# Patient Record
Sex: MALE | Race: WHITE | Employment: OTHER | URBAN - METROPOLITAN AREA
[De-identification: names, ages, dates, MRNs, and addresses within clinical notes are randomized per-mention and may not be internally consistent; named-entity substitution may affect disease eponyms.]

---

## 2019-08-16 ENCOUNTER — HOSPITAL ENCOUNTER (INPATIENT)
Age: 69
LOS: 3 days | Discharge: HOME HEALTH | DRG: 871 | End: 2019-08-19
Attending: EMERGENCY MEDICINE | Admitting: INTERNAL MEDICINE
Payer: MEDICARE

## 2019-08-16 ENCOUNTER — APPOINTMENT (OUTPATIENT)
Dept: GENERAL RADIOLOGY | Age: 69
DRG: 871 | End: 2019-08-16
Payer: MEDICARE

## 2019-08-16 DIAGNOSIS — N17.9 AKI (ACUTE KIDNEY INJURY) (HCC): ICD-10-CM

## 2019-08-16 DIAGNOSIS — J18.9 PNEUMONIA DUE TO ORGANISM: Primary | ICD-10-CM

## 2019-08-16 PROBLEM — A41.9 SEPSIS (HCC): Status: ACTIVE | Noted: 2019-08-16

## 2019-08-16 LAB
ABSOLUTE EOS #: 0 K/UL (ref 0–0.4)
ABSOLUTE IMMATURE GRANULOCYTE: 0.14 K/UL (ref 0–0.3)
ABSOLUTE LYMPH #: 0.72 K/UL (ref 1–4.8)
ABSOLUTE MONO #: 0.57 K/UL (ref 0.1–0.8)
ALLEN TEST: ABNORMAL
ANION GAP SERPL CALCULATED.3IONS-SCNC: 18 MMOL/L (ref 9–17)
ANION GAP: 12 MMOL/L (ref 7–16)
BASOPHILS # BLD: 0 % (ref 0–2)
BASOPHILS ABSOLUTE: 0 K/UL (ref 0–0.2)
BUN BLDV-MCNC: 42 MG/DL (ref 8–23)
BUN/CREAT BLD: ABNORMAL (ref 9–20)
CALCIUM SERPL-MCNC: 8.3 MG/DL (ref 8.6–10.4)
CHLORIDE BLD-SCNC: 92 MMOL/L (ref 98–107)
CO2: 19 MMOL/L (ref 20–31)
CREAT SERPL-MCNC: 2.51 MG/DL (ref 0.7–1.2)
DIFFERENTIAL TYPE: ABNORMAL
EOSINOPHILS RELATIVE PERCENT: 0 % (ref 1–4)
FIO2: ABNORMAL
GFR AFRICAN AMERICAN: 31 ML/MIN
GFR NON-AFRICAN AMERICAN: 26 ML/MIN
GFR NON-AFRICAN AMERICAN: 27 ML/MIN
GFR SERPL CREATININE-BSD FRML MDRD: 32 ML/MIN
GFR SERPL CREATININE-BSD FRML MDRD: ABNORMAL ML/MIN/{1.73_M2}
GLUCOSE BLD-MCNC: 182 MG/DL (ref 70–99)
GLUCOSE BLD-MCNC: 189 MG/DL (ref 74–100)
HCO3 VENOUS: 20.1 MMOL/L (ref 22–29)
HCT VFR BLD CALC: 41.1 % (ref 40.7–50.3)
HEMOGLOBIN: 13.4 G/DL (ref 13–17)
IMMATURE GRANULOCYTES: 1 %
LACTIC ACID, WHOLE BLOOD: 3.2 MMOL/L (ref 0.7–2.1)
LACTIC ACID: ABNORMAL MMOL/L
LYMPHOCYTES # BLD: 5 % (ref 24–44)
MCH RBC QN AUTO: 28.6 PG (ref 25.2–33.5)
MCHC RBC AUTO-ENTMCNC: 32.6 G/DL (ref 28.4–34.8)
MCV RBC AUTO: 87.8 FL (ref 82.6–102.9)
MODE: ABNORMAL
MONOCYTES # BLD: 4 % (ref 1–7)
MORPHOLOGY: ABNORMAL
NEGATIVE BASE EXCESS, VEN: 2 (ref 0–2)
NRBC AUTOMATED: 0 PER 100 WBC
O2 DEVICE/FLOW/%: ABNORMAL
O2 SAT, VEN: 65 % (ref 60–85)
PATIENT TEMP: ABNORMAL
PCO2, VEN: 26.1 MM HG (ref 41–51)
PDW BLD-RTO: 12.7 % (ref 11.8–14.4)
PH VENOUS: 7.5 (ref 7.32–7.43)
PLATELET # BLD: ABNORMAL K/UL (ref 138–453)
PLATELET ESTIMATE: ABNORMAL
PLATELET, FLUORESCENCE: 131 K/UL (ref 138–453)
PLATELET, IMMATURE FRACTION: 8.5 % (ref 1.1–10.3)
PMV BLD AUTO: ABNORMAL FL (ref 8.1–13.5)
PO2, VEN: 30 MM HG (ref 30–50)
POC CHLORIDE: 96 MMOL/L (ref 98–107)
POC CREATININE: 2.44 MG/DL (ref 0.51–1.19)
POC HEMATOCRIT: 44 % (ref 41–53)
POC HEMOGLOBIN: 15.1 G/DL (ref 13.5–17.5)
POC IONIZED CALCIUM: 0.96 MMOL/L (ref 1.15–1.33)
POC LACTIC ACID: 2.66 MMOL/L (ref 0.56–1.39)
POC PCO2 TEMP: ABNORMAL MM HG
POC PH TEMP: ABNORMAL
POC PO2 TEMP: ABNORMAL MM HG
POC POTASSIUM: 4.2 MMOL/L (ref 3.5–4.5)
POC SODIUM: 128 MMOL/L (ref 138–146)
POSITIVE BASE EXCESS, VEN: ABNORMAL (ref 0–3)
POTASSIUM SERPL-SCNC: 4.1 MMOL/L (ref 3.7–5.3)
RBC # BLD: 4.68 M/UL (ref 4.21–5.77)
RBC # BLD: ABNORMAL 10*6/UL
SAMPLE SITE: ABNORMAL
SEG NEUTROPHILS: 90 % (ref 36–66)
SEGMENTED NEUTROPHILS ABSOLUTE COUNT: 12.87 K/UL (ref 1.8–7.7)
SODIUM BLD-SCNC: 129 MMOL/L (ref 135–144)
TOTAL CO2, VENOUS: 21 MMOL/L (ref 23–30)
TROPONIN INTERP: ABNORMAL
TROPONIN T: ABNORMAL NG/ML
TROPONIN, HIGH SENSITIVITY: 32 NG/L (ref 0–22)
WBC # BLD: 14.3 K/UL (ref 3.5–11.3)
WBC # BLD: ABNORMAL 10*3/UL

## 2019-08-16 PROCEDURE — 99285 EMERGENCY DEPT VISIT HI MDM: CPT

## 2019-08-16 PROCEDURE — 2060000000 HC ICU INTERMEDIATE R&B

## 2019-08-16 PROCEDURE — 82565 ASSAY OF CREATININE: CPT

## 2019-08-16 PROCEDURE — 85055 RETICULATED PLATELET ASSAY: CPT

## 2019-08-16 PROCEDURE — 71046 X-RAY EXAM CHEST 2 VIEWS: CPT

## 2019-08-16 PROCEDURE — 6360000002 HC RX W HCPCS: Performed by: NURSE PRACTITIONER

## 2019-08-16 PROCEDURE — 87070 CULTURE OTHR SPECIMN AEROBIC: CPT

## 2019-08-16 PROCEDURE — 93005 ELECTROCARDIOGRAM TRACING: CPT | Performed by: STUDENT IN AN ORGANIZED HEALTH CARE EDUCATION/TRAINING PROGRAM

## 2019-08-16 PROCEDURE — 6360000002 HC RX W HCPCS: Performed by: STUDENT IN AN ORGANIZED HEALTH CARE EDUCATION/TRAINING PROGRAM

## 2019-08-16 PROCEDURE — 96367 TX/PROPH/DG ADDL SEQ IV INF: CPT

## 2019-08-16 PROCEDURE — 82947 ASSAY GLUCOSE BLOOD QUANT: CPT

## 2019-08-16 PROCEDURE — 36415 COLL VENOUS BLD VENIPUNCTURE: CPT

## 2019-08-16 PROCEDURE — 87040 BLOOD CULTURE FOR BACTERIA: CPT

## 2019-08-16 PROCEDURE — 84132 ASSAY OF SERUM POTASSIUM: CPT

## 2019-08-16 PROCEDURE — 2580000003 HC RX 258: Performed by: NURSE PRACTITIONER

## 2019-08-16 PROCEDURE — 85025 COMPLETE CBC W/AUTO DIFF WBC: CPT

## 2019-08-16 PROCEDURE — 84295 ASSAY OF SERUM SODIUM: CPT

## 2019-08-16 PROCEDURE — 80048 BASIC METABOLIC PNL TOTAL CA: CPT

## 2019-08-16 PROCEDURE — 83605 ASSAY OF LACTIC ACID: CPT

## 2019-08-16 PROCEDURE — 6370000000 HC RX 637 (ALT 250 FOR IP): Performed by: EMERGENCY MEDICINE

## 2019-08-16 PROCEDURE — 82803 BLOOD GASES ANY COMBINATION: CPT

## 2019-08-16 PROCEDURE — 87205 SMEAR GRAM STAIN: CPT

## 2019-08-16 PROCEDURE — 84484 ASSAY OF TROPONIN QUANT: CPT

## 2019-08-16 PROCEDURE — 82330 ASSAY OF CALCIUM: CPT

## 2019-08-16 PROCEDURE — 96365 THER/PROPH/DIAG IV INF INIT: CPT

## 2019-08-16 PROCEDURE — 2580000003 HC RX 258: Performed by: STUDENT IN AN ORGANIZED HEALTH CARE EDUCATION/TRAINING PROGRAM

## 2019-08-16 PROCEDURE — 82435 ASSAY OF BLOOD CHLORIDE: CPT

## 2019-08-16 PROCEDURE — 85014 HEMATOCRIT: CPT

## 2019-08-16 RX ORDER — SODIUM CHLORIDE 9 MG/ML
INJECTION, SOLUTION INTRAVENOUS CONTINUOUS
Status: DISCONTINUED | OUTPATIENT
Start: 2019-08-16 | End: 2019-08-18

## 2019-08-16 RX ORDER — BUDESONIDE AND FORMOTEROL FUMARATE DIHYDRATE 160; 4.5 UG/1; UG/1
2 AEROSOL RESPIRATORY (INHALATION) 2 TIMES DAILY
COMMUNITY

## 2019-08-16 RX ORDER — SODIUM CHLORIDE 0.9 % (FLUSH) 0.9 %
10 SYRINGE (ML) INJECTION EVERY 12 HOURS SCHEDULED
Status: DISCONTINUED | OUTPATIENT
Start: 2019-08-16 | End: 2019-08-19 | Stop reason: HOSPADM

## 2019-08-16 RX ORDER — ACETAMINOPHEN 325 MG/1
650 TABLET ORAL EVERY 4 HOURS PRN
Status: DISCONTINUED | OUTPATIENT
Start: 2019-08-16 | End: 2019-08-19 | Stop reason: HOSPADM

## 2019-08-16 RX ORDER — ALBUTEROL SULFATE 2.5 MG/3ML
2.5 SOLUTION RESPIRATORY (INHALATION)
Status: DISCONTINUED | OUTPATIENT
Start: 2019-08-16 | End: 2019-08-19 | Stop reason: HOSPADM

## 2019-08-16 RX ORDER — SODIUM CHLORIDE 0.9 % (FLUSH) 0.9 %
10 SYRINGE (ML) INJECTION PRN
Status: DISCONTINUED | OUTPATIENT
Start: 2019-08-16 | End: 2019-08-19 | Stop reason: HOSPADM

## 2019-08-16 RX ORDER — 0.9 % SODIUM CHLORIDE 0.9 %
30 INTRAVENOUS SOLUTION INTRAVENOUS ONCE
Status: COMPLETED | OUTPATIENT
Start: 2019-08-16 | End: 2019-08-16

## 2019-08-16 RX ORDER — ACETAMINOPHEN 500 MG
1000 TABLET ORAL ONCE
Status: COMPLETED | OUTPATIENT
Start: 2019-08-16 | End: 2019-08-16

## 2019-08-16 RX ADMIN — AZITHROMYCIN MONOHYDRATE 500 MG: 500 INJECTION, POWDER, LYOPHILIZED, FOR SOLUTION INTRAVENOUS at 19:20

## 2019-08-16 RX ADMIN — Medication 10 ML: at 21:10

## 2019-08-16 RX ADMIN — ACETAMINOPHEN 1000 MG: 500 TABLET ORAL at 18:20

## 2019-08-16 RX ADMIN — SODIUM CHLORIDE 2790 ML: 9 INJECTION, SOLUTION INTRAVENOUS at 17:31

## 2019-08-16 RX ADMIN — SODIUM CHLORIDE: 9 INJECTION, SOLUTION INTRAVENOUS at 21:07

## 2019-08-16 RX ADMIN — CEFTRIAXONE SODIUM 2 G: 2 INJECTION, POWDER, FOR SOLUTION INTRAMUSCULAR; INTRAVENOUS at 18:49

## 2019-08-16 RX ADMIN — ENOXAPARIN SODIUM 40 MG: 40 INJECTION SUBCUTANEOUS at 21:57

## 2019-08-16 ASSESSMENT — ENCOUNTER SYMPTOMS
WHEEZING: 0
SHORTNESS OF BREATH: 1
NAUSEA: 0
EYES NEGATIVE: 1
BLOOD IN STOOL: 0
CHEST TIGHTNESS: 0
VOMITING: 0
STRIDOR: 0
ALLERGIC/IMMUNOLOGIC NEGATIVE: 1
DIARRHEA: 1
COUGH: 1

## 2019-08-16 ASSESSMENT — PAIN SCALES - GENERAL
PAINLEVEL_OUTOF10: 0
PAINLEVEL_OUTOF10: 0

## 2019-08-16 NOTE — ED PROVIDER NOTES
I performed a history and physical examination of the patient and discussed management with the resident. I reviewed the residents note and agree with the documented findings and plan of care. Any areas of disagreement are noted on the chart. I was personally present for the key portions of any procedures. I have documented in the chart those procedures where I was not present during the key portions. I have reviewed the emergency nurses triage note. I agree with the chief complaint, past medical history, past surgical history, allergies, medications, social and family history as documented unless otherwise noted below. Documentation of the HPI, Physical Exam and Medical Decision Making performed by medical students or scribes is based on my personal performance of the HPI, PE and MDM. For Phys Assistant/ Nurse Practitioner cases/documentation I have personally evaluated this patient and have completed at least one if not all key elements of the E/M (history, physical exam, and MDM). I find the patient's history and physical exam are consistent with the NP/PA documentation. I agree with the care provided, treatment rendered, disposition and followup plan. Additional findings are as noted. Larissa Haynes. Preston Dallas MD  Attending Emergency  Physician    C/O NONPROD COUGH, SOB FOR SEV DAYS. NO CHEST PAIN, VOMITING, HEMOPTYSIS, FEVER, CHILLS. HX OF COPD. HAS HAD SEV LOOSE/WATERY STOOLS LATELY, NONE TODAY. NO HEMATEMESIS, MELENA, HEMATOCHEZIA. HX OF COPD. NONSMOKER. AWAKE, ALERT, COOP, RESP. LUNGS DIMINISHED AIR ENTRY RIGOBERTO THROUGHOUT, POS RALES LEFT BASE. NO RHONCHI, WHEEZES, STRIDOR, RETRACTIONS. CARDIAC-S1S2, RRR, NO MRG. ABD SOFT, NONDISTENDED, NONTENDER. NORMAL BOWEL SOUNDS. CXR-LLL INFILTRATE. IMP-LLL PNEUMONIA, COPD, SEPSIS. PLAN-LAB RESULTS REVIEWED-CONCERN FOR SEPSIS. WILL INITIATE IV FLUID RESUSCITATION, OBTAIN APPROPRIATE CULTURES, INITIATE ANTIBIOTICS, PURSUE ADMISSION TO STEPDOWN UNIT.

## 2019-08-16 NOTE — ED PROVIDER NOTES
and headaches. Hematological: Negative. Psychiatric/Behavioral: Negative. 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 has no past surgical history on file.       Social History     Socioeconomic History    Marital status:      Spouse name: Not on file    Number of children: Not on file    Years of education: Not on file    Highest education level: Not on file   Occupational History    Not on file   Social Needs    Financial resource strain: Not on file    Food insecurity:     Worry: Not on file     Inability: Not on file    Transportation needs:     Medical: Not on file     Non-medical: Not on file   Tobacco Use    Smoking status: Former Smoker     Last attempt to quit: 10/22/2010     Years since quittin.8    Smokeless tobacco: Never Used   Substance and Sexual Activity    Alcohol use: No    Drug use: No    Sexual activity: Not on file   Lifestyle    Physical activity:     Days per week: Not on file     Minutes per session: Not on file    Stress: Not on file   Relationships    Social connections:     Talks on phone: Not on file     Gets together: Not on file     Attends Confucianist service: Not on file     Active member of club or organization: Not on file     Attends meetings of clubs or organizations: Not on file     Relationship status: Not on file    Intimate partner violence:     Fear of current or ex partner: Not on file     Emotionally abused: Not on file     Physically abused: Not on file     Forced sexual activity: Not on file   Other Topics Concern    (POC)   Result Value Ref Range    POC Potassium 4.2 3.5 - 4.5 mmol/L   CHLORIDE (POC)   Result Value Ref Range    POC Chloride 96 (L) 98 - 107 mmol/L   CALCIUM, IONIC (POC)   Result Value Ref Range    POC Ionized Calcium 0.96 (L) 1.15 - 1.33 mmol/L   CBC Auto Differential   Result Value Ref Range    WBC 14.3 (H) 3.5 - 11.3 k/uL    RBC 4.68 4.21 - 5.77 m/uL    Hemoglobin 13.4 13.0 - 17.0 g/dL    Hematocrit 41.1 40.7 - 50.3 %    MCV 87.8 82.6 - 102.9 fL    MCH 28.6 25.2 - 33.5 pg    MCHC 32.6 28.4 - 34.8 g/dL    RDW 12.7 11.8 - 14.4 %    Platelets See Reflexed IPF Result 138 - 453 k/uL    MPV NOT REPORTED 8.1 - 13.5 fL    NRBC Automated 0.0 0.0 per 100 WBC    Differential Type NOT REPORTED     WBC Morphology NOT REPORTED     RBC Morphology NOT REPORTED     Platelet Estimate NOT REPORTED     Immature Granulocytes 1 (H) 0 %    Seg Neutrophils 90 (H) 36 - 66 %    Lymphocytes 5 (L) 24 - 44 %    Monocytes 4 1 - 7 %    Eosinophils % 0 (L) 1 - 4 %    Basophils 0 0 - 2 %    Absolute Immature Granulocyte 0.14 0.00 - 0.30 k/uL    Segs Absolute 12.87 (H) 1.8 - 7.7 k/uL    Absolute Lymph # 0.72 (L) 1.0 - 4.8 k/uL    Absolute Mono # 0.57 0.1 - 0.8 k/uL    Absolute Eos # 0.00 0.0 - 0.4 k/uL    Basophils # 0.00 0.0 - 0.2 k/uL    Morphology INCREASED BANDS PRESENT    Troponin   Result Value Ref Range    Troponin, High Sensitivity 32 (H) 0 - 22 ng/L    Troponin T NOT REPORTED <0.03 ng/mL    Troponin Interp NOT REPORTED    Basic Metabolic Panel w/ Reflex to MG   Result Value Ref Range    Glucose 182 (H) 70 - 99 mg/dL    BUN 42 (H) 8 - 23 mg/dL    CREATININE 2.51 (H) 0.70 - 1.20 mg/dL    Bun/Cre Ratio NOT REPORTED 9 - 20    Calcium 8.3 (L) 8.6 - 10.4 mg/dL    Sodium 129 (L) 135 - 144 mmol/L    Potassium 4.1 3.7 - 5.3 mmol/L    Chloride 92 (L) 98 - 107 mmol/L    CO2 19 (L) 20 - 31 mmol/L    Anion Gap 18 (H) 9 - 17 mmol/L    GFR Non-African American 26 (L) >60 mL/min    GFR  31 (L) >60 mL/min    GFR Comment          GFR

## 2019-08-17 ENCOUNTER — APPOINTMENT (OUTPATIENT)
Dept: ULTRASOUND IMAGING | Age: 69
DRG: 871 | End: 2019-08-17
Payer: MEDICARE

## 2019-08-17 PROBLEM — J18.9 PNEUMONIA DUE TO INFECTIOUS ORGANISM: Status: ACTIVE | Noted: 2019-08-17

## 2019-08-17 PROBLEM — R33.9 URINARY RETENTION: Status: ACTIVE | Noted: 2019-08-17

## 2019-08-17 PROBLEM — J96.01 ACUTE RESPIRATORY FAILURE WITH HYPOXIA (HCC): Status: ACTIVE | Noted: 2019-08-17

## 2019-08-17 PROBLEM — N17.9 AKI (ACUTE KIDNEY INJURY) (HCC): Status: ACTIVE | Noted: 2019-08-17

## 2019-08-17 PROBLEM — R19.7 DIARRHEA: Status: ACTIVE | Noted: 2019-08-17

## 2019-08-17 LAB
-: ABNORMAL
ABSOLUTE EOS #: 0 K/UL (ref 0–0.4)
ABSOLUTE IMMATURE GRANULOCYTE: 0 K/UL (ref 0–0.3)
ABSOLUTE LYMPH #: 0.11 K/UL (ref 1–4.8)
ABSOLUTE MONO #: 0.33 K/UL (ref 0.1–0.8)
ALBUMIN SERPL-MCNC: 2.3 G/DL (ref 3.5–5.2)
ALBUMIN/GLOBULIN RATIO: 0.8 (ref 1–2.5)
ALP BLD-CCNC: 62 U/L (ref 40–129)
ALT SERPL-CCNC: 33 U/L (ref 5–41)
AMORPHOUS: ABNORMAL
ANION GAP SERPL CALCULATED.3IONS-SCNC: 14 MMOL/L (ref 9–17)
ANION GAP SERPL CALCULATED.3IONS-SCNC: 15 MMOL/L (ref 9–17)
AST SERPL-CCNC: 53 U/L
BACTERIA: ABNORMAL
BASOPHILS # BLD: 0 % (ref 0–2)
BASOPHILS ABSOLUTE: 0 K/UL (ref 0–0.2)
BILIRUB SERPL-MCNC: 0.57 MG/DL (ref 0.3–1.2)
BILIRUBIN URINE: NEGATIVE
BUN BLDV-MCNC: 47 MG/DL (ref 8–23)
BUN BLDV-MCNC: 50 MG/DL (ref 8–23)
BUN/CREAT BLD: ABNORMAL (ref 9–20)
BUN/CREAT BLD: ABNORMAL (ref 9–20)
CALCIUM IONIZED: 0.97 MMOL/L (ref 1.13–1.33)
CALCIUM SERPL-MCNC: 7 MG/DL (ref 8.6–10.4)
CALCIUM SERPL-MCNC: 7.4 MG/DL (ref 8.6–10.4)
CASTS UA: ABNORMAL /LPF (ref 0–2)
CHLORIDE BLD-SCNC: 101 MMOL/L (ref 98–107)
CHLORIDE BLD-SCNC: 105 MMOL/L (ref 98–107)
CO2: 16 MMOL/L (ref 20–31)
CO2: 18 MMOL/L (ref 20–31)
COLOR: ABNORMAL
COMMENT UA: ABNORMAL
COMPLEMENT C3: 120 MG/DL (ref 90–180)
COMPLEMENT C4: 31 MG/DL (ref 10–40)
CREAT SERPL-MCNC: 2.97 MG/DL (ref 0.7–1.2)
CREAT SERPL-MCNC: 3.21 MG/DL (ref 0.7–1.2)
CRYSTALS, UA: ABNORMAL /HPF
CULTURE: NORMAL
DIFFERENTIAL TYPE: ABNORMAL
DIRECT EXAM: ABNORMAL
DIRECT EXAM: ABNORMAL
DIRECT EXAM: NORMAL
DIRECT EXAM: NORMAL
EKG ATRIAL RATE: 134 BPM
EKG P AXIS: 31 DEGREES
EKG P-R INTERVAL: 144 MS
EKG Q-T INTERVAL: 310 MS
EKG QRS DURATION: 130 MS
EKG QTC CALCULATION (BAZETT): 462 MS
EKG R AXIS: 82 DEGREES
EKG T AXIS: 29 DEGREES
EKG VENTRICULAR RATE: 134 BPM
EOSINOPHILS RELATIVE PERCENT: 0 % (ref 1–4)
EPITHELIAL CELLS UA: ABNORMAL /HPF (ref 0–5)
FREE KAPPA/LAMBDA RATIO: 1.57 (ref 0.26–1.65)
GFR AFRICAN AMERICAN: 24 ML/MIN
GFR AFRICAN AMERICAN: 26 ML/MIN
GFR NON-AFRICAN AMERICAN: 19 ML/MIN
GFR NON-AFRICAN AMERICAN: 21 ML/MIN
GFR SERPL CREATININE-BSD FRML MDRD: ABNORMAL ML/MIN/{1.73_M2}
GLUCOSE BLD-MCNC: 103 MG/DL (ref 75–110)
GLUCOSE BLD-MCNC: 109 MG/DL (ref 70–99)
GLUCOSE BLD-MCNC: 126 MG/DL (ref 70–99)
GLUCOSE BLD-MCNC: 179 MG/DL (ref 75–110)
GLUCOSE URINE: NEGATIVE
HCT VFR BLD CALC: 35.6 % (ref 40.7–50.3)
HCT VFR BLD CALC: 39.4 % (ref 40.7–50.3)
HEMOGLOBIN: 11.3 G/DL (ref 13–17)
HEMOGLOBIN: 12.7 G/DL (ref 13–17)
IMMATURE GRANULOCYTES: 0 %
INR BLD: 1
KAPPA FREE LIGHT CHAINS QNT: 4.37 MG/DL (ref 0.37–1.94)
KETONES, URINE: NEGATIVE
LACTIC ACID, SEPSIS WHOLE BLOOD: 1.8 MMOL/L (ref 0.5–1.9)
LACTIC ACID, SEPSIS: NORMAL MMOL/L (ref 0.5–1.9)
LACTOFERRIN, QUAL: NORMAL
LAMBDA FREE LIGHT CHAINS QNT: 2.78 MG/DL (ref 0.57–2.63)
LEUKOCYTE ESTERASE, URINE: NEGATIVE
LYMPHOCYTES # BLD: 1 % (ref 24–44)
Lab: ABNORMAL
Lab: NORMAL
Lab: NORMAL
MAGNESIUM: 1.9 MG/DL (ref 1.6–2.6)
MCH RBC QN AUTO: 28.2 PG (ref 25.2–33.5)
MCH RBC QN AUTO: 29.1 PG (ref 25.2–33.5)
MCHC RBC AUTO-ENTMCNC: 31.7 G/DL (ref 28.4–34.8)
MCHC RBC AUTO-ENTMCNC: 32.2 G/DL (ref 28.4–34.8)
MCV RBC AUTO: 88.8 FL (ref 82.6–102.9)
MCV RBC AUTO: 90.4 FL (ref 82.6–102.9)
MONOCYTES # BLD: 3 % (ref 1–7)
MORPHOLOGY: ABNORMAL
MUCUS: ABNORMAL
NITRITE, URINE: NEGATIVE
NRBC AUTOMATED: 0 PER 100 WBC
NRBC AUTOMATED: 0 PER 100 WBC
OTHER OBSERVATIONS UA: ABNORMAL
PDW BLD-RTO: 12.9 % (ref 11.8–14.4)
PDW BLD-RTO: 13.2 % (ref 11.8–14.4)
PH UA: 5 (ref 5–8)
PHOSPHORUS: 2.8 MG/DL (ref 2.5–4.5)
PLATELET # BLD: ABNORMAL K/UL (ref 138–453)
PLATELET # BLD: ABNORMAL K/UL (ref 138–453)
PLATELET ESTIMATE: ABNORMAL
PLATELET, FLUORESCENCE: 104 K/UL (ref 138–453)
PLATELET, FLUORESCENCE: 97 K/UL (ref 138–453)
PLATELET, IMMATURE FRACTION: 9.1 % (ref 1.1–10.3)
PLATELET, IMMATURE FRACTION: 9.8 % (ref 1.1–10.3)
PMV BLD AUTO: ABNORMAL FL (ref 8.1–13.5)
PMV BLD AUTO: ABNORMAL FL (ref 8.1–13.5)
POTASSIUM SERPL-SCNC: 3.7 MMOL/L (ref 3.7–5.3)
POTASSIUM SERPL-SCNC: 4 MMOL/L (ref 3.7–5.3)
PROTEIN UA: ABNORMAL
PROTHROMBIN TIME: 10.8 SEC (ref 9–12)
RBC # BLD: 4.01 M/UL (ref 4.21–5.77)
RBC # BLD: 4.36 M/UL (ref 4.21–5.77)
RBC # BLD: ABNORMAL 10*6/UL
RBC UA: ABNORMAL /HPF (ref 0–2)
RENAL EPITHELIAL, UA: ABNORMAL /HPF
SEG NEUTROPHILS: 96 % (ref 36–66)
SEGMENTED NEUTROPHILS ABSOLUTE COUNT: 10.56 K/UL (ref 1.8–7.7)
SODIUM BLD-SCNC: 133 MMOL/L (ref 135–144)
SODIUM BLD-SCNC: 136 MMOL/L (ref 135–144)
SPECIFIC GRAVITY UA: 1.02 (ref 1–1.03)
SPECIMEN DESCRIPTION: ABNORMAL
SPECIMEN DESCRIPTION: NORMAL
SPECIMEN DESCRIPTION: NORMAL
TOTAL PROTEIN: 5.3 G/DL (ref 6.4–8.3)
TRICHOMONAS: ABNORMAL
TROPONIN INTERP: ABNORMAL
TROPONIN T: ABNORMAL NG/ML
TROPONIN, HIGH SENSITIVITY: 32 NG/L (ref 0–22)
TURBIDITY: ABNORMAL
URINE HGB: ABNORMAL
UROBILINOGEN, URINE: NORMAL
WBC # BLD: 11 K/UL (ref 3.5–11.3)
WBC # BLD: 9.8 K/UL (ref 3.5–11.3)
WBC # BLD: ABNORMAL 10*3/UL
WBC UA: ABNORMAL /HPF (ref 0–5)
YEAST: ABNORMAL

## 2019-08-17 PROCEDURE — 84155 ASSAY OF PROTEIN SERUM: CPT

## 2019-08-17 PROCEDURE — 94640 AIRWAY INHALATION TREATMENT: CPT

## 2019-08-17 PROCEDURE — 84484 ASSAY OF TROPONIN QUANT: CPT

## 2019-08-17 PROCEDURE — 6370000000 HC RX 637 (ALT 250 FOR IP): Performed by: STUDENT IN AN ORGANIZED HEALTH CARE EDUCATION/TRAINING PROGRAM

## 2019-08-17 PROCEDURE — 87324 CLOSTRIDIUM AG IA: CPT

## 2019-08-17 PROCEDURE — 83516 IMMUNOASSAY NONANTIBODY: CPT

## 2019-08-17 PROCEDURE — 6360000002 HC RX W HCPCS: Performed by: NURSE PRACTITIONER

## 2019-08-17 PROCEDURE — 97162 PT EVAL MOD COMPLEX 30 MIN: CPT

## 2019-08-17 PROCEDURE — 6370000000 HC RX 637 (ALT 250 FOR IP): Performed by: NURSE PRACTITIONER

## 2019-08-17 PROCEDURE — 84166 PROTEIN E-PHORESIS/URINE/CSF: CPT

## 2019-08-17 PROCEDURE — 85610 PROTHROMBIN TIME: CPT

## 2019-08-17 PROCEDURE — 81001 URINALYSIS AUTO W/SCOPE: CPT

## 2019-08-17 PROCEDURE — 36415 COLL VENOUS BLD VENIPUNCTURE: CPT

## 2019-08-17 PROCEDURE — 80053 COMPREHEN METABOLIC PANEL: CPT

## 2019-08-17 PROCEDURE — 86335 IMMUNFIX E-PHORSIS/URINE/CSF: CPT

## 2019-08-17 PROCEDURE — 87140 CULTURE TYPE IMMUNOFLUORESC: CPT

## 2019-08-17 PROCEDURE — 82330 ASSAY OF CALCIUM: CPT

## 2019-08-17 PROCEDURE — 87506 IADNA-DNA/RNA PROBE TQ 6-11: CPT

## 2019-08-17 PROCEDURE — 84100 ASSAY OF PHOSPHORUS: CPT

## 2019-08-17 PROCEDURE — 85055 RETICULATED PLATELET ASSAY: CPT

## 2019-08-17 PROCEDURE — 87449 NOS EACH ORGANISM AG IA: CPT

## 2019-08-17 PROCEDURE — 85025 COMPLETE CBC W/AUTO DIFF WBC: CPT

## 2019-08-17 PROCEDURE — 82947 ASSAY GLUCOSE BLOOD QUANT: CPT

## 2019-08-17 PROCEDURE — 93005 ELECTROCARDIOGRAM TRACING: CPT | Performed by: INTERNAL MEDICINE

## 2019-08-17 PROCEDURE — 80048 BASIC METABOLIC PNL TOTAL CA: CPT

## 2019-08-17 PROCEDURE — 6360000002 HC RX W HCPCS: Performed by: INTERNAL MEDICINE

## 2019-08-17 PROCEDURE — 83630 LACTOFERRIN FECAL (QUAL): CPT

## 2019-08-17 PROCEDURE — 83883 ASSAY NEPHELOMETRY NOT SPEC: CPT

## 2019-08-17 PROCEDURE — 85027 COMPLETE CBC AUTOMATED: CPT

## 2019-08-17 PROCEDURE — 93010 ELECTROCARDIOGRAM REPORT: CPT | Performed by: INTERNAL MEDICINE

## 2019-08-17 PROCEDURE — 87300 AG DETECTION POLYVAL IF: CPT

## 2019-08-17 PROCEDURE — 83036 HEMOGLOBIN GLYCOSYLATED A1C: CPT

## 2019-08-17 PROCEDURE — 2580000003 HC RX 258: Performed by: NURSE PRACTITIONER

## 2019-08-17 PROCEDURE — 86160 COMPLEMENT ANTIGEN: CPT

## 2019-08-17 PROCEDURE — 87015 SPECIMEN INFECT AGNT CONCNTJ: CPT

## 2019-08-17 PROCEDURE — 84156 ASSAY OF PROTEIN URINE: CPT

## 2019-08-17 PROCEDURE — 76770 US EXAM ABDO BACK WALL COMP: CPT

## 2019-08-17 PROCEDURE — 87252 VIRUS INOCULATION TISSUE: CPT

## 2019-08-17 PROCEDURE — 84165 PROTEIN E-PHORESIS SERUM: CPT

## 2019-08-17 PROCEDURE — 2700000000 HC OXYGEN THERAPY PER DAY

## 2019-08-17 PROCEDURE — 87070 CULTURE OTHR SPECIMN AEROBIC: CPT

## 2019-08-17 PROCEDURE — 99223 1ST HOSP IP/OBS HIGH 75: CPT | Performed by: INTERNAL MEDICINE

## 2019-08-17 PROCEDURE — 6370000000 HC RX 637 (ALT 250 FOR IP): Performed by: INTERNAL MEDICINE

## 2019-08-17 PROCEDURE — 83735 ASSAY OF MAGNESIUM: CPT

## 2019-08-17 PROCEDURE — 97530 THERAPEUTIC ACTIVITIES: CPT

## 2019-08-17 PROCEDURE — 2060000000 HC ICU INTERMEDIATE R&B

## 2019-08-17 RX ORDER — METHYLPREDNISOLONE SODIUM SUCCINATE 40 MG/ML
40 INJECTION, POWDER, LYOPHILIZED, FOR SOLUTION INTRAMUSCULAR; INTRAVENOUS EVERY 8 HOURS
Status: DISCONTINUED | OUTPATIENT
Start: 2019-08-17 | End: 2019-08-18

## 2019-08-17 RX ORDER — NICOTINE POLACRILEX 4 MG
15 LOZENGE BUCCAL PRN
Status: DISCONTINUED | OUTPATIENT
Start: 2019-08-17 | End: 2019-08-19 | Stop reason: HOSPADM

## 2019-08-17 RX ORDER — HEPARIN SODIUM 5000 [USP'U]/ML
5000 INJECTION, SOLUTION INTRAVENOUS; SUBCUTANEOUS EVERY 8 HOURS SCHEDULED
Status: DISCONTINUED | OUTPATIENT
Start: 2019-08-18 | End: 2019-08-19 | Stop reason: HOSPADM

## 2019-08-17 RX ORDER — ACETAMINOPHEN 500 MG
500 TABLET ORAL ONCE
Status: COMPLETED | OUTPATIENT
Start: 2019-08-17 | End: 2019-08-17

## 2019-08-17 RX ORDER — PANTOPRAZOLE SODIUM 20 MG/1
20 TABLET, DELAYED RELEASE ORAL
Status: DISCONTINUED | OUTPATIENT
Start: 2019-08-18 | End: 2019-08-19 | Stop reason: HOSPADM

## 2019-08-17 RX ORDER — DEXTROSE MONOHYDRATE 50 MG/ML
100 INJECTION, SOLUTION INTRAVENOUS PRN
Status: DISCONTINUED | OUTPATIENT
Start: 2019-08-17 | End: 2019-08-19 | Stop reason: HOSPADM

## 2019-08-17 RX ORDER — DEXTROSE MONOHYDRATE 25 G/50ML
12.5 INJECTION, SOLUTION INTRAVENOUS PRN
Status: DISCONTINUED | OUTPATIENT
Start: 2019-08-17 | End: 2019-08-19 | Stop reason: HOSPADM

## 2019-08-17 RX ORDER — TAMSULOSIN HYDROCHLORIDE 0.4 MG/1
0.4 CAPSULE ORAL DAILY
Status: DISCONTINUED | OUTPATIENT
Start: 2019-08-17 | End: 2019-08-19 | Stop reason: HOSPADM

## 2019-08-17 RX ORDER — ALBUTEROL SULFATE 2.5 MG/3ML
2.5 SOLUTION RESPIRATORY (INHALATION)
Status: DISCONTINUED | OUTPATIENT
Start: 2019-08-17 | End: 2019-08-19 | Stop reason: HOSPADM

## 2019-08-17 RX ADMIN — MOMETASONE FUROATE AND FORMOTEROL FUMARATE DIHYDRATE 2 PUFF: 200; 5 AEROSOL RESPIRATORY (INHALATION) at 08:35

## 2019-08-17 RX ADMIN — Medication 10 ML: at 20:51

## 2019-08-17 RX ADMIN — SODIUM CHLORIDE: 9 INJECTION, SOLUTION INTRAVENOUS at 22:13

## 2019-08-17 RX ADMIN — ACETAMINOPHEN 650 MG: 325 TABLET ORAL at 03:57

## 2019-08-17 RX ADMIN — Medication 10 ML: at 08:46

## 2019-08-17 RX ADMIN — MOMETASONE FUROATE AND FORMOTEROL FUMARATE DIHYDRATE 2 PUFF: 200; 5 AEROSOL RESPIRATORY (INHALATION) at 19:37

## 2019-08-17 RX ADMIN — ENOXAPARIN SODIUM 40 MG: 40 INJECTION SUBCUTANEOUS at 08:46

## 2019-08-17 RX ADMIN — CEFTRIAXONE SODIUM 1 G: 1 INJECTION, POWDER, FOR SOLUTION INTRAMUSCULAR; INTRAVENOUS at 18:23

## 2019-08-17 RX ADMIN — TAMSULOSIN HYDROCHLORIDE 0.4 MG: 0.4 CAPSULE ORAL at 19:44

## 2019-08-17 RX ADMIN — TIOTROPIUM BROMIDE 18 MCG: 18 CAPSULE ORAL; RESPIRATORY (INHALATION) at 10:48

## 2019-08-17 RX ADMIN — METHYLPREDNISOLONE SODIUM SUCCINATE 40 MG: 40 INJECTION, POWDER, FOR SOLUTION INTRAMUSCULAR; INTRAVENOUS at 12:36

## 2019-08-17 RX ADMIN — ACETAMINOPHEN 500 MG: 500 TABLET ORAL at 05:40

## 2019-08-17 RX ADMIN — METHYLPREDNISOLONE SODIUM SUCCINATE 40 MG: 40 INJECTION, POWDER, FOR SOLUTION INTRAMUSCULAR; INTRAVENOUS at 19:44

## 2019-08-17 RX ADMIN — AZITHROMYCIN DIHYDRATE 500 MG: 500 INJECTION, POWDER, LYOPHILIZED, FOR SOLUTION INTRAVENOUS at 19:44

## 2019-08-17 NOTE — CONSULTS
Nephrology Consult Note    Reason for Consult: Elevated creatinine  Requesting Physician: Internal medicine    Chief Complaint: Feeling light did and dizzy   history Obtained From: Patient    History of Present Illness: This is a 80 y.o. male who presented with not feeling well for 2 to 3 days. Patient is a very pleasant gentleman. According to him he was admitted to [facility] 11 years ago at that time he had pneumonia which settle into his lungs. Lungs were drained and he had MRSA. Patient used to smoke but quit 40 years ago. He has a history of hypertension and treated with lisinopril. Without any history of hypertensive urgency and emergency. Burn to patient he was in his usual state of health. He was not feeling well from Monday. He was coughing a lot but not bringing up anything. He continues to take his medication. But his appetite was down and he started feeling lightheaded and dizzy. He was lightheaded to a point that he was not been able to walk therefore he came to the ER. In ER patient was tachycardic and hypotensive. His blood pressure was as low as 80 systolic. Patient also spiked up to 102 °F.  Patient was admitted with the diagnosis of possible pneumonia. He was started on antibiotic. Total he received 4 L of fluid in last 24 hours. Patient denies any history suggestive of benign prostatic hypertrophy. He denies any nocturia. There is no history of poor urinary stream and incomplete bladder emptying. Patient denies any consumption of over-the-counter herbal or natural medication. He regularly goes to [place]. He states that he was never been told that he had a elevated kidney number or renal dysfunction. She denies any consumption of over-the-counter herbal or natural medication. There is no history of kidney stones. Patient denies any nausea vomiting or diarrhea. There is no history of consumption of over-the-counter herbal or natural medication. He does not take NSAIDs on regular basis. 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 No pertinent surgical history. Current Medications:      mometasone-formoterol (DULERA) 200-5 MCG/ACT inhaler 2 puff BID   albuterol (PROVENTIL) nebulizer solution 2.5 mg As Directed RT PRN   tiotropium (SPIRIVA) inhalation capsule 18 mcg Daily   methylPREDNISolone sodium (SOLU-MEDROL) injection 40 mg Q8H   0.9 % sodium chloride infusion Continuous   sodium chloride flush 0.9 % injection 10 mL 2 times per day   sodium chloride flush 0.9 % injection 10 mL PRN   enoxaparin (LOVENOX) injection 40 mg Daily   acetaminophen (TYLENOL) tablet 650 mg Q4H PRN   albuterol (PROVENTIL) nebulizer solution 2.5 mg As Directed RT PRN   cefTRIAXone (ROCEPHIN) 1 g IVPB in 50 mL D5W minibag Q24H   azithromycin (ZITHROMAX) 500 mg in dextrose 5 % 250 mL IVPB Q24H       Allergies:  Patient has no known allergies.     Social History:   Social History     Socioeconomic History    Marital status:      Spouse name: Not on file    Number of children: Not on file    Years of education: Not on file    Highest education level: Not on file   Occupational History    Not on file   Social Needs    Financial resource strain: Not on file    Food insecurity:     Worry: Not on file     Inability: Not on file    Transportation needs:     Medical: Not on file     Non-medical: Not on file   Tobacco Use    Smoking status: Former Smoker     Last attempt to quit: 10/22/2010

## 2019-08-17 NOTE — H&P
of patient's decision     colonoscopy refused     Unspecified pleural effusion         Past Surgical History:     History reviewed. No pertinent surgical history. Medications Prior to Admission:     Prior to Admission medications    Medication Sig Start Date End Date Taking? Authorizing Provider   tiotropium (SPIRIVA RESPIMAT) 2.5 MCG/ACT AERS inhaler Inhale 2 puffs into the lungs daily   Yes Historical Provider, MD   budesonide-formoterol (SYMBICORT) 160-4.5 MCG/ACT AERO Inhale 2 puffs into the lungs 2 times daily   Yes Historical Provider, MD   salmeterol (SEREVENT DISKUS) 50 MCG/DOSE diskus inhaler Inhale 1 puff into the lungs 2 times daily. Yes Historical Provider, MD   predniSONE (DELTASONE) 10 MG tablet Take 2 tablets by mouth daily 2 daily x 3 days and then 1 daily x 4 days 6/15/15   Colin Abel MD   lisinopril (PRINIVIL;ZESTRIL) 5 MG tablet daily 2/16/15   Chris Ingram MD        Allergies:     Patient has no known allergies. Social History:     Tobacco:    reports that he quit smoking about 8 years ago. He has never used smokeless tobacco.  Alcohol:      reports that he does not drink alcohol. Drug Use:  reports that he does not use drugs. Family History:     Family History   Problem Relation Age of Onset    Diabetes Mother     Heart Disease Mother     Heart Disease Father     Heart Disease Paternal Uncle        Review of Systems:     Positive and Negative as described in HPI.     CONSTITUTIONAL: Positive for fevers and chills  HEENT:  negative for vision, hearing changes, runny nose, throat pain  RESPIRATORY: Positive for shortness of breath and cough  CARDIOVASCULAR:  negative for chest pain, palpitations  GASTROINTESTINAL:   negative for nausea or vomiting, positive for diarrhea gENITOURINARY:  negative for difficulty of urination, burning with urination, frequency   INTEGUMENT:  negative for rash, skin lesions, easy bruising   HEMATOLOGIC/LYMPHATIC:  negative for Collection Time: 08/16/19  5:32 PM   Result Value Ref Range    Glucose 182 (H) 70 - 99 mg/dL    BUN 42 (H) 8 - 23 mg/dL    CREATININE 2.51 (H) 0.70 - 1.20 mg/dL    Bun/Cre Ratio NOT REPORTED 9 - 20    Calcium 8.3 (L) 8.6 - 10.4 mg/dL    Sodium 129 (L) 135 - 144 mmol/L    Potassium 4.1 3.7 - 5.3 mmol/L    Chloride 92 (L) 98 - 107 mmol/L    CO2 19 (L) 20 - 31 mmol/L    Anion Gap 18 (H) 9 - 17 mmol/L    GFR Non-African American 26 (L) >60 mL/min    GFR  31 (L) >60 mL/min    GFR Comment          GFR Staging NOT REPORTED    Lactic Acid, Plasma    Collection Time: 08/16/19  5:32 PM   Result Value Ref Range    Lactic Acid NOT REPORTED mmol/L    Lactic Acid, Whole Blood 3.2 (H) 0.7 - 2.1 mmol/L   Immature Platelet Fraction    Collection Time: 08/16/19  5:32 PM   Result Value Ref Range    Platelet, Immature Fraction 8.5 1.1 - 10.3 %    Platelet, Fluorescence 131 (L) 138 - 453 k/uL   CULTURE BLOOD #1    Collection Time: 08/16/19 11:14 PM   Result Value Ref Range    Specimen Description . BLOOD     Special Requests RT ARM 10ML     Culture NO GROWTH 14 HOURS    Lactate, Sepsis    Collection Time: 08/16/19 11:14 PM   Result Value Ref Range    Lactic Acid, Sepsis NOT REPORTED 0.5 - 1.9 mmol/L    Lactic Acid, Sepsis, Whole Blood 1.8 0.5 - 1.9 mmol/L   CBC    Collection Time: 08/17/19  7:39 AM   Result Value Ref Range    WBC 9.8 3.5 - 11.3 k/uL    RBC 4.01 (L) 4.21 - 5.77 m/uL    Hemoglobin 11.3 (L) 13.0 - 17.0 g/dL    Hematocrit 35.6 (L) 40.7 - 50.3 %    MCV 88.8 82.6 - 102.9 fL    MCH 28.2 25.2 - 33.5 pg    MCHC 31.7 28.4 - 34.8 g/dL    RDW 12.9 11.8 - 14.4 %    Platelets See Reflexed IPF Result 138 - 453 k/uL    MPV NOT REPORTED 8.1 - 13.5 fL    NRBC Automated 0.0 0.0 per 100 WBC   Ionized Calcium    Collection Time: 08/17/19  7:39 AM   Result Value Ref Range    Calcium, Ion 0.97 (L) 1.13 - 1.33 mmol/L   Protime-INR    Collection Time: 08/17/19  7:39 AM   Result Value Ref Range    Protime 10.8 9.0 - 12.0 sec INR 1.0    Comprehensive Metabolic Panel w/ Reflex to MG    Collection Time: 08/17/19  7:39 AM   Result Value Ref Range    Glucose 109 (H) 70 - 99 mg/dL    BUN 47 (H) 8 - 23 mg/dL    CREATININE 3.21 (H) 0.70 - 1.20 mg/dL    Bun/Cre Ratio NOT REPORTED 9 - 20    Calcium 7.0 (L) 8.6 - 10.4 mg/dL    Sodium 136 135 - 144 mmol/L    Potassium 3.7 3.7 - 5.3 mmol/L    Chloride 105 98 - 107 mmol/L    CO2 16 (L) 20 - 31 mmol/L    Anion Gap 15 9 - 17 mmol/L    Alkaline Phosphatase 62 40 - 129 U/L    ALT 33 5 - 41 U/L    AST 53 (H) <40 U/L    Total Bilirubin 0.57 0.3 - 1.2 mg/dL    Total Protein 5.3 (L) 6.4 - 8.3 g/dL    Alb 2.3 (L) 3.5 - 5.2 g/dL    Albumin/Globulin Ratio 0.8 (L) 1.0 - 2.5    GFR Non- 19 (L) >60 mL/min    GFR  24 (L) >60 mL/min    GFR Comment          GFR Staging NOT REPORTED    Magnesium    Collection Time: 08/17/19  7:39 AM   Result Value Ref Range    Magnesium 1.9 1.6 - 2.6 mg/dL   Phosphorus    Collection Time: 08/17/19  7:39 AM   Result Value Ref Range    Phosphorus 2.8 2.5 - 4.5 mg/dL   Troponin    Collection Time: 08/17/19  7:39 AM   Result Value Ref Range    Troponin, High Sensitivity 32 (H) 0 - 22 ng/L    Troponin T NOT REPORTED <0.03 ng/mL    Troponin Interp NOT REPORTED    Immature Platelet Fraction    Collection Time: 08/17/19  7:39 AM   Result Value Ref Range    Platelet, Immature Fraction 9.8 1.1 - 10.3 %    Platelet, Fluorescence 97 (L) 138 - 453 k/uL   EKG 12 Lead    Collection Time: 08/17/19 10:35 AM   Result Value Ref Range    Ventricular Rate 93 BPM    Atrial Rate 93 BPM    P-R Interval 150 ms    QRS Duration 132 ms    Q-T Interval 370 ms    QTc Calculation (Bazett) 460 ms    P Axis 36 degrees    R Axis 10 degrees    T Axis 22 degrees   Urinalysis Reflex to Culture    Collection Time: 08/17/19  1:12 PM   Result Value Ref Range    Color, UA DARK YELLOW (A) YELLOW    Turbidity UA CLOUDY (A) CLEAR    Glucose, Ur NEGATIVE NEGATIVE    Bilirubin Urine NEGATIVE echocardiogram  8. Check hemoglobin A1c  9. DVT and GI prophylaxis    Consultations:   IP CONSULT TO HOSPITALIST  IP CONSULT TO CARDIOLOGY  IP CONSULT TO NEPHROLOGY  IP CONSULT TO UROLOGY     Patient is admitted as inpatient status because of co-morbidities listed above, severity of signs and symptoms as outlined, requirement for current medical therapies and most importantly because of direct risk to patient if care not provided in a hospital setting.     David Young MD  8/17/2019  2:23 PM    Copy sent to Dr. Yulisa Chen MD

## 2019-08-18 ENCOUNTER — APPOINTMENT (OUTPATIENT)
Dept: CT IMAGING | Age: 69
DRG: 871 | End: 2019-08-18
Payer: MEDICARE

## 2019-08-18 ENCOUNTER — APPOINTMENT (OUTPATIENT)
Dept: GENERAL RADIOLOGY | Age: 69
DRG: 871 | End: 2019-08-18
Payer: MEDICARE

## 2019-08-18 LAB
ALLEN TEST: POSITIVE
ANION GAP SERPL CALCULATED.3IONS-SCNC: 14 MMOL/L (ref 9–17)
BUN BLDV-MCNC: 45 MG/DL (ref 8–23)
BUN/CREAT BLD: ABNORMAL (ref 9–20)
C DIFF AG + TOXIN: NEGATIVE
CALCIUM SERPL-MCNC: 7.3 MG/DL (ref 8.6–10.4)
CAMPYLOBACTER PCR: NORMAL
CHLORIDE BLD-SCNC: 103 MMOL/L (ref 98–107)
CO2: 15 MMOL/L (ref 20–31)
CREAT SERPL-MCNC: 2.26 MG/DL (ref 0.7–1.2)
DIRECT EXAM: NORMAL
E COLI ENTEROTOXIGENIC PCR: NORMAL
ESTIMATED AVERAGE GLUCOSE: 117 MG/DL
FIO2: 1
GFR AFRICAN AMERICAN: 35 ML/MIN
GFR NON-AFRICAN AMERICAN: 29 ML/MIN
GFR SERPL CREATININE-BSD FRML MDRD: ABNORMAL ML/MIN/{1.73_M2}
GFR SERPL CREATININE-BSD FRML MDRD: ABNORMAL ML/MIN/{1.73_M2}
GLUCOSE BLD-MCNC: 130 MG/DL (ref 75–110)
GLUCOSE BLD-MCNC: 133 MG/DL (ref 70–99)
GLUCOSE BLD-MCNC: 147 MG/DL (ref 75–110)
GLUCOSE BLD-MCNC: 149 MG/DL (ref 75–110)
GLUCOSE BLD-MCNC: 171 MG/DL (ref 75–110)
HBA1C MFR BLD: 5.7 % (ref 4–6)
Lab: NORMAL
MODE: ABNORMAL
NEGATIVE BASE EXCESS, ART: 6 (ref 0–2)
O2 DEVICE/FLOW/%: ABNORMAL
PATIENT TEMP: ABNORMAL
PLESIOMONAS SHIGELLOIDES PCR: NORMAL
POC HCO3: 16.5 MMOL/L (ref 21–28)
POC O2 SATURATION: 95 % (ref 94–98)
POC PCO2 TEMP: ABNORMAL MM HG
POC PCO2: 24.7 MM HG (ref 35–48)
POC PH TEMP: ABNORMAL
POC PH: 7.43 (ref 7.35–7.45)
POC PO2 TEMP: ABNORMAL MM HG
POC PO2: 70.6 MM HG (ref 83–108)
POSITIVE BASE EXCESS, ART: ABNORMAL (ref 0–3)
POTASSIUM SERPL-SCNC: 4 MMOL/L (ref 3.7–5.3)
SALMONELLA PCR: NORMAL
SAMPLE SITE: ABNORMAL
SHIGATOXIN GENE PCR: NORMAL
SHIGELLA SP PCR: NORMAL
SODIUM BLD-SCNC: 132 MMOL/L (ref 135–144)
SPECIMEN DESCRIPTION: NORMAL
TCO2 (CALC), ART: 17 MMOL/L (ref 22–29)
VIBRIO PCR: NORMAL
YERSINIA ENTEROCOLITICA PCR: NORMAL

## 2019-08-18 PROCEDURE — 6370000000 HC RX 637 (ALT 250 FOR IP): Performed by: INTERNAL MEDICINE

## 2019-08-18 PROCEDURE — 94761 N-INVAS EAR/PLS OXIMETRY MLT: CPT

## 2019-08-18 PROCEDURE — 2580000003 HC RX 258: Performed by: NURSE PRACTITIONER

## 2019-08-18 PROCEDURE — 94640 AIRWAY INHALATION TREATMENT: CPT

## 2019-08-18 PROCEDURE — 87899 AGENT NOS ASSAY W/OPTIC: CPT

## 2019-08-18 PROCEDURE — 6370000000 HC RX 637 (ALT 250 FOR IP): Performed by: STUDENT IN AN ORGANIZED HEALTH CARE EDUCATION/TRAINING PROGRAM

## 2019-08-18 PROCEDURE — 80048 BASIC METABOLIC PNL TOTAL CA: CPT

## 2019-08-18 PROCEDURE — 2580000003 HC RX 258: Performed by: INTERNAL MEDICINE

## 2019-08-18 PROCEDURE — 82947 ASSAY GLUCOSE BLOOD QUANT: CPT

## 2019-08-18 PROCEDURE — 71045 X-RAY EXAM CHEST 1 VIEW: CPT

## 2019-08-18 PROCEDURE — 2060000000 HC ICU INTERMEDIATE R&B

## 2019-08-18 PROCEDURE — 6360000002 HC RX W HCPCS: Performed by: INTERNAL MEDICINE

## 2019-08-18 PROCEDURE — 82803 BLOOD GASES ANY COMBINATION: CPT

## 2019-08-18 PROCEDURE — 36600 WITHDRAWAL OF ARTERIAL BLOOD: CPT

## 2019-08-18 PROCEDURE — 2700000000 HC OXYGEN THERAPY PER DAY

## 2019-08-18 PROCEDURE — 2500000003 HC RX 250 WO HCPCS: Performed by: INTERNAL MEDICINE

## 2019-08-18 PROCEDURE — 99232 SBSQ HOSP IP/OBS MODERATE 35: CPT | Performed by: INTERNAL MEDICINE

## 2019-08-18 PROCEDURE — 6360000002 HC RX W HCPCS: Performed by: NURSE PRACTITIONER

## 2019-08-18 PROCEDURE — 74176 CT ABD & PELVIS W/O CONTRAST: CPT

## 2019-08-18 PROCEDURE — 36415 COLL VENOUS BLD VENIPUNCTURE: CPT

## 2019-08-18 RX ORDER — METHYLPREDNISOLONE SODIUM SUCCINATE 40 MG/ML
40 INJECTION, POWDER, LYOPHILIZED, FOR SOLUTION INTRAMUSCULAR; INTRAVENOUS EVERY 12 HOURS
Status: DISCONTINUED | OUTPATIENT
Start: 2019-08-18 | End: 2019-08-19 | Stop reason: HOSPADM

## 2019-08-18 RX ADMIN — HEPARIN SODIUM 5000 UNITS: 5000 INJECTION INTRAVENOUS; SUBCUTANEOUS at 06:04

## 2019-08-18 RX ADMIN — HEPARIN SODIUM 5000 UNITS: 5000 INJECTION INTRAVENOUS; SUBCUTANEOUS at 21:41

## 2019-08-18 RX ADMIN — METHYLPREDNISOLONE SODIUM SUCCINATE 40 MG: 40 INJECTION, POWDER, FOR SOLUTION INTRAMUSCULAR; INTRAVENOUS at 03:42

## 2019-08-18 RX ADMIN — INSULIN LISPRO 2 UNITS: 100 INJECTION, SOLUTION INTRAVENOUS; SUBCUTANEOUS at 17:40

## 2019-08-18 RX ADMIN — TIOTROPIUM BROMIDE 18 MCG: 18 CAPSULE ORAL; RESPIRATORY (INHALATION) at 09:54

## 2019-08-18 RX ADMIN — AZITHROMYCIN DIHYDRATE 500 MG: 500 INJECTION, POWDER, LYOPHILIZED, FOR SOLUTION INTRAVENOUS at 20:24

## 2019-08-18 RX ADMIN — MOMETASONE FUROATE AND FORMOTEROL FUMARATE DIHYDRATE 2 PUFF: 200; 5 AEROSOL RESPIRATORY (INHALATION) at 09:53

## 2019-08-18 RX ADMIN — CEFTRIAXONE SODIUM 1 G: 1 INJECTION, POWDER, FOR SOLUTION INTRAMUSCULAR; INTRAVENOUS at 18:59

## 2019-08-18 RX ADMIN — METHYLPREDNISOLONE SODIUM SUCCINATE 40 MG: 40 INJECTION, POWDER, FOR SOLUTION INTRAMUSCULAR; INTRAVENOUS at 15:07

## 2019-08-18 RX ADMIN — Medication: at 14:11

## 2019-08-18 RX ADMIN — INSULIN LISPRO 1 UNITS: 100 INJECTION, SOLUTION INTRAVENOUS; SUBCUTANEOUS at 20:33

## 2019-08-18 RX ADMIN — MOMETASONE FUROATE AND FORMOTEROL FUMARATE DIHYDRATE 2 PUFF: 200; 5 AEROSOL RESPIRATORY (INHALATION) at 20:41

## 2019-08-18 RX ADMIN — SODIUM CHLORIDE: 9 INJECTION, SOLUTION INTRAVENOUS at 06:04

## 2019-08-18 RX ADMIN — HEPARIN SODIUM 5000 UNITS: 5000 INJECTION INTRAVENOUS; SUBCUTANEOUS at 14:15

## 2019-08-18 RX ADMIN — TAMSULOSIN HYDROCHLORIDE 0.4 MG: 0.4 CAPSULE ORAL at 09:32

## 2019-08-18 RX ADMIN — PANTOPRAZOLE SODIUM 20 MG: 20 TABLET, DELAYED RELEASE ORAL at 06:04

## 2019-08-18 RX ADMIN — Medication 10 ML: at 09:33

## 2019-08-18 ASSESSMENT — PAIN SCALES - GENERAL
PAINLEVEL_OUTOF10: 0

## 2019-08-18 NOTE — PROGRESS NOTES
NEPHROLOGY  PROGRESS NOTE      SUBJECTIVE       Following for ANY with unknown baseline but presumed normal. Admitted with weakness, poor appetite, and dyspnea. Creatinine was 3.21. Urine positive for legionella. CT without contrast suggest pneumonia. On Zithromax and rocephin. Cardiology following for elevated troponin. ECHO ordered. Renal US with hyperechoic bilateral renal cortex consistent with parenchymal disease. Kappa chains elevated. Compliment levels normal.  Urinalysis with 1+ protein  NS @ 150/hr. Creatinine down to 2.26 this morning. Srinivasan placed due to urinary retention. Urine output 1750    Dyspneic with mild activity. OBJECTIVE      VS: /68   Pulse 86   Temp 97.9 °F (36.6 °C) (Oral)   Resp 27   Ht 6' (1.829 m)   Wt 200 lb 2.8 oz (90.8 kg)   SpO2 93%   BMI 27.15 kg/m²   MAXIMUM TEMPERATURE OVER 24HRS:  Temp (24hrs), Av.2 °F (36.8 °C), Min:97.9 °F (36.6 °C), Max:98.6 °F (37 °C)    24HR BLOOD PRESSURE RANGE:  Systolic (04ARI), WXM:039 , Min:102 , HUL:306   ; Diastolic (75NKM), RXU:94, Min:68, Max:75    24HR INTAKE/OUTPUT:      Intake/Output Summary (Last 24 hours) at 2019 0943  Last data filed at 2019 0413  Gross per 24 hour   Intake 4752.5 ml   Output 1750 ml   Net 3002.5 ml     WEIGHT :  Patient Vitals for the past 96 hrs (Last 3 readings):   Weight   19 2212 200 lb 2.8 oz (90.8 kg)   19 1655 205 lb (93 kg)       PHYSICAL EXAM      GENERAL APPEARANCE:Awake, alert, in no acute distress. Appears dyspneic   SKIN: warm and dry, no rash or erythema  EYES: conjunctivae normal and sclera anicteric  ENT: no thrush no pharyngeal congestion   NECK:   No JVD. No carotid bruits and no carotid lymphadenopathy . PULMONARY: Crackles in bases  CADRDIOVASCULAR: S1 and S2 normal NO S3 and NO S4 . No rubs , no murmur. ABDOMEN: soft nontender, bowel sounds present, no organomegaly, no ascites.        EXTREMITIES: no cyanosis, clubbing or edema     CURRENT MEDICATIONS        methylPREDNISolone sodium (SOLU-MEDROL) injection 40 mg Q12H   mometasone-formoterol (DULERA) 200-5 MCG/ACT inhaler 2 puff BID   albuterol (PROVENTIL) nebulizer solution 2.5 mg As Directed RT PRN   tiotropium (SPIRIVA) inhalation capsule 18 mcg Daily   glucose (GLUTOSE) 40 % oral gel 15 g PRN   dextrose 50 % IV solution PRN   glucagon (rDNA) injection 1 mg PRN   dextrose 5 % solution PRN   insulin lispro (HUMALOG) injection vial 0-12 Units TID WC   insulin lispro (HUMALOG) injection vial 0-6 Units Nightly   heparin (porcine) injection 5,000 Units 3 times per day   pantoprazole (PROTONIX) tablet 20 mg QAM AC   tamsulosin (FLOMAX) capsule 0.4 mg Daily   0.9 % sodium chloride infusion Continuous   sodium chloride flush 0.9 % injection 10 mL 2 times per day   sodium chloride flush 0.9 % injection 10 mL PRN   acetaminophen (TYLENOL) tablet 650 mg Q4H PRN   albuterol (PROVENTIL) nebulizer solution 2.5 mg As Directed RT PRN   cefTRIAXone (ROCEPHIN) 1 g IVPB in 50 mL D5W minibag Q24H   azithromycin (ZITHROMAX) 500 mg in dextrose 5 % 250 mL IVPB Q24H         LABS      CBC: Recent Labs     08/16/19  1732 08/17/19  0739 08/17/19  1407   WBC 14.3* 9.8 11.0   RBC 4.68 4.01* 4.36   HGB 13.4 11.3* 12.7*   HCT 41.1 35.6* 39.4*   MCV 87.8 88.8 90.4   MCH 28.6 28.2 29.1   MCHC 32.6 31.7 32.2   RDW 12.7 12.9 13.2   PLT See Reflexed IPF Result See Reflexed IPF Result See Reflexed IPF Result   MPV NOT REPORTED NOT REPORTED NOT REPORTED      BMP: Recent Labs     08/17/19  0739 08/17/19  1407 08/18/19  0701    133* 132*   K 3.7 4.0 4.0    101 103   CO2 16* 18* 15*   BUN 47* 50* 45*   CREATININE 3.21* 2.97* 2.26*   GLUCOSE 109* 126* 133*   CALCIUM 7.0* 7.4* 7.3*      BNP:No results found for: BNP  PHOSPHORUS:    Recent Labs     08/17/19  0739   PHOS 2.8     MAGNESIUM:   Recent Labs     08/17/19  0739   MG 1.9     ALBUMIN:   Recent Labs     08/17/19  0739   LABALBU 2.3*     ERIC: No results found for: ERIC SPEP: Lab Results   Component Value Date    PROT 5.0 08/17/2019    ALBCAL PENDING 08/17/2019    ALBPCT PENDING 08/17/2019    LABALPH PENDING 08/17/2019    LABALPH PENDING 08/17/2019    A1PCT PENDING 08/17/2019    A2PCT PENDING 08/17/2019    LABBETA PENDING 08/17/2019    BETAPCT PENDING 08/17/2019    GAMGLOB PENDING 08/17/2019    GGPCT PENDING 08/17/2019    PATH PENDING 08/17/2019     UPEP:   Lab Results   Component Value Date    TPU 71 08/17/2019      C3:   Lab Results   Component Value Date    C3 120 08/17/2019     C4:   Lab Results   Component Value Date    C4 31 08/17/2019     URINE PROTEIN:    Lab Results   Component Value Date    TPU 71 08/17/2019     URINALYSIS:  U/A: Lab Results   Component Value Date    NITRU NEGATIVE 08/17/2019    COLORU DARK YELLOW 08/17/2019    PHUR 5.0 08/17/2019    WBCUA None 08/17/2019    RBCUA 2 TO 5 08/17/2019    MUCUS 1+ 08/17/2019    TRICHOMONAS NOT REPORTED 08/17/2019    YEAST NOT REPORTED 08/17/2019    BACTERIA FEW 08/17/2019    SPECGRAV 1.016 08/17/2019    LEUKOCYTESUR NEGATIVE 08/17/2019    UROBILINOGEN Normal 08/17/2019    BILIRUBINUR NEGATIVE 08/17/2019    GLUCOSEU NEGATIVE 08/17/2019    KETUA NEGATIVE 08/17/2019    AMORPHOUS NOT REPORTED 08/17/2019       RADIOLOGY      FINDINGS:   KIDNEYS:  Slightly hyperechoic cortex compared to the minimally to mildly   hyperechoic liver.  Normal size and parenchymal thickness.  No   hydronephrosis, shadowing calculi, nor perinephric fluid.       Renal lengths in longitudinal axis:  11.5 cm right, 11.5 cm left       Incidental note of a 1.7 cm x 1.6 cm x 1.7 cm hyperechoic lesion in the   posterior right hemiliver (2.0 cm x 2.4 cm x 2.1 cm on 03/06/2008).     URINARY BLADDER:  Normal prevoid appearance.  Visualization of bilateral   ureteral jets.  No evaluation of postvoid residual volume.       Prevoid volume:  557 ml           Impression   1. Hyperechoic bilateral renal cortex consistent with underlying parenchymal   disease.    2. Normal prevoid sonographic appearance of the urinary bladder. 3. Minimal to mild hepatic steatosis. 4. Slightly decreased size of a 1.7 cm hyperechoic lesion in the right   hemiliver, almost certainly a benign hemangioma.  No follow-up is recommended. CT without contrast of abdomen  Left lower lobe consolidation compatible with pneumonia.  Small left pleural   effusion.       The urinary bladder is under distended and contains a Srinivasan catheter.  There   is suggestion of circumferential bladder wall thickening.  Correlate for   cystitis.       Nonspecific trace free fluid in the abdomen and pelvis, including the right   perinephric region. ASSESSMENT    1. ANY: Prerenal Azotemia leading to ATN: Creatinine on admission 2.4>3.21, now 2.26  2. Hypotension: Improved  3. Urinary retention secondary to enlarge prostate: Plans for outpatient cystoscopy. Srinivasan to be kept in place at discharge per urology. Flomax started  4. Legionella in Urine  5. Pneumonia  6. Metabolic acidosis with normal anion gap  7. Hypoxia      PLAN      1. Change IVF to BiCarb 75meq in 1/2NS @ 100/hr. 2. ABG  3. Follow up labs ordered. 4. Following along       Please do not hesitate to call with questions. Electronically signed by KRISTINA Trevino on 8/18/2019 at 9:43 AM     Attending Physician Statement  I have discussed the care of Theopolberlin Alcala, including pertinent history and exam findings with the NP. I have reviewed the key elements of all parts of the encounter with the resident/fellow. I have seen and examined the patient with the NP. I agree with the assessment and plan and status of the problem list as documented. PH is 7.4 and no needs for bicarb drip. Encourage oral intake.   Get a chest X rays    Ashwini Gregory

## 2019-08-18 NOTE — CONSULTS
[facility]   Consult Note                 Date:   8/18/2019  Date of admission:  8/16/2019  4:53 PM  MRN:   [id number]  YOB: 1950    Reason for Consult:  Elevated Trop     HISTORY OF PRESENT ILLNESS:    The patient is a 80 y.o.  male who is admitted to the hospital for few dys of not feeling goo , cough SOB , weak tired . Came was low BP , tachycardiac , abnormal Renal functions , tachycardiac , EKG RBBB. Denies any CP , palpitations , known H/O CAD , had angina more than 10-15 yrs ago had stress test 2015 Normal .    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 Past Surgical History:   has no past surgical history on file. Home Medications:    Prior to Admission medications    Medication Sig Start Date End Date Taking? Authorizing Provider   tiotropium (SPIRIVA RESPIMAT) 2.5 MCG/ACT AERS inhaler Inhale 2 puffs into the lungs daily   Yes Historical Provider, MD   budesonide-formoterol (SYMBICORT) 160-4.5 MCG/ACT AERO Inhale 2 puffs into the lungs 2 times daily   Yes Historical Provider, MD   salmeterol (SEREVENT DISKUS) 50 MCG/DOSE diskus inhaler Inhale 1 puff into the lungs 2 times daily.    Yes Historical Provider, MD   predniSONE (DELTASONE) 10 MG tablet Take 2 tablets by mouth daily 2 daily x 3 days and then 1 daily x 4 days 6/15/15   Cydney Leon MD   lisinopril (PRINIVIL;ZESTRIL) 5 MG tablet daily 2/16/15   Katherine Qureshi MD       Current Medications: Scheduled Meds:   methylPREDNISolone  40 mg Intravenous Q12H    mometasone-formoterol  2 puff Inhalation BID    tiotropium 18 mcg Inhalation Daily    insulin lispro  0-12 Units Subcutaneous TID WC    insulin lispro  0-6 Units Subcutaneous Nightly    heparin (porcine)  5,000 Units Subcutaneous 3 times per day    pantoprazole  20 mg Oral QAM AC    tamsulosin  0.4 mg Oral Daily    sodium chloride flush  10 mL Intravenous 2 times per day    cefTRIAXone (ROCEPHIN) IV  1 g Intravenous Q24H    azithromycin  500 mg Intravenous Q24H     Continuous Infusions:   dextrose      sodium chloride 150 mL/hr at 08/18/19 0604     PRN Meds:.albuterol, glucose, dextrose, glucagon (rDNA), dextrose, sodium chloride flush, acetaminophen, albuterol     Allergies:  Patient has no known allergies. Social History:   reports that he quit smoking about 8 years ago. He has never used smokeless tobacco. He reports that he does not drink alcohol or use drugs. Family History: family history includes Diabetes in his mother; Heart Disease in his father, mother, and paternal uncle. Review of Systems   CONSTITUTIONAL:  positive for  chills, fatigue and malaise    EYES:  negative for discharge    HEENT:  negative for epistaxis and sore throat    RESPIRATORY:  positive for  Cough, shortness of breath, wheezing    CARDIOVASCULAR:  negative for chest pain, palpitations, syncope, edema    GASTROINTESTINAL:  negative for nausea, vomiting, diarrhea, constipation, abdominal pain    GENITOURINARY:  negative for incontinence    MUSCULOSKELETAL:  negative for neck or back pain    NEUROLOGICAL:  negative for headaches, seizures and double vision   PSYCHIATRIC:  negative               PHYSICAL EXAM:    Blood pressure 114/68, pulse 86, temperature 97.9 °F (36.6 °C), temperature source Oral, resp. rate 27, height 6' (1.829 m), weight 200 lb 2.8 oz (90.8 kg), SpO2 93 %.     CONSTITUTIONAL: AOx4, no apparent distress, appears stated age   HEAD: normocephalic, atraumatic   EYES: PERRLA, EOMI   ENT: moist mucous membranes, uvula midline   NECK:  symmetric, no midline HTN , MUCH BETTER , IMPROVED FROM ADMISSION , DUE TO SEPSIS , ON MEDS   RENAL INSUFFICIENCY NEPHROLOGY ON BOARD   FURTHER PLAN AFTER THE ECHO   CONTINUE REST       Discussed with patient and nursing.     Britany Hinkle [id number]        [phone]

## 2019-08-18 NOTE — PROGRESS NOTES
palpitations  Gastrointestinal:  negative for abdominal pain, constipation, diarrhea, nausea, vomiting, diarrhea has improved  Neurological:  negative for dizziness, headache    Medications: Allergies:  No Known Allergies    Current Meds:   Scheduled Meds:    methylPREDNISolone  40 mg Intravenous Q12H    mometasone-formoterol  2 puff Inhalation BID    tiotropium  18 mcg Inhalation Daily    insulin lispro  0-12 Units Subcutaneous TID WC    insulin lispro  0-6 Units Subcutaneous Nightly    heparin (porcine)  5,000 Units Subcutaneous 3 times per day    pantoprazole  20 mg Oral QAM AC    tamsulosin  0.4 mg Oral Daily    sodium chloride flush  10 mL Intravenous 2 times per day    cefTRIAXone (ROCEPHIN) IV  1 g Intravenous Q24H    azithromycin  500 mg Intravenous Q24H     Continuous Infusions:    sodium bicarbonate infusion      dextrose       PRN Meds: albuterol, glucose, dextrose, glucagon (rDNA), dextrose, sodium chloride flush, acetaminophen, albuterol    Data:     Past Medical History:   has a past medical history of Acute upper respiratory infections of unspecified site, 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 Social History:   reports that he quit smoking about 8 years ago. He has never used smokeless tobacco. He reports that he does not drink alcohol or use drugs.      Family History:   Family History   Problem Relation Age of Onset    Diabetes Mother     Heart Disease Mother     Heart Disease Father     Heart Disease Paternal Uncle        Vitals:  /73   Pulse 90   Temp 97 °F (36.1 °C) (Axillary)   Resp 24   Ht 6' (1.829 m)   Wt 200 lb 2.8 oz (90.8 kg)   SpO2 95%   BMI 27.15 kg/m²   Temp (24hrs), Av °F (36.7 °C), Min:97 °F (36.1 °C), Max:98.6 °F (37 °C)    Recent Labs     08/17/19  1745 08/17/19  2130 08/18/19  0829 08/18/19  1217   POCGLU 103 179* 130* 147*       I/O (24Hr):     Intake/Output Summary (Last 24 hours) at 8/18/2019 1349  Last data filed at 8/18/2019 0413  Gross per 24 hour   Intake 4022.5 ml   Output 1150 ml   Net 2872.5 ml       Labs:  Hematology:  Recent Labs     08/16/19  1732 08/17/19  0739 08/17/19  1407   WBC 14.3* 9.8 11.0   RBC 4.68 4.01* 4.36   HGB 13.4 11.3* 12.7*   HCT 41.1 35.6* 39.4*   MCV 87.8 88.8 90.4   MCH 28.6 28.2 29.1   MCHC 32.6 31.7 32.2   RDW 12.7 12.9 13.2   PLT See Reflexed IPF Result See Reflexed IPF Result See Reflexed IPF Result   MPV NOT REPORTED NOT REPORTED NOT REPORTED   INR  --  1.0  --      Chemistry:  Recent Labs     08/16/19  1732 08/17/19  0739 08/17/19  1407 08/18/19  0701   * 136 133* 132*   K 4.1 3.7 4.0 4.0   CL 92* 105 101 103   CO2 19* 16* 18* 15*   GLUCOSE 182* 109* 126* 133*   BUN 42* 47* 50* 45*   CREATININE 2.51* 3.21* 2.97* 2.26*   MG  --  1.9  --   --    ANIONGAP 18* 15 14 14   LABGLOM 26* 19* 21* 29*   GFRAA 31* 24* 26* 35*   CALCIUM 8.3* 7.0* 7.4* 7.3*   CAION  --  0.97*  --   --    PHOS  --  2.8  --   --    TROPHS 32* 32*  --   --    LACTACIDWB 3.2*  --   --   --      Recent Labs     08/16/19  1704 08/17/19  0739 08/17/19  1407 08/17/19  1745 08/17/19  2130 08/18/19  0829 08/18/19  1217   PROT  --  5.3* 5.0*  --   --   --   --    LABALBU  --  2.3*  --   --   --   --   --    LABA1C  --  5.7  --   --   --   --   --    AST  --  53*  --   --   --   --   --    ALT  --  33  --   --   --   --   --    ALKPHOS  --  62  --   --   --   --   --    BILITOT  --  0.57  --   --   --   --   --    POCGLU 189*  --   --  103 179* 130* 147*     ABG:  Lab Results   Component Value Date    FIO2 NOT REPORTED 08/16/2019     Lab Results   Component Value Date/Time    SPECIAL NOT REPORTED 08/18/2019 11:42 AM     Lab Results   Component Value Date/Time    CULTURE fair balance

## 2019-08-18 NOTE — PROGRESS NOTES
Urology Progress Note    Subjective: Mohit Castillo is a 80 y.o. male. His/Her current Diet is: DIET CARDIAC; Carb Control: 3 carb choices (45 gms)/meal.  The patient is * No surgery found * from     No acute events overnight. No chest pain, shortness of breath, nausea, vomiting, fevers, chills  Tolerating Srinivasan catheter  Ambulating  + BM    Patient Vitals for the past 24 hrs:   BP Temp Temp src Pulse Resp SpO2   08/18/19 0820 -- 97.9 °F (36.6 °C) Oral -- -- --   08/18/19 0342 114/68 98.5 °F (36.9 °C) Oral 86 27 93 %   08/18/19 0031 102/68 98.6 °F (37 °C) Oral 83 23 95 %   08/1950 120/71 98.1 °F (36.7 °C) Oral 115 28 94 %   08/17/19 1939 -- -- -- -- 21 94 %   08/17/19 1741 126/75 97.9 °F (36.6 °C) Oral 103 (!) 32 95 %   08/17/19 1214 121/72 98.3 °F (36.8 °C) Axillary 100 29 96 %   08/17/19 1050 -- -- -- -- 25 96 %       Intake/Output Summary (Last 24 hours) at 8/18/2019 0942  Last data filed at 8/18/2019 0413  Gross per 24 hour   Intake 4752.5 ml   Output 1750 ml   Net 3002.5 ml       Recent Labs     08/16/19  1732 08/17/19  0739 08/17/19  1407   WBC 14.3* 9.8 11.0   HGB 13.4 11.3* 12.7*   HCT 41.1 35.6* 39.4*   MCV 87.8 88.8 90.4   PLT See Reflexed IPF Result See Reflexed IPF Result See Reflexed IPF Result     Recent Labs     08/17/19  0739 08/17/19  1407 08/18/19  0701    133* 132*   K 3.7 4.0 4.0    101 103   CO2 16* 18* 15*   PHOS 2.8  --   --    BUN 47* 50* 45*   CREATININE 3.21* 2.97* 2.26*       Recent Labs     08/17/19  1312   COLORU DARK YELLOW*   PHUR 5.0   WBCUA None   RBCUA 2 TO 5   MUCUS 1+*   TRICHOMONAS NOT REPORTED   YEAST NOT REPORTED   BACTERIA FEW*   SPECGRAV 1.016   LEUKOCYTESUR NEGATIVE   UROBILINOGEN Normal   BILIRUBINUR NEGATIVE       Physical Exam:     NAD, AOx3  RRR.  Peripheral pulses palpable  Respirations nonlabored, symmetric chest rise bilaterally  Abdomen: soft, nontender, nondistended  Lower extremities: No edema of calf tenderness bilaterally  Srinivasan draining

## 2019-08-19 VITALS
BODY MASS INDEX: 27.71 KG/M2 | WEIGHT: 204.59 LBS | RESPIRATION RATE: 21 BRPM | DIASTOLIC BLOOD PRESSURE: 76 MMHG | OXYGEN SATURATION: 93 % | TEMPERATURE: 98.5 F | HEART RATE: 93 BPM | HEIGHT: 72 IN | SYSTOLIC BLOOD PRESSURE: 129 MMHG

## 2019-08-19 PROBLEM — A41.9 SEPSIS (HCC): Status: RESOLVED | Noted: 2019-08-16 | Resolved: 2019-08-19

## 2019-08-19 PROBLEM — J96.01 ACUTE RESPIRATORY FAILURE WITH HYPOXIA (HCC): Status: RESOLVED | Noted: 2019-08-17 | Resolved: 2019-08-19

## 2019-08-19 LAB
ALBUMIN (CALCULATED): 2.7 G/DL (ref 3.2–5.2)
ALBUMIN PERCENT: 55 % (ref 45–65)
ALPHA 1 PERCENT: 7 % (ref 3–6)
ALPHA 2 PERCENT: 16 % (ref 6–13)
ALPHA-1-GLOBULIN: 0.3 G/DL (ref 0.1–0.4)
ALPHA-2-GLOBULIN: 0.8 G/DL (ref 0.5–0.9)
ANION GAP SERPL CALCULATED.3IONS-SCNC: 13 MMOL/L (ref 9–17)
BETA GLOBULIN: 0.6 G/DL (ref 0.5–1.1)
BETA PERCENT: 11 % (ref 11–19)
BUN BLDV-MCNC: 42 MG/DL (ref 8–23)
BUN/CREAT BLD: ABNORMAL (ref 9–20)
CALCIUM SERPL-MCNC: 7.4 MG/DL (ref 8.6–10.4)
CHLORIDE BLD-SCNC: 99 MMOL/L (ref 98–107)
CO2: 18 MMOL/L (ref 20–31)
CREAT SERPL-MCNC: 1.49 MG/DL (ref 0.7–1.2)
CULTURE: ABNORMAL
DIRECT EXAM: ABNORMAL
DIRECT EXAM: NORMAL
GAMMA GLOBULIN %: 11 % (ref 9–20)
GAMMA GLOBULIN: 0.6 G/DL (ref 0.5–1.5)
GFR AFRICAN AMERICAN: 57 ML/MIN
GFR NON-AFRICAN AMERICAN: 47 ML/MIN
GFR SERPL CREATININE-BSD FRML MDRD: ABNORMAL ML/MIN/{1.73_M2}
GFR SERPL CREATININE-BSD FRML MDRD: ABNORMAL ML/MIN/{1.73_M2}
GLUCOSE BLD-MCNC: 123 MG/DL (ref 75–110)
GLUCOSE BLD-MCNC: 125 MG/DL (ref 75–110)
GLUCOSE BLD-MCNC: 134 MG/DL (ref 70–99)
LV EF: 50 %
LVEF MODALITY: NORMAL
Lab: ABNORMAL
Lab: NORMAL
PATHOLOGIST: ABNORMAL
POTASSIUM SERPL-SCNC: 3.7 MMOL/L (ref 3.7–5.3)
PROTEIN ELECTROPHORESIS, SERUM: ABNORMAL
SODIUM BLD-SCNC: 130 MMOL/L (ref 135–144)
SPECIMEN DESCRIPTION: ABNORMAL
SPECIMEN DESCRIPTION: NORMAL
TOTAL PROT. SUM,%: 100 % (ref 98–102)
TOTAL PROT. SUM: 5 G/DL (ref 6.3–8.2)
TOTAL PROTEIN: 5 G/DL (ref 6.4–8.3)

## 2019-08-19 PROCEDURE — 2700000000 HC OXYGEN THERAPY PER DAY

## 2019-08-19 PROCEDURE — 80048 BASIC METABOLIC PNL TOTAL CA: CPT

## 2019-08-19 PROCEDURE — 6370000000 HC RX 637 (ALT 250 FOR IP): Performed by: INTERNAL MEDICINE

## 2019-08-19 PROCEDURE — 36415 COLL VENOUS BLD VENIPUNCTURE: CPT

## 2019-08-19 PROCEDURE — 97535 SELF CARE MNGMENT TRAINING: CPT

## 2019-08-19 PROCEDURE — 99238 HOSP IP/OBS DSCHRG MGMT 30/<: CPT | Performed by: INTERNAL MEDICINE

## 2019-08-19 PROCEDURE — 6370000000 HC RX 637 (ALT 250 FOR IP): Performed by: STUDENT IN AN ORGANIZED HEALTH CARE EDUCATION/TRAINING PROGRAM

## 2019-08-19 PROCEDURE — 93306 TTE W/DOPPLER COMPLETE: CPT

## 2019-08-19 PROCEDURE — 94640 AIRWAY INHALATION TREATMENT: CPT

## 2019-08-19 PROCEDURE — 97166 OT EVAL MOD COMPLEX 45 MIN: CPT

## 2019-08-19 PROCEDURE — 82947 ASSAY GLUCOSE BLOOD QUANT: CPT

## 2019-08-19 PROCEDURE — 6360000002 HC RX W HCPCS: Performed by: INTERNAL MEDICINE

## 2019-08-19 RX ORDER — TAMSULOSIN HYDROCHLORIDE 0.4 MG/1
0.4 CAPSULE ORAL DAILY
Qty: 30 CAPSULE | Refills: 3 | Status: ON HOLD | OUTPATIENT
Start: 2019-08-20 | End: 2022-05-01 | Stop reason: HOSPADM

## 2019-08-19 RX ORDER — AZITHROMYCIN 500 MG/1
500 TABLET, FILM COATED ORAL DAILY
Qty: 5 TABLET | Refills: 0 | Status: SHIPPED | OUTPATIENT
Start: 2019-08-19 | End: 2019-08-24

## 2019-08-19 RX ORDER — PREDNISONE 10 MG/1
TABLET ORAL
Qty: 21 TABLET | Refills: 0 | Status: SHIPPED | OUTPATIENT
Start: 2019-08-19 | End: 2019-08-19 | Stop reason: SDUPTHER

## 2019-08-19 RX ORDER — PREDNISONE 10 MG/1
TABLET ORAL
Qty: 21 TABLET | Refills: 0 | Status: SHIPPED | OUTPATIENT
Start: 2019-08-19 | End: 2019-10-07

## 2019-08-19 RX ORDER — ALBUTEROL SULFATE 90 UG/1
2 AEROSOL, METERED RESPIRATORY (INHALATION) 4 TIMES DAILY PRN
Qty: 3 INHALER | Refills: 1 | Status: SHIPPED | OUTPATIENT
Start: 2019-08-19

## 2019-08-19 RX ORDER — AMLODIPINE BESYLATE 2.5 MG/1
1.25 TABLET ORAL DAILY
Qty: 30 TABLET | Refills: 0 | Status: ON HOLD | OUTPATIENT
Start: 2019-08-19 | End: 2021-12-31 | Stop reason: HOSPADM

## 2019-08-19 RX ADMIN — METHYLPREDNISOLONE SODIUM SUCCINATE 40 MG: 40 INJECTION, POWDER, FOR SOLUTION INTRAMUSCULAR; INTRAVENOUS at 15:13

## 2019-08-19 RX ADMIN — MOMETASONE FUROATE AND FORMOTEROL FUMARATE DIHYDRATE 2 PUFF: 200; 5 AEROSOL RESPIRATORY (INHALATION) at 08:13

## 2019-08-19 RX ADMIN — TIOTROPIUM BROMIDE 18 MCG: 18 CAPSULE ORAL; RESPIRATORY (INHALATION) at 08:13

## 2019-08-19 RX ADMIN — PANTOPRAZOLE SODIUM 20 MG: 20 TABLET, DELAYED RELEASE ORAL at 07:33

## 2019-08-19 RX ADMIN — HEPARIN SODIUM 5000 UNITS: 5000 INJECTION INTRAVENOUS; SUBCUTANEOUS at 13:56

## 2019-08-19 RX ADMIN — METHYLPREDNISOLONE SODIUM SUCCINATE 40 MG: 40 INJECTION, POWDER, FOR SOLUTION INTRAMUSCULAR; INTRAVENOUS at 03:52

## 2019-08-19 RX ADMIN — HEPARIN SODIUM 5000 UNITS: 5000 INJECTION INTRAVENOUS; SUBCUTANEOUS at 06:17

## 2019-08-19 RX ADMIN — TAMSULOSIN HYDROCHLORIDE 0.4 MG: 0.4 CAPSULE ORAL at 09:34

## 2019-08-19 ASSESSMENT — PAIN SCALES - GENERAL: PAINLEVEL_OUTOF10: 0

## 2019-08-19 NOTE — DISCHARGE INSTR - COC
(Temporal)   Resp 16   Ht 6' (1.829 m)   Wt 204 lb 9.4 oz (92.8 kg)   SpO2 93%   BMI 27.75 kg/m²     Last documented pain score (0-10 scale): Pain Level: 0  Last Weight:   Wt Readings from Last 1 Encounters:   08/19/19 204 lb 9.4 oz (92.8 kg)     Mental Status:  oriented and alert    IV Access:  - None    Nursing Mobility/ADLs:  Walking   Independent  Transfer  Independent  Bathing  Independent  Dressing  Independent  [address]  Independent  Med Delivery   whole    Wound Care Documentation and Therapy:        Elimination:  Continence:   · Bowel: Yes  · Bladder: Yes  Urinary Catheter: Insertion Date: 8/16/2019   Colostomy/Ileostomy/Ileal Conduit: No       Date of Last BM: 8/19/2019    Intake/Output Summary (Last 24 hours) at 8/19/2019 1003  Last data filed at 8/19/2019 0447  Gross per 24 hour   Intake 5532 ml   Output 2600 ml   Net 2932 ml     I/O last 3 completed shifts: In: 9998 [P.O.:500; I.V.:5032]  Out: 2600 [Urine:2600]    Safety Concerns:     None    Impairments/Disabilities:      Vision    Nutrition Therapy:  Current Nutrition Therapy:   - Oral Diet:  General    Routes of Feeding: Oral  Liquids: Thin Liquids  Daily Fluid Restriction: no  Last Modified Barium Swallow with Video (Video Swallowing Test): not done    Treatments at the Time of Hospital Discharge:   Respiratory Treatments: ***  Oxygen Therapy:  is not on home oxygen therapy.   Ventilator:    - No ventilator support    Rehab Therapies: {THERAPEUTIC INTERVENTION:[id number]}  Weight Bearing Status/Restrictions: No weight bearing restirctions  Other Medical Equipment (for information only, NOT a DME order):  cane and bath bench  Other Treatments: ***    Patient's personal belongings (please select all that are sent with patient):  Nando    RN SIGNATURE:  Electronically signed by Vinod rPater RN on 8/19/19 at 2:55 PM    CASE MANAGEMENT/SOCIAL WORK SECTION    Inpatient Status Date:

## 2019-08-19 NOTE — CARE COORDINATION
Transitional Planning  Spoke with patient and daughter Reta Mcmanus. Plan for home with river catheter and interested in home health. Given choice, referral sent to [facility]. Reta Mcmanus will make follow up appointment at the [place] and requested prescription for any new long term medications to be faxed to the [place] for approval and mail order. PS to Dr Rocío Junior to update. 1500 - Order received for DME cane. Given choice, order and documentation faxed with request for delivery to home (per patient preference). Call to Xenia at [address], notified of discharge home today.  Requested referral be sent again and she will call back to confirm receipt of referral.   1540 - Follow up call to [address], spoke to Francis dutta, confirmed receipt of referral.     Discharge [facility] Case Management Department  Written by: Eboni Eckert RN    Patient Name: Quin Dahl  Attending Provider: Delaney Araya MD  Admit Date: 2019  4:53 PM  MRN: [id number]  Account: [de-identified]                     : 1950  Discharge Date:  19       Disposition: home with Rashid Drummond RN

## 2019-08-19 NOTE — DISCHARGE SUMMARY
resolved. He was evaluated by nephrology for acute kidney injury. His lisinopril was held. He was found to have urine retention. Urology recommended Srinivasan catheter on discharge and they will follow him up as an outpatient. He was started on Flomax. Patient was advised catheter care. Patient did have detectable troponin thought to be most likely from type II MI. Echocardiogram showed low normal ejection fraction of 50%. Patient's lisinopril was stopped. I discussed with the patient that he can start using low-dose amlodipine if his blood pressure starts going high. Advised him to monitor his blood pressure closely at home with that and follow-up with his PCP. Patient has appointment with his PCP in 2 days.   Discussed with patient and his daughter in detail that if his symptoms return or if there is any other concern that he should return to the hospital.    Physical examination    Respiratory exam: Bilateral air entry no rhonchi or wheezes  Cardiovascular examination: S1, S2  Abdominal examination: Soft, nontender, bowel sounds present  Extremities: nontender, no edema      Significant therapeutic interventions: As above    Significant Diagnostic Studies:   Labs / Micro:  CBC:   Lab Results   Component Value Date    WBC 11.0 08/17/2019    RBC 4.36 08/17/2019    HGB 12.7 08/17/2019    HCT 39.4 08/17/2019    MCV 90.4 08/17/2019    MCH 29.1 08/17/2019    MCHC 32.2 08/17/2019    RDW 13.2 08/17/2019    PLT See Reflexed IPF Result 08/17/2019     BMP:    Lab Results   Component Value Date    GLUCOSE 134 08/19/2019     08/19/2019    K 3.7 08/19/2019    CL 99 08/19/2019    CO2 18 08/19/2019    ANIONGAP 13 08/19/2019    BUN 42 08/19/2019    CREATININE 1.49 08/19/2019    BUNCRER NOT REPORTED 08/19/2019    CALCIUM 7.4 08/19/2019    LABGLOM 47 08/19/2019    GFRAA 57 08/19/2019    GFR      08/19/2019    GFR NOT REPORTED 08/19/2019        Radiology:  Ct Abdomen Pelvis Wo Contrast Additional Contrast?

## 2019-08-19 NOTE — CARE COORDINATION
Patient/family seen: yes    Informed patient/family of [facility] Medical Bundle Program with potential outreach by either Care Transitions Team or naviHealth Team based on hospital admission and location. [facility]PCI-A Notification Letter given: Yes    Current discharge plan: Home with home care    Collaboration with Case Management: Yes  Spoke with Cristina Cooney. She stated that they are weaning Rosaura Sears off oxygen, but he will be discharged home with a river. Home care to follow. [facility] will be the home care. Met with Rosaura Sears and family and they were in agreement with discharge plan of home with home care.

## 2019-08-19 NOTE — PROGRESS NOTES
understanding with good return demo. Stairs/Curb  Stairs?: No     Balance  Posture: Good  Sitting - Static: Good  Sitting - Dynamic: Good  Standing - Static: Good;-  Standing - Dynamic: Fair;+  Comments: Standing balance assessed w/ no AD    Exercises: Deferred by pt d/t visiting family. Pt educated on the importance of strength training to return to prior level of function.       AM-PAC Score     AM-PAC Inpatient Mobility without Stair Climbing Raw Score : 18 (08/17/19 1521)  AM-PAC Inpatient without Stair Climbing T-Scale Score : 51.97 (08/17/19 1521)  Mobility Inpatient CMS 0-100% Score: 23.26 (08/17/19 1521)  Mobility Inpatient without Stair CMS G-Code Modifier : CJ (08/17/19 1521)       Goals  Short term goals  Time Frame for Short term goals: 10  Short term goal 1: Perform independent bed mobility and functional transfers  Short term goal 2: Ambulate 300ft w/ least restrictive AD independently  Short term goal 3: Ascend/descend 8 stairs independently  Short term goal 4: Tolerate 45 minutes of therapy to demo increased endurance  Patient Goals   Patient goals : Did not state    Plan    Plan  Times per week: 5-6x/week   Current Treatment Recommendations: Strengthening, Transfer Training, Endurance Training, Patient/Caregiver Education & Training, ROM, Balance Training, Gait Training, Home Exercise Program, Functional Mobility Training, Stair training, Safety Education & Training  Safety Devices  Type of devices: Gait belt, Patient at risk for falls, Nurse notified, Left in bed, Call light within reach  Restraints  Initially in place: No     Therapy Time   Individual Concurrent Group Co-treatment   Time In 1030         Time Out 1102         Minutes Niyah JOHNSON 935, PT

## 2019-08-20 ENCOUNTER — CARE COORDINATION (OUTPATIENT)
Dept: CASE MANAGEMENT | Age: 69
End: 2019-08-20

## 2019-08-20 LAB
ANCA MYELOPEROXIDASE: 4 AU/ML
ANCA PROTEINASE 3: 14 AU/ML
EKG ATRIAL RATE: 93 BPM
EKG P AXIS: 36 DEGREES
EKG P-R INTERVAL: 150 MS
EKG Q-T INTERVAL: 370 MS
EKG QRS DURATION: 132 MS
EKG QTC CALCULATION (BAZETT): 460 MS
EKG R AXIS: 10 DEGREES
EKG T AXIS: 22 DEGREES
EKG VENTRICULAR RATE: 93 BPM

## 2019-08-20 PROCEDURE — 93010 ELECTROCARDIOGRAM REPORT: CPT | Performed by: INTERNAL MEDICINE

## 2019-08-21 LAB
P E INTERPRETATION, U: NORMAL
PATHOLOGIST: NORMAL
SPECIMEN TYPE: NORMAL
URINE IFX INTERP: NORMAL
URINE IFX SPECIMEN: NORMAL
URINE TOTAL PROTEIN: 71 MG/DL
URINE TOTAL PROTEIN: 71 MG/DL
VOLUME: NORMAL ML

## 2019-08-22 LAB
CULTURE: NORMAL
Lab: NORMAL
SPECIMEN DESCRIPTION: NORMAL

## 2019-08-23 ENCOUNTER — OFFICE VISIT (OUTPATIENT)
Dept: UROLOGY | Age: 69
End: 2019-08-23
Payer: MEDICARE

## 2019-08-23 VITALS
BODY MASS INDEX: 28.15 KG/M2 | HEART RATE: 98 BPM | WEIGHT: 207.8 LBS | DIASTOLIC BLOOD PRESSURE: 76 MMHG | HEIGHT: 72 IN | SYSTOLIC BLOOD PRESSURE: 151 MMHG

## 2019-08-23 DIAGNOSIS — N17.9 AKI (ACUTE KIDNEY INJURY) (HCC): Primary | ICD-10-CM

## 2019-08-23 DIAGNOSIS — R33.9 URINARY RETENTION: ICD-10-CM

## 2019-08-23 LAB
CULTURE: NORMAL
Lab: NORMAL
Lab: NORMAL
SPECIMEN DESCRIPTION: NORMAL
SPECIMEN DESCRIPTION: NORMAL

## 2019-08-23 PROCEDURE — 99213 OFFICE O/P EST LOW 20 MIN: CPT | Performed by: UROLOGY

## 2019-08-23 PROCEDURE — 4040F PNEUMOC VAC/ADMIN/RCVD: CPT | Performed by: UROLOGY

## 2019-08-23 PROCEDURE — G8419 CALC BMI OUT NRM PARAM NOF/U: HCPCS | Performed by: UROLOGY

## 2019-08-23 PROCEDURE — 1036F TOBACCO NON-USER: CPT | Performed by: UROLOGY

## 2019-08-23 PROCEDURE — 1123F ACP DISCUSS/DSCN MKR DOCD: CPT | Performed by: UROLOGY

## 2019-08-23 PROCEDURE — 3017F COLORECTAL CA SCREEN DOC REV: CPT | Performed by: UROLOGY

## 2019-08-23 PROCEDURE — 51798 US URINE CAPACITY MEASURE: CPT | Performed by: UROLOGY

## 2019-08-23 PROCEDURE — 1111F DSCHRG MED/CURRENT MED MERGE: CPT | Performed by: UROLOGY

## 2019-08-23 PROCEDURE — G8427 DOCREV CUR MEDS BY ELIG CLIN: HCPCS | Performed by: UROLOGY

## 2019-08-23 NOTE — PROGRESS NOTES
[facility]  [address] [address]  Dept: [phone]  Dept Fax: [phone]      [facility] Office Note - Follow Up Visit    Patient:  Chas Caldera  YOB: 1950  Date: 8/23/2019    The patient is a 80 y.o. male who presents today for evaluation of the following problems: urinary retention  Chief Complaint   Patient presents with    New Patient     fospital follow up-river removal        HISTORY OF PRESENT ILLNESS:     Urinary retention  Onset was  Days ago  Overall, the problem(s) are unchanged. Severity is described as mild-moderate. Associated Symptoms: No dysuria, no gross hematuria. Current Pain Severity: 0    Here for voiding trial  Was seen as inpatient for ANY secondary to retention  Has river in place now. Has been on flomax  No other  hx        Requested/reviewed records from Phani Combs MD office and/or outside physician/EMR    (Patient's old records have been requested, reviewed and pertinent findings summarized in today's note.)    Last several PSA's:  Lab Results   Component Value Date    PSA 0.85 10/24/2013    PSA 1.09 08/23/2012       Last total testosterone:  No results found for: TESTOSTERONE    Urinalysis today:  No results found for this visit on 08/23/19.     Last BUN and creatinine:  Lab Results   Component Value Date    BUN 42 (H) 08/19/2019     Lab Results   Component Value Date    CREATININE 1.49 (H) 08/19/2019       Additional Lab/Culture results: none    Imaging Reviewed during this Office Visit:   Nova Hathaway MD independently reviewed the images and verified the radiology reports from:    Xr Chest Standard (2 Vw)    Result Date: 8/16/2019  EXAMINATION: TWO XRAY VIEWS OF THE CHEST 8/16/2019 5:43 pm COMPARISON: Chest radiograph dated 10/21/2010 HISTORY: ORDERING SYSTEM PROVIDED HISTORY: dyspnea TECHNOLOGIST PROVIDED HISTORY: dyspnea

## 2019-08-23 NOTE — LETTER
[facility], AN AFFILIATE OF [facility] [address] [address]  Phone: [phone]  Fax: [phone]    Ramona Loya MD        August 23, 2019      To whom it may concern,  Please excuse Chris Agee from professional duties since 8/19/19 until 8/24/2019.         Sincerely,        Ramona Loya MD

## 2019-09-10 ENCOUNTER — CARE COORDINATION (OUTPATIENT)
Dept: CASE MANAGEMENT | Age: 69
End: 2019-09-10

## 2019-09-10 NOTE — CARE COORDINATION
[facility] Transitions Follow Up Call    9/10/2019    Patient: Eden Gaffney  Patient : 1950   MRN: <[id number]>  Reason for Admission:   Discharge Date: 19 RARS: Readmission Risk Score: 16         Spoke with: [facility] [employer] Transitions Subsequent and Final Call    Subsequent and Final Calls  Do you have any ongoing symptoms?:  No  Have your medications changed?:  No  Do you have any questions related to your medications?:  No  Do you currently have any active services?:  Yes  Are you currently active with any services?:  Home Health  Do you have any needs or concerns that I can assist you with?:  No  Identified Barriers:  None  Care Transitions Interventions  Other Interventions:          CTN spoke to [facility] for [facility] f/u call. Pt stated he is doing \"fantastic\". Stated his river catheter was removed at Urology appt on 19 and his UOP has been good. [facility]ated [facility] and [facility] visit he was given a \"clean bill of health\". Stated he did have a blood clot behind right calf and is on Xarelto for 90  Days. Pt has phone interview with Dr on 10/9/19 regarding Xarelto and has appt for f/u with his Dr for his prostate on 10/9/19 also. Pt is being discharged from [facility] today. Denies increased pain, redness, swelling, warmth on calves, denies SOB. Stated he has been active playing golf and has no questions or concerns. Agreed to f/u BPCI-A calls.     Xiomara Zhang RN BSN   Care Transitions Nurse  [phone]     Follow Up  Future Appointments   Date Time Provider Ed Parker   10/7/2019 11:10 AM Toñito Andrade MD [facility] None       Xiomara Zhang RN

## 2019-09-30 ENCOUNTER — CARE COORDINATION (OUTPATIENT)
Dept: CASE MANAGEMENT | Age: 69
End: 2019-09-30

## 2019-10-09 ENCOUNTER — CARE COORDINATION (OUTPATIENT)
Dept: CASE MANAGEMENT | Age: 69
End: 2019-10-09

## 2019-10-16 ENCOUNTER — CARE COORDINATION (OUTPATIENT)
Dept: CASE MANAGEMENT | Age: 69
End: 2019-10-16

## 2019-11-06 ENCOUNTER — CARE COORDINATION (OUTPATIENT)
Dept: CASE MANAGEMENT | Age: 69
End: 2019-11-06

## 2021-12-23 ENCOUNTER — HOSPITAL ENCOUNTER (INPATIENT)
Age: 71
LOS: 8 days | Discharge: HOME HEALTH | DRG: 232 | End: 2021-12-31
Attending: EMERGENCY MEDICINE | Admitting: INTERNAL MEDICINE
Payer: OTHER GOVERNMENT

## 2021-12-23 ENCOUNTER — APPOINTMENT (OUTPATIENT)
Dept: CARDIAC CATH/INVASIVE PROCEDURES | Age: 71
DRG: 232 | End: 2021-12-23
Payer: OTHER GOVERNMENT

## 2021-12-23 ENCOUNTER — APPOINTMENT (OUTPATIENT)
Dept: CT IMAGING | Age: 71
DRG: 232 | End: 2021-12-23
Payer: OTHER GOVERNMENT

## 2021-12-23 ENCOUNTER — APPOINTMENT (OUTPATIENT)
Dept: GENERAL RADIOLOGY | Age: 71
DRG: 232 | End: 2021-12-23
Payer: OTHER GOVERNMENT

## 2021-12-23 DIAGNOSIS — G89.18 POST-OP PAIN: ICD-10-CM

## 2021-12-23 DIAGNOSIS — Z95.1 S/P CABG X 3: Chronic | ICD-10-CM

## 2021-12-23 DIAGNOSIS — I25.9 CHEST PAIN DUE TO MYOCARDIAL ISCHEMIA, UNSPECIFIED ISCHEMIC CHEST PAIN TYPE: Primary | ICD-10-CM

## 2021-12-23 DIAGNOSIS — I21.4 NSTEMI (NON-ST ELEVATED MYOCARDIAL INFARCTION) (HCC): ICD-10-CM

## 2021-12-23 LAB
ABSOLUTE EOS #: 0 K/UL (ref 0–0.4)
ABSOLUTE IMMATURE GRANULOCYTE: 0 K/UL (ref 0–0.3)
ABSOLUTE LYMPH #: 2.02 K/UL (ref 1–4.8)
ABSOLUTE MONO #: 2.02 K/UL (ref 0.1–0.8)
ACTIVATED CLOTTING TIME: 258 SEC (ref 79–149)
ANION GAP SERPL CALCULATED.3IONS-SCNC: 12 MMOL/L (ref 9–17)
BASOPHILS # BLD: 0 % (ref 0–2)
BASOPHILS ABSOLUTE: 0 K/UL (ref 0–0.2)
BILIRUBIN URINE: NEGATIVE
BNP INTERPRETATION: ABNORMAL
BUN BLDV-MCNC: 13 MG/DL (ref 8–23)
BUN/CREAT BLD: ABNORMAL (ref 9–20)
CALCIUM SERPL-MCNC: 9 MG/DL (ref 8.6–10.4)
CHLORIDE BLD-SCNC: 99 MMOL/L (ref 98–107)
CO2: 22 MMOL/L (ref 20–31)
COLOR: YELLOW
COMMENT UA: ABNORMAL
CREAT SERPL-MCNC: 1.06 MG/DL (ref 0.7–1.2)
DIFFERENTIAL TYPE: ABNORMAL
EOSINOPHILS RELATIVE PERCENT: 0 % (ref 1–4)
ESTIMATED AVERAGE GLUCOSE: 108 MG/DL
GFR AFRICAN AMERICAN: >60 ML/MIN
GFR NON-AFRICAN AMERICAN: >60 ML/MIN
GFR SERPL CREATININE-BSD FRML MDRD: ABNORMAL ML/MIN/{1.73_M2}
GFR SERPL CREATININE-BSD FRML MDRD: ABNORMAL ML/MIN/{1.73_M2}
GLUCOSE BLD-MCNC: 118 MG/DL (ref 70–99)
GLUCOSE URINE: NEGATIVE
HBA1C MFR BLD: 5.4 % (ref 4–6)
HCT VFR BLD CALC: 42.8 % (ref 40.7–50.3)
HCT VFR BLD CALC: 44.7 % (ref 40.7–50.3)
HEMOGLOBIN: 13.7 G/DL (ref 13–17)
HEMOGLOBIN: 14.3 G/DL (ref 13–17)
IMMATURE GRANULOCYTES: 0 %
KETONES, URINE: NEGATIVE
LEUKOCYTE ESTERASE, URINE: NEGATIVE
LYMPHOCYTES # BLD: 10 % (ref 24–44)
MCH RBC QN AUTO: 28.7 PG (ref 25.2–33.5)
MCH RBC QN AUTO: 28.8 PG (ref 25.2–33.5)
MCHC RBC AUTO-ENTMCNC: 32 G/DL (ref 28.4–34.8)
MCHC RBC AUTO-ENTMCNC: 32 G/DL (ref 28.4–34.8)
MCV RBC AUTO: 89.8 FL (ref 82.6–102.9)
MCV RBC AUTO: 89.9 FL (ref 82.6–102.9)
MONOCYTES # BLD: 10 % (ref 1–7)
MORPHOLOGY: NORMAL
NITRITE, URINE: NEGATIVE
NRBC AUTOMATED: 0 PER 100 WBC
NRBC AUTOMATED: 0 PER 100 WBC
PARTIAL THROMBOPLASTIN TIME: 27.5 SEC (ref 20.5–30.5)
PARTIAL THROMBOPLASTIN TIME: 31.6 SEC (ref 20.5–30.5)
PARTIAL THROMBOPLASTIN TIME: 97 SEC (ref 20.5–30.5)
PDW BLD-RTO: 12.6 % (ref 11.8–14.4)
PDW BLD-RTO: 12.6 % (ref 11.8–14.4)
PH UA: 5 (ref 5–8)
PLATELET # BLD: 181 K/UL (ref 138–453)
PLATELET # BLD: 227 K/UL (ref 138–453)
PLATELET ESTIMATE: ABNORMAL
PMV BLD AUTO: 10.3 FL (ref 8.1–13.5)
PMV BLD AUTO: 9.8 FL (ref 8.1–13.5)
POTASSIUM SERPL-SCNC: 4.2 MMOL/L (ref 3.7–5.3)
PRO-BNP: 2853 PG/ML
PROCALCITONIN: 0.7 NG/ML
PROTEIN UA: NEGATIVE
RBC # BLD: 4.76 M/UL (ref 4.21–5.77)
RBC # BLD: 4.98 M/UL (ref 4.21–5.77)
RBC # BLD: ABNORMAL 10*6/UL
SARS-COV-2, RAPID: NOT DETECTED
SEG NEUTROPHILS: 80 % (ref 36–66)
SEGMENTED NEUTROPHILS ABSOLUTE COUNT: 16.16 K/UL (ref 1.8–7.7)
SODIUM BLD-SCNC: 133 MMOL/L (ref 135–144)
SPECIFIC GRAVITY UA: 1.06 (ref 1–1.03)
SPECIMEN DESCRIPTION: NORMAL
TROPONIN INTERP: ABNORMAL
TROPONIN T: ABNORMAL NG/ML
TROPONIN, HIGH SENSITIVITY: 2628 NG/L (ref 0–22)
TROPONIN, HIGH SENSITIVITY: 2699 NG/L (ref 0–22)
TROPONIN, HIGH SENSITIVITY: 4916 NG/L (ref 0–22)
TROPONIN, HIGH SENSITIVITY: 5554 NG/L (ref 0–22)
TROPONIN, HIGH SENSITIVITY: 6203 NG/L (ref 0–22)
TURBIDITY: CLEAR
URINE HGB: NEGATIVE
UROBILINOGEN, URINE: NORMAL
WBC # BLD: 19.1 K/UL (ref 3.5–11.3)
WBC # BLD: 20.2 K/UL (ref 3.5–11.3)
WBC # BLD: ABNORMAL 10*3/UL

## 2021-12-23 PROCEDURE — C1894 INTRO/SHEATH, NON-LASER: HCPCS

## 2021-12-23 PROCEDURE — 4A023N7 MEASUREMENT OF CARDIAC SAMPLING AND PRESSURE, LEFT HEART, PERCUTANEOUS APPROACH: ICD-10-PCS | Performed by: INTERNAL MEDICINE

## 2021-12-23 PROCEDURE — 85347 COAGULATION TIME ACTIVATED: CPT

## 2021-12-23 PROCEDURE — B2111ZZ FLUOROSCOPY OF MULTIPLE CORONARY ARTERIES USING LOW OSMOLAR CONTRAST: ICD-10-PCS | Performed by: INTERNAL MEDICINE

## 2021-12-23 PROCEDURE — 6360000002 HC RX W HCPCS

## 2021-12-23 PROCEDURE — B2151ZZ FLUOROSCOPY OF LEFT HEART USING LOW OSMOLAR CONTRAST: ICD-10-PCS | Performed by: INTERNAL MEDICINE

## 2021-12-23 PROCEDURE — 84145 PROCALCITONIN (PCT): CPT

## 2021-12-23 PROCEDURE — 36415 COLL VENOUS BLD VENIPUNCTURE: CPT

## 2021-12-23 PROCEDURE — 80048 BASIC METABOLIC PNL TOTAL CA: CPT

## 2021-12-23 PROCEDURE — 96375 TX/PRO/DX INJ NEW DRUG ADDON: CPT

## 2021-12-23 PROCEDURE — 81003 URINALYSIS AUTO W/O SCOPE: CPT

## 2021-12-23 PROCEDURE — 96374 THER/PROPH/DIAG INJ IV PUSH: CPT

## 2021-12-23 PROCEDURE — 71045 X-RAY EXAM CHEST 1 VIEW: CPT

## 2021-12-23 PROCEDURE — 83036 HEMOGLOBIN GLYCOSYLATED A1C: CPT

## 2021-12-23 PROCEDURE — 85730 THROMBOPLASTIN TIME PARTIAL: CPT

## 2021-12-23 PROCEDURE — 99285 EMERGENCY DEPT VISIT HI MDM: CPT

## 2021-12-23 PROCEDURE — 83880 ASSAY OF NATRIURETIC PEPTIDE: CPT

## 2021-12-23 PROCEDURE — C1769 GUIDE WIRE: HCPCS

## 2021-12-23 PROCEDURE — 87086 URINE CULTURE/COLONY COUNT: CPT

## 2021-12-23 PROCEDURE — 2709999900 HC NON-CHARGEABLE SUPPLY

## 2021-12-23 PROCEDURE — 2000000000 HC ICU R&B

## 2021-12-23 PROCEDURE — 93458 L HRT ARTERY/VENTRICLE ANGIO: CPT

## 2021-12-23 PROCEDURE — 85027 COMPLETE CBC AUTOMATED: CPT

## 2021-12-23 PROCEDURE — 71260 CT THORAX DX C+: CPT

## 2021-12-23 PROCEDURE — 93005 ELECTROCARDIOGRAM TRACING: CPT

## 2021-12-23 PROCEDURE — 2580000003 HC RX 258: Performed by: STUDENT IN AN ORGANIZED HEALTH CARE EDUCATION/TRAINING PROGRAM

## 2021-12-23 PROCEDURE — 6360000002 HC RX W HCPCS: Performed by: STUDENT IN AN ORGANIZED HEALTH CARE EDUCATION/TRAINING PROGRAM

## 2021-12-23 PROCEDURE — 2500000003 HC RX 250 WO HCPCS: Performed by: STUDENT IN AN ORGANIZED HEALTH CARE EDUCATION/TRAINING PROGRAM

## 2021-12-23 PROCEDURE — 92941 PRQ TRLML REVSC TOT OCCL AMI: CPT

## 2021-12-23 PROCEDURE — 6370000000 HC RX 637 (ALT 250 FOR IP): Performed by: STUDENT IN AN ORGANIZED HEALTH CARE EDUCATION/TRAINING PROGRAM

## 2021-12-23 PROCEDURE — 99223 1ST HOSP IP/OBS HIGH 75: CPT | Performed by: INTERNAL MEDICINE

## 2021-12-23 PROCEDURE — 84484 ASSAY OF TROPONIN QUANT: CPT

## 2021-12-23 PROCEDURE — 6360000004 HC RX CONTRAST MEDICATION

## 2021-12-23 PROCEDURE — 2580000003 HC RX 258

## 2021-12-23 PROCEDURE — 02703ZZ DILATION OF CORONARY ARTERY, ONE ARTERY, PERCUTANEOUS APPROACH: ICD-10-PCS | Performed by: INTERNAL MEDICINE

## 2021-12-23 PROCEDURE — 87040 BLOOD CULTURE FOR BACTERIA: CPT

## 2021-12-23 PROCEDURE — 85025 COMPLETE CBC W/AUTO DIFF WBC: CPT

## 2021-12-23 PROCEDURE — 2500000003 HC RX 250 WO HCPCS

## 2021-12-23 PROCEDURE — 6360000004 HC RX CONTRAST MEDICATION: Performed by: EMERGENCY MEDICINE

## 2021-12-23 PROCEDURE — C1725 CATH, TRANSLUMIN NON-LASER: HCPCS

## 2021-12-23 PROCEDURE — 87635 SARS-COV-2 COVID-19 AMP PRB: CPT

## 2021-12-23 PROCEDURE — C1887 CATHETER, GUIDING: HCPCS

## 2021-12-23 PROCEDURE — 6370000000 HC RX 637 (ALT 250 FOR IP)

## 2021-12-23 PROCEDURE — 51702 INSERT TEMP BLADDER CATH: CPT

## 2021-12-23 RX ORDER — ATORVASTATIN CALCIUM 80 MG/1
80 TABLET, FILM COATED ORAL NIGHTLY
Status: DISCONTINUED | OUTPATIENT
Start: 2021-12-23 | End: 2021-12-24

## 2021-12-23 RX ORDER — TAMSULOSIN HYDROCHLORIDE 0.4 MG/1
0.4 CAPSULE ORAL DAILY
Status: DISCONTINUED | OUTPATIENT
Start: 2021-12-23 | End: 2021-12-24

## 2021-12-23 RX ORDER — SODIUM CHLORIDE 9 MG/ML
25 INJECTION, SOLUTION INTRAVENOUS PRN
Status: DISCONTINUED | OUTPATIENT
Start: 2021-12-23 | End: 2021-12-27

## 2021-12-23 RX ORDER — HEPARIN SODIUM 1000 [USP'U]/ML
4000 INJECTION, SOLUTION INTRAVENOUS; SUBCUTANEOUS ONCE
Status: COMPLETED | OUTPATIENT
Start: 2021-12-23 | End: 2021-12-23

## 2021-12-23 RX ORDER — METOPROLOL TARTRATE 50 MG/1
25 TABLET, FILM COATED ORAL 2 TIMES DAILY
Status: DISCONTINUED | OUTPATIENT
Start: 2021-12-23 | End: 2021-12-27

## 2021-12-23 RX ORDER — SODIUM CHLORIDE 9 MG/ML
INJECTION, SOLUTION INTRAVENOUS CONTINUOUS
Status: DISCONTINUED | OUTPATIENT
Start: 2021-12-23 | End: 2021-12-24

## 2021-12-23 RX ORDER — POLYETHYLENE GLYCOL 3350 17 G/17G
17 POWDER, FOR SOLUTION ORAL DAILY PRN
Status: DISCONTINUED | OUTPATIENT
Start: 2021-12-23 | End: 2021-12-27

## 2021-12-23 RX ORDER — DIPHENHYDRAMINE HYDROCHLORIDE 50 MG/ML
25 INJECTION INTRAMUSCULAR; INTRAVENOUS ONCE
Status: COMPLETED | OUTPATIENT
Start: 2021-12-23 | End: 2021-12-23

## 2021-12-23 RX ORDER — NITROGLYCERIN 20 MG/100ML
5-200 INJECTION INTRAVENOUS CONTINUOUS
Status: DISCONTINUED | OUTPATIENT
Start: 2021-12-23 | End: 2021-12-27

## 2021-12-23 RX ORDER — HEPARIN SODIUM 1000 [USP'U]/ML
4000 INJECTION, SOLUTION INTRAVENOUS; SUBCUTANEOUS PRN
Status: DISCONTINUED | OUTPATIENT
Start: 2021-12-23 | End: 2021-12-27

## 2021-12-23 RX ORDER — ACETAMINOPHEN 325 MG/1
650 TABLET ORAL EVERY 6 HOURS PRN
Status: DISCONTINUED | OUTPATIENT
Start: 2021-12-23 | End: 2021-12-23

## 2021-12-23 RX ORDER — BUDESONIDE AND FORMOTEROL FUMARATE DIHYDRATE 160; 4.5 UG/1; UG/1
2 AEROSOL RESPIRATORY (INHALATION) 2 TIMES DAILY
Status: DISCONTINUED | OUTPATIENT
Start: 2021-12-23 | End: 2021-12-27

## 2021-12-23 RX ORDER — ASPIRIN 81 MG/1
81 TABLET ORAL DAILY
Status: DISCONTINUED | OUTPATIENT
Start: 2021-12-24 | End: 2021-12-24

## 2021-12-23 RX ORDER — ACETAMINOPHEN 325 MG/1
650 TABLET ORAL EVERY 4 HOURS PRN
Status: DISCONTINUED | OUTPATIENT
Start: 2021-12-23 | End: 2021-12-27

## 2021-12-23 RX ORDER — PROCHLORPERAZINE EDISYLATE 5 MG/ML
10 INJECTION INTRAMUSCULAR; INTRAVENOUS ONCE
Status: COMPLETED | OUTPATIENT
Start: 2021-12-23 | End: 2021-12-23

## 2021-12-23 RX ORDER — SODIUM CHLORIDE 0.9 % (FLUSH) 0.9 %
5-40 SYRINGE (ML) INJECTION PRN
Status: DISCONTINUED | OUTPATIENT
Start: 2021-12-23 | End: 2021-12-27

## 2021-12-23 RX ORDER — ONDANSETRON 4 MG/1
4 TABLET, ORALLY DISINTEGRATING ORAL EVERY 8 HOURS PRN
Status: DISCONTINUED | OUTPATIENT
Start: 2021-12-23 | End: 2021-12-27

## 2021-12-23 RX ORDER — FENTANYL CITRATE 50 UG/ML
50 INJECTION, SOLUTION INTRAMUSCULAR; INTRAVENOUS ONCE
Status: DISCONTINUED | OUTPATIENT
Start: 2021-12-23 | End: 2021-12-27

## 2021-12-23 RX ORDER — ACETAMINOPHEN 650 MG/1
650 SUPPOSITORY RECTAL EVERY 6 HOURS PRN
Status: DISCONTINUED | OUTPATIENT
Start: 2021-12-23 | End: 2021-12-23

## 2021-12-23 RX ORDER — SODIUM CHLORIDE 0.9 % (FLUSH) 0.9 %
5-40 SYRINGE (ML) INJECTION EVERY 12 HOURS SCHEDULED
Status: DISCONTINUED | OUTPATIENT
Start: 2021-12-23 | End: 2021-12-27

## 2021-12-23 RX ORDER — ALBUTEROL SULFATE 90 UG/1
2 AEROSOL, METERED RESPIRATORY (INHALATION) 4 TIMES DAILY PRN
Status: DISCONTINUED | OUTPATIENT
Start: 2021-12-23 | End: 2021-12-27

## 2021-12-23 RX ORDER — HEPARIN SODIUM 1000 [USP'U]/ML
2000 INJECTION, SOLUTION INTRAVENOUS; SUBCUTANEOUS PRN
Status: DISCONTINUED | OUTPATIENT
Start: 2021-12-23 | End: 2021-12-27

## 2021-12-23 RX ORDER — AMLODIPINE BESYLATE 2.5 MG/1
1.25 TABLET ORAL DAILY
Status: DISCONTINUED | OUTPATIENT
Start: 2021-12-23 | End: 2021-12-24

## 2021-12-23 RX ORDER — HEPARIN SODIUM 10000 [USP'U]/100ML
5-30 INJECTION, SOLUTION INTRAVENOUS CONTINUOUS
Status: DISCONTINUED | OUTPATIENT
Start: 2021-12-23 | End: 2021-12-27

## 2021-12-23 RX ORDER — MORPHINE SULFATE 4 MG/ML
4 INJECTION, SOLUTION INTRAMUSCULAR; INTRAVENOUS ONCE
Status: COMPLETED | OUTPATIENT
Start: 2021-12-23 | End: 2021-12-23

## 2021-12-23 RX ORDER — ONDANSETRON 2 MG/ML
4 INJECTION INTRAMUSCULAR; INTRAVENOUS EVERY 6 HOURS PRN
Status: DISCONTINUED | OUTPATIENT
Start: 2021-12-23 | End: 2021-12-27

## 2021-12-23 RX ADMIN — SODIUM CHLORIDE, PRESERVATIVE FREE 10 ML: 5 INJECTION INTRAVENOUS at 13:07

## 2021-12-23 RX ADMIN — HEPARIN SODIUM 2000 UNITS: 1000 INJECTION INTRAVENOUS; SUBCUTANEOUS at 21:48

## 2021-12-23 RX ADMIN — Medication 11.5 UNITS/KG/HR: at 14:54

## 2021-12-23 RX ADMIN — ATORVASTATIN CALCIUM 80 MG: 80 TABLET, FILM COATED ORAL at 20:11

## 2021-12-23 RX ADMIN — TAMSULOSIN HYDROCHLORIDE 0.4 MG: 0.4 CAPSULE ORAL at 14:54

## 2021-12-23 RX ADMIN — SODIUM CHLORIDE 50 ML/HR: 9 INJECTION, SOLUTION INTRAVENOUS at 12:10

## 2021-12-23 RX ADMIN — SODIUM CHLORIDE, PRESERVATIVE FREE 10 ML: 5 INJECTION INTRAVENOUS at 20:12

## 2021-12-23 RX ADMIN — MORPHINE SULFATE 4 MG: 4 INJECTION INTRAVENOUS at 06:20

## 2021-12-23 RX ADMIN — Medication 2 PUFF: at 20:23

## 2021-12-23 RX ADMIN — PROCHLORPERAZINE EDISYLATE 10 MG: 5 INJECTION INTRAMUSCULAR; INTRAVENOUS at 07:25

## 2021-12-23 RX ADMIN — Medication 11.5 UNITS/KG/HR: at 08:29

## 2021-12-23 RX ADMIN — METOPROLOL TARTRATE 25 MG: 50 TABLET, FILM COATED ORAL at 13:05

## 2021-12-23 RX ADMIN — DIPHENHYDRAMINE HYDROCHLORIDE 25 MG: 50 INJECTION, SOLUTION INTRAMUSCULAR; INTRAVENOUS at 07:25

## 2021-12-23 RX ADMIN — HEPARIN SODIUM 4000 UNITS: 1000 INJECTION INTRAVENOUS; SUBCUTANEOUS at 08:22

## 2021-12-23 RX ADMIN — IOPAMIDOL 75 ML: 755 INJECTION, SOLUTION INTRAVENOUS at 07:51

## 2021-12-23 RX ADMIN — NITROGLYCERIN 5 MCG/MIN: 20 INJECTION INTRAVENOUS at 08:23

## 2021-12-23 ASSESSMENT — PAIN SCALES - GENERAL
PAINLEVEL_OUTOF10: 0
PAINLEVEL_OUTOF10: 9
PAINLEVEL_OUTOF10: 0
PAINLEVEL_OUTOF10: 8

## 2021-12-23 ASSESSMENT — ENCOUNTER SYMPTOMS
COLOR CHANGE: 0
ABDOMINAL PAIN: 0
SHORTNESS OF BREATH: 1
CONSTIPATION: 0
DIARRHEA: 0

## 2021-12-23 NOTE — ED PROVIDER NOTES
[address] [facility], DO  12/23/21 [facility], DO  12/23/21 [facility], DO  12/23/21 2027

## 2021-12-23 NOTE — PROGRESS NOTES
Critical PTT 97 reported from laboratory. Anticipated value post CCL. Transradial band removed at 1300. Site soft, no hematoma. Transparent occlusive dressing applied to right radial wrist, +2 palpable radial pulse. Will resume heparin 2 hours from now, at 8.5 units/kg/hr, following algorithm for low dose protocol.

## 2021-12-23 NOTE — H&P
[address]     Department of Internal Medicine - Staff Internal Medicine Teaching Service          ADMISSION NOTE/HISTORY AND PHYSICAL EXAMINATION   Date: 12/23/2021  Patient Name: Eliazar Tatum  Date of admission: 12/23/2021  6:01 AM  YOB: 1950  PCP: Jaycee Lowe MD  History Obtained From:  patient, electronic medical record    CHIEF COMPLAINT     Chief complaint: Chest pain     HISTORY OF PRESENTING ILLNESS     The patient is a pleasant 80 y.o. male with a PMHx significant for CAD (unstable angina), COPD, empyema, GERD, HTN, history of blood clots and HLD who presents with a chief complaint of chest pain. Pt states he has also experienced nausea and diaphoresis since Tuesday this week. Pt stated that the pain woke him from sleep. He described the pain and an elephant stepping on his chest and rated the pain 9/10 in intensity radiating up the neck and down both of his arms. Pt denies prior stents. Pt received ASA and nitroglycerin which did not alleviate the pain. Pt denied headache, blurry vision, shortness of breath, abdominal pain or weakness. Troponin on arrival 2,699 and trended up to 6,203. WBC elevated at 20.2 decreased to 19.1. Pt was taken for cardiac catheterization shortly after arrival and underwent PTCA.          Review of Systems:  General ROS: Completed and except as mentioned above were negative   HEENT ROS: Completed and except as mentioned above were negative   Allergy and Immunology ROS:  Completed and except as mentioned above were negative  Hematological and Lymphatic ROS:  Completed and except as mentioned above were negative  Respiratory ROS:  Completed and except as mentioned above were negative  Cardiovascular ROS:  Completed and except as mentioned above were negative  Gastrointestinal ROS: Completed and except as mentioned above were negative  Genito-Urinary ROS:  Completed and except as mentioned above were negative  Musculoskeletal ROS:  Completed and except as mentioned above were negative  Neurological ROS:  Completed and except as mentioned above were negative  Skin & Dermatological ROS:  Completed and except as mentioned above were negative  Psychological ROS:  Completed and except as mentioned above were negative    PAST MEDICAL HISTORY     Past Medical History:   Diagnosis Date    Acute upper respiratory infections of unspecified site     CAD (coronary artery disease)     COPD [facility]chronic obstructive pulmonary disease) ([facility] [address]     Empyema without mention of fistula     Esophageal reflux     Essential hypertension, benign     History of blood transfusion     Hx of blood clots     RLE DVT,     Localized osteoarthrosis not specified whether primary or secondary, unspecified site     Other and unspecified hyperlipidemia     Surgical or other procedure not carried out because of patient's decision     colonoscopy refused     Unspecified pleural effusion        PAST SURGICAL HISTORY     Past Surgical History:   Procedure Laterality Date    LUNG DECORTICATION Right 2006    MRSA at that time. ALLERGIES     Patient has no known allergies. MEDICATIONS PRIOR TO ADMISSION     Prior to Admission medications    Medication Sig Start Date End Date Taking?  Authorizing Provider   tamsulosin (FLOMAX) 0.4 MG capsule Take 1 capsule by mouth daily 8/20/19  Yes Jess Tran MD   amLODIPine (NORVASC) 2.5 MG tablet Take 0.5 tablets by mouth daily Start taking  in place of lisinopril only if BP stays more than 389 systolic 3/03/53  Yes Jess Tran MD   albuterol sulfate  (90 Base) MCG/ACT inhaler Inhale 2 puffs into the lungs 4 times daily as needed for Wheezing or Shortness of Breath 8/19/19  Yes Jess Tran MD   tiotropium (SPIRIVA RESPIMAT) 2.5 MCG/ACT AERS inhaler Inhale 2 puffs into the lungs daily   Yes Historical Provider, MD   budesonide-formoterol (SYMBICORT) 160-4.5 MCG/ACT AERO Inhale 2 puffs into the lungs 2 times daily   Yes Historical Provider, MD       SOCIAL HISTORY     Tobacco: Previous history   Alcohol: Denies  Illicits: Denies   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     FAMILY HISTORY     Family History   Problem Relation Age of Onset    Diabetes Mother     Heart Disease Mother     Heart Disease Father     Heart Disease Paternal Uncle        PHYSICAL EXAM     Vitals: /70   Pulse 96   Temp 98.1 °F (36.7 °C) (Oral)   Resp 20   Ht 6' (1.829 m)   Wt 188 lb 15 oz (85.7 kg)   SpO2 98%   BMI 25.62 kg/m²   Tmax: Temp (24hrs), Av.8 °F (36.6 °C), Min:97.5 °F (36.4 °C), Max:98.1 °F (36.7 °C)    Last Body weight:   Wt Readings from Last 3 Encounters:   21 188 lb 15 oz (85.7 kg)   10/07/19 190 lb (86.2 kg)   19 207 lb 12.8 oz (94.3 kg)     Body Mass Index : Body mass index is 25.62 kg/m². PHYSICAL EXAMINATION:  Physical Exam  Vitals and nursing note reviewed. Constitutional:       Appearance: Normal appearance. HENT:      Head: Normocephalic and atraumatic. Nose: Nose normal.      Mouth/Throat:      Mouth: Mucous membranes are moist.      Pharynx: Oropharynx is clear. Eyes:      Extraocular Movements: Extraocular movements intact. Conjunctiva/sclera: Conjunctivae normal.      Pupils: Pupils are equal, round, and reactive to light. Cardiovascular:      Rate and Rhythm: Normal rate and regular rhythm. Pulses: Normal pulses. Heart sounds: Normal heart sounds. No murmur heard. No friction rub. No gallop. Pulmonary:      Effort: Pulmonary effort is normal. No respiratory distress. Breath sounds: Normal breath sounds. No stridor. No wheezing, rhonchi or rales. Chest:      Chest wall: Tenderness present. Abdominal:      General: Abdomen is flat. Bowel sounds are normal. There is no distension. Palpations: Abdomen is soft. There is no mass. Tenderness: There is no abdominal tenderness. There is no guarding or rebound.       Hernia: No hernia is present. Musculoskeletal:         General: No swelling, tenderness, deformity or signs of injury. Normal range of motion. Cervical back: Normal range of motion. Right lower leg: No edema. Left lower leg: No edema. Skin:     General: Skin is warm. Neurological:      General: No focal deficit present. Mental Status: He is alert and oriented to person, place, and time. Mental status is at baseline. Psychiatric:         Mood and Affect: Mood normal.         Behavior: Behavior normal.         Thought Content: Thought content normal.         Judgment: Judgment normal.         INVESTIGATIONS     Laboratory Testing:     Recent Results (from the past 24 hour(s))   COVID-19, Rapid    Collection Time: 12/23/21  6:55 AM    Specimen: Nasopharyngeal Swab   Result Value Ref Range    Specimen Description . NASOPHARYNGEAL SWAB     SARS-CoV-2, Rapid Not Detected Not Detected   Troponin    Collection Time: 12/23/21  6:57 AM   Result Value Ref Range    Troponin, High Sensitivity 2,699 (HH) 0 - 22 ng/L    Troponin T NOT REPORTED <0.03 ng/mL    Troponin Interp NOT REPORTED    CBC WITH AUTO DIFFERENTIAL    Collection Time: 12/23/21  6:57 AM   Result Value Ref Range    WBC 20.2 (H) 3.5 - 11.3 k/uL    RBC 4.98 4.21 - 5.77 m/uL    Hemoglobin 14.3 13.0 - 17.0 g/dL    Hematocrit 44.7 40.7 - 50.3 %    MCV 89.8 82.6 - 102.9 fL    MCH 28.7 25.2 - 33.5 pg    MCHC 32.0 28.4 - 34.8 g/dL    RDW 12.6 11.8 - 14.4 %    Platelets 232 030 - 280 k/uL    MPV 10.3 8.1 - 13.5 fL    NRBC Automated 0.0 0.0 per 100 WBC    Differential Type NOT REPORTED     WBC Morphology NOT REPORTED     RBC Morphology NOT REPORTED     Platelet Estimate NOT REPORTED     Immature Granulocytes 0 0 %    Seg Neutrophils 80 (H) 36 - 66 %    Lymphocytes 10 (L) 24 - 44 %    Monocytes 10 (H) 1 - 7 %    Eosinophils % 0 (L) 1 - 4 %    Basophils 0 0 - 2 %    Absolute Immature Granulocyte 0.00 0.00 - 0.30 k/uL    Segs Absolute 16.16 (H) 1.8 - 7.7 k/uL    Absolute Lymph # 2.02 1.0 - 4.8 k/uL    Absolute Mono # 2.02 (H) 0.1 - 0.8 k/uL    Absolute Eos # 0.00 0.0 - 0.4 k/uL    Basophils Absolute 0.00 0.0 - 0.2 k/uL    Morphology Normal    BASIC METABOLIC PANEL    Collection Time: 12/23/21  6:57 AM   Result Value Ref Range    Glucose 118 (H) 70 - 99 mg/dL    BUN 13 8 - 23 mg/dL    CREATININE 1.06 0.70 - 1.20 mg/dL    Bun/Cre Ratio NOT REPORTED 9 - 20    Calcium 9.0 8.6 - 10.4 mg/dL    Sodium 133 (L) 135 - 144 mmol/L    Potassium 4.2 3.7 - 5.3 mmol/L    Chloride 99 98 - 107 mmol/L    CO2 22 20 - 31 mmol/L    Anion Gap 12 9 - 17 mmol/L    GFR Non-African American >60 >60 mL/min    GFR African American >60 >60 mL/min    GFR Comment          GFR Staging NOT REPORTED    Brain Natriuretic Peptide    Collection Time: 12/23/21  6:57 AM   Result Value Ref Range    Pro-BNP 2,853 (H) <300 pg/mL    BNP Interpretation NOT REPORTED    Troponin    Collection Time: 12/23/21  8:44 AM   Result Value Ref Range    Troponin, High Sensitivity 2,628 (HH) 0 - 22 ng/L    Troponin T NOT REPORTED <0.03 ng/mL    Troponin Interp NOT REPORTED    CBC    Collection Time: 12/23/21  8:44 AM   Result Value Ref Range    WBC 19.1 (H) 3.5 - 11.3 k/uL    RBC 4.76 4.21 - 5.77 m/uL    Hemoglobin 13.7 13.0 - 17.0 g/dL    Hematocrit 42.8 40.7 - 50.3 %    MCV 89.9 82.6 - 102.9 fL    MCH 28.8 25.2 - 33.5 pg    MCHC 32.0 28.4 - 34.8 g/dL    RDW 12.6 11.8 - 14.4 %    Platelets 000 945 - 916 k/uL    MPV 9.8 8.1 - 13.5 fL    NRBC Automated 0.0 0.0 per 100 WBC   APTT    Collection Time: 12/23/21  8:44 AM   Result Value Ref Range    PTT 27.5 20.5 - 30.5 sec   Activated clotting time    Collection Time: 12/23/21 10:52 AM   Result Value Ref Range    Activated Clotting Time 258 (H) 79 - 149 sec   Troponin    Collection Time: 12/23/21 12:21 PM   Result Value Ref Range    Troponin, High Sensitivity 4,916 (HH) 0 - 22 ng/L    Troponin T NOT REPORTED <0.03 ng/mL    Troponin Interp NOT REPORTED    APTT    Collection Time: 12/23/21 12:21 PM   Result Value Ref Range    PTT 97.0 (HH) 20.5 - 30.5 sec       Imaging:   XR CHEST PORTABLE    Result Date: 12/23/2021  New variable nearly diffuse interstitial thickening concerning for viral process. CT CHEST PULMONARY EMBOLISM W CONTRAST    Result Date: 12/23/2021  1. No evidence of pulmonary embolism. 2.  Findings compatible with scarring in the left upper lobe and lung bases. No convincing evidence for acute airspace disease. 3.  Mild emphysematous disease. RECOMMENDATIONS: Unavailable       ASSESSMENT & PLAN     ASSESSMENT / PLAN:     IMPRESSION  This is a 80 y.o. male who presented with chest pain and found to have NSTEMI. Patient admitted to inpatient status for management of NSTEMI. Active Problems:  NSTEMI (non-ST elevated myocardial infarction)   - S/P cardiac cath 12/23  - Pt on ASA, Lipitor, 1 dose of morphine, Lopressor, Nitroglycerin gtt and heparin   - Trending troponin's  - Echocardiogram pending   - Continue to monitor for chest pain   - Cardiology following     Hypertension - stable   - Hold BP medications due to hypotension     History of blood clots   - Stable on heparin     COPD   - Continue with Albuterol, Symbicort and Spiriva     GERD - stable     Hyperlipidemia -  Stable   - Lipitor     Diet: Regular low fat, low cholesterol and high fiber   DVT ppx: Heparin   GI ppx: None     PT/OT/SW: Consulted   Discharge Planning: In process       Miguel Angel Wong MD  Internal Medicine Resident, PGY-1  [address] [facility];  [place], [place]  12/23/2021, 2:27 PM

## 2021-12-23 NOTE — PROGRESS NOTES
Pt. Admitted to room 1002 post CCL. Placed on cardiac monitor, NC O2 at 3L. Pt. Denies chest pain/pressure/tightness at this time, NTG gtt shut off. Pt. Resting comfortably in bed. Transradial band with 11 cc air noted to right radial wrist. Post procedural cath pathway started.

## 2021-12-23 NOTE — ED NOTES
Bed: 20  Expected date: 12/23/21  Expected time: 5:49 AM  Means of arrival: [facility]  Comments:  Johnnette Primrose, RN  12/23/21 [id number]

## 2021-12-23 NOTE — ED NOTES
Report attempt called to [facility]. Arcadio Grubbs RN stated he looked up everything he needs to know.      Mica Lawrence RN  12/23/21 2007

## 2021-12-23 NOTE — CARE COORDINATION
Case Management Initial Discharge Plan  Eliazar Tatum,             Met with:patient, sister and daughter to discuss discharge plans. Information verified: address, contacts, phone number, , insurance Yes  Insurance Provider: Lolly Gross    Emergency Contact/Next of Kin name & number: daughter Girish Ferreira [phone] and sister Nara Irizarry [phone]  Who are involved in patient's support system? family    PCP: Jaycee Lowe MD  Date of last visit: 21      Discharge Planning    Living Arrangements:  Alone     Home has 1 story  0 stairs to climb to get into front door    Patient able to perform ADL's:Independent    Current Services (outpatient & in home) none  DME equipment: none  DME provider: N/A    Is patient receiving oral anticoagulation therapy? No    If indicated:   Physician managing anticoagulation treatment:   Where does patient obtain lab work for ATC treatment? Potential Assistance Needed:  Gennaro Esquivel    Patient agreeable to home care:yes  [place] of choice provided:no    Prior SNF/Rehab Placement and Facility: [facility]  Agreeable to SNF/Rehab: Yes  [place] of choice provided: yes     Evaluation: no    Expected Discharge date:       Patient expects to be discharged to: If home: is the family and/or caregiver wiling & able to provide support at home? Who will be providing this support? *    Follow Up Appointment: Best Day/ Time:      Transportation provider: [address] [place] [address]  Transportation arrangements needed for discharge: Yes    Readmission Risk              Risk of Unplanned Readmission:  10             Does patient have a readmission risk score greater than 14?: No  If yes, follow-up appointment must be made within 7 days of discharge. Goals of Care: Successful CABG      Educated pt, sister and daughter on transitional options, provided freedom of choice and are agreeable with plan      Discharge Plan:Most probable SNF. Pt lives alone. 1st choice [facility] and 2nd choice [facility]. Referrals sent. [address] in admissions and left VM.  Called [facility] and spoke with Gemini Price who will give message to admissions          Electronically signed by Angel Casanova RN on 12/23/21 at 4:22 PM EST

## 2021-12-23 NOTE — CONSULTS
[facility] Cardiology Cardiology    Consult                        Today's Date: 12/23/2021  Patient Name: Tessa Quispe  Date of admission: 12/23/2021  6:01 AM  Patient's age: 80 y.o., 1950  Admission Dx: NSTEMI (non-ST elevated myocardial infarction) [facility]) [I21.4]    Reason for Consult: Chest pain    Requesting Physician: No admitting provider for patient encounter. CHIEF COMPLAINT:  Chest pain    History Obtained From:  patient    HISTORY OF PRESENT ILLNESS:      The patient is a 80 y.o.  male who is admitted to the hospital for NSTEMI. The patient presented with chest pain, continuous since tuesday. Presented this morning to ER in the morning. Associated with SOB. No dizziness or syncope. Was started by ER on IV nitro and heparin. Chest pain is better now. 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 Past Surgical History:   has no past surgical history on file. Home Medications:    Prior to Admission medications    Medication Sig Start Date End Date Taking?  Authorizing Provider   tamsulosin (FLOMAX) 0.4 MG capsule Take 1 capsule by mouth daily 8/20/19   Alexa Vanessa MD   amLODIPine (NORVASC) 2.5 MG tablet Take 0.5 tablets by mouth daily Start taking  in place of lisinopril only if BP stays more than 812 systolic 9/91/75   Alexa Vanessa MD   albuterol sulfate  (90 Base) MCG/ACT inhaler Inhale 2 puffs into the lungs 4 times daily as needed for Wheezing or Shortness of Breath 8/19/19   Alexa Vanessa MD   tiotropium (SPIRIVA RESPIMAT) 2.5 MCG/ACT AERS inhaler Inhale 2 puffs into the lungs daily Historical Provider, MD   budesonide-formoterol (SYMBICORT) 160-4.5 MCG/ACT AERO Inhale 2 puffs into the lungs 2 times daily    Historical Provider, MD       Allergies:  Patient has no known allergies. Social History:   reports that he quit smoking about 11 years ago. He has never used smokeless tobacco. He reports that he does not drink alcohol and does not use drugs. Family History: family history includes Diabetes in his mother; Heart Disease in his father, mother, and paternal uncle. No h/o sudden cardiac death. No for premature CAD    REVIEW OF SYSTEMS:    · Constitutional: there has been no unanticipated weight loss. There's been Yes change in energy level, Yes change in activity level. · Eyes: No visual changes or diplopia. No scleral icterus. · ENT: No Headaches, hearing loss or vertigo. No mouth sores or sore throat. · Cardiovascular: Yes chest pain, Yes dyspnea on exertion, No palpitations or No loss of consciousness. No cough, hemoptysis, No pleuritic pain, or phlebitis. · Respiratory: No cough or wheezing, no sputum production. No hematemesis. · Gastrointestinal: No abdominal pain, appetite loss, blood in stools. No change in bowel or bladder habits. · Genitourinary: No dysuria, trouble voiding, or hematuria. · Musculoskeletal:  No gait disturbance, No weakness or joint complaints. · Integumentary: No rash or pruritis. · Neurological: No headache, diplopia, change in muscle strength, numbness or tingling. No change in gait, balance, coordination, mood, affect, memory, mentation, behavior. · Psychiatric: No anxiety, or depression. · Endocrine: No temperature intolerance. No excessive thirst, fluid intake, or urination. No tremor. · Hematologic/Lymphatic: No abnormal bruising or bleeding, blood clots or swollen lymph nodes. · Allergic/Immunologic: No nasal congestion or hives.     PHYSICAL EXAM:      /80   Pulse 112   Temp 97.6 °F (36.4 °C) (Oral)   Resp 23   Ht 6' (1.829 m)   Wt 192 lb (87.1 kg)   SpO2 93%   BMI 26.04 kg/m²    Constitutional and General Appearance: alert, cooperative, no distress and appears stated age  [de-identified]: PERRL, no cervical lymphadenopathy. No masses palpable. Normal oral mucosa  Respiratory:  · Normal excursion and expansion without use of accessory muscles  · Resp Auscultation: Good respiratory effort. No for increased work of breathing. On auscultation: clear to auscultation bilaterally  Cardiovascular:  · The apical impulse is not displaced  · Heart tones are crisp and normal. regular S1 and S2.  · Jugular venous pulsation Normal  · The carotid upstroke is normal in amplitude and contour without delay or bruit  · Peripheral pulses are symmetrical and full  Abdomen:  · No masses or tenderness  · Bowel sounds present  Extremities:  ·  No Cyanosis or Clubbing  ·  Lower extremity edema: No  · Skin: Warm and dry  Neurological:  · Alert and oriented. · Moves all extremities well  · No abnormalities of mood, affect, memory, mentation, or behavior are noted    DATA:    Diagnostics:    EKG: NSR. RBBB  Labs:     CBC:   Recent Labs     12/23/21  0657 12/23/21  0844   WBC 20.2* 19.1*   HGB 14.3 13.7   HCT 44.7 42.8    181     BMP:   Recent Labs     12/23/21  0657   *   K 4.2   CO2 22   BUN 13   CREATININE 1.06   LABGLOM >60   GLUCOSE 118*     BNP: No results for input(s): BNP in the last 72 hours. PT/INR: No results for input(s): PROTIME, INR in the last 72 hours. APTT:  Recent Labs     12/23/21  0844   APTT 27.5     CARDIAC ENZYMES:No results for input(s): CKTOTAL, CKMB, CKMBINDEX, TROPONINI in the last 72 hours. FASTING LIPID PANEL:  Lab Results   Component Value Date    HDL 51 10/24/2013    TRIG 45 10/24/2013     LIVER PROFILE:No results for input(s): AST, ALT, LABALBU in the last 72 hours. IMPRESSION/RECOMMENDATIONS:  NSTEMI. Markedly elevated troponin. ECG RBBB similar to previous ones.  Chest pain continuous since Tuesday, better now on IV nitro and heparin. Discussed with him and his sister proceeding with coronary angiography and possible PCI, benefits risks and alternatives explained, bleeding, vessel injury, CVA, MI, contrast allergy or nephropathy, death and agree to proceed. Cath lab contacted. Discussed with patient and Nurse.     Carlos Charles MD, MD  [facility] Consult           [phone]

## 2021-12-23 NOTE — ED NOTES
Pt placed on 2L NC by Dr Wanda Brooks for a SpO2 goal of maintaining 93-96%.      Henok Sal RN  12/23/21 6753

## 2021-12-23 NOTE — ED PROVIDER NOTES
[facility]. [address]  Emergency Department Encounter  Emergency Medicine Resident     Pt Name: Jacob Nguyen  MRN: [id number]  [place] 1950  Date of evaluation: 12/23/21  PCP:  Selma Cruz MD    Chief Complaint     Chief Complaint   Patient presents with    Chest Pain       History of present illness (HPI)  (Location/Symptom, Timing/Onset, Context/Setting, Quality, Duration, Modifying Factors, Severity.)      Jacob Nguyen is a 80 y.o. male who presented to the emergency department with concerns for waxing waning crushing substernal chest pain with worsening of clamminess, nausea that has been occurring since Tuesday of this week. States that the pain is woken from sleep today, he describes it as 9 out of 10 intensity, states there was not relieved by nitroglycerin in route, patient was preloaded with aspirin prior to arrival.  Patient does state that he has a history of blood clots in the past as well as a history of unstable angina when he was 28years old. Patient states he has no previous cardiac stents, and no history of heart attack. Patient endorses crushing chest pain that radiates up to the neck. Patient states he has not had similar symptoms to this in the past.    Past medical / surgical/ social/ family history      Past Medical Hx:   Patient has no medical problems   has a past medical history of Acute upper respiratory infections of unspecified site, CAD (coronary artery disease), COPD (chronic obstructive pulmonary disease) ([facility] [address] Empyema without mention of fistula, Esophageal reflux, Essential hypertension, benign, History of blood transfusion, Hx of blood clots, Localized osteoarthrosis not specified whether primary or secondary, unspecified site, Other and unspecified hyperlipidemia, Surgical or other procedure not carried out because of patient's decision, and Unspecified pleural effusion.     Past Surgical Hx:  Patient has had no surgeries   has a past surgical history that includes Lung decortication (Right, 2006). Social Hx:  Social History     Socioeconomic History    Marital status:      Spouse name: Not on file    Number of children: Not on file    Years of education: Not on file    Highest education level: Not on file   Occupational History    Not on file   Tobacco Use    Smoking status: Former Smoker     Quit date: 10/22/2010     Years since quittin.1    Smokeless tobacco: Never Used   Substance and Sexual Activity    Alcohol use: No     Comment: Sober since 1989    Drug use: No    Sexual activity: Not on file   Other Topics Concern    Not on file   Social History Narrative    Not on file     Social Determinants of Health     Financial Resource Strain:     Difficulty of Paying Living Expenses: Not on file   Food Insecurity:     Worried About [address] in the Last Year: Not on file    [address] [employer] in the Last Year: Not on file   Transportation Needs:     Lack of Transportation (Medical): Not on file    Lack of Transportation (Non-Medical):  Not on file   Physical Activity:     Days of Exercise per Week: Not on file    Minutes of Exercise per Session: Not on file   Stress:     Feeling of Stress : Not on file   Social Connections:     Frequency of Communication with Friends and Family: Not on file    Frequency of Social Gatherings with Friends and Family: Not on file    Attends Sikh Services: Not on file    Active Member of [address] [place] [address] or Organizations: Not on file    Attends Club or Organization Meetings: Not on file    Marital Status: Not on file   Intimate Partner Violence:     Fear of Current or Ex-Partner: Not on file    Emotionally Abused: Not on file    Physically Abused: Not on file    Sexually Abused: Not on file   Housing Stability:     Unable to Pay for Housing in the Last Year: Not on file    Number of [place] in the Last Year: Not on file    Unstable Housing in the Last Year: Not on file       Family Hx:  No relevant family history is reported  Family History   Problem Relation Age of Onset    Diabetes Mother     Heart Disease Mother     Heart Disease Father     Heart Disease Paternal Uncle        Allergies:    No known medication allergies  Patient has no known allergies. Home Medications: The patient takes no medications  Prior to Admission medications    Medication Sig Start Date End Date Taking? Authorizing Provider   tamsulosin (FLOMAX) 0.4 MG capsule Take 1 capsule by mouth daily 8/20/19  Yes Alena Bishop MD   amLODIPine (NORVASC) 2.5 MG tablet Take 0.5 tablets by mouth daily Start taking  in place of lisinopril only if BP stays more than 330 systolic 9/51/40  Yes Alena Bishop MD   albuterol sulfate  (90 Base) MCG/ACT inhaler Inhale 2 puffs into the lungs 4 times daily as needed for Wheezing or Shortness of Breath 8/19/19  Yes Alena Bishop MD   tiotropium (SPIRIVA RESPIMAT) 2.5 MCG/ACT AERS inhaler Inhale 2 puffs into the lungs daily   Yes Historical Provider, MD   budesonide-formoterol (SYMBICORT) 160-4.5 MCG/ACT AERO Inhale 2 puffs into the lungs 2 times daily   Yes Historical Provider, MD       Review of systems  (2-9 systems for level 4, 10 or more for level 5)      CONSTITUTIONAL: Denies recent fever, chills  EYES: No visual changes. NECK: No midline neck pain  RESPIRATORY: Denies shortness of breath. + dyspnea. CARDIAC: +  chest pain. The pain radiates to the neck. GI: Denies abdominal pain +  nausea, Denies  vomiting. Denies Blood in the stool or black tarry stools. : Denies dysuria  MUSCULOSKELETAL: Denies focal weakness. NEUROLOGICAL: denies headache or focal weakness. SKIN:  Denies any rash. Physical exam  (up to 7 for level 4, 8 or more for level 5)      /67   Pulse 92   Temp 97.5 °F (36.4 °C) (Oral)   Resp 19   Ht 6' (1.829 m)   Wt 188 lb 15 oz (85.7 kg)   SpO2 98%   BMI 25.62 kg/m²     GENERAL APPEARANCE: AxOx4, generally well-appearing.  no acute distress. HEENT: Normocephalic and atraumatic. Mucus membranes moist. EOMI, clear conjunctiva, oropharynx clear. NECK: Supple without lymphadenopathy. No stiffness or restricted ROM. HEART: Normal rate and regular rhythm, normal S1/S1, no m/r/g   LUNGS: clear to auscultation without wheezes or rales, good air movement  ABDOMEN: Soft, nontender, nondistended with good bowel sounds heard. BACK: No CVAT, no obvious deformity. EXTREMITIES: Without cyanosis, clubbing or edema. NEUROLOGICAL: Grossly nonfocal. Alert and oriented, moving all 4 extremities. CN not formally tested but appear grossly intact. SKIN: Warm and dry without any rash. Plan     DIAGNOSTIC ORDERS:  Orders Placed This Encounter   Procedures    COVID-19, Rapid    CT CHEST PULMONARY EMBOLISM W CONTRAST    XR CHEST PORTABLE    Troponin    CBC WITH AUTO DIFFERENTIAL    BASIC METABOLIC PANEL    Brain Natriuretic Peptide    CBC    Troponin    Activated clotting time    Troponin    Lipid panel    CBC    CBC auto differential    Basic Metabolic Panel w/ Reflex to MG    APTT    ADULT DIET;  Regular; Low Fat/Low Chol/High Fiber/2 gm Na    Vital signs per unit routine    Notify physician    Up as tolerated    Vital signs    Strict Bedrest    Wound care    Tobacco cessation education    Check Cath Site and Distal Pulses    Puncture site care    Verify metformin (GLUCOPHAGE) Discontinued    Nursing communication for POCT - activated clotting time    If any sign of bleeding or hematoma at puncture site do the following:    Maintain Dry Sterile Dressing    Telemetry monitoring - continuous duration    Full code    Inpatient consult to Cardiology    Inpatient consult to Internal Medicine    Inpatient consult to Case Management    Inpatient consult to Cardiothoracic Surgery    OT eval and treat    PT evaluation and treat    Initiate Oxygen Therapy Protocol    Initiate Oxygen Therapy Protocol    EKG 12 Lead  EKG 12 Lead    EKG 12 Lead    ECHO Complete 2D W Doppler W Color    PATIENT STATUS (FROM ED OR OR/PROCEDURAL) Inpatient    Transfer Patient       MEDICATION ORDERS:  Orders Placed This Encounter   Medications    morphine injection 4 mg    diphenhydrAMINE (BENADRYL) injection 25 mg    prochlorperazine (COMPAZINE) injection 10 mg    iopamidol (ISOVUE-370) 76 % injection 75 mL    heparin (porcine) injection 4,000 Units    heparin (porcine) injection 4,000 Units    heparin (porcine) injection 2,000 Units    heparin 25,000 units in dextrose 5% 250 mL (premix) infusion    nitroGLYCERIN 50 mg in dextrose 5% 250 mL infusion    amLODIPine (NORVASC) tablet 1.25 mg    budesonide-formoterol (SYMBICORT) 160-4.5 MCG/ACT inhaler 2 puff    tamsulosin (FLOMAX) capsule 0.4 mg    tiotropium (SPIRIVA RESPIMAT) 2.5 MCG/ACT inhaler 2 puff    albuterol sulfate  (90 Base) MCG/ACT inhaler 2 puff     Order Specific Question:   Initiate RT Bronchodilator Protocol     Answer:    Yes    sodium chloride flush 0.9 % injection 5-40 mL    sodium chloride flush 0.9 % injection 5-40 mL    0.9 % sodium chloride infusion    OR Linked Order Group     ondansetron (ZOFRAN-ODT) disintegrating tablet 4 mg     ondansetron (ZOFRAN) injection 4 mg    polyethylene glycol (GLYCOLAX) packet 17 g    DISCONTD: acetaminophen (TYLENOL) tablet 650 mg    DISCONTD: acetaminophen (TYLENOL) suppository 650 mg    fentaNYL (SUBLIMAZE) injection 50 mcg    sodium chloride flush 0.9 % injection 5-40 mL    sodium chloride flush 0.9 % injection 5-40 mL    0.9 % sodium chloride infusion    acetaminophen (TYLENOL) tablet 650 mg    metoprolol tartrate (LOPRESSOR) tablet 25 mg    atorvastatin (LIPITOR) tablet 80 mg    aspirin EC tablet 81 mg    0.9 % sodium chloride infusion       Differential diagnosis      Emergent: ACS/NSTEMI/STEMI/angina, arrhythmia, trauma, aortic dissection,  PE, PNA, pneumothroax, esophageal rupture, tamponade, Cocaine use, Coronary artery dissection  Nonemergent: pneumonia, pericarditis, GERD, MSK, Endocarditis, anxiety    Evaluated for: diaphoresis, present chest pain, tachypnea, BP both arms, heart sounds, JVD, tender chest wall, wheezing      Diagnostic Results     LABS:  Results for orders placed or performed during the hospital encounter of 12/23/21   COVID-19, Rapid    Specimen: Nasopharyngeal Swab   Result Value Ref Range    Specimen Description . NASOPHARYNGEAL SWAB     SARS-CoV-2, Rapid Not Detected Not Detected   Troponin   Result Value Ref Range    Troponin, High Sensitivity 2,699 (HH) 0 - 22 ng/L    Troponin T NOT REPORTED <0.03 ng/mL    Troponin Interp NOT REPORTED    Troponin   Result Value Ref Range    Troponin, High Sensitivity 2,628 (HH) 0 - 22 ng/L    Troponin T NOT REPORTED <0.03 ng/mL    Troponin Interp NOT REPORTED    CBC WITH AUTO DIFFERENTIAL   Result Value Ref Range    WBC 20.2 (H) 3.5 - 11.3 k/uL    RBC 4.98 4.21 - 5.77 m/uL    Hemoglobin 14.3 13.0 - 17.0 g/dL    Hematocrit 44.7 40.7 - 50.3 %    MCV 89.8 82.6 - 102.9 fL    MCH 28.7 25.2 - 33.5 pg    MCHC 32.0 28.4 - 34.8 g/dL    RDW 12.6 11.8 - 14.4 %    Platelets 514 825 - 263 k/uL    MPV 10.3 8.1 - 13.5 fL    NRBC Automated 0.0 0.0 per 100 WBC    Differential Type NOT REPORTED     WBC Morphology NOT REPORTED     RBC Morphology NOT REPORTED     Platelet Estimate NOT REPORTED     Immature Granulocytes 0 0 %    Seg Neutrophils 80 (H) 36 - 66 %    Lymphocytes 10 (L) 24 - 44 %    Monocytes 10 (H) 1 - 7 %    Eosinophils % 0 (L) 1 - 4 %    Basophils 0 0 - 2 %    Absolute Immature Granulocyte 0.00 0.00 - 0.30 k/uL    Segs Absolute 16.16 (H) 1.8 - 7.7 k/uL    Absolute Lymph # 2.02 1.0 - 4.8 k/uL    Absolute Mono # 2.02 (H) 0.1 - 0.8 k/uL    Absolute Eos # 0.00 0.0 - 0.4 k/uL    Basophils Absolute 0.00 0.0 - 0.2 k/uL    Morphology Normal    BASIC METABOLIC PANEL   Result Value Ref Range    Glucose 118 (H) 70 - 99 mg/dL    BUN 13 8 - 23 mg/dL    CREATININE 1. 06 0.70 - 1.20 mg/dL    Bun/Cre Ratio NOT REPORTED 9 - 20    Calcium 9.0 8.6 - 10.4 mg/dL    Sodium 133 (L) 135 - 144 mmol/L    Potassium 4.2 3.7 - 5.3 mmol/L    Chloride 99 98 - 107 mmol/L    CO2 22 20 - 31 mmol/L    Anion Gap 12 9 - 17 mmol/L    GFR Non-African American >60 >60 mL/min    GFR African American >60 >60 mL/min    GFR Comment          GFR Staging NOT REPORTED    Brain Natriuretic Peptide   Result Value Ref Range    Pro-BNP 2,853 (H) <300 pg/mL    BNP Interpretation NOT REPORTED    CBC   Result Value Ref Range    WBC 19.1 (H) 3.5 - 11.3 k/uL    RBC 4.76 4.21 - 5.77 m/uL    Hemoglobin 13.7 13.0 - 17.0 g/dL    Hematocrit 42.8 40.7 - 50.3 %    MCV 89.9 82.6 - 102.9 fL    MCH 28.8 25.2 - 33.5 pg    MCHC 32.0 28.4 - 34.8 g/dL    RDW 12.6 11.8 - 14.4 %    Platelets 788 742 - 920 k/uL    MPV 9.8 8.1 - 13.5 fL    NRBC Automated 0.0 0.0 per 100 WBC   APTT   Result Value Ref Range    PTT 27.5 20.5 - 30.5 sec   Activated clotting time   Result Value Ref Range    Activated Clotting Time 258 (H) 79 - 149 sec       RADIOLOGY:  CT CHEST PULMONARY EMBOLISM W CONTRAST   Final Result   1. No evidence of pulmonary embolism. 2.  Findings compatible with scarring in the left upper lobe and lung bases. No convincing evidence for acute airspace disease. 3.  Mild emphysematous disease. RECOMMENDATIONS:   Unavailable         XR CHEST PORTABLE   Final Result   New variable nearly diffuse interstitial thickening concerning for viral   process. ED Course as of 12/23/21 1227   Thu Dec 23, 2021   0608 EKG shows normal sinus, left axis deviation, ventricular rate 95, , , 97, concern for left atrial margin based on lead II, upsloping T waves in V5, V6, T wave inversions, V3, nonspecific EKG, none normal EKG [GP]   0647 Patient reevaluated, feeling symptomatic improvement with morphine however still having pain.  Chest xr shows normal sized mediastinum, however with patient's symptoms radiating Scooter Camargo(Interpreting physician) on 2019  10:46 AM  ----------------------------------------------------------------------------  FINDINGS  Left Atrium  Left atrium is normal in size. Left Ventricle  Normal left ventricle size, wall thickness and low normal function. Calculated EF via Reinoso's method is 50 %. Cannot rule out wall motion abnormalities due to poor visualization. Right Atrium  Right atrial dilatation. Right Ventricle  Normal right ventricular size and function. Mitral Valve  Normal mitral valve structure and function. No evidence of mitral stenosis or mitral regurgitation. Aortic Valve  Aortic valve is sclerotic but opens well. Aortic valve is trileaflet. No evidence of aortic stenosis. Trivial aortic insufficiency. Tricuspid Valve  Normal tricuspid valve structure and function. No tricuspid regurgitation was seen. Pulmonic Valve  The pulmonic valve is normal in structure. Pericardial Effusion  No significant pericardial effusion is seen. Miscellaneous  Normal aortic root dimension. IVC normal diameter & inspiratory collapse indicating normal RA filling  pressure .   E/E'' = 9.5 .    M-mode / 2D Measurements & Calculations:    LVIDd:4.6 cm(3.7 - 5.6 cm)       Diastolic USIGGD:62.5 ml  DMDYS:6.3 cm(2.2 - 4.0 cm)       Systolic LJQKJH:95.2 ml  TATN:3.3 cm(0.6 - 1.1 cm)        Aortic Root:2.7 cm(2.0 - 3.7 cm)  LVPWd:1 cm(0.6 - 1.1 cm)         LA Dimension: 4.7 cm(1.9 - 4.0 cm)  Fractional Shortenin.61 %    LA volume/Index: 59.77 ml /28m^2  Calculated LVEF (%): 50 %        LVOT:2 cm  RVDd:3.5 cm    Mitral:                                    Aortic    Valve Area (P1/2-Time): 7.59 cm^2          Peak Velocity: 1.30 m/s  Peak E-Wave: 0.71 m/s                      Peak Gradient: 6.76 mmHg  Peak A-Wave: 0.87 m/s  E/A Ratio: 0.81  Peak Gradient: 2.01 mmHg  Deceleration Time: 99 msec  P1/2t: 29 msec    Pulmonic:    Peak Velocity: 0.95 m/s  Estimated RA Pressure: 3 mmHg Peak Gradient: 3.61 mmHg    Diastology / Tissue Doppler  Septal Wall E' velocity:0.08 m/s  Septal Wall E/E':9  Lateral Wall E' velocity:0.07 m/s  Lateral Wall E/E':9.6    No results found for this or any previous visit. Procedures     None    Final impression      1. Chest pain due to myocardial ischemia, unspecified ischemic chest pain type    2. NSTEMI (non-ST elevated myocardial infarction) ([address]           Disposition with plan     Disposition: Admitted    PATIENT REFERRED TO:  No follow-up provider specified.     DISCHARGE MEDICATIONS:  Current Discharge Medication List            Scottie Paniagua MD  PGY-3 Emergency Medicine  [facility]     (Please note that portions of this note were completed with a voice recognition program.  Efforts were made to edit the dictations but occasionally words are mis-transcribed.)        Brooke Cruz MD  Resident  12/23/21 3677

## 2021-12-23 NOTE — ED NOTES
Pt arrived to ED via self. Pt states chest pain that began yesterday worsening today. Pt states SOB. Pt states pain is in the center of his chest, radiates to the back of his head and neck. Pt was given 1 nitro and 325 ASA PTA. Pt states there was no improvement in chest pain. Pt was changed into gown, EKG completed, connected to cardiac monitor, and labs were drawn from established line.      Luciano Dhaliwal RN  12/23/21 111 [address], RN  12/23/21 8090

## 2021-12-23 NOTE — CONSULTS
[facility] Cardiothoracic Surgery  CONSULTATION      CC: Multivessel CAD     HPI:  Mr. Hussain Goodwin is a 80 y.o.  male who presents s/p cardiac catheterization today after presenting to the ER with complaints of chest pain. He was diagnosed with a NSTEMI and taken to cath lab emergently where his RCA was found to be acutely occluded. Vessel was ballooned with return of MARIFER 3 flow and patient was referred for surgical evaluation. Pertinent medical history includes hypertension, GERD and urinary retention. Cardiac workup has revealed multivessel CAD with an EF of 50%. Patient denies chest pain and shortness of breath at this time. He has been referred for consideration of coronary revascularization. PMH:   has a past medical history of Acute upper respiratory infections of unspecified site, CAD (coronary artery disease), COPD (chronic obstructive pulmonary disease) ([address] Empyema without mention of fistula, Esophageal reflux, Essential hypertension, benign, History of blood transfusion, blood clots, Localized osteoarthrosis not specified whether primary or secondary, unspecified site, Other and unspecified hyperlipidemia, Surgical or other procedure not carried out because of patient's decision, and Unspecified pleural effusion. PSH:   has a past surgical history that includes Lung decortication (Right, 2006).     Allergies:  No Known Allergies    Medications:   Current Facility-Administered Medications:     heparin (porcine) injection 4,000 Units, 4,000 Units, IntraVENous, PRN, Perri Bridges MD    heparin (porcine) injection 2,000 Units, 2,000 Units, IntraVENous, PRN, Perri Bridges MD    heparin 25,000 units in dextrose 5% 250 mL (premix) infusion, 5-30 Units/kg/hr, IntraVENous, Continuous, Perri Bridges MD, Last Rate: 10 mL/hr at 12/23/21 1454, 11.5 Units/kg/hr at 12/23/21 1454    nitroGLYCERIN 50 mg in dextrose 5% 250 mL infusion, 5-200 mcg/min, IntraVENous, Continuous, Perri Bridges MD, Stopped at 12/23/21 1100    [Held by provider] amLODIPine (NORVASC) tablet 1.25 mg, 1.25 mg, Oral, Daily, Tash Edmondson MD    budesonide-formoterol [facility]) 160-4.5 MCG/ACT inhaler 2 puff, 2 puff, Inhalation, BID, Tash Edmondson MD    tamsulosin [facility]) capsule 0.4 mg, 0.4 mg, Oral, Daily, Tash Edmondson MD, 0.4 mg at 12/23/21 1454    tiotropium (SPIRIVA RESPIMAT) 2.5 MCG/ACT inhaler 2 puff, 2 puff, Inhalation, Daily, Tash Edmondson MD    albuterol sulfate  (90 Base) MCG/ACT inhaler 2 puff, 2 puff, Inhalation, 4x Daily PRN, Tash Edmondson MD    sodium chloride flush 0.9 % injection 5-40 mL, 5-40 mL, IntraVENous, 2 times per day, Tash Edmondson MD    sodium chloride flush 0.9 % injection 5-40 mL, 5-40 mL, IntraVENous, PRN, Tash Edmondson MD    0.9 % sodium chloride infusion, 25 mL, IntraVENous, PRN, Tash Edmondson MD    ondansetron (ZOFRAN-ODT) disintegrating tablet 4 mg, 4 mg, Oral, Q8H PRN **OR** ondansetron (ZOFRAN) injection 4 mg, 4 mg, IntraVENous, Q6H PRN, Tash Edmondson MD    polyethylene glycol [facility]) packet 17 g, 17 g, Oral, Daily PRN, Tash Edmondson MD    fentaNYL (SUBLIMAZE) injection 50 mcg, 50 mcg, IntraVENous, Once, Colonel Brown MD    sodium chloride flush 0.9 % injection 5-40 mL, 5-40 mL, IntraVENous, 2 times per day, Keyana Guerra MD, 10 mL at 12/23/21 1307    sodium chloride flush 0.9 % injection 5-40 mL, 5-40 mL, IntraVENous, PRN, Keyana Guerra MD    0.9 % sodium chloride infusion, 25 mL, IntraVENous, PRN, Keyana Guerra MD    acetaminophen (TYLENOL) tablet 650 mg, 650 mg, Oral, Q4H PRN, Keyana Guerra MD    metoprolol tartrate (LOPRESSOR) tablet 25 mg, 25 mg, Oral, BID, Keyana Guerra MD, 25 mg at 12/23/21 1305    atorvastatin (LIPITOR) tablet 80 mg, 80 mg, Oral, Nightly, Keyana Guerra MD    [START ON 12/24/2021] aspirin EC tablet 81 mg, 81 mg, Oral, Daily, Keyana Guerra MD    0.9 % sodium chloride infusion, , IntraVENous, Continuous, Bethanie Navarro MD, Last Rate: 50 mL/hr at 12/23/21 1210, 50 mL/hr at 12/23/21 1210    Social Hx:   reports that he quit smoking about 11 years ago. He has never used smokeless tobacco.    Family Hx:  family history includes Diabetes in his mother; Heart Disease in his father, mother, and paternal uncle. ROS:    Review of Systems   Constitutional: Positive for activity change and fatigue. Negative for appetite change, chills and fever. HENT: Negative for congestion. Eyes: Negative for visual disturbance. Respiratory: Positive for shortness of breath. Cardiovascular: Positive for chest pain. Gastrointestinal: Negative for abdominal pain, constipation and diarrhea. Genitourinary: Negative for dysuria. Musculoskeletal: Negative for gait problem. Skin: Negative for color change. Neurological: Negative for dizziness and weakness. Psychiatric/Behavioral: Negative for suicidal ideas. The patient is not nervous/anxious. Physical Examination   Vitals:  Vitals:    12/23/21 1600   BP: 97/61   Pulse: 85   Resp: 21   Temp: 98.6 °F (37 °C)   SpO2: 93%     Physical Exam  Constitutional:       General: He is not in acute distress. Appearance: Normal appearance. He is normal weight. He is not ill-appearing. HENT:      Head: Normocephalic. Nose: Nose normal.      Mouth/Throat:      Mouth: Mucous membranes are moist.      Pharynx: Oropharynx is clear. Eyes:      Extraocular Movements: Extraocular movements intact. Pupils: Pupils are equal, round, and reactive to light. Cardiovascular:      Rate and Rhythm: Normal rate and regular rhythm. Pulses: Normal pulses. Heart sounds: Normal heart sounds. Pulmonary:      Effort: Pulmonary effort is normal.      Breath sounds: Normal breath sounds. Abdominal:      Palpations: Abdomen is soft. Tenderness: There is no abdominal tenderness. Musculoskeletal:         General: Normal range of motion. Cervical back: Normal range of motion.       Right lower leg: No edema. Left lower leg: No edema. Skin:     General: Skin is warm and dry. Capillary Refill: Capillary refill takes less than 2 seconds. Neurological:      General: No focal deficit present. Mental Status: He is alert and oriented to person, place, and time. Psychiatric:         Mood and Affect: Mood normal.         Behavior: Behavior normal.         Labs:   CBC:   Recent Labs     12/23/21  0657 12/23/21  0844   WBC 20.2* 19.1*   HGB 14.3 13.7   HCT 44.7 42.8   MCV 89.8 89.9    181     BMP:  Recent Labs     12/23/21  0657   *   K 4.2   CL 99   CO2 22   BUN 13   CREATININE 1.06     Accucheck Glucoses:No results for input(s): POCGLU in the last 72 hours. Cardiac Enzymes: No results for input(s): CKTOTAL, CKMB, CKMBINDEX, TROPONINI in the last 72 hours. PT/INR: No results for input(s): PROTIME, INR in the last 72 hours. APTT:   Recent Labs     12/23/21  0844 12/23/21  1221   APTT 27.5 97.0*     Liver Profile:  Lab Results   Component Value Date    AST 53 08/17/2019    ALT 33 08/17/2019    BILITOT 0.57 08/17/2019    ALKPHOS 62 08/17/2019     Lab Results   Component Value Date    CHOL 163 10/24/2013    HDL 51 10/24/2013    TRIG 45 10/24/2013     TSH:   Lab Results   Component Value Date    TSH 1.63 10/24/2013     UA:   Lab Results   Component Value Date    COLORU DARK YELLOW 08/17/2019    PHUR 5.0 08/17/2019    WBCUA None 08/17/2019    RBCUA 2 TO 5 08/17/2019    MUCUS 1+ 08/17/2019    TRICHOMONAS NOT REPORTED 08/17/2019    YEAST NOT REPORTED 08/17/2019    BACTERIA FEW 08/17/2019    SPECGRAV 1.016 08/17/2019    LEUKOCYTESUR NEGATIVE 08/17/2019    UROBILINOGEN Normal 08/17/2019    BILIRUBINUR NEGATIVE 08/17/2019    GLUCOSEU NEGATIVE 08/17/2019    AMORPHOUS NOT REPORTED 08/17/2019         Testing:  Cardiac cath:   Procedure Summary:    Acute occlusion of mid RCA underwent PTCA with restoration of MARIFER III   flow. Multi-vessel Coronary Artery Disease.    Normal LV systolic function. Recommendations:    Routine Post PCI orders. Medical therapy as needed. Risk factor modification. CTS consult for CABG      Signature:    ----------------------------------------------------------------   Electronically signed by Juan Dhillon(Performing Physician) on   12/23/2021 11:50   ----------------------------------------------------------------      Angiographic Findings: Cardiac Arteries and Lesion Findings     LMCA: Normal 0% stenosis. LAD: Single stenosis. Lesion on Prox LAD: 80% stenosis. LCx: Multiple stenosis. Lesion on 1st Ob Melodie: Proximal subsection. 90% stenosis. Lesion on Mid CX: 80% stenosis. Lesion on 2nd Ob Melodie: Ostial.80% stenosis. RCA: Acute occlusion. Lesion on Mid RCA: 100% stenosis 20 mm length reduced to 20%. Pre    procedure MARIFER 0 flow was noted. Post Procedure MARIFER III flow was    present. Poor runoff was present. The lesion was diagnosed as High Risk    (C). Devices used:     - Luge Wire 182 cm. Number of passes: 1.     - Euphora Balloon 2.5mm x 15mm. 1 inflation(s) to a max pressure of:  8 fabiola. Coronary Tree Dominance: Right     LV Analysis  LV function assessed as:Normal.  Ejection Fraction  +----------------------------------------------------------------------+---+  ! Method                                                                ! EF%! +----------------------------------------------------------------------+---+  ! LV gram                                                               !50 !  +----------------------------------------------------------------------+---+       Echo: Ordered.     CXR:  ONE XRAY VIEW OF THE CHEST       12/23/2021 6:32 am       COMPARISON:   08/18/2019       HISTORY:   ORDERING SYSTEM PROVIDED HISTORY: assess cardiac/lung window   TECHNOLOGIST PROVIDED HISTORY:   assess cardiac/lung window       FINDINGS:   Heart size and pulmonary vasculature are normal.  The previously seen   airspace opacity in the left mid and lower lung zones has resolved however   there is new nearly diffuse interstitial thickening most pronounced through   the left lung and right base. Minor left basilar atelectasis. Lung volumes   are low. No pneumothorax or pleural effusion. Surrounding structures are   unremarkable. Impression   New variable nearly diffuse interstitial thickening concerning for viral   process. CT scan:   CTA OF THE CHEST 12/23/2021 7:47 am       TECHNIQUE:   CTA of the chest was performed after the administration of intravenous   contrast.  Multiplanar reformatted images are provided for review. MIP   images are provided for review. Dose modulation, iterative reconstruction,   and/or weight based adjustment of the mA/kV was utilized to reduce the   radiation dose to as low as reasonably achievable. COMPARISON:   Chest radiograph today. Chest CT 03/04/2008. HISTORY:   ORDERING SYSTEM PROVIDED HISTORY: concern for PE vs dissction   TECHNOLOGIST PROVIDED HISTORY:   concern for PE vs dissction   Decision Support Exception - unselect if not a suspected or confirmed   emergency medical condition->Emergency Medical Condition (MA)   Reason for Exam: concern for PE vs dissction       FINDINGS:   Pulmonary Arteries: Pulmonary arteries are adequately opacified for   evaluation. No evidence of intraluminal filling defect to suggest pulmonary   embolism. Main pulmonary artery is normal in caliber. Mediastinum: No evidence of mediastinal lymphadenopathy. The heart and   pericardium demonstrate no acute abnormality. There is no acute abnormality   of the thoracic aorta. Calcified atheromatous plaque and coronary   calcification. Lungs/pleura: Respiratory motion artifact is noted. Mild emphysematous   changes in the upper lobes.   Subpleural curvilinear and reticular opacities   are noted in the left upper lobe and lung bases, likely representing residual   scar from previously seen more confluent areas of consolidation in 2008. no   effusion. The central airway is patent. Upper Abdomen: No acute findings. Mild nodular thickening of the adrenal   glands appears unchanged since 2008, consistent with a benign process. Soft Tissues/Bones: No acute bone or soft tissue abnormality. Impression   1. No evidence of pulmonary embolism. 2.  Findings compatible with scarring in the left upper lobe and lung bases. No convincing evidence for acute airspace disease. 3.  Mild emphysematous disease. Imaging Studies:  I have personally reviewed the testing/imaging above with Dr Mark Duff, he is in agreement with the findings listed above. In summary, Mr. Afshin Chung is a 80y.o. year-old male with multivessel CAD. Problem List:   Multivessel CAD s/p cardiac cath  NSTEMI  Emphysema   Essential hypertension  Hyperlipidemia  Urinary retention      Recommendation: Per discussion with Dr Mark Duff, Mr. Afshin Chung needs to complete pre-operative testing to determine if surgical intervention is the best course of treatment due to compromised lung function. Pulmonology team has been consulted for optimization and surgical clearance. Patient may need PFTs if able to be completed in a timely fashion. I have discussed the proposed procedure, along with its risk, benefits, and therapeutic alternatives. Specific risks discussedincluded death, stroke, mediastinitis, re-operation for bleeding, and atrial fibrillation. We have also discussed the potential need for blood transfusion, along with its risks and benefits. He appears to understand,and consents to proceed. On this date 12/23/2021 I have spent 50 minutes reviewing previous notes, test results and face to face with the patient discussing the diagnosis and importance of compliance with the treatment plan as well as documenting on the day of the visit.  At least 50% of the time documented was spent with the patient to provide counseling and/or coordination of care.     Agree with above  Plan for pre-op ultrasounds  Consult pulmonary medicine for clearnace  Possible surgery in next 2-4 days  R/B/I discussed    CHRISTOPH Mancera - CNP

## 2021-12-23 NOTE — PROGRESS NOTES
[facility]  Documentation Note                Admission Dx: No admission diagnoses are documented for this encounter. 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 Previous Testing:     ECHO 8/19/19: EF 50%, RA dilatation, trivial AI. STRESS 5/11/15: No ischemia/infarct. EF 63%. Previous office/hospital visit:   Dmitri Weaver NP 8/19/19:   1. Elevated trop. Likely type II MI   2. HTN  3. ANY     Plan --   1. ECHO- Results reviewed. .Likely type II MI. No further cardiac work up at this time.     2. ANY per Nephrology       Lela Eagle, [facility]

## 2021-12-24 ENCOUNTER — APPOINTMENT (OUTPATIENT)
Dept: GENERAL RADIOLOGY | Age: 71
DRG: 232 | End: 2021-12-24
Payer: OTHER GOVERNMENT

## 2021-12-24 LAB
ABSOLUTE EOS #: 0 K/UL (ref 0–0.44)
ABSOLUTE IMMATURE GRANULOCYTE: 0.17 K/UL (ref 0–0.3)
ABSOLUTE LYMPH #: 1.74 K/UL (ref 1.1–3.7)
ABSOLUTE MONO #: 1.74 K/UL (ref 0.1–1.2)
ANION GAP SERPL CALCULATED.3IONS-SCNC: 13 MMOL/L (ref 9–17)
BASOPHILS # BLD: 0 % (ref 0–2)
BASOPHILS ABSOLUTE: 0 K/UL (ref 0–0.2)
BILIRUBIN URINE: NEGATIVE
BUN BLDV-MCNC: 20 MG/DL (ref 8–23)
BUN/CREAT BLD: ABNORMAL (ref 9–20)
CALCIUM IONIZED: 1.07 MMOL/L (ref 1.13–1.33)
CALCIUM SERPL-MCNC: 8.3 MG/DL (ref 8.6–10.4)
CHLORIDE BLD-SCNC: 101 MMOL/L (ref 98–107)
CHOLESTEROL/HDL RATIO: 1.9
CHOLESTEROL: 116 MG/DL
CO2: 19 MMOL/L (ref 20–31)
COLOR: YELLOW
COMMENT UA: NORMAL
CREAT SERPL-MCNC: 1.19 MG/DL (ref 0.7–1.2)
CULTURE: NORMAL
DIFFERENTIAL TYPE: ABNORMAL
EOSINOPHILS RELATIVE PERCENT: 0 % (ref 1–4)
GFR AFRICAN AMERICAN: >60 ML/MIN
GFR NON-AFRICAN AMERICAN: >60 ML/MIN
GFR SERPL CREATININE-BSD FRML MDRD: ABNORMAL ML/MIN/{1.73_M2}
GFR SERPL CREATININE-BSD FRML MDRD: ABNORMAL ML/MIN/{1.73_M2}
GLUCOSE BLD-MCNC: 94 MG/DL (ref 70–99)
GLUCOSE URINE: NEGATIVE
HCT VFR BLD CALC: 40.5 % (ref 40.7–50.3)
HDLC SERPL-MCNC: 60 MG/DL
HEMOGLOBIN: 13.4 G/DL (ref 13–17)
IMMATURE GRANULOCYTES: 1 %
KETONES, URINE: NEGATIVE
LDL CHOLESTEROL: 46 MG/DL (ref 0–130)
LEUKOCYTE ESTERASE, URINE: NEGATIVE
LV EF: 48 %
LVEF MODALITY: NORMAL
LYMPHOCYTES # BLD: 10 % (ref 24–43)
Lab: NORMAL
MAGNESIUM: 2.2 MG/DL (ref 1.6–2.6)
MCH RBC QN AUTO: 29.5 PG (ref 25.2–33.5)
MCHC RBC AUTO-ENTMCNC: 33.1 G/DL (ref 28.4–34.8)
MCV RBC AUTO: 89.2 FL (ref 82.6–102.9)
MONOCYTES # BLD: 10 % (ref 3–12)
MORPHOLOGY: NORMAL
NITRITE, URINE: NEGATIVE
NRBC AUTOMATED: 0 PER 100 WBC
PARTIAL THROMBOPLASTIN TIME: 31.6 SEC (ref 20.5–30.5)
PARTIAL THROMBOPLASTIN TIME: 36.1 SEC (ref 20.5–30.5)
PARTIAL THROMBOPLASTIN TIME: 36.6 SEC (ref 20.5–30.5)
PARTIAL THROMBOPLASTIN TIME: 41 SEC (ref 20.5–30.5)
PDW BLD-RTO: 12.9 % (ref 11.8–14.4)
PH UA: 5 (ref 5–8)
PLATELET # BLD: 167 K/UL (ref 138–453)
PLATELET ESTIMATE: ABNORMAL
PMV BLD AUTO: 9.9 FL (ref 8.1–13.5)
POTASSIUM SERPL-SCNC: 4 MMOL/L (ref 3.7–5.3)
POTASSIUM SERPL-SCNC: 4.4 MMOL/L (ref 3.7–5.3)
PROTEIN UA: NEGATIVE
RBC # BLD: 4.54 M/UL (ref 4.21–5.77)
RBC # BLD: ABNORMAL 10*6/UL
SEG NEUTROPHILS: 79 % (ref 36–65)
SEGMENTED NEUTROPHILS ABSOLUTE COUNT: 13.75 K/UL (ref 1.5–8.1)
SODIUM BLD-SCNC: 133 MMOL/L (ref 135–144)
SPECIFIC GRAVITY UA: 1.02 (ref 1–1.03)
SPECIMEN DESCRIPTION: NORMAL
TRIGL SERPL-MCNC: 52 MG/DL
TROPONIN INTERP: ABNORMAL
TROPONIN T: ABNORMAL NG/ML
TROPONIN, HIGH SENSITIVITY: 5090 NG/L (ref 0–22)
TURBIDITY: CLEAR
URINE HGB: NEGATIVE
UROBILINOGEN, URINE: NORMAL
VLDLC SERPL CALC-MCNC: NORMAL MG/DL (ref 1–30)
WBC # BLD: 17.4 K/UL (ref 3.5–11.3)
WBC # BLD: ABNORMAL 10*3/UL

## 2021-12-24 PROCEDURE — 80048 BASIC METABOLIC PNL TOTAL CA: CPT

## 2021-12-24 PROCEDURE — 99221 1ST HOSP IP/OBS SF/LOW 40: CPT | Performed by: THORACIC SURGERY (CARDIOTHORACIC VASCULAR SURGERY)

## 2021-12-24 PROCEDURE — 93880 EXTRACRANIAL BILAT STUDY: CPT

## 2021-12-24 PROCEDURE — 2580000003 HC RX 258: Performed by: STUDENT IN AN ORGANIZED HEALTH CARE EDUCATION/TRAINING PROGRAM

## 2021-12-24 PROCEDURE — 80061 LIPID PANEL: CPT

## 2021-12-24 PROCEDURE — 6370000000 HC RX 637 (ALT 250 FOR IP): Performed by: STUDENT IN AN ORGANIZED HEALTH CARE EDUCATION/TRAINING PROGRAM

## 2021-12-24 PROCEDURE — 2000000000 HC ICU R&B

## 2021-12-24 PROCEDURE — 2700000000 HC OXYGEN THERAPY PER DAY

## 2021-12-24 PROCEDURE — 84132 ASSAY OF SERUM POTASSIUM: CPT

## 2021-12-24 PROCEDURE — 2060000000 HC ICU INTERMEDIATE R&B

## 2021-12-24 PROCEDURE — 83735 ASSAY OF MAGNESIUM: CPT

## 2021-12-24 PROCEDURE — APPNB30 APP NON BILLABLE TIME 0-30 MINS: Performed by: NURSE PRACTITIONER

## 2021-12-24 PROCEDURE — 84484 ASSAY OF TROPONIN QUANT: CPT

## 2021-12-24 PROCEDURE — 6360000002 HC RX W HCPCS: Performed by: STUDENT IN AN ORGANIZED HEALTH CARE EDUCATION/TRAINING PROGRAM

## 2021-12-24 PROCEDURE — 93005 ELECTROCARDIOGRAM TRACING: CPT | Performed by: INTERNAL MEDICINE

## 2021-12-24 PROCEDURE — 85730 THROMBOPLASTIN TIME PARTIAL: CPT

## 2021-12-24 PROCEDURE — 36415 COLL VENOUS BLD VENIPUNCTURE: CPT

## 2021-12-24 PROCEDURE — 93005 ELECTROCARDIOGRAM TRACING: CPT | Performed by: NURSE PRACTITIONER

## 2021-12-24 PROCEDURE — 81003 URINALYSIS AUTO W/O SCOPE: CPT

## 2021-12-24 PROCEDURE — 71045 X-RAY EXAM CHEST 1 VIEW: CPT

## 2021-12-24 PROCEDURE — 93970 EXTREMITY STUDY: CPT

## 2021-12-24 PROCEDURE — 99223 1ST HOSP IP/OBS HIGH 75: CPT | Performed by: INTERNAL MEDICINE

## 2021-12-24 PROCEDURE — 87641 MR-STAPH DNA AMP PROBE: CPT

## 2021-12-24 PROCEDURE — 94640 AIRWAY INHALATION TREATMENT: CPT

## 2021-12-24 PROCEDURE — 87086 URINE CULTURE/COLONY COUNT: CPT

## 2021-12-24 PROCEDURE — 93306 TTE W/DOPPLER COMPLETE: CPT

## 2021-12-24 PROCEDURE — 6370000000 HC RX 637 (ALT 250 FOR IP)

## 2021-12-24 PROCEDURE — 85025 COMPLETE CBC W/AUTO DIFF WBC: CPT

## 2021-12-24 PROCEDURE — 2580000003 HC RX 258

## 2021-12-24 PROCEDURE — 6360000002 HC RX W HCPCS

## 2021-12-24 PROCEDURE — 94761 N-INVAS EAR/PLS OXIMETRY MLT: CPT

## 2021-12-24 PROCEDURE — 99232 SBSQ HOSP IP/OBS MODERATE 35: CPT | Performed by: INTERNAL MEDICINE

## 2021-12-24 PROCEDURE — 2500000003 HC RX 250 WO HCPCS: Performed by: STUDENT IN AN ORGANIZED HEALTH CARE EDUCATION/TRAINING PROGRAM

## 2021-12-24 PROCEDURE — 6370000000 HC RX 637 (ALT 250 FOR IP): Performed by: NURSE PRACTITIONER

## 2021-12-24 PROCEDURE — 82330 ASSAY OF CALCIUM: CPT

## 2021-12-24 RX ORDER — MAGNESIUM SULFATE 1 G/100ML
1000 INJECTION INTRAVENOUS ONCE
Status: COMPLETED | OUTPATIENT
Start: 2021-12-24 | End: 2021-12-24

## 2021-12-24 RX ORDER — ATORVASTATIN CALCIUM 80 MG/1
80 TABLET, FILM COATED ORAL NIGHTLY
Status: DISCONTINUED | OUTPATIENT
Start: 2021-12-24 | End: 2021-12-27

## 2021-12-24 RX ORDER — FAMOTIDINE 20 MG/1
20 TABLET, FILM COATED ORAL 2 TIMES DAILY
Status: DISCONTINUED | OUTPATIENT
Start: 2021-12-24 | End: 2021-12-27

## 2021-12-24 RX ORDER — ASPIRIN 81 MG/1
81 TABLET ORAL DAILY
Status: DISCONTINUED | OUTPATIENT
Start: 2021-12-25 | End: 2021-12-27

## 2021-12-24 RX ORDER — CALCIUM GLUCONATE 20 MG/ML
1000 INJECTION, SOLUTION INTRAVENOUS ONCE
Status: COMPLETED | OUTPATIENT
Start: 2021-12-24 | End: 2021-12-24

## 2021-12-24 RX ORDER — AMIODARONE HYDROCHLORIDE 200 MG/1
200 TABLET ORAL
Status: DISCONTINUED | OUTPATIENT
Start: 2021-12-27 | End: 2021-12-27

## 2021-12-24 RX ADMIN — METOPROLOL TARTRATE 25 MG: 50 TABLET, FILM COATED ORAL at 21:00

## 2021-12-24 RX ADMIN — SODIUM CHLORIDE, PRESERVATIVE FREE 10 ML: 5 INJECTION INTRAVENOUS at 21:00

## 2021-12-24 RX ADMIN — METOPROLOL TARTRATE 25 MG: 50 TABLET, FILM COATED ORAL at 09:31

## 2021-12-24 RX ADMIN — HEPARIN SODIUM 2000 UNITS: 1000 INJECTION INTRAVENOUS; SUBCUTANEOUS at 04:47

## 2021-12-24 RX ADMIN — MAGNESIUM SULFATE HEPTAHYDRATE 1000 MG: 1 INJECTION, SOLUTION INTRAVENOUS at 21:01

## 2021-12-24 RX ADMIN — Medication 14.5 UNITS/KG/HR: at 18:49

## 2021-12-24 RX ADMIN — HEPARIN SODIUM 2000 UNITS: 1000 INJECTION INTRAVENOUS; SUBCUTANEOUS at 15:39

## 2021-12-24 RX ADMIN — TIOTROPIUM BROMIDE INHALATION SPRAY 2 PUFF: 3.12 SPRAY, METERED RESPIRATORY (INHALATION) at 08:57

## 2021-12-24 RX ADMIN — FAMOTIDINE 20 MG: 20 TABLET, FILM COATED ORAL at 11:56

## 2021-12-24 RX ADMIN — Medication 2 PUFF: at 08:59

## 2021-12-24 RX ADMIN — CALCIUM GLUCONATE 1000 MG: 20 INJECTION, SOLUTION INTRAVENOUS at 22:13

## 2021-12-24 RX ADMIN — Medication 2 PUFF: at 19:53

## 2021-12-24 RX ADMIN — ATORVASTATIN CALCIUM 80 MG: 80 TABLET, FILM COATED ORAL at 21:00

## 2021-12-24 RX ADMIN — Medication 81 MG: at 09:31

## 2021-12-24 RX ADMIN — FAMOTIDINE 20 MG: 20 TABLET, FILM COATED ORAL at 21:00

## 2021-12-24 NOTE — PROGRESS NOTES
[facility]  Internal Medicine Teaching Residency Program  Inpatient Daily Progress Note  ______________________________________________________________________________    Patient: Marcella Crawford  YOB: 1950   KCW:[id number]    Acct: [de-identified]     Room: [address] [place] [address]  Admit date: 12/23/2021  Today's date: 12/24/21  Number of days in the hospital: 1    SUBJECTIVE   Admitting Diagnosis: NSTEMI (non-ST elevated myocardial infarction) ([facility] [address]  CC: Chest pain     - Pt examined at bedside. Chart & results reviewed. - Pt has been tachycardic throughout the night   - Pt states chest pain has improved   - Troponin's trending down   - Blood Cx negative   - Urine Cx pending   - Afebrile  - VSS    Plan for today:   - Pre-op work up   - Pulmonology consult   - Repeat CXR  - Wean off the nitro gtt   - Continue heparin   - Continue medical management       ROS:  Constitutional:  negative for chills, fevers, sweats  Respiratory:  negative for cough, dyspnea on exertion, hemoptysis, shortness of breath, wheezing  Cardiovascular:  negative for chest pain, chest pressure/discomfort, lower extremity edema, palpitations  Gastrointestinal:  negative for abdominal pain, constipation, diarrhea, nausea, vomiting  Neurological:  negative for dizziness, headache    BRIEF HISTORY     The patient is a pleasant 80 y.o. male with a PMHx significant for CAD (unstable angina), COPD, empyema, GERD, HTN, history of blood clots and HLD who presents with a chief complaint of chest pain. Pt states he has also experienced nausea and diaphoresis since Tuesday this week. Pt stated that the pain woke him from sleep. He described the pain and an elephant stepping on his chest and rated the pain 9/10 in intensity radiating up the neck and down both of his arms. Pt denies prior stents. Pt received ASA and nitroglycerin which did not alleviate the pain.  Pt denied headache, blurry vision, shortness of breath, abdominal pain or weakness. Troponin on arrival 2,699 and trended up to 6,203. WBC elevated at 20.2 decreased to 19.1. Pt was taken for cardiac catheterization shortly after arrival and underwent PTCA. OBJECTIVE     Vital Signs:  BP 95/63   Pulse 96   Temp 98.4 °F (36.9 °C)   Resp 16   Ht 6' (1.829 m)   Wt 188 lb 15 oz (85.7 kg)   SpO2 91%   BMI 25.62 kg/m²     Temp (24hrs), Av.1 °F (36.7 °C), Min:97.5 °F (36.4 °C), Max:98.6 °F (37 °C)    In: 330   Out: 600 [Urine:600]    Physical Exam:  Physical Exam  Vitals and nursing note reviewed. Constitutional:       Appearance: Normal appearance. HENT:      Head: Normocephalic and atraumatic. Nose: Nose normal.      Mouth/Throat:      Mouth: Mucous membranes are moist.      Pharynx: Oropharynx is clear. Eyes:      Extraocular Movements: Extraocular movements intact. Conjunctiva/sclera: Conjunctivae normal.      Pupils: Pupils are equal, round, and reactive to light. Cardiovascular:      Rate and Rhythm: Normal rate and regular rhythm. Pulses: Normal pulses. Heart sounds: Normal heart sounds. No murmur heard. No friction rub. No gallop. Pulmonary:      Effort: Pulmonary effort is normal. No respiratory distress. Breath sounds: Normal breath sounds. No stridor. No wheezing, rhonchi or rales. Chest:      Chest wall: Tenderness present. Abdominal:      General: Abdomen is flat. Bowel sounds are normal. There is no distension. Palpations: Abdomen is soft. There is no mass. Tenderness: There is no abdominal tenderness. There is no guarding or rebound. Hernia: No hernia is present. Musculoskeletal:         General: No swelling, tenderness, deformity or signs of injury. Normal range of motion. Cervical back: Normal range of motion. Right lower leg: No edema. Left lower leg: No edema. Skin:     General: Skin is warm. Neurological:      General: No focal deficit present.       Mental Status: He is alert and oriented to person, place, and time. Mental status is at baseline. Psychiatric:         Mood and Affect: Mood normal.         Behavior: Behavior normal.         Thought Content: Thought content normal.         Judgment: Judgment normal.           Medications:  Scheduled Medications:    [Held by provider] amLODIPine  1.25 mg Oral Daily    budesonide-formoterol  2 puff Inhalation BID    tamsulosin  0.4 mg Oral Daily    tiotropium  2 puff Inhalation Daily    sodium chloride flush  5-40 mL IntraVENous 2 times per day    fentanNYL  50 mcg IntraVENous Once    sodium chloride flush  5-40 mL IntraVENous 2 times per day    metoprolol tartrate  25 mg Oral BID    atorvastatin  80 mg Oral Nightly    aspirin  81 mg Oral Daily     Continuous Infusions:    heparin (PORCINE) Infusion 10.5 Units/kg/hr (12/23/21 2147)    nitroGLYCERIN Stopped (12/23/21 1100)    sodium chloride      sodium chloride      sodium chloride 50 mL/hr (12/23/21 1210)     PRN Medicationsheparin (porcine), 4,000 Units, PRN  heparin (porcine), 2,000 Units, PRN  albuterol sulfate HFA, 2 puff, 4x Daily PRN  sodium chloride flush, 5-40 mL, PRN  sodium chloride, 25 mL, PRN  ondansetron, 4 mg, Q8H PRN   Or  ondansetron, 4 mg, Q6H PRN  polyethylene glycol, 17 g, Daily PRN  sodium chloride flush, 5-40 mL, PRN  sodium chloride, 25 mL, PRN  acetaminophen, 650 mg, Q4H PRN        Diagnostic Labs:  CBC:   Recent Labs     12/23/21  0657 12/23/21  0844   WBC 20.2* 19.1*   RBC 4.98 4.76   HGB 14.3 13.7   HCT 44.7 42.8   MCV 89.8 89.9   RDW 12.6 12.6    181     BMP:   Recent Labs     12/23/21  0657   *   K 4.2   CL 99   CO2 22   BUN 13   CREATININE 1.06     BNP: No results for input(s): BNP in the last 72 hours. PT/INR: No results for input(s): PROTIME, INR in the last 72 hours.   APTT:   Recent Labs     12/23/21  0844 12/23/21  1221 12/23/21 2059   APTT 27.5 97.0* 31.6*     CARDIAC ENZYMES: No results for input(s): CKMB, Petey Segura in the last 72 hours. Invalid input(s): CKTOTAL;3  FASTING LIPID PANEL:  Lab Results   Component Value Date    CHOL 163 10/24/2013    HDL 51 10/24/2013    TRIG 45 10/24/2013     LIVER PROFILE: No results for input(s): AST, ALT, ALB, BILIDIR, BILITOT, ALKPHOS in the last 72 hours. MICROBIOLOGY:   Lab Results   Component Value Date/Time    CULTURE NO GROWTH <24 HRS 12/23/2021 02:54 PM    CULTURE NO GROWTH <24 HRS 12/23/2021 02:54 PM       Imaging:    XR CHEST PORTABLE    Result Date: 12/23/2021  New variable nearly diffuse interstitial thickening concerning for viral process. CT CHEST PULMONARY EMBOLISM W CONTRAST    Result Date: 12/23/2021  1. No evidence of pulmonary embolism. 2.  Findings compatible with scarring in the left upper lobe and lung bases. No convincing evidence for acute airspace disease. 3.  Mild emphysematous disease. RECOMMENDATIONS: Unavailable       ASSESSMENT & PLAN     Assessment and Plan:    Principal Problem:    NSTEMI (non-ST elevated myocardial infarction) ([address]  Active Problems:    Chest pain due to myocardial ischemia  Resolved Problems:    * No resolved hospital problems. *      IMPRESSION  This is a 80 y.o. male who presented with chest pain and found to have NSTEMI.  Patient admitted to inpatient status for management of NSTEMI.      Active Problems:  NSTEMI (non-ST elevated myocardial infarction)   - S/P cardiac cath 12/23 with PTCA of RCA  - Pt on ASA, Lipitor, 1 dose of morphine, Lopressor, Nitroglycerin gtt and heparin   - Trending troponin's  - Echocardiogram pending   - Continue to monitor for chest pain   - CT surgery recommendation   - Plan for pulmonology consult for pre-op clearance   - Follow up on ultrasounds   - Pending pre-op clearance plan for surgery in 2-4 days   - Continue with medical management   - Repeat CXR    Hypertension - stable   - Hold BP medications due to hypotension      History of blood clots   - Stable on heparin      COPD - Continue with Albuterol, Symbicort and Spiriva      GERD - stable      Hyperlipidemia -  Stable   - Lipitor      Diet: Regular low fat, low cholesterol and high fiber   DVT ppx: Heparin   GI ppx: None      PT/OT/SW: Consulted   Discharge Planning: In process         Madhuri Gonzales MD  Internal Medicine Resident, PGY-1  [address], [place]   95:37 AM 12/24/2021

## 2021-12-24 NOTE — FLOWSHEET NOTE
SPIRITUAL CARE DEPARTMENT - [address]  PROGRESS NOTE    Shift date: 12/24/21  Shift day: Friday   Shift # 1    Room # 1002/1002-01   Name: Tim Gerardo            Age: 80 y.o. Gender: male          Jehovah's witness:    Place of Scientology:     Referral: Routine Visit    Admit Date & Time: 12/23/2021  6:01 AM    PATIENT/EVENT DESCRIPTION:  Tim Gerardo is a 80 y.o. male.  received consult for patient. SPIRITUAL ASSESSMENT/INTERVENTION:  Patient appeared to be approachable and coping with what is happening. Patient engaged in conversation and stated his  visited and has Yarsan[employer] family praying for him.  listened and validated prayer chain and nurtured hope by offering prayers. Patient expressed gratitude for  visit. SPIRITUAL CARE FOLLOW-UP PLAN:  Chaplains will remain available to offer spiritual and emotional support as needed.       Electronically signed by Ron Gray, on 12/24/2021 at 3:58 PM.  [facility]  [phone]       12/24/21 2346   Encounter Summary   Services provided to: Patient and family together   Referral/Consult From: Nurse   Continue Visiting   (12/24/21)   Complexity of Encounter Low   Length of Encounter 15 minutes   Spiritual Assessment Completed Yes   Routine   Type Pre-procedure   Assessment Approachable;Coping   Intervention Sustaining presence/ Ministry of presence   Outcome Expressed gratitude

## 2021-12-24 NOTE — CARE COORDINATION
Received voicemail from [facility], they are out-of-network with pt's insurance.

## 2021-12-24 NOTE — PROGRESS NOTES
Occupational [address] [employer]  Occupational Therapy Not Seen Note    DATE: 2021    NAME: Sylvia Mcneal  MRN: [id number]   : 1950      Patient not seen this date for Occupational Therapy due to: Other: Per chart, pt is being evaluated for possible CABG. Trops are currently 5,090. Will await POC prior to EOB/OOB activity with occupational therapy.      Next Scheduled Treatment: Check back as able     Electronically signed by Amy Bailey OT on 2021 at 7:32 AM

## 2021-12-24 NOTE — PROGRESS NOTES
Physical Therapy         Physical Therapy Cancel Note      DATE: 2021    NAME: Mitch Hughes  MRN: [id number]   : 1950      Patient not seen this date for Physical Therapy due to:  Per chart, pt is being evaluated for possible CABG. Trops are currently 5,090.  Will await POC prior to EOB/OOB activity with physical therapy.          Electronically signed by Ace Tucker PT on 2021 at 8:27 AM

## 2021-12-24 NOTE — CARE COORDINATION
Kailey RN at Surgery Desk ([phone]) alerted to need to schedule case for Monday with Dr Awilda Fong at 9:00 AM. Surgery will call back once approved as add-on.     CHRISTOPH nOeil - CNP

## 2021-12-24 NOTE — PROGRESS NOTES
[facility]  Daily Progress Note    Surgeon: Dr Nichole Rodriguez   CC:  Multivessel CAD s/p Cardiac Cath  OR: Tentatively planning for OR on Monday with Dr Vanna Sheth  EF: 50%      Subjective:  Mr. Rissa Wong feels well today with no acute complaints. Pain is controlled on current medication regimen. Remains on heparin and nitro gtt per Cardiology team. OOBTC and ambulating. Denies chest pain or shortness of breath. Plan of care reviewed and questions answered. Physical Exam  Vital Signs: /69   Pulse 108   Temp 98 °F (36.7 °C) (Oral)   Resp 16   Ht 6' (1.829 m)   Wt 187 lb 2.7 oz (84.9 kg)   SpO2 93%   BMI 25.38 kg/m²  O2 Flow Rate (L/min): 2 L/min   Admit Weight: Weight: 192 lb (87.1 kg)   WEIGHTWeight: 187 lb 2.7 oz (84.9 kg)     General: alert and oriented to person, place and time, well-developed and well-nourished, in no acute distress. Up in chair  Heart: Normal S1 and S2.  Regular rhythm. No murmurs, gallops, or rubs. Lungs: clear to auscultation bilaterally and diminished breath sounds bibasilar  Abdomen: soft, non tender, non distended, BSx4  Extremities: 1+ pitting to all extremities and generalized edema  Wounds: clean and dry, healing appropriately.       Scheduled Meds:    budesonide-formoterol  2 puff Inhalation BID    tiotropium  2 puff Inhalation Daily    sodium chloride flush  5-40 mL IntraVENous 2 times per day    fentanNYL  50 mcg IntraVENous Once    sodium chloride flush  5-40 mL IntraVENous 2 times per day    metoprolol tartrate  25 mg Oral BID    atorvastatin  80 mg Oral Nightly    aspirin  81 mg Oral Daily     Continuous Infusions:    heparin (PORCINE) Infusion 12.5 Units/kg/hr (12/24/21 0446)    nitroGLYCERIN Stopped (12/23/21 1100)    sodium chloride      sodium chloride         Data:  CBC:   Recent Labs     12/23/21  0657 12/23/21  0844 12/24/21  0253   WBC 20.2* 19.1* 17.4*   HGB 14.3 13.7 13.4   HCT 44.7 42.8 40.5*   MCV 89.8 89.9 89.2   PLT 227 181 167     BMP:   Recent Labs     12/23/21  0657 12/24/21  0253   * 133*   K 4.2 4.4   CL 99 101   CO2 22 19*   BUN 13 20   CREATININE 1.06 1.19     PT/INR: No results for input(s): PROTIME, INR in the last 72 hours. APTT:   Recent Labs     12/23/21  1221 12/23/21 2059 12/24/21  0253   APTT 97.0* 31.6* 41.0*         I/O:  I/O last 3 completed shifts: In: 2052 [P.O.:320; I.V.:879]  Out: 1000 [Urine:1000]      Assessment:  · Multivessel CAD s/p cardiac cath  · NSTEMI  · Emphysema   · Pulmonology following. Cleared for surgery. · Essential hypertension  · Hyperlipidemia  · Urinary retention        Plan:    Remains inpatient on telemetry,    Monitor vitals closely including continuous pulse oximetry   Oxygen as needed via nasal cannula to maintain SpO2 > 92%   Educate on device and use of incentive spirometry & acapella   Monitor CBC, BMP, INR and Mag daily   Heparin and Nitro drips - managed by Cardiology   Medical management per primary team   Currently taking metoprolol,    Patient is on BB, ASA, Statin therapy per protocol   o ACE-I/ARB not indicated with EF of 50%   SCDs while stationary    Pepcid for GI prophylaxis   Replace electrolytes as needed per sliding scale and recheck per policy   PT/OT evalutation for discharge recommendation and ambulation 3x daily   Case Management consult for discharge planning   Pulmonology Consult, appreciate their input      The above recommendations including medications and orders were discussed and agreed upon with Dr. Cookie Barreto, the attending on service for the cardiothoracic surgery group today. Electronically signed by CHRISTOPH Ardon CNP on 12/24/2021 at 10:06 AM    On this date 12/24/2021 I have spent 26 minutes reviewing previous notes, test results and face to face with the patient discussing the diagnosis and importance of compliance with the treatment plan as well as documenting on the day of the visit.  At least 50% of the time documented was spent with the patient to provide counseling and/or coordination of care. This note was created with the assistance of a speech-recognition program.  Although the intention is to generate a document that actually reflects the content of the visit, no guarantees can be provided that every mistake has been identified and corrected by editing. Agree with the above  Plan for US and CT of chest  Plan for CABG early next week  R/B/I discussed  Appreciate the consult    Note was updated later by me after  physical examination and  completion of the assessment.

## 2021-12-24 NOTE — PROGRESS NOTES
[facility]   Progress Note                   Date:   12/24/2021  Patient name: Renetta Mckinley  Date of admission:  12/23/2021  6:01 AM  MRN:   [id number]  YOB: 1950  PCP: Candida Mcnamara MD    Reason for Admission:      Subjective: There were no acute events overnight, remained hemodynamically stable, denies chest pain, dyspnea, orthopnea or palpitations. Medications:   Scheduled Meds:   [Held by provider] amLODIPine  1.25 mg Oral Daily    budesonide-formoterol  2 puff Inhalation BID    tamsulosin  0.4 mg Oral Daily    tiotropium  2 puff Inhalation Daily    sodium chloride flush  5-40 mL IntraVENous 2 times per day    fentanNYL  50 mcg IntraVENous Once    sodium chloride flush  5-40 mL IntraVENous 2 times per day    metoprolol tartrate  25 mg Oral BID    atorvastatin  80 mg Oral Nightly    aspirin  81 mg Oral Daily       Continuous Infusions:   heparin (PORCINE) Infusion 12.5 Units/kg/hr (12/24/21 0446)    nitroGLYCERIN Stopped (12/23/21 1100)    sodium chloride      sodium chloride      sodium chloride 50 mL/hr (12/23/21 1210)       CBC:   Recent Labs     12/23/21  0657 12/23/21  0844 12/24/21  0253   WBC 20.2* 19.1* 17.4*   HGB 14.3 13.7 13.4    181 167     BMP:    Recent Labs     12/23/21  0657 12/24/21  0253   * 133*   K 4.2 4.4   CL 99 101   CO2 22 19*   BUN 13 20   CREATININE 1.06 1.19   GLUCOSE 118* 94     Hepatic: No results for input(s): AST, ALT, ALB, BILITOT, ALKPHOS in the last 72 hours. Troponin: No results for input(s): TROPONINI in the last 72 hours. BNP: No results for input(s): BNP in the last 72 hours. Lipids:   Recent Labs     12/24/21  0253   CHOL 116   HDL 60     INR: No results for input(s): INR in the last 72 hours.     Objective:   Vitals: BP (!) 95/58   Pulse 104   Temp 98 °F (36.7 °C) (Oral)   Resp 16   Ht 6' (1.829 m)   Wt 187 lb 2.7 oz (84.9 kg)   SpO2 92%   BMI 25.38 kg/m²     General appearance: awake, alert, in no apparent respiratory distress   HEENT: Head: Normocephalic, no lesions, without obvious abnormality  Neck: no JVD  Lungs: clear to auscultation bilaterally, no basilar rales, no wheezing   Heart: regular rate and rhythm, S1, S2 normal, no murmur, click, rub or gallop  Abdomen: soft, non-tender; bowel sounds normal  Extremities: No LE edema  Neurologic: Mental status: Alert, oriented. Motor and sensory not done. Echocardiogram: 08/2019  Summary  Normal left ventricle size, wall thickness and low normal function. Calculated EF via Reinoso's method is 50 %. Cannot rule out wall motion abnormalities due to poor visualization. Right atrial dilatation. Aortic valve is sclerotic but opens well. Trivial aortic insufficiency. Coronary Angiography: 12/23  LMCA: Normal 0% stenosis. LAD: Single stenosis.       Lesion on Prox LAD: 80% stenosis.       LCx: Multiple stenosis.       Lesion on 1st Ob Melodie: Proximal subsection. 90% stenosis.       Lesion on Mid CX: 80% stenosis.       Lesion on 2nd Ob Melodie: Ostial.80% stenosis.       RCA: Acute occlusion.         Lesion on Mid RCA: 100% stenosis 20 mm length reduced to 20%. Pre     procedure MARIFER 0 flow was noted. Post Procedure MARIFER III flow was     present. Poor runoff was present. The lesion was diagnosed as High Risk     (C).     Coronary Tree Dominance: Right       LV Analysis LV function assessed as:Normal.   The LV gram was performed in the CHOUDHARY 30 position. LVEF: 50%.    Conclusions:  Acute occlusion of mid RCA underwent PTCA with restoration of MARIFER III    flow. Multi-vessel Coronary Artery Disease. Normal LV systolic function.       Recommendations   Routine Post PCI orders. Medical therapy as needed. Risk factor modification. CTS consult for CABG       IMPRESSION:  1. NSTEMI type I with cardiac cath showing MV CAD. POD 1 of PTCA of mid RCA. 2. History of hypertension.   3. Mildly low LVEF on LV angiogram. RECOMMENDATIONS:  Continue heparin drip. Nitro drip weaned off. Medical management and preop work-up for CABG. Echocardiogram pending. We will continue to follow.     Discussed with patient and Nurse. Tom Hook MD       Cardiovascular Fellow PGY-4  12/24/2021, 6:38 AM    Attending note. The patient was seen and examined agree with above. No chest pain or shortness of breath. On IV heparin and nitro. We will continue current medications and follow-up on CT surgery recommendation.

## 2021-12-24 NOTE — CONSULTS
Pulmonary/Critical Care consultation    Patient's name:  Danilo Diaz  Medical Record Number: [id number]  Patient's account/billing number: [de-identified]  Patient's YOB: 1950  Age: 80 y.o. Date of Admission: 12/23/2021  6:01 AM  Date of Consult: 12/24/2021      Primary Care Physician: Dara Choi MD      Code Status: Full Code    Reason for consult: Preoperative evaluation for patient with known history of COPD and history of decortication for right-sided empyema      HISTORY OF PRESENT ILLNESS:   History was obtained from chart review and the patient. Danilo Diaz is a 80 y.o. white gentleman with known history of COPD who was seen by me in the past.  In 2015 he has stage I COPD.   His FEV1 in May 2015 was 2.82 L, 87% predicted  Patient currently denied any shortness of breath at rest  Has a cough with mucoid sputum  No fever or chills  No chest pain or pressure at rest  Patient's shortness of breath has only been recently and most likely is due to his angina  Patient has been following the [place] system since 2015  Apparently had some lung nodules seen on the CT scan of the chest that are followed by them and deemed to be nonmalignant, according to the patient  CT scan of the chest here without any nodules or masses, on 12/23/2021  Chest x-ray today without any acute process  No hemoptysis  No weight loss or loss of appetite          Past Medical History:        Diagnosis Date    Acute upper respiratory infections of unspecified site     CAD (coronary artery disease)     [facility]ch[facility] obstructive pulmonary disease) ([facility] [address]     Empyema without mention of fistula     Esophageal reflux     Essential hypertension, benign     History of blood transfusion     Hx of blood clots     RLE DVT,     Localized osteoarthrosis not specified whether primary or secondary, unspecified site     Other and unspecified hyperlipidemia     Surgical or other procedure not carried out because of patient's decision     colonoscopy refused     Unspecified pleural effusion        Past Surgical History:        Procedure Laterality Date    LUNG DECORTICATION Right 2006    MRSA at that time. Allergies:    No Known Allergies      Home Meds:   Prior to Admission medications    Medication Sig Start Date End Date Taking? Authorizing Provider   tamsulosin (FLOMAX) 0.4 MG capsule Take 1 capsule by mouth daily 8/20/19  Yes Cynthia Cardenas MD   amLODIPine (NORVASC) 2.5 MG tablet Take 0.5 tablets by mouth daily Start taking  in place of lisinopril only if BP stays more than 674 systolic 7/71/79  Yes Cynthia Cardenas MD   albuterol sulfate  (90 Base) MCG/ACT inhaler Inhale 2 puffs into the lungs 4 times daily as needed for Wheezing or Shortness of Breath 8/19/19  Yes Cynthia Cardenas MD   tiotropium (SPIRIVA RESPIMAT) 2.5 MCG/ACT AERS inhaler Inhale 2 puffs into the lungs daily   Yes Historical Provider, MD   budesonide-formoterol (SYMBICORT) 160-4.5 MCG/ACT AERO Inhale 2 puffs into the lungs 2 times daily   Yes Historical Provider, MD       Family History:       Problem Relation Age of Onset    Diabetes Mother     Heart Disease Mother     Heart Disease Father     Heart Disease Paternal Uncle          Social History:   TOBACCO:   reports that he quit smoking about 11 years ago. He has never used smokeless tobacco.  ETOH:   reports no history of alcohol use. DRUGS:  reports no history of drug use. OCCUPATION:   There  is history of TB. There is not asbestos or silica dust exposure. The patient reports does not have coal, foundry, quarry or [employer] exposure. Travel history reveals nothing of significance. There is not  history of recreational or IV drug use. There is not hot tube exposure. The patient does not have pets, dogs, cats turtles or exotic birds.            REVIEW OF SYSTEMS:    Review of Systems -   General ROS: Completed and except as mentioned above were Constitutional: This is a well developed, well nourished, 25-29.9 - Overweight 80y.o. year old male who is alert, oriented, cooperative and in no apparent distress. Head:normocephalic and atraumatic. EENT:   SANDI. No conjunctival injections. Septum was midline, mucosa was without erythema, exudates or cobblestoning. No thrush was noted. Mallampati II (soft palate, uvula, fauces visible)  Neck: Supple without thyromegaly. No elevated JVP. Trachea was midline. Respiratory: Chest was symmetrical without dullness to percussion. Breath sounds bilaterally were clear to auscultation. There were no wheezes, rhonchi or rales. There is no intercostal retraction or use of accessory muscles. No egophony noted. Cardiovascular: Regular without murmur, clicks, gallops or rubs. Abdomen: Slightly rounded and soft without organomegaly. No rebound tenderness, rigidity or guarding was appreciated. Lymphatic: No lymphadenopathy. Musculoskeletal: Normal curvature of the spine. No gross muscle weakness. Extremities:  No lower extremity edema, no ulcerations, tenderness, varicosities or erythema. Muscle size, tone and strength are normal.  No involuntary movements are noted. Skin:  Warm and dry. Good color, turgor and pigmentation. No lesions or scars. No cyanosis or clubbing  Neurological/Psychiatric: The patient's general behavior, level of consciousness, thought content and emotional status is normal.            Laboratory findings:-    CBC:   Recent Labs     12/24/21  0253   WBC 17.4*   HGB 13.4        BMP:    Recent Labs     12/23/21  0657 12/23/21  0657 12/24/21  0253   *   < > 133*   K 4.2   < > 4.4   CL 99   < > 101   CO2 22   < > 19*   BUN 13   < > 20   CREATININE 1.06  --  1.19   GLUCOSE 118*   < > 94    < > = values in this interval not displayed. S. Calcium:  Recent Labs     12/24/21  0253   CALCIUM 8.3*     S.  Ionized Calcium:No results for input(s): IONCA in the last 72 hours.  S. Magnesium:No results for input(s): MG in the last 72 hours. S. Phosphorus:No results for input(s): PHOS in the last 72 hours. S. Glucose:No results for input(s): POCGLU in the last 72 hours. Glycosylated hemoglobin A1C:   Recent Labs     12/23/21  1507   LABA1C 5.4     INR: No results for input(s): INR in the last 72 hours. Hepatic functions: No results for input(s): ALKPHOS, ALT, AST, PROT, BILITOT, BILIDIR, LABALBU in the last 72 hours. Pancreatic functions:No results for input(s): LACTA, AMYLASE in the last 72 hours. S. Lactic Acid: No results for input(s): LACTA in the last 72 hours. Cardiac enzymes:No results for input(s): CKTOTAL, CKMB, CKMBINDEX, TROPONINI in the last 72 hours. BNP:No results for input(s): BNP in the last 72 hours. Lipid profile:   Recent Labs     12/24/21  0253   CHOL 116   TRIG 52   HDL 60     Blood Gases: No results found for: PH, PCO2, PO2, HCO3, O2SAT  Thyroid functions:   Lab Results   Component Value Date    TSH 1.63 10/24/2013            Microbiology:    Cultures during this admission:     Blood cultures:      [] None drawn      [] Negative             []  Positive (Details:  )  Urine Culture:        [] None drawn      [] Negative             []  Positive (Details:  )  Sputum Culture:   [] None drawn       [] Negative             []  Positive (Details:  )     Pulmonary function tests: Stage I COPD in 2015 with an FEV1 more than 80% predicted and more than 2.8 l    Radiological reports:    XR CHEST PORTABLE    Result Date: 12/23/2021  New variable nearly diffuse interstitial thickening concerning for viral process. CT CHEST PULMONARY EMBOLISM W CONTRAST    Result Date: 12/23/2021  1. No evidence of pulmonary embolism. 2.  Findings compatible with scarring in the left upper lobe and lung bases. No convincing evidence for acute airspace disease. 3.  Mild emphysematous disease.  RECOMMENDATIONS: Unavailable                  Assessment and Plan       IMPRESSION: 1. Chest pain due to myocardial ischemia, unspecified ischemic chest pain type    2. NSTEMI (non-ST elevated myocardial infarction) ([facility] [address]        Principal Problem:    NSTEMI (non-ST elevated myocardial infarction) ([facility] [address]  Active Problems:    Chest pain due to myocardial ischemia  Resolved Problems:    * No resolved hospital problems. *  History of stage I COPD in 2015 with an FEV1 of 2.82 L at the time, that is 87% predicted  Previous history of tuberculosis  History of right-sided empyema s/p decortication    PLAN:       Patient had stage I COPD in 2015 with an FEV1 of 2.82 L. His symptoms of shortness of breath has only been recently and I believe it could be related to angina. Patient would not have any contraindication from pulmonary standpoint for cardiac surgery. If the pulmonary function test is done, will review it but anticipate no findings that would contraindicate surgery  Continue bronchodilators  Coached the patient on using incentive spirometry  Recommend flu vaccination in the fall annually. Recommendations given regarding pneumococcal vaccinations. Maintain an active lifestyle. continue smoking cessation. Patient was educated on how to use the respiratory medications. All the questions that the daughters and patient has had were answered to   their satisfaction. Home O2 evaluation was done. Supplemental oxygen was not indicated   Pulmonary function tests were reviewed and discussed  Chest x-ray was reviewed and discussed. CT scan of the chest was reviewed and discussed  Discussed with Ms. Pino  Thank you for having us involved in the care of your patient. Please call us if you have any questions or concerns.       Alondra Solis MD, M.D.            12/24/2021, 7:55 AM

## 2021-12-24 NOTE — PLAN OF CARE
Problem: Falls - Risk of:  Goal: Will remain free from falls  Description: Will remain free from falls  Outcome: Ongoing  Goal: Absence of physical injury  Description: Absence of physical injury  Outcome: Ongoing     Problem: Discharge Planning:  Goal: Discharged to appropriate level of care  Description: Discharged to appropriate level of care  Outcome: Ongoing     Problem: Cardiac Output - Decreased:  Goal: Hemodynamic stability will improve  Description: Hemodynamic stability will improve  Outcome: Ongoing     Problem: Serum Glucose Level - Abnormal:  Goal: Ability to maintain appropriate glucose levels will improve  Description: Ability to maintain appropriate glucose levels will improve  Outcome: Ongoing     Problem: Pain:  Goal: Pain level will decrease  Description: Pain level will decrease  Outcome: Ongoing  Goal: Control of acute pain  Description: Control of acute pain  Outcome: Ongoing  Goal: Control of chronic pain  Description: Control of chronic pain  Outcome: Ongoing     Problem: Tissue Perfusion - Cardiopulmonary, Altered:  Goal: Absence of angina  Description: Absence of angina  Outcome: Ongoing  Goal: Circulatory function within specified parameters  Description: Circulatory function within specified parameters  Outcome: Ongoing  Goal: Hemodynamic stability will improve  Description: Hemodynamic stability will improve  Outcome: Ongoing     Problem: Tobacco Use:  Goal: Will participate in inpatient tobacco-use cessation counseling  Description: Will participate in inpatient tobacco-use cessation counseling  Outcome: Ongoing     Problem: Bleeding:  Goal: Will show no signs and symptoms of excessive bleeding  Description: Will show no signs and symptoms of excessive bleeding  Outcome: Ongoing

## 2021-12-25 LAB
ABSOLUTE EOS #: 0.04 K/UL (ref 0–0.44)
ABSOLUTE IMMATURE GRANULOCYTE: 0.08 K/UL (ref 0–0.3)
ABSOLUTE LYMPH #: 1.53 K/UL (ref 1.1–3.7)
ABSOLUTE MONO #: 1.02 K/UL (ref 0.1–1.2)
ALBUMIN SERPL-MCNC: 3.2 G/DL (ref 3.5–5.2)
ALBUMIN/GLOBULIN RATIO: 1.2 (ref 1–2.5)
ALP BLD-CCNC: 84 U/L (ref 40–129)
ALT SERPL-CCNC: 20 U/L (ref 5–41)
ANION GAP SERPL CALCULATED.3IONS-SCNC: 15 MMOL/L (ref 9–17)
AST SERPL-CCNC: 30 U/L
BASOPHILS # BLD: 0 % (ref 0–2)
BASOPHILS ABSOLUTE: 0.03 K/UL (ref 0–0.2)
BILIRUB SERPL-MCNC: 0.66 MG/DL (ref 0.3–1.2)
BUN BLDV-MCNC: 28 MG/DL (ref 8–23)
BUN/CREAT BLD: ABNORMAL (ref 9–20)
CALCIUM SERPL-MCNC: 8.3 MG/DL (ref 8.6–10.4)
CHLORIDE BLD-SCNC: 98 MMOL/L (ref 98–107)
CO2: 18 MMOL/L (ref 20–31)
CREAT SERPL-MCNC: 1.15 MG/DL (ref 0.7–1.2)
CULTURE: NO GROWTH
DIFFERENTIAL TYPE: ABNORMAL
EOSINOPHILS RELATIVE PERCENT: 0 % (ref 1–4)
GFR AFRICAN AMERICAN: >60 ML/MIN
GFR NON-AFRICAN AMERICAN: >60 ML/MIN
GFR SERPL CREATININE-BSD FRML MDRD: ABNORMAL ML/MIN/{1.73_M2}
GFR SERPL CREATININE-BSD FRML MDRD: ABNORMAL ML/MIN/{1.73_M2}
GLUCOSE BLD-MCNC: 93 MG/DL (ref 70–99)
HCT VFR BLD CALC: 35.6 % (ref 40.7–50.3)
HEMOGLOBIN: 11.9 G/DL (ref 13–17)
IMMATURE GRANULOCYTES: 1 %
LYMPHOCYTES # BLD: 12 % (ref 24–43)
Lab: NORMAL
MAGNESIUM: 2.3 MG/DL (ref 1.6–2.6)
MCH RBC QN AUTO: 29.5 PG (ref 25.2–33.5)
MCHC RBC AUTO-ENTMCNC: 33.4 G/DL (ref 28.4–34.8)
MCV RBC AUTO: 88.3 FL (ref 82.6–102.9)
MONOCYTES # BLD: 8 % (ref 3–12)
MRSA, DNA, NASAL: NORMAL
NRBC AUTOMATED: 0 PER 100 WBC
PARTIAL THROMBOPLASTIN TIME: 45.9 SEC (ref 20.5–30.5)
PARTIAL THROMBOPLASTIN TIME: 47.4 SEC (ref 20.5–30.5)
PARTIAL THROMBOPLASTIN TIME: 62.5 SEC (ref 20.5–30.5)
PDW BLD-RTO: 12.8 % (ref 11.8–14.4)
PLATELET # BLD: 166 K/UL (ref 138–453)
PLATELET ESTIMATE: ABNORMAL
PMV BLD AUTO: 10.8 FL (ref 8.1–13.5)
POTASSIUM SERPL-SCNC: 4.1 MMOL/L (ref 3.7–5.3)
PREALBUMIN: 10.6 MG/DL (ref 20–40)
RBC # BLD: 4.03 M/UL (ref 4.21–5.77)
RBC # BLD: ABNORMAL 10*6/UL
SEG NEUTROPHILS: 79 % (ref 36–65)
SEGMENTED NEUTROPHILS ABSOLUTE COUNT: 9.98 K/UL (ref 1.5–8.1)
SODIUM BLD-SCNC: 131 MMOL/L (ref 135–144)
SPECIMEN DESCRIPTION: NORMAL
SPECIMEN DESCRIPTION: NORMAL
TOTAL PROTEIN: 5.8 G/DL (ref 6.4–8.3)
WBC # BLD: 12.7 K/UL (ref 3.5–11.3)
WBC # BLD: ABNORMAL 10*3/UL

## 2021-12-25 PROCEDURE — 36415 COLL VENOUS BLD VENIPUNCTURE: CPT

## 2021-12-25 PROCEDURE — 99232 SBSQ HOSP IP/OBS MODERATE 35: CPT | Performed by: INTERNAL MEDICINE

## 2021-12-25 PROCEDURE — 6370000000 HC RX 637 (ALT 250 FOR IP): Performed by: STUDENT IN AN ORGANIZED HEALTH CARE EDUCATION/TRAINING PROGRAM

## 2021-12-25 PROCEDURE — 85730 THROMBOPLASTIN TIME PARTIAL: CPT

## 2021-12-25 PROCEDURE — 83735 ASSAY OF MAGNESIUM: CPT

## 2021-12-25 PROCEDURE — 2060000000 HC ICU INTERMEDIATE R&B

## 2021-12-25 PROCEDURE — 85025 COMPLETE CBC W/AUTO DIFF WBC: CPT

## 2021-12-25 PROCEDURE — 6360000002 HC RX W HCPCS

## 2021-12-25 PROCEDURE — 80053 COMPREHEN METABOLIC PANEL: CPT

## 2021-12-25 PROCEDURE — 84134 ASSAY OF PREALBUMIN: CPT

## 2021-12-25 PROCEDURE — 99233 SBSQ HOSP IP/OBS HIGH 50: CPT | Performed by: INTERNAL MEDICINE

## 2021-12-25 PROCEDURE — 94761 N-INVAS EAR/PLS OXIMETRY MLT: CPT

## 2021-12-25 PROCEDURE — 2580000003 HC RX 258

## 2021-12-25 PROCEDURE — 6370000000 HC RX 637 (ALT 250 FOR IP): Performed by: NURSE PRACTITIONER

## 2021-12-25 PROCEDURE — 6370000000 HC RX 637 (ALT 250 FOR IP)

## 2021-12-25 PROCEDURE — 94640 AIRWAY INHALATION TREATMENT: CPT

## 2021-12-25 PROCEDURE — 2580000003 HC RX 258: Performed by: STUDENT IN AN ORGANIZED HEALTH CARE EDUCATION/TRAINING PROGRAM

## 2021-12-25 RX ORDER — DIPHENOXYLATE HCL/ATROPINE 2.5-.025/5
5 LIQUID (ML) ORAL 4 TIMES DAILY PRN
Status: DISCONTINUED | OUTPATIENT
Start: 2021-12-25 | End: 2021-12-27

## 2021-12-25 RX ADMIN — FAMOTIDINE 20 MG: 20 TABLET, FILM COATED ORAL at 08:57

## 2021-12-25 RX ADMIN — METOPROLOL TARTRATE 25 MG: 50 TABLET, FILM COATED ORAL at 08:57

## 2021-12-25 RX ADMIN — SODIUM CHLORIDE, PRESERVATIVE FREE 10 ML: 5 INJECTION INTRAVENOUS at 20:07

## 2021-12-25 RX ADMIN — TIOTROPIUM BROMIDE INHALATION SPRAY 2 PUFF: 3.12 SPRAY, METERED RESPIRATORY (INHALATION) at 09:07

## 2021-12-25 RX ADMIN — Medication 20.5 UNITS/KG/HR: at 12:38

## 2021-12-25 RX ADMIN — Medication 2 PUFF: at 09:09

## 2021-12-25 RX ADMIN — SODIUM CHLORIDE, PRESERVATIVE FREE 10 ML: 5 INJECTION INTRAVENOUS at 08:57

## 2021-12-25 RX ADMIN — HEPARIN SODIUM 2000 UNITS: 1000 INJECTION INTRAVENOUS; SUBCUTANEOUS at 11:20

## 2021-12-25 RX ADMIN — Medication 81 MG: at 08:57

## 2021-12-25 RX ADMIN — HEPARIN SODIUM 2000 UNITS: 1000 INJECTION INTRAVENOUS; SUBCUTANEOUS at 00:11

## 2021-12-25 RX ADMIN — ATORVASTATIN CALCIUM 80 MG: 80 TABLET, FILM COATED ORAL at 20:05

## 2021-12-25 RX ADMIN — FAMOTIDINE 20 MG: 20 TABLET, FILM COATED ORAL at 20:05

## 2021-12-25 RX ADMIN — HEPARIN SODIUM 2000 UNITS: 1000 INJECTION INTRAVENOUS; SUBCUTANEOUS at 05:47

## 2021-12-25 RX ADMIN — DIPHENOXYLATE HYDROCHLORIDE AND ATROPINE SULFATE 5 ML: 2.5; .025 SOLUTION ORAL at 18:07

## 2021-12-25 ASSESSMENT — PAIN SCALES - GENERAL: PAINLEVEL_OUTOF10: 0

## 2021-12-25 NOTE — PROGRESS NOTES
[facility]   Progress Note                   Date:   12/25/2021  Patient name: Astrid Duong  Date of admission:  12/23/2021  6:01 AM  MRN:   [id number]  YOB: 1950  PCP: Eden Swan MD    Reason for Admission:  nstemi     Subjective:       Multiple PACs overnight, was given magnesium. No chest pain or shortness of breath. Medications:   Scheduled Meds:   famotidine  20 mg Oral BID    Or    famotidine (PEPCID) injection  20 mg IntraVENous BID    aspirin  81 mg Oral Daily    atorvastatin  80 mg Oral Nightly    [START ON 12/27/2021] amiodarone  200 mg Oral 90 Min Pre-Op    budesonide-formoterol  2 puff Inhalation BID    tiotropium  2 puff Inhalation Daily    sodium chloride flush  5-40 mL IntraVENous 2 times per day    fentanNYL  50 mcg IntraVENous Once    sodium chloride flush  5-40 mL IntraVENous 2 times per day    metoprolol tartrate  25 mg Oral BID       Continuous Infusions:   heparin (PORCINE) Infusion 18.5 Units/kg/hr (12/25/21 0546)    nitroGLYCERIN Stopped (12/23/21 1100)    sodium chloride      sodium chloride         CBC:   Recent Labs     12/23/21  0844 12/24/21 0253 12/25/21  0440   WBC 19.1* 17.4* 12.7*   HGB 13.7 13.4 11.9*    167 166     BMP:    Recent Labs     12/23/21  0657 12/23/21  0657 12/24/21 0253 12/24/21 2013 12/25/21  0440   *  --  133*  --  131*   K 4.2   < > 4.4 4.0 4.1   CL 99  --  101  --  98   CO2 22  --  19*  --  18*   BUN 13  --  20  --  28*   CREATININE 1.06  --  1.19  --  1.15   GLUCOSE 118*  --  94  --  93    < > = values in this interval not displayed. Hepatic:   Recent Labs     12/25/21  0440   AST 30   ALT 20   BILITOT 0.66   ALKPHOS 84     Troponin: No results for input(s): TROPONINI in the last 72 hours. BNP: No results for input(s): BNP in the last 72 hours. Lipids:   Recent Labs     12/24/21  0253   CHOL 116   HDL 60     INR: No results for input(s): INR in the last 72 hours.     Objective: Vitals: BP (!) 93/58   Pulse 82   Temp 98.3 °F (36.8 °C) (Oral)   Resp 16   Ht 6' (1.829 m)   Wt 187 lb 2.7 oz (84.9 kg)   SpO2 93%   BMI 25.38 kg/m²     General appearance: awake, alert, in no apparent respiratory distress   HEENT: Head: Normocephalic, no lesions, without obvious abnormality  Neck: no JVD  Lungs: clear to auscultation bilaterally, no basilar rales, no wheezing   Heart: regular rate and rhythm, S1, S2 normal, no murmur, click, rub or gallop  Abdomen: soft, non-tender; bowel sounds normal  Extremities: No LE edema  Neurologic: Mental status: Alert, oriented. Motor and sensory not done. Echocardiogram: 08/2019  Summary  Normal left ventricle size, wall thickness and low normal function. Calculated EF via Reinoso's method is 50 %. Cannot rule out wall motion abnormalities due to poor visualization. Right atrial dilatation. Aortic valve is sclerotic but opens well. Trivial aortic insufficiency. Coronary Angiography: 12/23  LMCA: Normal 0% stenosis. LAD: Single stenosis.       Lesion on Prox LAD: 80% stenosis.       LCx: Multiple stenosis.       Lesion on 1st Ob Melodie: Proximal subsection. 90% stenosis.       Lesion on Mid CX: 80% stenosis.       Lesion on 2nd Ob Melodie: Ostial.80% stenosis.       RCA: Acute occlusion.         Lesion on Mid RCA: 100% stenosis 20 mm length reduced to 20%. Pre     procedure MARIFER 0 flow was noted. Post Procedure MARIFER III flow was     present. Poor runoff was present. The lesion was diagnosed as High Risk     (C).     Coronary Tree Dominance: Right       LV Analysis LV function assessed as:Normal.   The LV gram was performed in the CHOUDHARY 30 position. LVEF: 50%.    Conclusions:  Acute occlusion of mid RCA underwent PTCA with restoration of MARIFER III    flow. Multi-vessel Coronary Artery Disease. Normal LV systolic function.       Recommendations   Routine Post PCI orders. Medical therapy as needed. Risk factor modification.    CTS consult for CABG       IMPRESSION:  1. NSTEMI type I with cardiac cath showing MV CAD (CTS plan for CABG on Monday)  2. POD 2 of PTCA of mid RCA. 3. History of hypertension. 4. Mildly low LVEF on LV angiogram.     RECOMMENDATIONS:  Continue heparin drip. Nitro drip weaned off. Medical management and preop work-up for CABG. Echocardiogram pending. We will continue to follow.     Discussed with patient and Nurse. Clayton Alejo MD       Cardiovascular Fellow PGY-4  12/25/2021, 6:24 AM      Attending Physician Statement  I have discussed the care of Salma Juárez, including pertinent history and exam findings,  with the cardiology fellow/resident. I have seen and examined the patient and the key elements of all parts of the encounter have been performed by me. I agree with the assessment, plan and orders as documented by the resident.        Colletta Peek MD, [facility]

## 2021-12-25 NOTE — PROGRESS NOTES
Pulmonary/Critical Care consultation    Patient's name:  Salma Juárez  Medical Record Number: [id number]  Patient's account/billing number: [de-identified]  Patient's YOB: 1950  Age: 80 y.o. Date of Admission: 12/23/2021  6:01 AM  Date of Consult: 12/25/2021      Primary Care Physician: Gaby James MD      Code Status: Full Code    Reason for consult: Preoperative evaluation for patient with known history of COPD and history of decortication for right-sided empyema      HISTORY OF PRESENT ILLNESS:   History was obtained from chart review and the patientReid Juárez is a 80 y.o. white gentleman with known history of COPD who was seen by me in the past.  In 2015 he has stage I COPD.   His FEV1 in May 2015 was 2.82 L, 87% predicted  Patient currently denied any shortness of breath at rest  Has a cough with mucoid sputum  No fever or chills  No chest pain or pressure at rest  Patient's shortness of breath has only been recently and most likely is due to his angina  Patient has been following the [place] system since 2015  Apparently had some lung nodules seen on the CT scan of the chest that are followed by them and deemed to be nonmalignant, according to the patient  CT scan of the chest here without any nodules or masses, on 12/23/2021  Chest x-ray today without any acute process  No hemoptysis  No weight loss or loss of appetite    Interval history:  Denied any shortness of breath  Not much cough or sputum production  Afebrile  Patient is on room air  No pedal edema  No chest pain or pressure      Past Medical History:        Diagnosis Date    Acute upper respiratory infections of unspecified site     CAD (coronary artery disease)     COPD [facility] obstructive [facility]) ([facility] [address]     Empyema without mention of fistula     Esophageal reflux     Essential hypertension, benign     History of blood transfusion     Hx of blood clots     RLE DVT,     Localized osteoarthrosis not specified whether primary or secondary, unspecified site     Other and unspecified hyperlipidemia     Surgical or other procedure not carried out because of patient's decision     colonoscopy refused     Unspecified pleural effusion        Past Surgical History:        Procedure Laterality Date    LUNG DECORTICATION Right 2006    MRSA at that time. Allergies:    No Known Allergies      Home Meds:   Prior to Admission medications    Medication Sig Start Date End Date Taking? Authorizing Provider   tamsulosin (FLOMAX) 0.4 MG capsule Take 1 capsule by mouth daily 8/20/19  Yes Coleen Mujica MD   amLODIPine (NORVASC) 2.5 MG tablet Take 0.5 tablets by mouth daily Start taking  in place of lisinopril only if BP stays more than 739 systolic 5/32/93  Yes Coleen Mujica MD   albuterol sulfate  (90 Base) MCG/ACT inhaler Inhale 2 puffs into the lungs 4 times daily as needed for Wheezing or Shortness of Breath 8/19/19  Yes Coleen Mujica MD   tiotropium (SPIRIVA RESPIMAT) 2.5 MCG/ACT AERS inhaler Inhale 2 puffs into the lungs daily   Yes Historical Provider, MD   budesonide-formoterol (SYMBICORT) 160-4.5 MCG/ACT AERO Inhale 2 puffs into the lungs 2 times daily   Yes Historical Provider, MD       Family History:       Problem Relation Age of Onset    Diabetes Mother     Heart Disease Mother     Heart Disease Father     Heart Disease Paternal Uncle          Social History:   TOBACCO:   reports that he quit smoking about 11 years ago. He has never used smokeless tobacco.  ETOH:   reports no history of alcohol use. DRUGS:  reports no history of drug use. OCCUPATION:   There  is history of TB. There is not asbestos or silica dust exposure. The patient reports does not have coal, foundry, quarry or [employer] exposure. Travel history reveals nothing of significance. There is not  history of recreational or IV drug use. There is not hot tube exposure.  The patient does not have pets, dogs, cats turtles or exotic birds. REVIEW OF SYSTEMS:    Review of Systems -   General ROS: Completed and except as mentioned above were negative   Psychological ROS:  Completed and except as mentioned above were negative  Ophthalmic ROS:  Completed and except as mentioned above were negative  ENT ROS:  Completed and except as mentioned above were negative  Allergy and Immunology ROS:  Completed and except as mentioned above were negative  Hematological and Lymphatic ROS:  Completed and except as mentioned above were negative  Endocrine ROS: Completed and except as mentioned above were negative  Breast ROS:  Completed and except as mentioned above were negative  Respiratory ROS:  Completed and except as mentioned above were negative  Cardiovascular ROS:  Completed and except as mentioned above were negative  Gastrointestinal ROS: Completed and except as mentioned above were negative  Genito-Urinary ROS:  Completed and except as mentioned above were negative  Musculoskeletal ROS:  Completed and except as mentioned above were negative  Neurological ROS:  Completed and except as mentioned above were negative  Dermatological ROS:  Completed and except as mentioned above were negative          Physical Exam:    Vitals: BP (!) 98/59   Pulse 82   Temp 98.2 °F (36.8 °C) (Oral)   Resp 18   Ht 6' (1.829 m)   Wt 187 lb 2.7 oz (84.9 kg)   SpO2 93%   BMI 25.38 kg/m²     Last Body weight:   Wt Readings from Last 3 Encounters:   12/24/21 187 lb 2.7 oz (84.9 kg)   10/07/19 190 lb (86.2 kg)   08/23/19 207 lb 12.8 oz (94.3 kg)       Body Mass Index : Body mass index is 25.38 kg/m².       Intake and Output summary:     Intake/Output Summary (Last 24 hours) at 12/25/2021 0824  Last data filed at 12/25/2021 0600  Gross per 24 hour   Intake 1466 ml   Output --   Net 1466 ml       Physical Examination:   PHYSICAL EXAMINATION:  Vitals:    12/24/21 2318 12/25/21 0000 12/25/21 0400 12/25/21 0728   BP: 126/69 96/61 (!) 93/58 (!) 98/59   Pulse: 90 87 82    Resp:  14 16 18   Temp:  98.1 °F (36.7 °C) 98.3 °F (36.8 °C) 98.2 °F (36.8 °C)   TempSrc:  Oral Oral Oral   SpO2: 95% 93% 93%    Weight:       Height:         Constitutional: This is a well developed, well nourished, 25-29.9 - Overweight 80y.o. year old male who is alert, oriented, cooperative and in no apparent distress. Head:normocephalic and atraumatic. EENT:   SANDI. No conjunctival injections. Septum was midline, mucosa was without erythema, exudates or cobblestoning. No thrush was noted. Mallampati II (soft palate, uvula, fauces visible)  Neck: Supple without thyromegaly. No elevated JVP. Trachea was midline. Respiratory: Chest was symmetrical without dullness to percussion. Breath sounds bilaterally were clear to auscultation. There were no wheezes, rhonchi or rales. There is no intercostal retraction or use of accessory muscles. No egophony noted. Cardiovascular: Regular without murmur, clicks, gallops or rubs. Abdomen: Slightly rounded and soft without organomegaly. No rebound tenderness, rigidity or guarding was appreciated. Lymphatic: No lymphadenopathy. Musculoskeletal: Normal curvature of the spine. No gross muscle weakness. Extremities:  No lower extremity edema, no ulcerations, tenderness, varicosities or erythema. Muscle size, tone and strength are normal.  No involuntary movements are noted. Skin:  Warm and dry. Good color, turgor and pigmentation. No lesions or scars.   No cyanosis or clubbing  Neurological/Psychiatric: The patient's general behavior, level of consciousness, thought content and emotional status is normal.            Laboratory findings:-    CBC:   Recent Labs     12/25/21  0440   WBC 12.7*   HGB 11.9*        BMP:    Recent Labs     12/23/21  0657 12/23/21  0657 12/24/21  0253 12/24/21 2013 12/25/21  0440   *   < > 133*  --  131*   K 4.2   < > 4.4   < > 4.1   CL 99   < > 101  --  98   CO2 22   < > 19*  --  18*   BUN 13   < > 20  --  28*   CREATININE 1.06  --  1.19  --  1.15   GLUCOSE 118*   < > 94  --  93    < > = values in this interval not displayed. S. Calcium:  Recent Labs     12/25/21  0440   CALCIUM 8.3*     S. Ionized Calcium:No results for input(s): IONCA in the last 72 hours. S. Magnesium:  Recent Labs     12/25/21  0440   MG 2.3     S. Phosphorus:No results for input(s): PHOS in the last 72 hours. S. Glucose:No results for input(s): POCGLU in the last 72 hours. Glycosylated hemoglobin A1C:   Recent Labs     12/23/21  1507   LABA1C 5.4     INR: No results for input(s): INR in the last 72 hours. Hepatic functions:   Recent Labs     12/25/21  0440   ALKPHOS 84   ALT 20   AST 30   PROT 5.8*   BILITOT 0.66   LABALBU 3.2*     Pancreatic functions:No results for input(s): LACTA, AMYLASE in the last 72 hours. S. Lactic Acid: No results for input(s): LACTA in the last 72 hours. Cardiac enzymes:No results for input(s): CKTOTAL, CKMB, CKMBINDEX, TROPONINI in the last 72 hours. BNP:No results for input(s): BNP in the last 72 hours. Lipid profile:   Recent Labs     12/24/21  0253   CHOL 116   TRIG 52   HDL 60     Blood Gases: No results found for: PH, PCO2, PO2, HCO3, O2SAT  Thyroid functions:   Lab Results   Component Value Date    TSH 1.63 10/24/2013            Microbiology:    Cultures during this admission:     Blood cultures:      [] None drawn      [] Negative             []  Positive (Details:  )  Urine Culture:        [] None drawn      [] Negative             []  Positive (Details:  )  Sputum Culture:   [] None drawn       [] Negative             []  Positive (Details:  )     Pulmonary function tests: Stage I COPD in 2015 with an FEV1 more than 80% predicted and more than 2.8 l    Radiological reports:    XR CHEST PORTABLE    Result Date: 12/23/2021  New variable nearly diffuse interstitial thickening concerning for viral process.      CT CHEST PULMONARY EMBOLISM W CONTRAST    Result Date: 12/23/2021  1. No evidence of pulmonary embolism. 2.  Findings compatible with scarring in the left upper lobe and lung bases. No convincing evidence for acute airspace disease. 3.  Mild emphysematous disease. RECOMMENDATIONS: Unavailable                  Assessment and Plan       IMPRESSION:   1. Chest pain due to myocardial ischemia, unspecified ischemic chest pain type    2. NSTEMI (non-ST elevated myocardial infarction) ([facility] [address]        Principal Problem:    NSTEMI (non-ST elevated myocardial infarction) ([place] [address]  Active Problems:    Chest pain due to myocardial ischemia  Resolved Problems:    * No resolved hospital problems. *  History of stage I COPD in 2015 with an FEV1 of 2.82 L at the time, that is 87% predicted  Previous history of tuberculosis  History of right-sided empyema s/p decortication  Hyponatremia, mild  Leukocytosis, better  Mild anemia    PLAN:       Patient had stage I COPD in 2015 with an FEV1 of 2.82 L. His symptoms of shortness of breath has only been recently and I believe it could be related to angina. Patient would not have any contraindication from pulmonary standpoint for cardiac surgery. If the pulmonary function test is done, will review it but anticipate no findings that would contraindicate surgery  Continue bronchodilators  Coached the patient on using incentive spirometry. Patient has been using it well  Recommend flu vaccination in the fall annually. Recommendations given regarding pneumococcal vaccinations. Maintain an active lifestyle. continue smoking cessation. Patient was educated on how to use the respiratory medications. All the questions that the daughters and patient has had were answered to   their satisfaction. Home O2 evaluation was done. Supplemental oxygen was not indicated   Pulmonary function tests were reviewed from 2015 and discussed with the patient and the CT surgery team  Discussed with Ms. Pino  Thank you for having us involved in the care of your patient. Please call us if you have any questions or concerns.       Octavio Brothers MD,            12/25/2021, 8:24 AM

## 2021-12-25 NOTE — PROGRESS NOTES
[facility]  Internal Medicine Teaching Residency Program  Inpatient Daily Progress Note  ______________________________________________________________________________    Patient: Jose Álvarez  YOB: 1950   TOY:[id number]    Acct: [de-identified]     Room: [address] [place] [address]  Admit date: 12/23/2021  Today's date: 12/25/21  Number of days in the hospital: 2    SUBJECTIVE   Admitting Diagnosis: NSTEMI (non-ST elevated myocardial infarction) ([facility] [address]  CC: Chest pain     - Pt examined at bedside. Chart & results reviewed. - Overnight, pt started having multiple episodes of diarrhea. He also states he had three \"accidents\" in which he soiled his brief. He stated he could not get to the bathroom quick enough and lost control of both bowel and bladder. Pt was also hypotensive last night which has improved this morning. Hypotension could be secondary to diarrhea. We will continue to monitor BP with goal of greater than or equal to 100/60. Will also monitor K+ as pt has had multiple episodes of diarrhea. - Pt resting comfortably in his chair with daughter at bedside   - Pt denies any chest pain this morning   - Pt sating well on room air  - Echocardiogram showing LV hypertrophy and LV systolic dysfunction with EF of 45-50%  - Afebrile   - VSS    Plan for today:   - Continue with current medical management  - Monitor for diarrhea   - Monitor electrolytes   - Monitor BP with goal greater than or equal to 100/60   - Follow up on occult blood screen   - Follow up on C.  Diff   - Plan for CABG on Monday       ROS:  Constitutional:  negative for chills, fevers, sweats  Respiratory:  negative for cough, dyspnea on exertion, hemoptysis, shortness of breath, wheezing  Cardiovascular:  negative for chest pain, chest pressure/discomfort, lower extremity edema, palpitations  Gastrointestinal:  negative for abdominal pain, constipation, diarrhea, nausea, vomiting  Neurological:  negative for dizziness, headache    BRIEF HISTORY     The patient is a pleasant 71 y. o. male with a PMHx significant for CAD (unstable angina), COPD, empyema, GERD, HTN, history of blood clots and HLD who presents with a chief complaint of chest pain. Pt states he has also experienced nausea and diaphoresis since Tuesday this week. Pt stated that the pain woke him from sleep. He described the pain and an elephant stepping on his chest and rated the pain 9/10 in intensity radiating up the neck and down both of his arms. Pt denies prior stents. Pt received ASA and nitroglycerin which did not alleviate the pain. Pt denied headache, blurry vision, shortness of breath, abdominal pain or weakness. Troponin on arrival 2,699 and trended up to 6,203. WBC elevated at 20.2 decreased to 19.1. Pt was taken for cardiac catheterization shortly after arrival and underwent PTCA.       OBJECTIVE     Vital Signs:  BP (!) 93/58   Pulse 82   Temp 98.3 °F (36.8 °C) (Oral)   Resp 16   Ht 6' (1.829 m)   Wt 187 lb 2.7 oz (84.9 kg)   SpO2 93%   BMI 25.38 kg/m²     Temp (24hrs), Av.1 °F (36.7 °C), Min:98 °F (36.7 °C), Max:98.3 °F (36.8 °C)    In: 526   Out: -     Physical Exam:  Physical Exam  Vitals and nursing note reviewed. Constitutional:       Appearance: Normal appearance. HENT:      Head: Normocephalic and atraumatic. Nose: Nose normal.      Mouth/Throat:      Mouth: Mucous membranes are moist.      Pharynx: Oropharynx is clear. Eyes:      Extraocular Movements: Extraocular movements intact. Conjunctiva/sclera: Conjunctivae normal.      Pupils: Pupils are equal, round, and reactive to light. Cardiovascular:      Rate and Rhythm: Normal rate and regular rhythm. Pulses: Normal pulses. Heart sounds: Normal heart sounds. No murmur heard. No friction rub. No gallop. Pulmonary:      Effort: Pulmonary effort is normal. No respiratory distress. Breath sounds: Normal breath sounds. No stridor.  No wheezing, rhonchi or rales. Chest:      Chest wall: No tenderness. Abdominal:      General: Abdomen is flat. Bowel sounds are normal. There is no distension. Palpations: Abdomen is soft. There is no mass. Tenderness: There is no abdominal tenderness. There is no guarding or rebound. Hernia: No hernia is present. Musculoskeletal:         General: No swelling, tenderness, deformity or signs of injury. Normal range of motion. Cervical back: Normal range of motion. Right lower leg: No edema. Left lower leg: No edema. Skin:     General: Skin is warm. Neurological:      General: No focal deficit present. Mental Status: He is alert and oriented to person, place, and time. Mental status is at baseline. Psychiatric:         Mood and Affect: Mood normal.         Behavior: Behavior normal.         Thought Content:  Thought content normal.         Judgment: Judgment normal.           Medications:  Scheduled Medications:    famotidine  20 mg Oral BID    Or    famotidine (PEPCID) injection  20 mg IntraVENous BID    aspirin  81 mg Oral Daily    atorvastatin  80 mg Oral Nightly    [START ON 12/27/2021] amiodarone  200 mg Oral 90 Min Pre-Op    budesonide-formoterol  2 puff Inhalation BID    tiotropium  2 puff Inhalation Daily    sodium chloride flush  5-40 mL IntraVENous 2 times per day    fentanNYL  50 mcg IntraVENous Once    sodium chloride flush  5-40 mL IntraVENous 2 times per day    metoprolol tartrate  25 mg Oral BID     Continuous Infusions:    heparin (PORCINE) Infusion 18.5 Units/kg/hr (12/25/21 0546)    nitroGLYCERIN Stopped (12/23/21 1100)    sodium chloride      sodium chloride       PRN Medicationsheparin (porcine), 4,000 Units, PRN  heparin (porcine), 2,000 Units, PRN  albuterol sulfate HFA, 2 puff, 4x Daily PRN  sodium chloride flush, 5-40 mL, PRN  sodium chloride, 25 mL, PRN  ondansetron, 4 mg, Q8H PRN   Or  ondansetron, 4 mg, Q6H PRN  polyethylene glycol, 17 g, Problem:    NSTEMI (non-ST elevated myocardial infarction) [facility])  Active Problems:    Chest pain due to myocardial ischemia  Resolved Problems:    * No resolved hospital problems. *      IMPRESSION  This is a 76 y. o. male who presented with chest pain and found to have NSTEMI. Patient admitted to inpatient status for management of NSTEMI.      Active Problems:  NSTEMI (non-ST elevated myocardial infarction)   - S/P cardiac cath 12/23 with PTCA of RCA  - Pt on ASA, Lipitor, 1 dose of morphine, Lopressor, Nitroglycerin gtt and heparin   - Echocardiogram showing LV hypertrophy and LV systolic dysfunction with EF of 45-50%  - Continue to monitor for chest pain    - Continue with medical management   - Repeat CXR > stable findings   - Pulmonology cleared for CABG   - CABG Monday      Diarrhea  - Started yesterday evening   - Multiple episodes of watery stool   - C. Diff antigen   - C. Diff precautions   - FOBT   - Continue to monitor   - Monitor electrolytes      Hypotension likely secondary to voluminous diarrhea - stable   - Resolved this morning   - Goal of greater than or equal to 100/60  - Hypotensive overnight 93/58   - Continue to monitor     Hypertension - stable   - Hold BP medications due to hypotension      History of blood clots   - Stable on heparin      COPD   - Continue with Albuterol, Symbicort and Spiriva      GERD - stable      Hyperlipidemia -  Stable   - Lipitor      Diet: Regular low fat, low cholesterol and high fiber   DVT ppx: Heparin   GI ppx: None      PT/OT/SW: Consulted   Discharge Planning: In process         Santa Barrientos MD  Internal Medicine Resident, PGY-1  [facility];  [place], [place]   7:30 AM 12/25/2021

## 2021-12-25 NOTE — PROGRESS NOTES
1950- Writer updated Dr. Addie José of HR jump from 80 to 140s twice. Strips printed and emailed to Dr. Addie José. New order for EKG and electrolyte labs. 2045- Orders placed for 1gm of magnesium and 1gm of calcium gluconate.

## 2021-12-25 NOTE — PLAN OF CARE
Problem: Falls - Risk of:  Goal: Will remain free from falls  Description: Will remain free from falls  12/25/2021 0433 by Seble Gonzalez RN  Outcome: Met This Shift     Problem: Falls - Risk of:  Goal: Absence of physical injury  Description: Absence of physical injury  12/25/2021 0433 by Seble Gonzalez RN  Outcome: Met This Shift     Problem: Cardiac Output - Decreased:  Goal: Hemodynamic stability will improve  Description: Hemodynamic stability will improve  12/25/2021 0433 by Seble Gonzalez RN  Outcome: Met This Shift     Problem: Pain:  Goal: Pain level will decrease  Description: Pain level will decrease  12/25/2021 0433 by Seble Gonzalez RN  Outcome: Met This Shift

## 2021-12-26 ENCOUNTER — ANESTHESIA EVENT (OUTPATIENT)
Dept: OPERATING ROOM | Age: 71
DRG: 232 | End: 2021-12-26
Payer: OTHER GOVERNMENT

## 2021-12-26 LAB
ABSOLUTE EOS #: 0.09 K/UL (ref 0–0.44)
ABSOLUTE IMMATURE GRANULOCYTE: 0.04 K/UL (ref 0–0.3)
ABSOLUTE LYMPH #: 1.9 K/UL (ref 1.1–3.7)
ABSOLUTE MONO #: 0.69 K/UL (ref 0.1–1.2)
ANION GAP SERPL CALCULATED.3IONS-SCNC: 14 MMOL/L (ref 9–17)
BASOPHILS # BLD: 0 % (ref 0–2)
BASOPHILS ABSOLUTE: <0.03 K/UL (ref 0–0.2)
BUN BLDV-MCNC: 28 MG/DL (ref 8–23)
BUN/CREAT BLD: ABNORMAL (ref 9–20)
CALCIUM SERPL-MCNC: 8.4 MG/DL (ref 8.6–10.4)
CHLORIDE BLD-SCNC: 101 MMOL/L (ref 98–107)
CO2: 16 MMOL/L (ref 20–31)
CREAT SERPL-MCNC: 1.22 MG/DL (ref 0.7–1.2)
DIFFERENTIAL TYPE: ABNORMAL
EOSINOPHILS RELATIVE PERCENT: 1 % (ref 1–4)
GFR AFRICAN AMERICAN: >60 ML/MIN
GFR NON-AFRICAN AMERICAN: 59 ML/MIN
GFR SERPL CREATININE-BSD FRML MDRD: ABNORMAL ML/MIN/{1.73_M2}
GFR SERPL CREATININE-BSD FRML MDRD: ABNORMAL ML/MIN/{1.73_M2}
GLUCOSE BLD-MCNC: 77 MG/DL (ref 70–99)
HCT VFR BLD CALC: 37.9 % (ref 40.7–50.3)
HEMOGLOBIN: 11.9 G/DL (ref 13–17)
IMMATURE GRANULOCYTES: 0 %
LYMPHOCYTES # BLD: 20 % (ref 24–43)
MCH RBC QN AUTO: 28.5 PG (ref 25.2–33.5)
MCHC RBC AUTO-ENTMCNC: 31.4 G/DL (ref 28.4–34.8)
MCV RBC AUTO: 90.9 FL (ref 82.6–102.9)
MONOCYTES # BLD: 7 % (ref 3–12)
NRBC AUTOMATED: 0 PER 100 WBC
PARTIAL THROMBOPLASTIN TIME: 64.3 SEC (ref 20.5–30.5)
PDW BLD-RTO: 12.8 % (ref 11.8–14.4)
PLATELET # BLD: 178 K/UL (ref 138–453)
PLATELET ESTIMATE: ABNORMAL
PMV BLD AUTO: 10.6 FL (ref 8.1–13.5)
POTASSIUM SERPL-SCNC: 4.3 MMOL/L (ref 3.7–5.3)
RBC # BLD: 4.17 M/UL (ref 4.21–5.77)
RBC # BLD: ABNORMAL 10*6/UL
SEG NEUTROPHILS: 72 % (ref 36–65)
SEGMENTED NEUTROPHILS ABSOLUTE COUNT: 6.58 K/UL (ref 1.5–8.1)
SODIUM BLD-SCNC: 131 MMOL/L (ref 135–144)
WBC # BLD: 9.3 K/UL (ref 3.5–11.3)
WBC # BLD: ABNORMAL 10*3/UL

## 2021-12-26 PROCEDURE — 6370000000 HC RX 637 (ALT 250 FOR IP): Performed by: STUDENT IN AN ORGANIZED HEALTH CARE EDUCATION/TRAINING PROGRAM

## 2021-12-26 PROCEDURE — 6370000000 HC RX 637 (ALT 250 FOR IP)

## 2021-12-26 PROCEDURE — 2060000000 HC ICU INTERMEDIATE R&B

## 2021-12-26 PROCEDURE — 80048 BASIC METABOLIC PNL TOTAL CA: CPT

## 2021-12-26 PROCEDURE — 99233 SBSQ HOSP IP/OBS HIGH 50: CPT | Performed by: INTERNAL MEDICINE

## 2021-12-26 PROCEDURE — 99232 SBSQ HOSP IP/OBS MODERATE 35: CPT | Performed by: INTERNAL MEDICINE

## 2021-12-26 PROCEDURE — 6370000000 HC RX 637 (ALT 250 FOR IP): Performed by: NURSE PRACTITIONER

## 2021-12-26 PROCEDURE — 85025 COMPLETE CBC W/AUTO DIFF WBC: CPT

## 2021-12-26 PROCEDURE — 36415 COLL VENOUS BLD VENIPUNCTURE: CPT

## 2021-12-26 PROCEDURE — 85730 THROMBOPLASTIN TIME PARTIAL: CPT

## 2021-12-26 PROCEDURE — APPNB30 APP NON BILLABLE TIME 0-30 MINS: Performed by: NURSE PRACTITIONER

## 2021-12-26 RX ORDER — LACTOBACILLUS RHAMNOSUS GG 10B CELL
1 CAPSULE ORAL
Status: DISCONTINUED | OUTPATIENT
Start: 2021-12-26 | End: 2021-12-27

## 2021-12-26 RX ADMIN — Medication 2 PUFF: at 21:15

## 2021-12-26 RX ADMIN — FAMOTIDINE 20 MG: 20 TABLET, FILM COATED ORAL at 21:14

## 2021-12-26 RX ADMIN — ATORVASTATIN CALCIUM 80 MG: 80 TABLET, FILM COATED ORAL at 21:14

## 2021-12-26 RX ADMIN — METOPROLOL TARTRATE 25 MG: 50 TABLET, FILM COATED ORAL at 21:14

## 2021-12-26 RX ADMIN — METOPROLOL TARTRATE 25 MG: 50 TABLET, FILM COATED ORAL at 08:02

## 2021-12-26 RX ADMIN — DIPHENOXYLATE HYDROCHLORIDE AND ATROPINE SULFATE 5 ML: 2.5; .025 SOLUTION ORAL at 10:49

## 2021-12-26 RX ADMIN — Medication 2 PUFF: at 08:01

## 2021-12-26 RX ADMIN — Medication 81 MG: at 08:02

## 2021-12-26 RX ADMIN — FAMOTIDINE 20 MG: 20 TABLET, FILM COATED ORAL at 07:58

## 2021-12-26 RX ADMIN — TIOTROPIUM BROMIDE INHALATION SPRAY 2 PUFF: 3.12 SPRAY, METERED RESPIRATORY (INHALATION) at 08:09

## 2021-12-26 RX ADMIN — Medication 1 CAPSULE: at 14:01

## 2021-12-26 ASSESSMENT — PAIN SCALES - GENERAL
PAINLEVEL_OUTOF10: 0
PAINLEVEL_OUTOF10: 0

## 2021-12-26 NOTE — CARE COORDINATION
Transitional planning. OR tentative for Monday. [facility]. 2nd choice [facility]-pt has been there before. 66 Pt's daughter in room with pt and they have both decided daughter will stay with pt after surgery at his home. Home care list given and explained. Daughter and pt will let CM know of their 3 choices.

## 2021-12-26 NOTE — PROGRESS NOTES
[facility]  Daily Progress Note    Surgeon: Dr Michelet Leong   CC:  Multivessel CAD s/p Cardiac Cath  OR: Monday @ 9:00 am with Dr Jewell Alberto  EF: 50%      Subjective:  Mr. Tamika Bernstein feels well today with no acute complaints. Pain is controlled on current medication regimen. Remains on heparin and nitro gtt per Cardiology team. OOBTC and ambulating. Denies chest pain or shortness of breath. Plan of care reviewed and questions answered. Physical Exam  Vital Signs: BP (!) 104/59   Pulse 73   Temp 98.5 °F (36.9 °C) (Oral)   Resp 20   Ht 6' (1.829 m)   Wt 187 lb 2.7 oz (84.9 kg)   SpO2 96%   BMI 25.38 kg/m²  O2 Flow Rate (L/min): 2 L/min   Admit Weight: Weight: 192 lb (87.1 kg)   WEIGHTWeight: 187 lb 2.7 oz (84.9 kg)     General: alert and oriented to person, place and time, well-developed and well-nourished, in no acute distress. Up in chair  Heart: Normal S1 and S2.  Regular rhythm. No murmurs, gallops, or rubs. Lungs: clear to auscultation bilaterally and diminished breath sounds bibasilar  Abdomen: soft, non tender, non distended, BSx4  Extremities: 1+ pitting to all extremities and generalized edema  Wounds: clean and dry, healing appropriately.       Scheduled Meds:    famotidine  20 mg Oral BID    Or    famotidine (PEPCID) injection  20 mg IntraVENous BID    aspirin  81 mg Oral Daily    atorvastatin  80 mg Oral Nightly    [START ON 12/27/2021] amiodarone  200 mg Oral 90 Min Pre-Op    budesonide-formoterol  2 puff Inhalation BID    tiotropium  2 puff Inhalation Daily    sodium chloride flush  5-40 mL IntraVENous 2 times per day    fentanNYL  50 mcg IntraVENous Once    sodium chloride flush  5-40 mL IntraVENous 2 times per day    metoprolol tartrate  25 mg Oral BID     Continuous Infusions:    heparin (PORCINE) Infusion 20.5 Units/kg/hr (12/26/21 0240)    nitroGLYCERIN Stopped (12/23/21 1100)    sodium chloride      sodium chloride         Data:  CBC:   Recent Labs 12/24/21 0253 12/25/21 0440 12/26/21 0229   WBC 17.4* 12.7* 9.3   HGB 13.4 11.9* 11.9*   HCT 40.5* 35.6* 37.9*   MCV 89.2 88.3 90.9    166 178     BMP:   Recent Labs     12/24/21  0253 12/24/21 0253 12/24/21 2013 12/25/21 0440 12/26/21 0229   *  --   --  131* 131*   K 4.4   < > 4.0 4.1 4.3     --   --  98 101   CO2 19*  --   --  18* 16*   BUN 20  --   --  28* 28*   CREATININE 1.19  --   --  1.15 1.22*    < > = values in this interval not displayed. PT/INR: No results for input(s): PROTIME, INR in the last 72 hours. APTT:   Recent Labs     12/25/21  0956 12/25/21 1749 12/26/21 0029   APTT 47.4* 62.5* 64.3*         I/O:  I/O last 3 completed shifts: In: 80 [P.O.:780]  Out: 725 [Urine:725]      Assessment:  · Multivessel CAD s/p cardiac cath  · NSTEMI  · Emphysema   · Pulmonology following. Cleared for surgery. · Essential hypertension  · Hyperlipidemia  · Urinary retention        Plan:    Remains inpatient on telemetry,    Monitor vitals closely including continuous pulse oximetry   Oxygen as needed via nasal cannula to maintain SpO2 > 92%   Educate on device and use of incentive spirometry & acapella   Monitor CBC, BMP, INR and Mag daily   Heparin and Nitro drips - managed by Cardiology   Medical management per primary team   Currently taking metoprolol,    Patient is on BB, ASA, Statin therapy per protocol   o ACE-I/ARB not indicated with EF of 50%   SCDs while stationary    Pepcid for GI prophylaxis   Replace electrolytes as needed per sliding scale and recheck per policy   PT/OT evalutation for discharge recommendation and ambulation 3x daily   Case Management consult for discharge planning   Pulmonology Consult, appreciate their input      The above recommendations including medications and orders were discussed and agreed upon with Dr. Zeyad Joy, the attending on service for the cardiothoracic surgery group today.      Electronically signed by Arian Meza CHRISTOPH Pino CNP on 12/26/2021 at 10:16 AM    On this date 12/26/2021 I have spent 26 minutes reviewing previous notes, test results and face to face with the patient discussing the diagnosis and importance of compliance with the treatment plan as well as documenting on the day of the visit. At least 50% of the time documented was spent with the patient to provide counseling and/or coordination of care. This note was created with the assistance of a speech-recognition program.  Although the intention is to generate a document that actually reflects the content of the visit, no guarantees can be provided that every mistake has been identified and corrected by editing. Note was updated later by me after  physical examination and  completion of the assessment.

## 2021-12-26 NOTE — PROGRESS NOTES
[facility]   Progress Note                   Date:   12/26/2021  Patient name: Anyi Beth  Date of admission:  12/23/2021  6:01 AM  MRN:   [id number]  YOB: 1950  PCP: Pat Deluca MD    Reason for Admission:  nstemi     Subjective:       No acute issues overnight. Medications:   Scheduled Meds:   lactobacillus  1 capsule Oral Daily with breakfast    famotidine  20 mg Oral BID    Or    famotidine (PEPCID) injection  20 mg IntraVENous BID    aspirin  81 mg Oral Daily    atorvastatin  80 mg Oral Nightly    [START ON 12/27/2021] amiodarone  200 mg Oral 90 Min Pre-Op    budesonide-formoterol  2 puff Inhalation BID    tiotropium  2 puff Inhalation Daily    sodium chloride flush  5-40 mL IntraVENous 2 times per day    fentanNYL  50 mcg IntraVENous Once    sodium chloride flush  5-40 mL IntraVENous 2 times per day    metoprolol tartrate  25 mg Oral BID       Continuous Infusions:   heparin (PORCINE) Infusion 20.5 Units/kg/hr (12/26/21 0240)    nitroGLYCERIN Stopped (12/23/21 1100)    sodium chloride      sodium chloride         CBC:   Recent Labs     12/24/21 0253 12/25/21 0440 12/26/21 0229   WBC 17.4* 12.7* 9.3   HGB 13.4 11.9* 11.9*    166 178     BMP:    Recent Labs     12/24/21 0253 12/24/21 0253 12/24/21 2013 12/25/21 0440 12/26/21 0229   *  --   --  131* 131*   K 4.4   < > 4.0 4.1 4.3     --   --  98 101   CO2 19*  --   --  18* 16*   BUN 20  --   --  28* 28*   CREATININE 1.19  --   --  1.15 1.22*   GLUCOSE 94  --   --  93 77    < > = values in this interval not displayed. Hepatic:   Recent Labs     12/25/21 0440   AST 30   ALT 20   BILITOT 0.66   ALKPHOS 84     Troponin: No results for input(s): TROPONINI in the last 72 hours. BNP: No results for input(s): BNP in the last 72 hours. Lipids:   Recent Labs     12/24/21 0253   CHOL 116   HDL 60     INR: No results for input(s): INR in the last 72 hours.     Objective: Vitals: BP (!) 104/59   Pulse 73   Temp 98.5 °F (36.9 °C) (Oral)   Resp 20   Ht 6' (1.829 m)   Wt 187 lb 2.7 oz (84.9 kg)   SpO2 96%   BMI 25.38 kg/m²     General appearance: awake, alert, in no apparent respiratory distress   HEENT: Head: Normocephalic, no lesions, without obvious abnormality  Neck: no JVD  Lungs: clear to auscultation bilaterally, no basilar rales, no wheezing   Heart: regular rate and rhythm, S1, S2 normal, no murmur, click, rub or gallop  Abdomen: soft, non-tender; bowel sounds normal  Extremities: No LE edema  Neurologic: Mental status: Alert, oriented. Motor and sensory not done. Echocardiogram: 08/2019  Summary  Normal left ventricle size, wall thickness and low normal function. Calculated EF via Reinoso's method is 50 %. Cannot rule out wall motion abnormalities due to poor visualization. Right atrial dilatation. Aortic valve is sclerotic but opens well. Trivial aortic insufficiency. Coronary Angiography: 12/23  LMCA: Normal 0% stenosis. LAD: Single stenosis.       Lesion on Prox LAD: 80% stenosis.       LCx: Multiple stenosis.       Lesion on 1st Ob Melodie: Proximal subsection. 90% stenosis.       Lesion on Mid CX: 80% stenosis.       Lesion on 2nd Ob Melodie: Ostial.80% stenosis.       RCA: Acute occlusion.         Lesion on Mid RCA: 100% stenosis 20 mm length reduced to 20%. Pre     procedure MARIFER 0 flow was noted. Post Procedure MARIFER III flow was     present. Poor runoff was present. The lesion was diagnosed as High Risk     (C).     Coronary Tree Dominance: Right       LV Analysis LV function assessed as:Normal.   The LV gram was performed in the CHOUDHARY 30 position. LVEF: 50%.    Conclusions:  Acute occlusion of mid RCA underwent PTCA with restoration of MARIFER III    flow. Multi-vessel Coronary Artery Disease. Normal LV systolic function.       Recommendations   Routine Post PCI orders. Medical therapy as needed. Risk factor modification.    CTS consult for CABG       IMPRESSION:  1. NSTEMI type I with cardiac cath showing MV CAD (CTS plan for CABG on Monday)  2. POD 3 of PTCA of mid RCA. 3. History of hypertension. 4. Cardiomyopathy, Mildly low LVEF on LV angiogram.     RECOMMENDATIONS:  Continue heparin drip. Nitro drip weaned off. Medical management and preop work-up for CABG. Echocardiogram pending. We will continue to follow.     Discussed with patient and Nurse. Tom Hook MD       Cardiovascular Fellow PGY-4  12/26/2021, 12:24 PM        Attending Physician Statement  I have discussed the care of Rosa Wang, including pertinent history and exam findings,  with the cardiology fellow/resident. I have seen and examined the patient and the key elements of all parts of the encounter have been performed by me. I agree with the assessment, plan and orders as documented by the resident.      Pastor Granados MD, [facility]

## 2021-12-26 NOTE — FLOWSHEET NOTE
SPIRITUAL CARE DEPARTMENT - [facility] [address]  PROGRESS NOTE    Shift date: 12/26/21  Shift day: Sunday   Shift # 1    Room # 1002/1002-01   Name: Marisa Hu            Age: 80 y.o. Gender: male          Zoroastrianism: Amish   Place of L[facility]-day:     Referral: Routine Visit  Pre-Op    Admit Date & Time: 12/23/2021  6:01 AM    PATIENT/EVENT DESCRIPTION:  Marisa Hu is a 80 y.o. male        SPIRITUAL ASSESSMENT/INTERVENTION:   spoke with RN about hospital stay.  appeared to be welcome in room by patient and family member. Patient appeared to be approachable while anxious and coping with his hospital stay. Patient engaged in conversation and talked about his Adventism and  and their support. It appear Dulce Hay has a deep magno and hope in God which is a source of strength and comfort.  listened and validated Hermes's concerns and magno.  offered prayer which patient and family member accepted. Both expressed gratitude for the visit. SPIRITUAL CARE FOLLOW-UP PLAN:  Chaplains will remain available to offer spiritual and emotional support as needed. Electronically signed by Argenis Mata Resident, on 12/26/2021 at 11:21 AM.  [facility]  [phone]       12/26/21 1120   Encounter Summary   Services provided to: Patient and family together   Referral/Consult From: Maria Luisa   Continue Visiting   (12/26/21)   Complexity of Encounter Moderate   Length of Encounter 30 minutes   Spiritual Assessment Completed Yes   Routine   Type Pre-procedure   Assessment Approachable; Anxious; Hopeful   Intervention Prayer; Active listening;Sustaining presence/ Ministry of presence   Outcome Expressed gratitude

## 2021-12-26 NOTE — PROGRESS NOTES
Pulmonary/Critical Care consultation    Patient's name:  Connie Glasgow  Medical Record Number: [id number]  Patient's account/billing number: [de-identified]  Patient's YOB: 1950  Age: 80 y.o. Date of Admission: 12/23/2021  6:01 AM  Date of Consult: 12/26/2021      Primary Care Physician: Jesenia Louis MD      Code Status: Full Code    Reason for consult: Preoperative evaluation for patient with known history of COPD and history of decortication for right-sided empyema      HISTORY OF PRESENT ILLNESS:   History was obtained from chart review and the patient. Connie Glasgow is a 80 y.o. white gentleman with known history of COPD who was seen by me in the past.  In 2015 he has stage I COPD.   His FEV1 in May 2015 was 2.82 L, 87% predicted  Patient currently denied any shortness of breath at rest  Has a cough with mucoid sputum  No fever or chills  No chest pain or pressure at rest  Patient's shortness of breath has only been recently and most likely is due to his angina  Patient has been following the [place] system since 2015  Apparently had some lung nodules seen on the CT scan of the chest that are followed by them and deemed to be nonmalignant, according to the patient  CT scan of the chest here without any nodules or masses, on 12/23/2021  Chest x-ray today without any acute process  No hemoptysis  No weight loss or loss of appetite    Interval history:  Denied any shortness of breath  Not much cough or sputum production  Afebrile  Patient is on room air  No pedal edema  No chest pain or pressure      Past Medical History:        Diagnosis Date    Acute upper respiratory infections of unspecified site     CAD (coronary artery disease)     COPD [facility] pulmonary [facility]) [facility] [address]     Empyema without mention of fistula     Esophageal reflux     Essential hypertension, benign     History of blood transfusion     Hx of blood clots     RLE DVT,     Localized osteoarthrosis not specified whether primary or secondary, unspecified site     Other and unspecified hyperlipidemia     Surgical or other procedure not carried out because of patient's decision     colonoscopy refused     Unspecified pleural effusion        Past Surgical History:        Procedure Laterality Date    LUNG DECORTICATION Right 2006    MRSA at that time. Allergies:    No Known Allergies      Home Meds:   Prior to Admission medications    Medication Sig Start Date End Date Taking? Authorizing Provider   tamsulosin (FLOMAX) 0.4 MG capsule Take 1 capsule by mouth daily 8/20/19  Yes Aditya Gomez MD   amLODIPine (NORVASC) 2.5 MG tablet Take 0.5 tablets by mouth daily Start taking  in place of lisinopril only if BP stays more than 463 systolic 1/85/90  Yes Aditya Gomez MD   albuterol sulfate  (90 Base) MCG/ACT inhaler Inhale 2 puffs into the lungs 4 times daily as needed for Wheezing or Shortness of Breath 8/19/19  Yes Aditya Gomez MD   tiotropium (SPIRIVA RESPIMAT) 2.5 MCG/ACT AERS inhaler Inhale 2 puffs into the lungs daily   Yes Historical Provider, MD   budesonide-formoterol (SYMBICORT) 160-4.5 MCG/ACT AERO Inhale 2 puffs into the lungs 2 times daily   Yes Historical Provider, MD       Family History:       Problem Relation Age of Onset    Diabetes Mother     Heart Disease Mother     Heart Disease Father     Heart Disease Paternal Uncle          Social History:   TOBACCO:   reports that he quit smoking about 11 years ago. He has never used smokeless tobacco.  ETOH:   reports no history of alcohol use. DRUGS:  reports no history of drug use. OCCUPATION:   There  is history of TB. There is not asbestos or silica dust exposure. The patient reports does not have coal, foundry, quarry or [employer] exposure. Travel history reveals nothing of significance. There is not  history of recreational or IV drug use. There is not hot tube exposure.  The patient does not have pets, dogs, cats turtles or exotic birds. REVIEW OF SYSTEMS:    Review of Systems -   General ROS: Completed and except as mentioned above were negative   Psychological ROS:  Completed and except as mentioned above were negative  Ophthalmic ROS:  Completed and except as mentioned above were negative  ENT ROS:  Completed and except as mentioned above were negative  Allergy and Immunology ROS:  Completed and except as mentioned above were negative  Hematological and Lymphatic ROS:  Completed and except as mentioned above were negative  Endocrine ROS: Completed and except as mentioned above were negative  Breast ROS:  Completed and except as mentioned above were negative  Respiratory ROS:  Completed and except as mentioned above were negative  Cardiovascular ROS:  Completed and except as mentioned above were negative  Gastrointestinal ROS: Completed and except as mentioned above were negative  Genito-Urinary ROS:  Completed and except as mentioned above were negative  Musculoskeletal ROS:  Completed and except as mentioned above were negative  Neurological ROS:  Completed and except as mentioned above were negative  Dermatological ROS:  Completed and except as mentioned above were negative          Physical Exam:    Vitals: /61   Pulse 84   Temp 98.5 °F (36.9 °C) (Oral)   Resp 20   Ht 6' (1.829 m)   Wt 187 lb 2.7 oz (84.9 kg)   SpO2 94%   BMI 25.38 kg/m²     Last Body weight:   Wt Readings from Last 3 Encounters:   12/24/21 187 lb 2.7 oz (84.9 kg)   10/07/19 190 lb (86.2 kg)   08/23/19 207 lb 12.8 oz (94.3 kg)       Body Mass Index : Body mass index is 25.38 kg/m².       Intake and Output summary:     Intake/Output Summary (Last 24 hours) at 12/26/2021 0813  Last data filed at 12/26/2021 0440  Gross per 24 hour   Intake 780 ml   Output 725 ml   Net 55 ml       Physical Examination:   PHYSICAL EXAMINATION:  Vitals:    12/26/21 0400 12/26/21 0500 12/26/21 0600 12/26/21 0700   BP: (!) 102/53 (!) 97/56  110/61   Pulse: 85 84 97 84   Resp: 16   20   Temp: 98.4 °F (36.9 °C)   98.5 °F (36.9 °C)   TempSrc: Oral   Oral   SpO2: 91% (!) 89% 91% 94%   Weight:       Height:         Constitutional: This is a well developed, well nourished, 25-29.9 - Overweight 80y.o. year old male who is alert, oriented, cooperative and in no apparent distress. Head:normocephalic and atraumatic. EENT:   SANDI. No conjunctival injections. Septum was midline, mucosa was without erythema, exudates or cobblestoning. No thrush was noted. Mallampati II (soft palate, uvula, fauces visible)  Neck: Supple without thyromegaly. No elevated JVP. Trachea was midline. Respiratory: Chest was symmetrical without dullness to percussion. Breath sounds bilaterally were clear to auscultation. There were no wheezes, rhonchi or rales. There is no intercostal retraction or use of accessory muscles. No egophony noted. Cardiovascular: Regular without murmur, clicks, gallops or rubs. Abdomen: Slightly rounded and soft without organomegaly. No rebound tenderness, rigidity or guarding was appreciated. Lymphatic: No lymphadenopathy. Musculoskeletal: Normal curvature of the spine. No gross muscle weakness. Extremities:  No lower extremity edema, no ulcerations, tenderness, varicosities or erythema. Muscle size, tone and strength are normal.  No involuntary movements are noted. Skin:  Warm and dry. Good color, turgor and pigmentation. No lesions or scars.   No cyanosis or clubbing  Neurological/Psychiatric: The patient's general behavior, level of consciousness, thought content and emotional status is normal.            Laboratory findings:-    CBC:   Recent Labs     12/26/21 0229   WBC 9.3   HGB 11.9*        BMP:    Recent Labs     12/24/21 0253 12/24/21 2013 12/25/21 0440 12/25/21 0440 12/26/21 0229   *  --  131*   < > 131*   K 4.4   < > 4.1   < > 4.3     --  98   < > 101   CO2 19*  --  18*   < > 16*   BUN 20 --  28*   < > 28*   CREATININE 1.19  --  1.15  --  1.22*   GLUCOSE 94  --  93   < > 77    < > = values in this interval not displayed. S. Calcium:  Recent Labs     12/26/21  0229   CALCIUM 8.4*     S. Ionized Calcium:No results for input(s): IONCA in the last 72 hours. S. Magnesium:  Recent Labs     12/25/21  0440   MG 2.3     S. Phosphorus:No results for input(s): PHOS in the last 72 hours. S. Glucose:No results for input(s): POCGLU in the last 72 hours. Glycosylated hemoglobin A1C:   Recent Labs     12/23/21  1507   LABA1C 5.4     INR: No results for input(s): INR in the last 72 hours. Hepatic functions:   Recent Labs     12/25/21  0440   ALKPHOS 84   ALT 20   AST 30   PROT 5.8*   BILITOT 0.66   LABALBU 3.2*     Pancreatic functions:No results for input(s): LACTA, AMYLASE in the last 72 hours. S. Lactic Acid: No results for input(s): LACTA in the last 72 hours. Cardiac enzymes:No results for input(s): CKTOTAL, CKMB, CKMBINDEX, TROPONINI in the last 72 hours. BNP:No results for input(s): BNP in the last 72 hours. Lipid profile:   Recent Labs     12/24/21  0253   CHOL 116   TRIG 52   HDL 60     Blood Gases: No results found for: PH, PCO2, PO2, HCO3, O2SAT  Thyroid functions:   Lab Results   Component Value Date    TSH 1.63 10/24/2013            Microbiology:    Cultures during this admission:     Blood cultures:      [] None drawn      [x] Negative             []  Positive (Details:  )  Urine Culture:        [] None drawn      [x] Negative             []  Positive (Details:  )  Sputum Culture:   [] None drawn       [] Negative             []  Positive (Details:  )     COVID-19 test was negative on 12/23/2021    Pulmonary function tests: Stage I COPD in 2015 with an FEV1 more than 80% predicted and more than 2.8 l    Radiological reports:    XR CHEST PORTABLE    Result Date: 12/23/2021  New variable nearly diffuse interstitial thickening concerning for viral process.      CT CHEST PULMONARY EMBOLISM W CONTRAST    Result Date: 12/23/2021  1. No evidence of pulmonary embolism. 2.  Findings compatible with scarring in the left upper lobe and lung bases. No convincing evidence for acute airspace disease. 3.  Mild emphysematous disease. RECOMMENDATIONS: Unavailable                  Assessment and Plan       IMPRESSION:   1. Chest pain due to myocardial ischemia, unspecified ischemic chest pain type    2. NSTEMI (non-ST elevated myocardial infarction) ([facility] [address]        Principal Problem:    NSTEMI (non-ST elevated myocardial infarction) ([facility] [address]  Active Problems:    Essential hypertension, benign    Diarrhea    Chest pain due to myocardial ischemia  Resolved Problems:    * No resolved hospital problems. *  History of stage I COPD in 2015 with an FEV1 of 2.82 L at the time, that is 87% predicted  Previous history of tuberculosis  History of right-sided empyema s/p decortication  Hyponatremia, mild, stable  Leukocytosis, resolved  Mild anemia, stable  Mild acute kidney injury,? Contrast related    PLAN:       Patient had stage I COPD in 2015 with an FEV1 of 2.82 L. His symptoms of shortness of breath has only been recently and I believe it could be related to angina. Patient would not have any contraindication from pulmonary standpoint for cardiac surgery. If the pulmonary function test is done, will review it but anticipate no findings that would contraindicate surgery  Continue bronchodilators  Coached the patient on using incentive spirometry. Patient has been using it well  Achieve good pain control postoperatively  Recommend flu vaccination in the fall annually. Recommendations given regarding pneumococcal vaccinations. Maintain an active lifestyle. continue smoking cessation. Patient was educated on how to use the respiratory medications. All the questions that the daughters and patient has had were answered to   their satisfaction. Home O2 evaluation was done.   Supplemental oxygen was not indicated   Pulmonary function tests were reviewed from 2015 and discussed with the patient and the CT surgery team  Thank you for having us involved in the care of your patient. Please call us if you have any questions or concerns.       Ayde Sorensen MD,            12/26/2021, 8:13 AM

## 2021-12-26 NOTE — PROGRESS NOTES
[facility]  Internal Medicine Teaching Residency Program  Inpatient Daily Progress Note  ______________________________________________________________________________    Patient: Jose Álvarez  YOB: 1950   EPP:[id number]    Acct: [de-identified]     Room: [address] [place] [address]  Admit date: 12/23/2021  Today's date: 12/26/21  Number of days in the hospital: 3    SUBJECTIVE   Admitting Diagnosis: NSTEMI (non-ST elevated myocardial infarction) ([facility] [address]  CC: Chest pain     - Pt examined at bedside. Chart & results reviewed. - Hypotension overnight > 97/56 which has been normal for him   - Diarrhea improving > pt had 2-3 loose stools in the day per RN and 1 BM that was mostly gas with small loose stools. Pt and RN deny any blood. - Pt denies any chest pain and SOB    - Pt complained of sore left ankle to palpation  - Na is 131  - Cr 122 with a baseline of 1.0-1.2   - Afebrile   - VSS    Plan for today:   - CABG scheduled for 09:00 tomorrow   - NPO after midnight  - Hold heparin 6 hours before surgery   - Probitotics  - Occult blood pending        ROS:  Constitutional:  negative for chills, fevers, sweats  Respiratory:  negative for cough, dyspnea on exertion, hemoptysis, shortness of breath, wheezing  Cardiovascular:  negative for chest pain, chest pressure/discomfort, lower extremity edema, palpitations  Gastrointestinal:  negative for abdominal pain, constipation, diarrhea, nausea, vomiting  Neurological:  negative for dizziness, headache    BRIEF HISTORY     The patient is a pleasant 71 y. o. male with a PMHx significant for CAD (unstable angina), COPD, empyema, GERD, HTN, history of blood clots and HLD who presents with a chief complaint of chest pain. Pt states he has also experienced nausea and diaphoresis since Tuesday this week. Pt stated that the pain woke him from sleep.  He described the pain and an elephant stepping on his chest and rated the pain 9/10 in intensity radiating up the neck and down both of his arms. Pt denies prior stents. Pt received ASA and nitroglycerin which did not alleviate the pain. Pt denied headache, blurry vision, shortness of breath, abdominal pain or weakness. Troponin on arrival 2,699 and trended up to 6,203. WBC elevated at 20.2 decreased to 19.1. Pt was taken for cardiac catheterization shortly after arrival and underwent PTCA.     OBJECTIVE     Vital Signs:  BP (!) 97/56   Pulse 97   Temp 98.4 °F (36.9 °C) (Oral)   Resp 16   Ht 6' (1.829 m)   Wt 187 lb 2.7 oz (84.9 kg)   SpO2 91%   BMI 25.38 kg/m²     Temp (24hrs), Av.3 °F (36.8 °C), Min:98.1 °F (36.7 °C), Max:98.4 °F (36.9 °C)    In: -   Out: 725 [Urine:725]    Physical Exam:  Physical Exam  Vitals and nursing note reviewed. Constitutional:       Appearance: Normal appearance. HENT:      Head: Normocephalic and atraumatic. Nose: Nose normal.      Mouth/Throat:      Mouth: Mucous membranes are moist.      Pharynx: Oropharynx is clear. Eyes:      Extraocular Movements: Extraocular movements intact. Conjunctiva/sclera: Conjunctivae normal.      Pupils: Pupils are equal, round, and reactive to light. Cardiovascular:      Rate and Rhythm: Normal rate and regular rhythm. Pulses: Normal pulses. Heart sounds: Normal heart sounds. No murmur heard. No friction rub. No gallop. Pulmonary:      Effort: Pulmonary effort is normal. No respiratory distress. Breath sounds: Normal breath sounds. No stridor. No wheezing, rhonchi or rales. Chest:      Chest wall: No tenderness. Abdominal:      General: Abdomen is flat. Bowel sounds are normal. There is no distension. Palpations: Abdomen is soft. There is no mass. Tenderness: There is no abdominal tenderness. There is no guarding or rebound. Hernia: No hernia is present. Musculoskeletal:         General: No swelling, deformity or signs of injury. Normal range of motion.       Cervical back: Normal range of motion. Right lower leg: No edema. Left lower leg: No edema. Left ankle: No swelling. Tenderness present. Comments: Tenderness to palpation    Skin:     General: Skin is warm. Neurological:      General: No focal deficit present. Mental Status: He is alert and oriented to person, place, and time. Mental status is at baseline. Psychiatric:         Mood and Affect: Mood normal.         Behavior: Behavior normal.         Thought Content:  Thought content normal.         Judgment: Judgment normal.           Medications:  Scheduled Medications:    famotidine  20 mg Oral BID    Or    famotidine (PEPCID) injection  20 mg IntraVENous BID    aspirin  81 mg Oral Daily    atorvastatin  80 mg Oral Nightly    [START ON 12/27/2021] amiodarone  200 mg Oral 90 Min Pre-Op    budesonide-formoterol  2 puff Inhalation BID    tiotropium  2 puff Inhalation Daily    sodium chloride flush  5-40 mL IntraVENous 2 times per day    fentanNYL  50 mcg IntraVENous Once    sodium chloride flush  5-40 mL IntraVENous 2 times per day    metoprolol tartrate  25 mg Oral BID     Continuous Infusions:    heparin (PORCINE) Infusion 20.5 Units/kg/hr (12/26/21 0240)    nitroGLYCERIN Stopped (12/23/21 1100)    sodium chloride      sodium chloride       PRN Medicationsdiphenoxylate-atropine, 5 mL, 4x Daily PRN  heparin (porcine), 4,000 Units, PRN  heparin (porcine), 2,000 Units, PRN  albuterol sulfate HFA, 2 puff, 4x Daily PRN  sodium chloride flush, 5-40 mL, PRN  sodium chloride, 25 mL, PRN  ondansetron, 4 mg, Q8H PRN   Or  ondansetron, 4 mg, Q6H PRN  polyethylene glycol, 17 g, Daily PRN  sodium chloride flush, 5-40 mL, PRN  sodium chloride, 25 mL, PRN  acetaminophen, 650 mg, Q4H PRN        Diagnostic Labs:  CBC:   Recent Labs     12/24/21  0253 12/25/21  0440 12/26/21  0229   WBC 17.4* 12.7* 9.3   RBC 4.54 4.03* 4.17*   HGB 13.4 11.9* 11.9*   HCT 40.5* 35.6* 37.9*   MCV 89.2 88.3 90.9   RDW 12.9 12.8 12.8    166 178     BMP:   Recent Labs     12/24/21  0253 12/24/21  0253 12/24/21  2013 12/25/21  0440 12/26/21  0229   *  --   --  131* 131*   K 4.4   < > 4.0 4.1 4.3     --   --  98 101   CO2 19*  --   --  18* 16*   BUN 20  --   --  28* 28*   CREATININE 1.19  --   --  1.15 1.22*    < > = values in this interval not displayed. BNP: No results for input(s): BNP in the last 72 hours. PT/INR: No results for input(s): PROTIME, INR in the last 72 hours. APTT:   Recent Labs     12/25/21  0956 12/25/21  1749 12/26/21  0029   APTT 47.4* 62.5* 64.3*     CARDIAC ENZYMES: No results for input(s): CKMB, CKMBINDEX, TROPONINI in the last 72 hours. Invalid input(s): CKTOTAL;3  FASTING LIPID PANEL:  Lab Results   Component Value Date    CHOL 116 12/24/2021    HDL 60 12/24/2021    TRIG 52 12/24/2021     LIVER PROFILE:   Recent Labs     12/25/21  0440   AST 30   ALT 20   BILITOT 0.66   ALKPHOS 84      MICROBIOLOGY:   Lab Results   Component Value Date/Time    CULTURE NO GROWTH 12/24/2021 04:20 PM       Imaging:    XR CHEST PORTABLE    Result Date: 12/24/2021  No acute process detected. XR CHEST PORTABLE    Result Date: 12/23/2021  New variable nearly diffuse interstitial thickening concerning for viral process. CT CHEST PULMONARY EMBOLISM W CONTRAST    Result Date: 12/23/2021  1. No evidence of pulmonary embolism. 2.  Findings compatible with scarring in the left upper lobe and lung bases. No convincing evidence for acute airspace disease. 3.  Mild emphysematous disease. RECOMMENDATIONS: Unavailable       ASSESSMENT & PLAN     Assessment and Plan:    Principal Problem:    NSTEMI (non-ST elevated myocardial infarction) ([facility] [address]  Active Problems:    Essential hypertension, benign    Diarrhea    Chest pain due to myocardial ischemia  Resolved Problems:    * No resolved hospital problems. *      IMPRESSION  This is a 76 y. o. male who presented with chest pain and found to have NSTEMI. Patient admitted to inpatient status for management of NSTEMI.      Active Problems:  NSTEMI (non-ST elevated myocardial infarction)   - S/P cardiac cath 12/23 with PTCA of RCA  - Pt on ASA, Lipitor, 1 dose of morphine, Lopressor, Nitroglycerin gtt and heparin   - Echocardiogram showing LV hypertrophy and LV systolic dysfunction with EF of 45-50%  - Continue to monitor for chest pain    - Continue with medical management   - Repeat CXR > stable findings   - Pulmonology cleared for CABG   - CABG Monday      Diarrhea - improving   - Started 12/24 in the evening   - Probiotics   - FOBT pending   - Continue to monitor   - Monitor electrolytes      Hypotension likely secondary to voluminous diarrhea - stable   - Resolved this morning   - Goal of greater than or equal to 100/60  - Hypotensive overnight 97/56  - Continue to monitor      Hypertension - stable   - Hold BP medications due to hypotension      History of blood clots   - Stable on heparin   - Hold heparin per CTS     COPD   - Continue with Albuterol, Symbicort and Spiriva      GERD - stable      Hyperlipidemia -  Stable   - Lipitor      Diet: Regular low fat, low cholesterol and high fiber > NPO after midnight   DVT ppx: Heparin hold heparin per CTS  GI ppx: None      PT/OT/SW: Consulted   Discharge Planning: In process       Gari Oppenheim, MD  Internal Medicine Resident, PGY-1  [address] [facility];  [place], [place]   7:24 AM 12/26/2021

## 2021-12-27 ENCOUNTER — ANESTHESIA (OUTPATIENT)
Dept: OPERATING ROOM | Age: 71
DRG: 232 | End: 2021-12-27
Payer: OTHER GOVERNMENT

## 2021-12-27 ENCOUNTER — APPOINTMENT (OUTPATIENT)
Dept: GENERAL RADIOLOGY | Age: 71
DRG: 232 | End: 2021-12-27
Payer: OTHER GOVERNMENT

## 2021-12-27 VITALS — OXYGEN SATURATION: 100 % | DIASTOLIC BLOOD PRESSURE: 57 MMHG | TEMPERATURE: 98.1 F | SYSTOLIC BLOOD PRESSURE: 83 MMHG

## 2021-12-27 LAB
ABSOLUTE EOS #: 0.1 K/UL (ref 0–0.44)
ABSOLUTE IMMATURE GRANULOCYTE: 0.03 K/UL (ref 0–0.3)
ABSOLUTE LYMPH #: 1.52 K/UL (ref 1.1–3.7)
ABSOLUTE MONO #: 0.9 K/UL (ref 0.1–1.2)
ALLEN TEST: ABNORMAL
ALLEN TEST: NORMAL
ANION GAP SERPL CALCULATED.3IONS-SCNC: 11 MMOL/L (ref 9–17)
ANION GAP SERPL CALCULATED.3IONS-SCNC: 11 MMOL/L (ref 9–17)
ANION GAP: 12 MMOL/L (ref 7–16)
ANION GAP: 12 MMOL/L (ref 7–16)
BASOPHILS # BLD: 0 % (ref 0–2)
BASOPHILS ABSOLUTE: 0.03 K/UL (ref 0–0.2)
BLOOD BANK SPECIMEN: NORMAL
BUN BLDV-MCNC: 20 MG/DL (ref 8–23)
BUN BLDV-MCNC: 23 MG/DL (ref 8–23)
BUN/CREAT BLD: ABNORMAL (ref 9–20)
BUN/CREAT BLD: ABNORMAL (ref 9–20)
CALCIUM SERPL-MCNC: 8.2 MG/DL (ref 8.6–10.4)
CALCIUM SERPL-MCNC: 8.4 MG/DL (ref 8.6–10.4)
CHLORIDE BLD-SCNC: 106 MMOL/L (ref 98–107)
CHLORIDE BLD-SCNC: 107 MMOL/L (ref 98–107)
CO2: 18 MMOL/L (ref 20–31)
CO2: 20 MMOL/L (ref 20–31)
CREAT SERPL-MCNC: 0.89 MG/DL (ref 0.7–1.2)
CREAT SERPL-MCNC: 0.96 MG/DL (ref 0.7–1.2)
DIFFERENTIAL TYPE: ABNORMAL
EKG ATRIAL RATE: 91 BPM
EKG ATRIAL RATE: 95 BPM
EKG ATRIAL RATE: 96 BPM
EKG ATRIAL RATE: 98 BPM
EKG P AXIS: 59 DEGREES
EKG P AXIS: 6 DEGREES
EKG P AXIS: 65 DEGREES
EKG P AXIS: 73 DEGREES
EKG P-R INTERVAL: 178 MS
EKG P-R INTERVAL: 188 MS
EKG P-R INTERVAL: 188 MS
EKG P-R INTERVAL: 214 MS
EKG Q-T INTERVAL: 376 MS
EKG Q-T INTERVAL: 378 MS
EKG Q-T INTERVAL: 384 MS
EKG Q-T INTERVAL: 396 MS
EKG QRS DURATION: 142 MS
EKG QRS DURATION: 144 MS
EKG QRS DURATION: 144 MS
EKG QRS DURATION: 146 MS
EKG QTC CALCULATION (BAZETT): 464 MS
EKG QTC CALCULATION (BAZETT): 475 MS
EKG QTC CALCULATION (BAZETT): 490 MS
EKG QTC CALCULATION (BAZETT): 497 MS
EKG R AXIS: -10 DEGREES
EKG R AXIS: -6 DEGREES
EKG R AXIS: -8 DEGREES
EKG R AXIS: 18 DEGREES
EKG T AXIS: -16 DEGREES
EKG T AXIS: -21 DEGREES
EKG T AXIS: -8 DEGREES
EKG T AXIS: 14 DEGREES
EKG VENTRICULAR RATE: 91 BPM
EKG VENTRICULAR RATE: 95 BPM
EKG VENTRICULAR RATE: 96 BPM
EKG VENTRICULAR RATE: 98 BPM
EOSINOPHILS RELATIVE PERCENT: 1 % (ref 1–4)
FIO2: 40
FIO2: 50
FIO2: ABNORMAL
FIO2: NORMAL
GFR AFRICAN AMERICAN: >60 ML/MIN
GFR AFRICAN AMERICAN: >60 ML/MIN
GFR NON-AFRICAN AMERICAN: >60 ML/MIN
GFR SERPL CREATININE-BSD FRML MDRD: >60 ML/MIN
GFR SERPL CREATININE-BSD FRML MDRD: ABNORMAL ML/MIN/{1.73_M2}
GFR SERPL CREATININE-BSD FRML MDRD: NORMAL ML/MIN/{1.73_M2}
GLUCOSE BLD-MCNC: 103 MG/DL (ref 74–100)
GLUCOSE BLD-MCNC: 106 MG/DL (ref 75–110)
GLUCOSE BLD-MCNC: 108 MG/DL (ref 75–110)
GLUCOSE BLD-MCNC: 109 MG/DL (ref 75–110)
GLUCOSE BLD-MCNC: 118 MG/DL (ref 75–110)
GLUCOSE BLD-MCNC: 119 MG/DL (ref 74–100)
GLUCOSE BLD-MCNC: 121 MG/DL (ref 75–110)
GLUCOSE BLD-MCNC: 132 MG/DL (ref 75–110)
GLUCOSE BLD-MCNC: 138 MG/DL (ref 74–100)
GLUCOSE BLD-MCNC: 142 MG/DL (ref 74–100)
GLUCOSE BLD-MCNC: 144 MG/DL (ref 70–99)
GLUCOSE BLD-MCNC: 91 MG/DL (ref 74–100)
GLUCOSE BLD-MCNC: 94 MG/DL (ref 75–110)
GLUCOSE BLD-MCNC: 97 MG/DL (ref 70–99)
GLUCOSE BLD-MCNC: 97 MG/DL (ref 74–100)
HCT VFR BLD CALC: 33.1 % (ref 40.7–50.3)
HCT VFR BLD CALC: 34.7 % (ref 40.7–50.3)
HEMOGLOBIN: 11 G/DL (ref 13–17)
HEMOGLOBIN: 11.1 G/DL (ref 13–17)
IMMATURE GRANULOCYTES: 0 %
INR BLD: 1.1
LYMPHOCYTES # BLD: 20 % (ref 24–43)
MAGNESIUM: 3 MG/DL (ref 1.6–2.6)
MCH RBC QN AUTO: 29.3 PG (ref 25.2–33.5)
MCH RBC QN AUTO: 29.7 PG (ref 25.2–33.5)
MCHC RBC AUTO-ENTMCNC: 32 G/DL (ref 28.4–34.8)
MCHC RBC AUTO-ENTMCNC: 33.2 G/DL (ref 28.4–34.8)
MCV RBC AUTO: 89.5 FL (ref 82.6–102.9)
MCV RBC AUTO: 91.6 FL (ref 82.6–102.9)
MODE: ABNORMAL
MODE: NORMAL
MONOCYTES # BLD: 12 % (ref 3–12)
NEGATIVE BASE EXCESS, ART: 1 (ref 0–2)
NEGATIVE BASE EXCESS, ART: 2 (ref 0–2)
NEGATIVE BASE EXCESS, ART: 2 (ref 0–2)
NEGATIVE BASE EXCESS, ART: ABNORMAL (ref 0–2)
NRBC AUTOMATED: 0 PER 100 WBC
NRBC AUTOMATED: 0 PER 100 WBC
O2 DEVICE/FLOW/%: ABNORMAL
O2 DEVICE/FLOW/%: NORMAL
PARTIAL THROMBOPLASTIN TIME: 30.3 SEC (ref 20.5–30.5)
PATIENT TEMP: ABNORMAL
PATIENT TEMP: NORMAL
PDW BLD-RTO: 12.6 % (ref 11.8–14.4)
PDW BLD-RTO: 12.7 % (ref 11.8–14.4)
PLATELET # BLD: 130 K/UL (ref 138–453)
PLATELET # BLD: 179 K/UL (ref 138–453)
PLATELET ESTIMATE: ABNORMAL
PMV BLD AUTO: 10.1 FL (ref 8.1–13.5)
PMV BLD AUTO: 10.1 FL (ref 8.1–13.5)
POC BUN: 20 MG/DL (ref 8–26)
POC CHLORIDE: 103 MMOL/L (ref 98–107)
POC CHLORIDE: 104 MMOL/L (ref 98–107)
POC CREATININE: 1.04 MG/DL (ref 0.51–1.19)
POC HCO3: 23.1 MMOL/L (ref 21–28)
POC HCO3: 23.4 MMOL/L (ref 21–28)
POC HCO3: 23.8 MMOL/L (ref 21–28)
POC HCO3: 23.9 MMOL/L (ref 21–28)
POC HCO3: 24.4 MMOL/L (ref 21–28)
POC HCO3: 24.6 MMOL/L (ref 21–28)
POC HCO3: 25.6 MMOL/L (ref 21–28)
POC HEMATOCRIT: 25 % (ref 41–53)
POC HEMATOCRIT: 25 % (ref 41–53)
POC HEMATOCRIT: 27 % (ref 41–53)
POC HEMATOCRIT: 32 % (ref 41–53)
POC HEMATOCRIT: 33 % (ref 41–53)
POC HEMATOCRIT: 33 % (ref 41–53)
POC HEMOGLOBIN: 10.9 G/DL (ref 13.5–17.5)
POC HEMOGLOBIN: 11.1 G/DL (ref 13.5–17.5)
POC HEMOGLOBIN: 11.2 G/DL (ref 13.5–17.5)
POC HEMOGLOBIN: 8.5 G/DL (ref 13.5–17.5)
POC HEMOGLOBIN: 8.6 G/DL (ref 13.5–17.5)
POC HEMOGLOBIN: 9.2 G/DL (ref 13.5–17.5)
POC IONIZED CALCIUM: 1 MMOL/L (ref 1.15–1.33)
POC IONIZED CALCIUM: 1 MMOL/L (ref 1.15–1.33)
POC IONIZED CALCIUM: 1.18 MMOL/L (ref 1.15–1.33)
POC IONIZED CALCIUM: 1.19 MMOL/L (ref 1.15–1.33)
POC IONIZED CALCIUM: 1.3 MMOL/L (ref 1.15–1.33)
POC IONIZED CALCIUM: 1.4 MMOL/L (ref 1.15–1.33)
POC LACTIC ACID: 0.85 MMOL/L (ref 0.56–1.39)
POC O2 SATURATION: 100 % (ref 94–98)
POC O2 SATURATION: 96 % (ref 94–98)
POC O2 SATURATION: 97 % (ref 94–98)
POC O2 SATURATION: 97 % (ref 94–98)
POC O2 SATURATION: 99 % (ref 94–98)
POC PCO2 TEMP: ABNORMAL MM HG
POC PCO2 TEMP: NORMAL MM HG
POC PCO2: 38.1 MM HG (ref 35–48)
POC PCO2: 38.3 MM HG (ref 35–48)
POC PCO2: 38.6 MM HG (ref 35–48)
POC PCO2: 40.3 MM HG (ref 35–48)
POC PCO2: 40.8 MM HG (ref 35–48)
POC PCO2: 45.1 MM HG (ref 35–48)
POC PCO2: 50.7 MM HG (ref 35–48)
POC PH TEMP: ABNORMAL
POC PH TEMP: NORMAL
POC PH: 7.29 (ref 7.35–7.45)
POC PH: 7.34 (ref 7.35–7.45)
POC PH: 7.38 (ref 7.35–7.45)
POC PH: 7.39 (ref 7.35–7.45)
POC PH: 7.39 (ref 7.35–7.45)
POC PH: 7.4 (ref 7.35–7.45)
POC PH: 7.41 (ref 7.35–7.45)
POC PO2 TEMP: ABNORMAL MM HG
POC PO2 TEMP: NORMAL MM HG
POC PO2: 180.4 MM HG (ref 83–108)
POC PO2: 209.5 MM HG (ref 83–108)
POC PO2: 375 MM HG (ref 83–108)
POC PO2: 450.1 MM HG (ref 83–108)
POC PO2: 82.1 MM HG (ref 83–108)
POC PO2: 93.4 MM HG (ref 83–108)
POC PO2: 96.1 MM HG (ref 83–108)
POC POTASSIUM: 4.2 MMOL/L (ref 3.5–4.5)
POC POTASSIUM: 4.4 MMOL/L (ref 3.5–4.5)
POC POTASSIUM: 4.6 MMOL/L (ref 3.5–4.5)
POC POTASSIUM: 4.7 MMOL/L (ref 3.5–4.5)
POC POTASSIUM: 5.3 MMOL/L (ref 3.5–4.5)
POC POTASSIUM: 5.6 MMOL/L (ref 3.5–4.5)
POC SODIUM: 134 MMOL/L (ref 138–146)
POC SODIUM: 135 MMOL/L (ref 138–146)
POC SODIUM: 139 MMOL/L (ref 138–146)
POC TCO2: 24 MMOL/L (ref 22–30)
POC TCO2: 25 MMOL/L (ref 22–30)
POC TCO2: 25 MMOL/L (ref 22–30)
POC TCO2: 26 MMOL/L (ref 22–30)
POSITIVE BASE EXCESS, ART: 1 (ref 0–3)
POSITIVE BASE EXCESS, ART: ABNORMAL (ref 0–3)
POSITIVE BASE EXCESS, ART: NORMAL (ref 0–3)
POTASSIUM SERPL-SCNC: 4.3 MMOL/L (ref 3.7–5.3)
POTASSIUM SERPL-SCNC: 4.4 MMOL/L (ref 3.7–5.3)
PROTHROMBIN TIME: 11.4 SEC (ref 9.1–12.3)
RBC # BLD: 3.7 M/UL (ref 4.21–5.77)
RBC # BLD: 3.79 M/UL (ref 4.21–5.77)
RBC # BLD: ABNORMAL 10*6/UL
SAMPLE SITE: ABNORMAL
SAMPLE SITE: NORMAL
SEG NEUTROPHILS: 67 % (ref 36–65)
SEGMENTED NEUTROPHILS ABSOLUTE COUNT: 4.91 K/UL (ref 1.5–8.1)
SODIUM BLD-SCNC: 136 MMOL/L (ref 135–144)
SODIUM BLD-SCNC: 137 MMOL/L (ref 135–144)
TCO2 (CALC), ART: ABNORMAL MMOL/L (ref 22–29)
TCO2 (CALC), ART: NORMAL MMOL/L (ref 22–29)
WBC # BLD: 16.6 K/UL (ref 3.5–11.3)
WBC # BLD: 7.5 K/UL (ref 3.5–11.3)
WBC # BLD: ABNORMAL 10*3/UL

## 2021-12-27 PROCEDURE — 2709999900 HC NON-CHARGEABLE SUPPLY: Performed by: THORACIC SURGERY (CARDIOTHORACIC VASCULAR SURGERY)

## 2021-12-27 PROCEDURE — 6360000002 HC RX W HCPCS: Performed by: THORACIC SURGERY (CARDIOTHORACIC VASCULAR SURGERY)

## 2021-12-27 PROCEDURE — 6360000002 HC RX W HCPCS: Performed by: NURSE PRACTITIONER

## 2021-12-27 PROCEDURE — 3600000008 HC SURGERY OHS BASE: Performed by: THORACIC SURGERY (CARDIOTHORACIC VASCULAR SURGERY)

## 2021-12-27 PROCEDURE — 80048 BASIC METABOLIC PNL TOTAL CA: CPT

## 2021-12-27 PROCEDURE — 3700000000 HC ANESTHESIA ATTENDED CARE: Performed by: THORACIC SURGERY (CARDIOTHORACIC VASCULAR SURGERY)

## 2021-12-27 PROCEDURE — 86920 COMPATIBILITY TEST SPIN: CPT

## 2021-12-27 PROCEDURE — 33508 ENDOSCOPIC VEIN HARVEST: CPT | Performed by: THORACIC SURGERY (CARDIOTHORACIC VASCULAR SURGERY)

## 2021-12-27 PROCEDURE — 82374 ASSAY BLOOD CARBON DIOXIDE: CPT

## 2021-12-27 PROCEDURE — 3700000001 HC ADD 15 MINUTES (ANESTHESIA): Performed by: THORACIC SURGERY (CARDIOTHORACIC VASCULAR SURGERY)

## 2021-12-27 PROCEDURE — 84520 ASSAY OF UREA NITROGEN: CPT

## 2021-12-27 PROCEDURE — 82947 ASSAY GLUCOSE BLOOD QUANT: CPT

## 2021-12-27 PROCEDURE — 94640 AIRWAY INHALATION TREATMENT: CPT

## 2021-12-27 PROCEDURE — 33533 CABG ARTERIAL SINGLE: CPT | Performed by: THORACIC SURGERY (CARDIOTHORACIC VASCULAR SURGERY)

## 2021-12-27 PROCEDURE — 71045 X-RAY EXAM CHEST 1 VIEW: CPT

## 2021-12-27 PROCEDURE — 84295 ASSAY OF SERUM SODIUM: CPT

## 2021-12-27 PROCEDURE — P9041 ALBUMIN (HUMAN),5%, 50ML: HCPCS | Performed by: PHYSICIAN ASSISTANT

## 2021-12-27 PROCEDURE — 84132 ASSAY OF SERUM POTASSIUM: CPT

## 2021-12-27 PROCEDURE — 93010 ELECTROCARDIOGRAM REPORT: CPT | Performed by: INTERNAL MEDICINE

## 2021-12-27 PROCEDURE — 85347 COAGULATION TIME ACTIVATED: CPT

## 2021-12-27 PROCEDURE — 85576 BLOOD PLATELET AGGREGATION: CPT

## 2021-12-27 PROCEDURE — 6370000000 HC RX 637 (ALT 250 FOR IP)

## 2021-12-27 PROCEDURE — 2580000003 HC RX 258: Performed by: NURSE ANESTHETIST, CERTIFIED REGISTERED

## 2021-12-27 PROCEDURE — 87070 CULTURE OTHR SPECIMN AEROBIC: CPT

## 2021-12-27 PROCEDURE — 0BNP0ZZ RELEASE LEFT PLEURA, OPEN APPROACH: ICD-10-PCS | Performed by: THORACIC SURGERY (CARDIOTHORACIC VASCULAR SURGERY)

## 2021-12-27 PROCEDURE — 06BP3ZZ EXCISION OF RIGHT SAPHENOUS VEIN, PERCUTANEOUS APPROACH: ICD-10-PCS | Performed by: THORACIC SURGERY (CARDIOTHORACIC VASCULAR SURGERY)

## 2021-12-27 PROCEDURE — 85384 FIBRINOGEN ACTIVITY: CPT

## 2021-12-27 PROCEDURE — 3600000018 HC SURGERY OHS ADDTL 15MIN: Performed by: THORACIC SURGERY (CARDIOTHORACIC VASCULAR SURGERY)

## 2021-12-27 PROCEDURE — 33518 CABG ARTERY-VEIN TWO: CPT | Performed by: THORACIC SURGERY (CARDIOTHORACIC VASCULAR SURGERY)

## 2021-12-27 PROCEDURE — 6370000000 HC RX 637 (ALT 250 FOR IP): Performed by: NURSE PRACTITIONER

## 2021-12-27 PROCEDURE — 6370000000 HC RX 637 (ALT 250 FOR IP): Performed by: NURSE ANESTHETIST, CERTIFIED REGISTERED

## 2021-12-27 PROCEDURE — 94761 N-INVAS EAR/PLS OXIMETRY MLT: CPT

## 2021-12-27 PROCEDURE — 87205 SMEAR GRAM STAIN: CPT

## 2021-12-27 PROCEDURE — 93005 ELECTROCARDIOGRAM TRACING: CPT | Performed by: PHYSICIAN ASSISTANT

## 2021-12-27 PROCEDURE — 83605 ASSAY OF LACTIC ACID: CPT

## 2021-12-27 PROCEDURE — 86900 BLOOD TYPING SEROLOGIC ABO: CPT

## 2021-12-27 PROCEDURE — 2580000003 HC RX 258: Performed by: NURSE PRACTITIONER

## 2021-12-27 PROCEDURE — 82565 ASSAY OF CREATININE: CPT

## 2021-12-27 PROCEDURE — 99232 SBSQ HOSP IP/OBS MODERATE 35: CPT | Performed by: INTERNAL MEDICINE

## 2021-12-27 PROCEDURE — 36415 COLL VENOUS BLD VENIPUNCTURE: CPT

## 2021-12-27 PROCEDURE — 5A1945Z RESPIRATORY VENTILATION, 24-96 CONSECUTIVE HOURS: ICD-10-PCS | Performed by: THORACIC SURGERY (CARDIOTHORACIC VASCULAR SURGERY)

## 2021-12-27 PROCEDURE — 87075 CULTR BACTERIA EXCEPT BLOOD: CPT

## 2021-12-27 PROCEDURE — 94002 VENT MGMT INPAT INIT DAY: CPT

## 2021-12-27 PROCEDURE — 0212099 BYPASS CORONARY ARTERY, THREE ARTERIES FROM LEFT INTERNAL MAMMARY WITH AUTOLOGOUS VENOUS TISSUE, OPEN APPROACH: ICD-10-PCS | Performed by: THORACIC SURGERY (CARDIOTHORACIC VASCULAR SURGERY)

## 2021-12-27 PROCEDURE — 6360000002 HC RX W HCPCS: Performed by: NURSE ANESTHETIST, CERTIFIED REGISTERED

## 2021-12-27 PROCEDURE — 86850 RBC ANTIBODY SCREEN: CPT

## 2021-12-27 PROCEDURE — 82803 BLOOD GASES ANY COMBINATION: CPT

## 2021-12-27 PROCEDURE — 2500000003 HC RX 250 WO HCPCS: Performed by: NURSE ANESTHETIST, CERTIFIED REGISTERED

## 2021-12-27 PROCEDURE — 37799 UNLISTED PX VASCULAR SURGERY: CPT

## 2021-12-27 PROCEDURE — 85027 COMPLETE CBC AUTOMATED: CPT

## 2021-12-27 PROCEDURE — 85610 PROTHROMBIN TIME: CPT

## 2021-12-27 PROCEDURE — 85025 COMPLETE CBC W/AUTO DIFF WBC: CPT

## 2021-12-27 PROCEDURE — 2700000000 HC OXYGEN THERAPY PER DAY

## 2021-12-27 PROCEDURE — 6360000002 HC RX W HCPCS: Performed by: PHYSICIAN ASSISTANT

## 2021-12-27 PROCEDURE — 2580000003 HC RX 258: Performed by: PHYSICIAN ASSISTANT

## 2021-12-27 PROCEDURE — 2720000010 HC SURG SUPPLY STERILE: Performed by: THORACIC SURGERY (CARDIOTHORACIC VASCULAR SURGERY)

## 2021-12-27 PROCEDURE — 6370000000 HC RX 637 (ALT 250 FOR IP): Performed by: PHYSICIAN ASSISTANT

## 2021-12-27 PROCEDURE — 7100000001 HC PACU RECOVERY - ADDTL 15 MIN

## 2021-12-27 PROCEDURE — 82435 ASSAY OF BLOOD CHLORIDE: CPT

## 2021-12-27 PROCEDURE — 80051 ELECTROLYTE PANEL: CPT

## 2021-12-27 PROCEDURE — 85014 HEMATOCRIT: CPT

## 2021-12-27 PROCEDURE — 2580000003 HC RX 258: Performed by: THORACIC SURGERY (CARDIOTHORACIC VASCULAR SURGERY)

## 2021-12-27 PROCEDURE — 5A1221Z PERFORMANCE OF CARDIAC OUTPUT, CONTINUOUS: ICD-10-PCS | Performed by: THORACIC SURGERY (CARDIOTHORACIC VASCULAR SURGERY)

## 2021-12-27 PROCEDURE — 85390 FIBRINOLYSINS SCREEN I&R: CPT

## 2021-12-27 PROCEDURE — 83735 ASSAY OF MAGNESIUM: CPT

## 2021-12-27 PROCEDURE — 7100000000 HC PACU RECOVERY - FIRST 15 MIN

## 2021-12-27 PROCEDURE — 82330 ASSAY OF CALCIUM: CPT

## 2021-12-27 PROCEDURE — 86901 BLOOD TYPING SEROLOGIC RH(D): CPT

## 2021-12-27 PROCEDURE — 85730 THROMBOPLASTIN TIME PARTIAL: CPT

## 2021-12-27 PROCEDURE — 2100000001 HC CVICU R&B

## 2021-12-27 RX ORDER — PAPAVERINE HYDROCHLORIDE 30 MG/ML
INJECTION INTRAMUSCULAR; INTRAVENOUS
Status: DISCONTINUED
Start: 2021-12-27 | End: 2021-12-27

## 2021-12-27 RX ORDER — GLUCAGON 1 MG/ML
1 KIT INJECTION PRN
Status: DISCONTINUED | OUTPATIENT
Start: 2021-12-27 | End: 2021-12-31 | Stop reason: HOSPADM

## 2021-12-27 RX ORDER — HEPARIN SODIUM 1000 [USP'U]/ML
INJECTION, SOLUTION INTRAVENOUS; SUBCUTANEOUS
Status: DISCONTINUED
Start: 2021-12-27 | End: 2021-12-27

## 2021-12-27 RX ORDER — PROPOFOL 10 MG/ML
5-50 INJECTION, EMULSION INTRAVENOUS
Status: DISCONTINUED | OUTPATIENT
Start: 2021-12-27 | End: 2021-12-28

## 2021-12-27 RX ORDER — CHLORHEXIDINE GLUCONATE 0.12 MG/ML
15 RINSE ORAL 2 TIMES DAILY
Status: DISCONTINUED | OUTPATIENT
Start: 2021-12-27 | End: 2021-12-31 | Stop reason: HOSPADM

## 2021-12-27 RX ORDER — POTASSIUM CHLORIDE 29.8 MG/ML
20 INJECTION INTRAVENOUS PRN
Status: DISCONTINUED | OUTPATIENT
Start: 2021-12-27 | End: 2021-12-31 | Stop reason: HOSPADM

## 2021-12-27 RX ORDER — LIDOCAINE HYDROCHLORIDE 10 MG/ML
INJECTION, SOLUTION EPIDURAL; INFILTRATION; INTRACAUDAL; PERINEURAL PRN
Status: DISCONTINUED | OUTPATIENT
Start: 2021-12-27 | End: 2021-12-27 | Stop reason: SDUPTHER

## 2021-12-27 RX ORDER — SODIUM CHLORIDE 9 MG/ML
25 INJECTION, SOLUTION INTRAVENOUS PRN
Status: DISCONTINUED | OUTPATIENT
Start: 2021-12-27 | End: 2021-12-31 | Stop reason: HOSPADM

## 2021-12-27 RX ORDER — OXYCODONE HYDROCHLORIDE AND ACETAMINOPHEN 5; 325 MG/1; MG/1
2 TABLET ORAL EVERY 4 HOURS PRN
Status: DISCONTINUED | OUTPATIENT
Start: 2021-12-27 | End: 2021-12-31 | Stop reason: HOSPADM

## 2021-12-27 RX ORDER — VANCOMYCIN HYDROCHLORIDE 1 G/20ML
INJECTION, POWDER, LYOPHILIZED, FOR SOLUTION INTRAVENOUS PRN
Status: DISCONTINUED | OUTPATIENT
Start: 2021-12-27 | End: 2021-12-27 | Stop reason: HOSPADM

## 2021-12-27 RX ORDER — PROTAMINE SULFATE 10 MG/ML
INJECTION, SOLUTION INTRAVENOUS
Status: COMPLETED
Start: 2021-12-27 | End: 2021-12-27

## 2021-12-27 RX ORDER — AMIODARONE HYDROCHLORIDE 200 MG/1
200 TABLET ORAL 2 TIMES DAILY
Status: DISCONTINUED | OUTPATIENT
Start: 2021-12-27 | End: 2021-12-31 | Stop reason: HOSPADM

## 2021-12-27 RX ORDER — SODIUM CHLORIDE 9 MG/ML
INJECTION INTRAVENOUS
Status: DISCONTINUED
Start: 2021-12-27 | End: 2021-12-27

## 2021-12-27 RX ORDER — CLOPIDOGREL BISULFATE 75 MG/1
75 TABLET ORAL DAILY
Status: DISCONTINUED | OUTPATIENT
Start: 2021-12-28 | End: 2021-12-31 | Stop reason: HOSPADM

## 2021-12-27 RX ORDER — MEPERIDINE HYDROCHLORIDE 50 MG/ML
25 INJECTION INTRAMUSCULAR; INTRAVENOUS; SUBCUTANEOUS
Status: ACTIVE | OUTPATIENT
Start: 2021-12-27 | End: 2021-12-27

## 2021-12-27 RX ORDER — PROTAMINE SULFATE 10 MG/ML
50 INJECTION, SOLUTION INTRAVENOUS
Status: ACTIVE | OUTPATIENT
Start: 2021-12-27 | End: 2021-12-27

## 2021-12-27 RX ORDER — ROCURONIUM BROMIDE 10 MG/ML
INJECTION, SOLUTION INTRAVENOUS PRN
Status: DISCONTINUED | OUTPATIENT
Start: 2021-12-27 | End: 2021-12-27 | Stop reason: SDUPTHER

## 2021-12-27 RX ORDER — HEPARIN SODIUM 1000 [USP'U]/ML
INJECTION, SOLUTION INTRAVENOUS; SUBCUTANEOUS PRN
Status: DISCONTINUED | OUTPATIENT
Start: 2021-12-27 | End: 2021-12-27 | Stop reason: SDUPTHER

## 2021-12-27 RX ORDER — MAGNESIUM SULFATE 1 G/100ML
1000 INJECTION INTRAVENOUS PRN
Status: DISCONTINUED | OUTPATIENT
Start: 2021-12-27 | End: 2021-12-31 | Stop reason: HOSPADM

## 2021-12-27 RX ORDER — MIDAZOLAM HYDROCHLORIDE 1 MG/ML
INJECTION INTRAMUSCULAR; INTRAVENOUS PRN
Status: DISCONTINUED | OUTPATIENT
Start: 2021-12-27 | End: 2021-12-27 | Stop reason: SDUPTHER

## 2021-12-27 RX ORDER — FENTANYL CITRATE 50 UG/ML
50 INJECTION, SOLUTION INTRAMUSCULAR; INTRAVENOUS
Status: DISCONTINUED | OUTPATIENT
Start: 2021-12-27 | End: 2021-12-31 | Stop reason: HOSPADM

## 2021-12-27 RX ORDER — ONDANSETRON 2 MG/ML
4 INJECTION INTRAMUSCULAR; INTRAVENOUS EVERY 8 HOURS PRN
Status: DISCONTINUED | OUTPATIENT
Start: 2021-12-27 | End: 2021-12-31 | Stop reason: HOSPADM

## 2021-12-27 RX ORDER — PROPOFOL 10 MG/ML
INJECTION, EMULSION INTRAVENOUS CONTINUOUS PRN
Status: DISCONTINUED | OUTPATIENT
Start: 2021-12-27 | End: 2021-12-27 | Stop reason: SDUPTHER

## 2021-12-27 RX ORDER — ALBUMIN, HUMAN INJ 5% 5 %
25 SOLUTION INTRAVENOUS PRN
Status: DISCONTINUED | OUTPATIENT
Start: 2021-12-27 | End: 2021-12-31 | Stop reason: HOSPADM

## 2021-12-27 RX ORDER — CHLORHEXIDINE GLUCONATE 4 G/100ML
SOLUTION TOPICAL SEE ADMIN INSTRUCTIONS
Status: DISCONTINUED | OUTPATIENT
Start: 2021-12-27 | End: 2021-12-27

## 2021-12-27 RX ORDER — DEXTROSE MONOHYDRATE 25 G/50ML
12.5 INJECTION, SOLUTION INTRAVENOUS PRN
Status: DISCONTINUED | OUTPATIENT
Start: 2021-12-27 | End: 2021-12-31 | Stop reason: HOSPADM

## 2021-12-27 RX ORDER — NICOTINE POLACRILEX 4 MG
15 LOZENGE BUCCAL PRN
Status: DISCONTINUED | OUTPATIENT
Start: 2021-12-27 | End: 2021-12-31 | Stop reason: HOSPADM

## 2021-12-27 RX ORDER — ACETAMINOPHEN 650 MG/1
650 SUPPOSITORY RECTAL EVERY 4 HOURS PRN
Status: DISCONTINUED | OUTPATIENT
Start: 2021-12-27 | End: 2021-12-31 | Stop reason: HOSPADM

## 2021-12-27 RX ORDER — OXYCODONE HYDROCHLORIDE AND ACETAMINOPHEN 5; 325 MG/1; MG/1
1 TABLET ORAL EVERY 4 HOURS PRN
Status: DISCONTINUED | OUTPATIENT
Start: 2021-12-27 | End: 2021-12-31 | Stop reason: HOSPADM

## 2021-12-27 RX ORDER — ACETAMINOPHEN 325 MG/1
650 TABLET ORAL EVERY 4 HOURS PRN
Status: DISCONTINUED | OUTPATIENT
Start: 2021-12-27 | End: 2021-12-31 | Stop reason: HOSPADM

## 2021-12-27 RX ORDER — POLYETHYLENE GLYCOL 3350 17 G/17G
17 POWDER, FOR SOLUTION ORAL DAILY
Status: DISCONTINUED | OUTPATIENT
Start: 2021-12-27 | End: 2021-12-31 | Stop reason: HOSPADM

## 2021-12-27 RX ORDER — PROPOFOL 10 MG/ML
INJECTION, EMULSION INTRAVENOUS
Status: COMPLETED
Start: 2021-12-27 | End: 2021-12-27

## 2021-12-27 RX ORDER — CHLORHEXIDINE GLUCONATE 0.12 MG/ML
15 RINSE ORAL ONCE
Status: COMPLETED | OUTPATIENT
Start: 2021-12-27 | End: 2021-12-27

## 2021-12-27 RX ORDER — PROTAMINE SULFATE 10 MG/ML
INJECTION, SOLUTION INTRAVENOUS PRN
Status: DISCONTINUED | OUTPATIENT
Start: 2021-12-27 | End: 2021-12-27 | Stop reason: SDUPTHER

## 2021-12-27 RX ORDER — DEXTROSE MONOHYDRATE 50 MG/ML
100 INJECTION, SOLUTION INTRAVENOUS PRN
Status: DISCONTINUED | OUTPATIENT
Start: 2021-12-27 | End: 2021-12-31 | Stop reason: HOSPADM

## 2021-12-27 RX ORDER — PHENYLEPHRINE HCL IN 0.9% NACL 1 MG/10 ML
SYRINGE (ML) INTRAVENOUS PRN
Status: DISCONTINUED | OUTPATIENT
Start: 2021-12-27 | End: 2021-12-27 | Stop reason: SDUPTHER

## 2021-12-27 RX ORDER — DEXTROSE MONOHYDRATE 25 G/50ML
INJECTION, SOLUTION INTRAVENOUS PRN
Status: DISCONTINUED | OUTPATIENT
Start: 2021-12-27 | End: 2021-12-27 | Stop reason: SDUPTHER

## 2021-12-27 RX ORDER — SODIUM CHLORIDE 0.9 % (FLUSH) 0.9 %
10 SYRINGE (ML) INJECTION PRN
Status: DISCONTINUED | OUTPATIENT
Start: 2021-12-27 | End: 2021-12-31 | Stop reason: HOSPADM

## 2021-12-27 RX ORDER — ASPIRIN 81 MG/1
81 TABLET ORAL DAILY
Status: DISCONTINUED | OUTPATIENT
Start: 2021-12-27 | End: 2021-12-31 | Stop reason: HOSPADM

## 2021-12-27 RX ORDER — BISACODYL 10 MG
10 SUPPOSITORY, RECTAL RECTAL DAILY PRN
Status: DISCONTINUED | OUTPATIENT
Start: 2021-12-27 | End: 2021-12-31 | Stop reason: HOSPADM

## 2021-12-27 RX ORDER — FENTANYL CITRATE 50 UG/ML
25 INJECTION, SOLUTION INTRAMUSCULAR; INTRAVENOUS
Status: DISCONTINUED | OUTPATIENT
Start: 2021-12-27 | End: 2021-12-31 | Stop reason: HOSPADM

## 2021-12-27 RX ORDER — VANCOMYCIN HYDROCHLORIDE 1 G/20ML
INJECTION, POWDER, LYOPHILIZED, FOR SOLUTION INTRAVENOUS
Status: DISCONTINUED
Start: 2021-12-27 | End: 2021-12-27

## 2021-12-27 RX ORDER — INSULIN GLARGINE 100 [IU]/ML
0.15 INJECTION, SOLUTION SUBCUTANEOUS NIGHTLY
Status: DISCONTINUED | OUTPATIENT
Start: 2021-12-28 | End: 2021-12-31 | Stop reason: HOSPADM

## 2021-12-27 RX ORDER — DIPHENHYDRAMINE HCL 25 MG
25 TABLET ORAL NIGHTLY PRN
Status: DISCONTINUED | OUTPATIENT
Start: 2021-12-28 | End: 2021-12-31 | Stop reason: HOSPADM

## 2021-12-27 RX ORDER — ATORVASTATIN CALCIUM 40 MG/1
40 TABLET, FILM COATED ORAL NIGHTLY
Status: DISCONTINUED | OUTPATIENT
Start: 2021-12-28 | End: 2021-12-31 | Stop reason: HOSPADM

## 2021-12-27 RX ORDER — HYDRALAZINE HYDROCHLORIDE 20 MG/ML
5 INJECTION INTRAMUSCULAR; INTRAVENOUS EVERY 5 MIN PRN
Status: DISCONTINUED | OUTPATIENT
Start: 2021-12-27 | End: 2021-12-31 | Stop reason: HOSPADM

## 2021-12-27 RX ORDER — NOREPINEPHRINE BIT/0.9 % NACL 16MG/250ML
.01-3.3 INFUSION BOTTLE (ML) INTRAVENOUS CONTINUOUS
Status: DISCONTINUED | OUTPATIENT
Start: 2021-12-27 | End: 2021-12-28

## 2021-12-27 RX ORDER — MAGNESIUM HYDROXIDE 1200 MG/15ML
LIQUID ORAL CONTINUOUS PRN
Status: COMPLETED | OUTPATIENT
Start: 2021-12-27 | End: 2021-12-27

## 2021-12-27 RX ORDER — SODIUM CHLORIDE 9 MG/ML
INJECTION, SOLUTION INTRAVENOUS CONTINUOUS PRN
Status: DISCONTINUED | OUTPATIENT
Start: 2021-12-27 | End: 2021-12-27 | Stop reason: SDUPTHER

## 2021-12-27 RX ORDER — SODIUM CHLORIDE 0.9 % (FLUSH) 0.9 %
10 SYRINGE (ML) INJECTION EVERY 12 HOURS SCHEDULED
Status: DISCONTINUED | OUTPATIENT
Start: 2021-12-27 | End: 2021-12-31 | Stop reason: HOSPADM

## 2021-12-27 RX ORDER — SODIUM CHLORIDE, SODIUM LACTATE, POTASSIUM CHLORIDE, CALCIUM CHLORIDE 600; 310; 30; 20 MG/100ML; MG/100ML; MG/100ML; MG/100ML
INJECTION, SOLUTION INTRAVENOUS CONTINUOUS PRN
Status: DISCONTINUED | OUTPATIENT
Start: 2021-12-27 | End: 2021-12-27 | Stop reason: SDUPTHER

## 2021-12-27 RX ORDER — FENTANYL CITRATE 0.05 MG/ML
INJECTION, SOLUTION INTRAMUSCULAR; INTRAVENOUS PRN
Status: DISCONTINUED | OUTPATIENT
Start: 2021-12-27 | End: 2021-12-27 | Stop reason: SDUPTHER

## 2021-12-27 RX ORDER — MULTIVITAMIN WITH IRON
1 TABLET ORAL
Status: DISCONTINUED | OUTPATIENT
Start: 2021-12-28 | End: 2021-12-31 | Stop reason: HOSPADM

## 2021-12-27 RX ORDER — PROPOFOL 10 MG/ML
INJECTION, EMULSION INTRAVENOUS PRN
Status: DISCONTINUED | OUTPATIENT
Start: 2021-12-27 | End: 2021-12-27 | Stop reason: SDUPTHER

## 2021-12-27 RX ADMIN — Medication 81 MG: at 06:20

## 2021-12-27 RX ADMIN — Medication 50 MCG: at 09:47

## 2021-12-27 RX ADMIN — ROCURONIUM BROMIDE 50 MG: 10 INJECTION INTRAVENOUS at 09:02

## 2021-12-27 RX ADMIN — SODIUM CHLORIDE, PRESERVATIVE FREE 10 ML: 5 INJECTION INTRAVENOUS at 19:58

## 2021-12-27 RX ADMIN — METOPROLOL TARTRATE 12.5 MG: 25 TABLET, FILM COATED ORAL at 06:08

## 2021-12-27 RX ADMIN — FENTANYL CITRATE 100 MCG: 50 INJECTION INTRAVENOUS at 09:40

## 2021-12-27 RX ADMIN — FENTANYL CITRATE 50 MCG: 50 INJECTION INTRAVENOUS at 10:40

## 2021-12-27 RX ADMIN — FENTANYL CITRATE 50 MCG: 50 INJECTION INTRAVENOUS at 10:58

## 2021-12-27 RX ADMIN — MIDAZOLAM HYDROCHLORIDE 2 MG: 1 INJECTION, SOLUTION INTRAMUSCULAR; INTRAVENOUS at 08:58

## 2021-12-27 RX ADMIN — DEXTROSE MONOHYDRATE 2000 MG: 50 INJECTION, SOLUTION INTRAVENOUS at 21:19

## 2021-12-27 RX ADMIN — ROCURONIUM BROMIDE 50 MG: 10 INJECTION INTRAVENOUS at 11:48

## 2021-12-27 RX ADMIN — SODIUM CHLORIDE: 9 INJECTION, SOLUTION INTRAVENOUS at 09:17

## 2021-12-27 RX ADMIN — FENTANYL CITRATE 100 MCG: 50 INJECTION INTRAVENOUS at 10:26

## 2021-12-27 RX ADMIN — FENTANYL CITRATE 250 MCG: 50 INJECTION INTRAVENOUS at 09:03

## 2021-12-27 RX ADMIN — AMIODARONE HYDROCHLORIDE 200 MG: 200 TABLET ORAL at 15:29

## 2021-12-27 RX ADMIN — Medication 50 MCG: at 13:54

## 2021-12-27 RX ADMIN — FENTANYL CITRATE 50 MCG: 50 INJECTION INTRAVENOUS at 13:47

## 2021-12-27 RX ADMIN — MUPIROCIN: 20 OINTMENT TOPICAL at 19:58

## 2021-12-27 RX ADMIN — FENTANYL CITRATE 100 MCG: 50 INJECTION INTRAVENOUS at 09:38

## 2021-12-27 RX ADMIN — CHLORHEXIDINE GLUCONATE 0.12% ORAL RINSE 15 ML: 1.2 LIQUID ORAL at 06:08

## 2021-12-27 RX ADMIN — FENTANYL CITRATE 100 MCG: 50 INJECTION INTRAVENOUS at 09:01

## 2021-12-27 RX ADMIN — Medication 2 PUFF: at 07:38

## 2021-12-27 RX ADMIN — AMIODARONE HYDROCHLORIDE 200 MG: 200 TABLET ORAL at 06:08

## 2021-12-27 RX ADMIN — FENTANYL CITRATE 50 MCG: 50 INJECTION INTRAVENOUS at 10:29

## 2021-12-27 RX ADMIN — FENTANYL CITRATE 100 MCG: 50 INJECTION INTRAVENOUS at 10:14

## 2021-12-27 RX ADMIN — FENTANYL CITRATE 50 MCG: 50 INJECTION INTRAVENOUS at 10:32

## 2021-12-27 RX ADMIN — INSULIN HUMAN 10 UNITS: 100 INJECTION, SOLUTION PARENTERAL at 12:45

## 2021-12-27 RX ADMIN — HEPARIN SODIUM 30000 UNITS: 1000 INJECTION INTRAVENOUS; SUBCUTANEOUS at 11:12

## 2021-12-27 RX ADMIN — PROPOFOL 25 MCG/KG/MIN: 10 INJECTION, EMULSION INTRAVENOUS at 14:13

## 2021-12-27 RX ADMIN — ROCURONIUM BROMIDE 25 MG: 10 INJECTION INTRAVENOUS at 14:08

## 2021-12-27 RX ADMIN — Medication 50 MCG: at 13:43

## 2021-12-27 RX ADMIN — MIDAZOLAM HYDROCHLORIDE 2 MG: 1 INJECTION, SOLUTION INTRAMUSCULAR; INTRAVENOUS at 11:26

## 2021-12-27 RX ADMIN — MUPIROCIN: 20 OINTMENT TOPICAL at 15:29

## 2021-12-27 RX ADMIN — ROCURONIUM BROMIDE 50 MG: 10 INJECTION INTRAVENOUS at 11:07

## 2021-12-27 RX ADMIN — Medication 50 MCG: at 09:43

## 2021-12-27 RX ADMIN — CHLORHEXIDINE GLUCONATE 0.12% ORAL RINSE 15 ML: 1.2 LIQUID ORAL at 16:05

## 2021-12-27 RX ADMIN — ROCURONIUM BROMIDE 50 MG: 10 INJECTION INTRAVENOUS at 13:08

## 2021-12-27 RX ADMIN — Medication 50 MCG: at 13:31

## 2021-12-27 RX ADMIN — Medication 50 MCG: at 09:16

## 2021-12-27 RX ADMIN — Medication 50 MCG: at 10:05

## 2021-12-27 RX ADMIN — SODIUM CHLORIDE, POTASSIUM CHLORIDE, SODIUM LACTATE AND CALCIUM CHLORIDE: 600; 310; 30; 20 INJECTION, SOLUTION INTRAVENOUS at 09:15

## 2021-12-27 RX ADMIN — DEXTROSE MONOHYDRATE 12.5 G: 25 INJECTION, SOLUTION INTRAVENOUS at 12:45

## 2021-12-27 RX ADMIN — PROPOFOL 30 MG: 10 INJECTION, EMULSION INTRAVENOUS at 11:25

## 2021-12-27 RX ADMIN — Medication 50 MCG: at 10:01

## 2021-12-27 RX ADMIN — TIOTROPIUM BROMIDE INHALATION SPRAY 2 PUFF: 3.12 SPRAY, METERED RESPIRATORY (INHALATION) at 07:36

## 2021-12-27 RX ADMIN — PROPOFOL 50 MG: 10 INJECTION, EMULSION INTRAVENOUS at 11:22

## 2021-12-27 RX ADMIN — PROPOFOL 25 MCG/KG/MIN: 10 INJECTION, EMULSION INTRAVENOUS at 13:45

## 2021-12-27 RX ADMIN — FENTANYL CITRATE 100 MCG: 50 INJECTION INTRAVENOUS at 13:48

## 2021-12-27 RX ADMIN — ALBUMIN (HUMAN) 25 G: 12.5 INJECTION, SOLUTION INTRAVENOUS at 16:30

## 2021-12-27 RX ADMIN — DEXTROSE MONOHYDRATE 2000 MG: 50 INJECTION, SOLUTION INTRAVENOUS at 13:50

## 2021-12-27 RX ADMIN — OXYCODONE HYDROCHLORIDE AND ACETAMINOPHEN 1 TABLET: 5; 325 TABLET ORAL at 23:01

## 2021-12-27 RX ADMIN — CHLORHEXIDINE GLUCONATE 0.12% ORAL RINSE 15 ML: 1.2 LIQUID ORAL at 19:58

## 2021-12-27 RX ADMIN — MUPIROCIN: 20 OINTMENT TOPICAL at 06:08

## 2021-12-27 RX ADMIN — PROTAMINE SULFATE 300 MG: 10 INJECTION, SOLUTION INTRAVENOUS at 13:19

## 2021-12-27 RX ADMIN — ROCURONIUM BROMIDE 50 MG: 10 INJECTION INTRAVENOUS at 10:06

## 2021-12-27 RX ADMIN — FENTANYL CITRATE 50 MCG: 50 INJECTION INTRAVENOUS at 09:53

## 2021-12-27 RX ADMIN — LIDOCAINE HYDROCHLORIDE 50 MG: 10 INJECTION, SOLUTION EPIDURAL; INFILTRATION; INTRACAUDAL; PERINEURAL at 09:01

## 2021-12-27 RX ADMIN — AMINOCAPROIC ACID 10 G/HR: 250 INJECTION, SOLUTION INTRAVENOUS at 09:20

## 2021-12-27 RX ADMIN — DEXTROSE MONOHYDRATE 2000 MG: 50 INJECTION, SOLUTION INTRAVENOUS at 09:56

## 2021-12-27 RX ADMIN — PROPOFOL 150 MG: 10 INJECTION, EMULSION INTRAVENOUS at 09:01

## 2021-12-27 RX ADMIN — Medication 50 MCG: at 14:01

## 2021-12-27 RX ADMIN — FENTANYL CITRATE 50 MCG: 50 INJECTION INTRAVENOUS at 10:21

## 2021-12-27 RX ADMIN — ALBUMIN (HUMAN) 25 G: 12.5 INJECTION, SOLUTION INTRAVENOUS at 19:59

## 2021-12-27 RX ADMIN — FENTANYL CITRATE 50 MCG: 50 INJECTION, SOLUTION INTRAMUSCULAR; INTRAVENOUS at 18:21

## 2021-12-27 RX ADMIN — SODIUM CHLORIDE 2.46 UNITS/HR: 9 INJECTION, SOLUTION INTRAVENOUS at 15:18

## 2021-12-27 RX ADMIN — FENTANYL CITRATE 50 MCG: 50 INJECTION INTRAVENOUS at 09:51

## 2021-12-27 RX ADMIN — MIDAZOLAM HYDROCHLORIDE 2 MG: 1 INJECTION, SOLUTION INTRAMUSCULAR; INTRAVENOUS at 12:48

## 2021-12-27 ASSESSMENT — PULMONARY FUNCTION TESTS
PIF_VALUE: 17
PIF_VALUE: 1
PIF_VALUE: 16
PIF_VALUE: 21
PIF_VALUE: 17
PIF_VALUE: 19
PIF_VALUE: 16
PIF_VALUE: 21
PIF_VALUE: 17
PIF_VALUE: 1
PIF_VALUE: 1
PIF_VALUE: 19
PIF_VALUE: 18
PIF_VALUE: 19
PIF_VALUE: 1
PIF_VALUE: 22
PIF_VALUE: 21
PIF_VALUE: 18
PIF_VALUE: 1
PIF_VALUE: 17
PIF_VALUE: 18
PIF_VALUE: 21
PIF_VALUE: 1
PIF_VALUE: 1
PIF_VALUE: 17
PIF_VALUE: 16
PIF_VALUE: 17
PIF_VALUE: 18
PIF_VALUE: 0
PIF_VALUE: 17
PIF_VALUE: 17
PIF_VALUE: 1
PIF_VALUE: 17
PIF_VALUE: 0
PIF_VALUE: 1
PIF_VALUE: 17
PIF_VALUE: 1
PIF_VALUE: 17
PIF_VALUE: 21
PIF_VALUE: 17
PIF_VALUE: 1
PIF_VALUE: 16
PIF_VALUE: 1
PIF_VALUE: 21
PIF_VALUE: 1
PIF_VALUE: 19
PIF_VALUE: 1
PIF_VALUE: 15
PIF_VALUE: 1
PIF_VALUE: 17
PIF_VALUE: 19
PIF_VALUE: 1
PIF_VALUE: 18
PIF_VALUE: 17
PIF_VALUE: 16
PIF_VALUE: 1
PIF_VALUE: 18
PIF_VALUE: 18
PIF_VALUE: 1
PIF_VALUE: 1
PIF_VALUE: 17
PIF_VALUE: 19
PIF_VALUE: 17
PIF_VALUE: 20
PIF_VALUE: 1
PIF_VALUE: 1
PIF_VALUE: 16
PIF_VALUE: 16
PIF_VALUE: 17
PIF_VALUE: 1
PIF_VALUE: 19
PIF_VALUE: 19
PIF_VALUE: 17
PIF_VALUE: 16
PIF_VALUE: 1
PIF_VALUE: 1
PIF_VALUE: 19
PIF_VALUE: 1
PIF_VALUE: 20
PIF_VALUE: 1
PIF_VALUE: 17
PIF_VALUE: 17
PIF_VALUE: 20
PIF_VALUE: 20
PIF_VALUE: 1
PIF_VALUE: 21
PIF_VALUE: 21
PIF_VALUE: 19
PIF_VALUE: 18
PIF_VALUE: 16
PIF_VALUE: 1
PIF_VALUE: 21
PIF_VALUE: 17
PIF_VALUE: 1
PIF_VALUE: 19
PIF_VALUE: 17
PIF_VALUE: 1
PIF_VALUE: 19
PIF_VALUE: 1
PIF_VALUE: 21
PIF_VALUE: 1
PIF_VALUE: 16
PIF_VALUE: 1
PIF_VALUE: 17
PIF_VALUE: 19
PIF_VALUE: 1
PIF_VALUE: 17
PIF_VALUE: 19
PIF_VALUE: 18
PIF_VALUE: 16
PIF_VALUE: 16
PIF_VALUE: 1
PIF_VALUE: 19
PIF_VALUE: 17
PIF_VALUE: 17
PIF_VALUE: 16
PIF_VALUE: 22
PIF_VALUE: 20
PIF_VALUE: 19
PIF_VALUE: 19
PIF_VALUE: 16
PIF_VALUE: 17
PIF_VALUE: 1
PIF_VALUE: 2
PIF_VALUE: 1
PIF_VALUE: 1
PIF_VALUE: 17
PIF_VALUE: 1
PIF_VALUE: 21
PIF_VALUE: 17
PIF_VALUE: 1
PIF_VALUE: 17
PIF_VALUE: 16
PIF_VALUE: 1
PIF_VALUE: 1
PIF_VALUE: 17
PIF_VALUE: 21
PIF_VALUE: 1
PIF_VALUE: 0
PIF_VALUE: 16
PIF_VALUE: 19
PIF_VALUE: 17
PIF_VALUE: 17
PIF_VALUE: 16
PIF_VALUE: 1
PIF_VALUE: 19
PIF_VALUE: 16
PIF_VALUE: 11
PIF_VALUE: 17
PIF_VALUE: 20
PIF_VALUE: 1
PIF_VALUE: 20
PIF_VALUE: 19
PIF_VALUE: 1
PIF_VALUE: 17
PIF_VALUE: 0
PIF_VALUE: 17
PIF_VALUE: 17
PIF_VALUE: 21
PIF_VALUE: 1
PIF_VALUE: 21
PIF_VALUE: 17
PIF_VALUE: 1
PIF_VALUE: 20
PIF_VALUE: 17
PIF_VALUE: 19
PIF_VALUE: 1
PIF_VALUE: 17
PIF_VALUE: 20
PIF_VALUE: 17
PIF_VALUE: 18
PIF_VALUE: 16
PIF_VALUE: 1
PIF_VALUE: 1
PIF_VALUE: 18
PIF_VALUE: 17
PIF_VALUE: 17
PIF_VALUE: 1
PIF_VALUE: 16
PIF_VALUE: 17
PIF_VALUE: 21
PIF_VALUE: 1
PIF_VALUE: 1
PIF_VALUE: 16
PIF_VALUE: 1
PIF_VALUE: 1
PIF_VALUE: 17
PIF_VALUE: 17
PIF_VALUE: 19
PIF_VALUE: 15
PIF_VALUE: 17
PIF_VALUE: 20
PIF_VALUE: 19
PIF_VALUE: 20
PIF_VALUE: 17
PIF_VALUE: 16
PIF_VALUE: 19
PIF_VALUE: 16
PIF_VALUE: 1
PIF_VALUE: 20
PIF_VALUE: 18
PIF_VALUE: 16
PIF_VALUE: 1
PIF_VALUE: 17
PIF_VALUE: 1
PIF_VALUE: 17
PIF_VALUE: 16
PIF_VALUE: 19
PIF_VALUE: 20
PIF_VALUE: 0
PIF_VALUE: 19
PIF_VALUE: 17
PIF_VALUE: 17
PIF_VALUE: 19
PIF_VALUE: 1
PIF_VALUE: 17
PIF_VALUE: 18
PIF_VALUE: 19
PIF_VALUE: 17
PIF_VALUE: 23
PIF_VALUE: 1
PIF_VALUE: 20
PIF_VALUE: 1
PIF_VALUE: 17
PIF_VALUE: 1
PIF_VALUE: 1
PIF_VALUE: 17
PIF_VALUE: 20
PIF_VALUE: 17
PIF_VALUE: 17
PIF_VALUE: 22
PIF_VALUE: 1
PIF_VALUE: 16
PIF_VALUE: 1
PIF_VALUE: 21
PIF_VALUE: 1
PIF_VALUE: 17
PIF_VALUE: 19
PIF_VALUE: 0
PIF_VALUE: 20
PIF_VALUE: 16
PIF_VALUE: 0
PIF_VALUE: 20
PIF_VALUE: 16
PIF_VALUE: 20
PIF_VALUE: 17
PIF_VALUE: 17
PIF_VALUE: 19
PIF_VALUE: 17
PIF_VALUE: 1
PIF_VALUE: 22
PIF_VALUE: 21
PIF_VALUE: 18
PIF_VALUE: 1
PIF_VALUE: 18
PIF_VALUE: 18
PIF_VALUE: 20
PIF_VALUE: 17
PIF_VALUE: 17
PIF_VALUE: 19
PIF_VALUE: 20
PIF_VALUE: 17
PIF_VALUE: 18
PIF_VALUE: 20
PIF_VALUE: 19
PIF_VALUE: 1
PIF_VALUE: 20
PIF_VALUE: 16
PIF_VALUE: 19
PIF_VALUE: 16
PIF_VALUE: 0
PIF_VALUE: 17
PIF_VALUE: 0
PIF_VALUE: 19
PIF_VALUE: 18
PIF_VALUE: 0
PIF_VALUE: 1
PIF_VALUE: 21
PIF_VALUE: 20
PIF_VALUE: 17
PIF_VALUE: 1
PIF_VALUE: 21
PIF_VALUE: 20
PIF_VALUE: 17
PIF_VALUE: 1
PIF_VALUE: 1
PIF_VALUE: 16
PIF_VALUE: 1
PIF_VALUE: 15
PIF_VALUE: 1
PIF_VALUE: 16
PIF_VALUE: 1
PIF_VALUE: 18
PIF_VALUE: 20
PIF_VALUE: 1
PIF_VALUE: 17
PIF_VALUE: 18
PIF_VALUE: 20
PIF_VALUE: 0
PIF_VALUE: 2
PIF_VALUE: 21
PIF_VALUE: 23
PIF_VALUE: 20
PIF_VALUE: 17
PIF_VALUE: 18
PIF_VALUE: 1
PIF_VALUE: 18
PIF_VALUE: 20
PIF_VALUE: 1
PIF_VALUE: 16
PIF_VALUE: 1
PIF_VALUE: 19
PIF_VALUE: 20
PIF_VALUE: 1

## 2021-12-27 ASSESSMENT — PAIN SCALES - GENERAL
PAINLEVEL_OUTOF10: 6
PAINLEVEL_OUTOF10: 0
PAINLEVEL_OUTOF10: 4

## 2021-12-27 NOTE — PROGRESS NOTES
[facility]  Internal Medicine Teaching Residency Program  Inpatient Daily Progress Note  ______________________________________________________________________________    Patient: Astrid Espana  YOB: 1950   HXT:[id number]    Acct: [de-identified]     Room: [address] [place], [place] [address]  Admit date: 12/23/2021  Today's date: 12/27/21  Number of days in the hospital: 4    SUBJECTIVE   Admitting Diagnosis: NSTEMI (non-ST elevated myocardial infarction) ([facility]  CC: Chest pain     - Pt examined at bedside. Chart & results reviewed. - No acute events overnight   - Diarrhea improved and pt attributed it to the oil on the vegetables at dinner. He states it has happened before when he has oil in his diet   - Afebrile   - VSS    Plan for today:   - CABG   - Follow up on CTS recommendations post-op      ROS:  Constitutional:  negative for chills, fevers, sweats  Respiratory:  negative for cough, dyspnea on exertion, hemoptysis, shortness of breath, wheezing  Cardiovascular:  negative for chest pain, chest pressure/discomfort, lower extremity edema, palpitations  Gastrointestinal:  negative for abdominal pain, constipation, diarrhea, nausea, vomiting  Neurological:  negative for dizziness, headache    BRIEF HISTORY     The patient is a pleasant 71 y. o. male with a PMHx significant for CAD (unstable angina), COPD, empyema, GERD, HTN, history of blood clots and HLD who presents with a chief complaint of chest pain. Pt states he has also experienced nausea and diaphoresis since Tuesday this week. Pt stated that the pain woke him from sleep. He described the pain and an elephant stepping on his chest and rated the pain 9/10 in intensity radiating up the neck and down both of his arms. Pt denies prior stents. Pt received ASA and nitroglycerin which did not alleviate the pain. Pt denied headache, blurry vision, shortness of breath, abdominal pain or weakness.  Troponin on arrival 2,699 and trended up to 6,203. WBC elevated at 20.2 decreased to 19.1. Pt was taken for cardiac catheterization shortly after arrival and underwent PTCA.     OBJECTIVE     Vital Signs:  /61   Pulse 80   Temp 98.3 °F (36.8 °C) (Axillary)   Resp 16   Ht 6' (1.829 m)   Wt 190 lb 4.1 oz (86.3 kg)   SpO2 91%   BMI 25.80 kg/m²     Temp (24hrs), Av °F (36.7 °C), Min:97.6 °F (36.4 °C), Max:98.3 °F (36.8 °C)    In: 1150   Out: 900 [Urine:900]    Physical Exam:  Physical Exam  Vitals and nursing note reviewed. Constitutional:       Appearance: Normal appearance. HENT:      Head: Normocephalic and atraumatic. Nose: Nose normal.      Mouth/Throat:      Mouth: Mucous membranes are moist.      Pharynx: Oropharynx is clear. Eyes:      Extraocular Movements: Extraocular movements intact. Conjunctiva/sclera: Conjunctivae normal.      Pupils: Pupils are equal, round, and reactive to light. Cardiovascular:      Rate and Rhythm: Normal rate and regular rhythm. Pulses: Normal pulses. Heart sounds: Normal heart sounds. No murmur heard. No friction rub. No gallop. Pulmonary:      Effort: Pulmonary effort is normal. No respiratory distress. Breath sounds: Normal breath sounds. No stridor. No wheezing, rhonchi or rales. Chest:      Chest wall: No tenderness. Abdominal:      General: Abdomen is flat. Bowel sounds are normal. There is no distension. Palpations: Abdomen is soft. There is no mass. Tenderness: There is no abdominal tenderness. There is no guarding or rebound. Hernia: No hernia is present. Musculoskeletal:         General: No swelling, tenderness, deformity or signs of injury. Normal range of motion. Cervical back: Normal range of motion. Right lower leg: No edema. Left lower leg: No edema. Skin:     General: Skin is warm. Neurological:      General: No focal deficit present. Mental Status: He is alert and oriented to person, place, and time. Mental status is at baseline. Psychiatric:         Mood and Affect: Mood normal.         Behavior: Behavior normal.         Thought Content:  Thought content normal.         Judgment: Judgment normal.           Medications:  Scheduled Medications:    ceFAZolin (ANCEF) IVPB  2,000 mg IntraVENous On Call to OR    mupirocin   Nasal BID    chlorhexidine   Topical See Admin Instructions    vancomycin (VANCOCIN) IV  1,500 mg IntraVENous On Call to OR    vancomycin        papaverine        sodium chloride (PF)        heparin (porcine)        lactobacillus  1 capsule Oral Daily with breakfast    famotidine  20 mg Oral BID    Or    famotidine (PEPCID) injection  20 mg IntraVENous BID    aspirin  81 mg Oral Daily    atorvastatin  80 mg Oral Nightly    amiodarone  200 mg Oral 90 Min Pre-Op    budesonide-formoterol  2 puff Inhalation BID    tiotropium  2 puff Inhalation Daily    sodium chloride flush  5-40 mL IntraVENous 2 times per day    fentanNYL  50 mcg IntraVENous Once    sodium chloride flush  5-40 mL IntraVENous 2 times per day    metoprolol tartrate  25 mg Oral BID     Continuous Infusions:    heparin (PORCINE) Infusion Stopped (12/27/21 0600)    nitroGLYCERIN Stopped (12/23/21 1100)    sodium chloride      sodium chloride       PRN Medicationsdiphenoxylate-atropine, 5 mL, 4x Daily PRN  heparin (porcine), 4,000 Units, PRN  heparin (porcine), 2,000 Units, PRN  albuterol sulfate HFA, 2 puff, 4x Daily PRN  sodium chloride flush, 5-40 mL, PRN  sodium chloride, 25 mL, PRN  ondansetron, 4 mg, Q8H PRN   Or  ondansetron, 4 mg, Q6H PRN  polyethylene glycol, 17 g, Daily PRN  sodium chloride flush, 5-40 mL, PRN  sodium chloride, 25 mL, PRN  acetaminophen, 650 mg, Q4H PRN        Diagnostic Labs:  CBC:   Recent Labs     12/25/21  0440 12/26/21  0229 12/27/21  0446   WBC 12.7* 9.3 7.5   RBC 4.03* 4.17* 3.79*   HGB 11.9* 11.9* 11.1*   HCT 35.6* 37.9* 34.7*   MCV 88.3 90.9 91.6   RDW 12.8 12.8 12.7    178 179     BMP:   Recent Labs     12/25/21  0440 12/26/21  0229 12/27/21  0446   * 131* 136   K 4.1 4.3 4.3   CL 98 101 107   CO2 18* 16* 18*   BUN 28* 28* 23   CREATININE 1.15 1.22* 0.96     BNP: No results for input(s): BNP in the last 72 hours. PT/INR: No results for input(s): PROTIME, INR in the last 72 hours. APTT:   Recent Labs     12/25/21  0956 12/25/21  1749 12/26/21  0029   APTT 47.4* 62.5* 64.3*     CARDIAC ENZYMES: No results for input(s): CKMB, CKMBINDEX, TROPONINI in the last 72 hours. Invalid input(s): CKTOTAL;3  FASTING LIPID PANEL:  Lab Results   Component Value Date    CHOL 116 12/24/2021    HDL 60 12/24/2021    TRIG 52 12/24/2021     LIVER PROFILE:   Recent Labs     12/25/21  0440   AST 30   ALT 20   BILITOT 0.66   ALKPHOS 84      MICROBIOLOGY:   Lab Results   Component Value Date/Time    CULTURE NO GROWTH 12/24/2021 04:20 PM       Imaging:    XR CHEST PORTABLE    Result Date: 12/24/2021  No acute process detected. XR CHEST PORTABLE    Result Date: 12/23/2021  New variable nearly diffuse interstitial thickening concerning for viral process. CT CHEST PULMONARY EMBOLISM W CONTRAST    Result Date: 12/23/2021  1. No evidence of pulmonary embolism. 2.  Findings compatible with scarring in the left upper lobe and lung bases. No convincing evidence for acute airspace disease. 3.  Mild emphysematous disease. RECOMMENDATIONS: Unavailable       ASSESSMENT & PLAN     Assessment and Plan:    Principal Problem:    NSTEMI (non-ST elevated myocardial infarction) ([facility]  Active Problems:    Essential hypertension, benign    Diarrhea    Chest pain due to myocardial ischemia  Resolved Problems:    * No resolved hospital problems. *      IMPRESSION  This is a 76 y. o. male who presented with chest pain and found to have NSTEMI. Patient admitted to inpatient status for management of NSTEMI.      Active Problems:  NSTEMI (non-ST elevated myocardial infarction)   - S/P cardiac cath 12/23 with PTCA of RCA  - Pt on ASA, Lipitor, 1 dose of morphine, Lopressor, Nitroglycerin gtt and heparin   - Echocardiogram showing LV hypertrophy and LV systolic dysfunction with EF of 45-50%  - Continue to monitor for chest pain    - Continue with medical management   - Repeat CXR > stable findings   - CABG today       Diarrhea - improved   - Started 12/24 in the evening   - Continue with Probiotics   - FOBT pending   - Continue to monitor   - Monitor electrolytes      Hypotension likely secondary to voluminous diarrhea - stable   - Goal of greater than or equal to 100/60  - Continue to monitor      Hypertension - stable   - Hold BP medications due to hypotension      History of blood clots   - Stable on heparin   - Hold heparin per CTS     COPD   - Continue with Albuterol, Symbicort and Spiriva      GERD - stable      Hyperlipidemia -  Stable   - Lipitor      Diet: Regular low fat, low cholesterol and high fiber > NPO after midnight   DVT ppx: Heparin hold heparin per CTS  GI ppx: None      PT/OT/SW: Consulted   Discharge Planning: In process         Brigitte Pitts MD  Internal Medicine Resident, PGY-1  [facility];  [place], [place]   7:27 AM 12/27/2021

## 2021-12-27 NOTE — PROGRESS NOTES
12/27/21 1812   Vent Information   $Ventilation Off Vent         Order obtained for extubation. SpO2 of 99% on 40% FiO2. Patient extubated and placed on 6 liters/min via nasal cannula. Post extubation SpO2 is 99% with HR  81 bpm and RR 16 breaths/min. Patient had strong cough that was non-productive. Extubation Well tolerated by patient. .   Breath Sounds: clear    RICO BHATT RCP   6:17 PM

## 2021-12-27 NOTE — ANESTHESIA PROCEDURE NOTES
Central Venous Line:    A central venous line was placed using surface landmarks, in the OR for the following indication(s): central venous access and CVP monitoring. 12/27/2021 9:09 AM12/27/2021 9:19 AM    Sterility preparation included the following: hand hygiene performed prior to procedure, maximum sterile barriers used and sterile technique used to drape from head to toe. The patient was placed in Trendelenburg position. The left subclavian vein was prepped. The site was prepped with Chloraprep. A 9 Fr (size), 10 (length), introducer slick was placed. During the procedure, the following specific steps were taken: target vein identified, needle advanced into vein and blood aspirated and guidewire advanced into vein. Intravenous verification was obtained by venous blood return. Post insertion care included: all ports aspirated, all ports flushed easily, guidewire removed intact, Biopatch applied, line sutured in place and dressing applied. During the procedure the patient experienced: patient tolerated procedure well with no complications.       Insertion site scrubbed per usage guidelines?: Yes  Skin prep agent dried for 3 minutes prior to procedure?:yes  Anesthesia type: general..No  Staffing  Performed: Anesthesiologist   Anesthesiologist: Jarad Lozano MD  Preanesthetic Checklist  Completed: patient identified, IV checked, site marked, risks and benefits discussed, surgical consent, monitors and equipment checked, pre-op evaluation, timeout performed, anesthesia consent given, oxygen available and patient being monitored

## 2021-12-27 NOTE — ANESTHESIA PRE PROCEDURE
Department of Anesthesiology  Preprocedure Note       Name:  Arielle Nguyen   Age:  80 y.o.  :  1950                                          MRN:  [id number]         Date:  2021      Surgeon: Chloé Calles):  Gisselle Ni MD    Procedure: Procedure(s):  CABG CORONARY ARTERY BYPASS X3    Medications prior to admission:   Prior to Admission medications    Medication Sig Start Date End Date Taking?  Authorizing Provider   tamsulosin (FLOMAX) 0.4 MG capsule Take 1 capsule by mouth daily 19  Yes Maddie Carmichael MD   amLODIPine (NORVASC) 2.5 MG tablet Take 0.5 tablets by mouth daily Start taking  in place of lisinopril only if BP stays more than 317 systolic 3/36/87  Yes Maddie Carmichael MD   albuterol sulfate  (90 Base) MCG/ACT inhaler Inhale 2 puffs into the lungs 4 times daily as needed for Wheezing or Shortness of Breath 19  Yes Maddie Carmichael MD   tiotropium (SPIRIVA RESPIMAT) 2.5 MCG/ACT AERS inhaler Inhale 2 puffs into the lungs daily   Yes Historical Provider, MD   budesonide-formoterol (SYMBICORT) 160-4.5 MCG/ACT AERO Inhale 2 puffs into the lungs 2 times daily   Yes Historical Provider, MD       Current medications:    Current Facility-Administered Medications   Medication Dose Route Frequency Provider Last Rate Last Admin    ceFAZolin (ANCEF) 2000 mg in dextrose 5 % 50 mL IVPB  2,000 mg IntraVENous On Call to [address] [place] [address], APRN - CNP        mupirocin (BACTROBAN) 2 % ointment   Nasal BID CHRISTOPH Mejia CNP   Given at 21 0608    chlorhexidine (HIBICLENS) 4 % liquid   Topical See Admin Instructions CHRISTOPH Matos CNP        vancomycin (VANCOCIN) 1500 mg in dextrose 5 % 250 mL IVPB  1,500 mg IntraVENous On Call to [address] [place] [address], APRN - CNP        vancomycin (VANCOCIN) 1 g injection             papaverine 30 MG/ML injection             sodium chloride (PF) 0.9 % injection             heparin (porcine) 1000 UNIT/ML injection             propofol 1000 MG/100ML injection             lactobacillus (CULTURELLE) capsule 1 capsule  1 capsule Oral Daily with breakfast Erin Nair MD   1 capsule at 12/26/21 1401    diphenoxylate-atropine (LOMOTIL) liquid 5 mL  5 mL Oral 4x Daily PRN Rossana Angulo MD   5 mL at 12/26/21 1049    famotidine (PEPCID) tablet 20 mg  20 mg Oral BID [facility], APRN - CNP   20 mg at 12/26/21 2114    Or    famotidine (PEPCID) injection 20 mg  20 mg IntraVENous BID Carolina Smithun APRN - CNP        aspirin EC tablet 81 mg  81 mg Oral Daily Carolina Youkyin APRN - CNP   81 mg at 12/27/21 2300    atorvastatin (LIPITOR) tablet 80 mg  80 mg Oral Nightly Carolina Smithun APRN - CNP   80 mg at 12/26/21 2114    amiodarone (CORDARONE) tablet 200 mg  200 mg Oral 90 Min Pre-Op Carolina Pino APRN - CNP   200 mg at 12/27/21 9485    heparin (porcine) injection 4,000 Units  4,000 Units IntraVENous PRN Erin Nair MD        heparin (porcine) injection 2,000 Units  2,000 Units IntraVENous PRN Erin Nair MD   2,000 Units at 12/25/21 1120    heparin 25,000 units in dextrose 5% 250 mL (premix) infusion  5-30 Units/kg/hr IntraVENous Continuous Erin Nair MD   Stopped at 12/27/21 0600    nitroGLYCERIN 50 mg in dextrose 5% 250 mL infusion  5-200 mcg/min IntraVENous Continuous Erin Nair MD   Stopped at 12/23/21 1100    budesonide-formoterol (SYMBICORT) 160-4.5 MCG/ACT inhaler 2 puff  2 puff Inhalation BID Erin Nair MD   2 puff at 12/27/21 0738    tiotropium (SPIRIVA RESPIMAT) 2.5 MCG/ACT inhaler 2 puff  2 puff Inhalation Daily Erin Nair MD   2 puff at 12/27/21 0736    albuterol sulfate  (90 Base) MCG/ACT inhaler 2 puff  2 puff Inhalation 4x Daily PRN Erin Nair MD        sodium chloride flush 0.9 % injection 5-40 mL  5-40 mL IntraVENous 2 times per day Erin Nair MD        sodium chloride flush 0.9 % injection 5-40 mL  5-40 mL IntraVENous PRN Erin Nair MD        0.9 % sodium chloride infusion  25 mL IntraVENous PRN Jourdan Suh MD        ondansetron (ZOFRAN-ODT) disintegrating tablet 4 mg  4 mg Oral Q8H PRN Jourdan Suh MD        Or    ondansetron [facility]) injection 4 mg  4 mg IntraVENous Q6H PRN Jourdan Suh MD        polyethylene glycol [facility]) packet 17 g  17 g Oral Daily PRN Jourdan Suh MD        fentaNYL (SUBLIMAZE) injection 50 mcg  50 mcg IntraVENous Once Cyndy Knight MD        sodium chloride flush 0.9 % injection 5-40 mL  5-40 mL IntraVENous 2 times per day Papa Jones MD   10 mL at 12/25/21 2007    sodium chloride flush 0.9 % injection 5-40 mL  5-40 mL IntraVENous PRN Papa Jones MD        0.9 % sodium chloride infusion  25 mL IntraVENous PRN Papa Jones MD        acetaminophen (TYLENOL) tablet 650 mg  650 mg Oral Q4H PRN Papa Jones MD        metoprolol tartrate (LOPRESSOR) tablet 25 mg  25 mg Oral BID Papa Jones MD   25 mg at 12/26/21 2114       Allergies:  No Known Allergies    Problem List:    Patient Active Problem List   Diagnosis Code    Esophageal reflux K21.9    Acute upper respiratory infection J06.9    Localized osteoarthrosis M19.90    Essential hypertension, benign I10    Pleural effusion J90    Empyema of pleura ([facility] J86.9    Procedure not carried out because of patient's decision Z53.20    Other and unspecified hyperlipidemia E78.5    Bronchitis, acute J20.9    Pneumonia due to infectious organism J18.9    ANY (acute kidney injury) ([facility] N17.9    Urinary retention R33.9    Diarrhea R19.7    NSTEMI (non-ST elevated myocardial infarction) ([facility] I21.4    Chest pain due to myocardial ischemia I25.9       Past Medical History:        Diagnosis Date    Acute upper respiratory infections of unspecified site     CAD (coronary artery disease)     COPD (chronic obstructive pulmonary disease) ([facility]     Empyema without mention of fistula     Esophageal reflux     Essential hypertension, benign     History of blood transfusion     Hx of blood clots     RLE DVT,     Localized osteoarthrosis not specified whether primary or secondary, unspecified site     Other and unspecified hyperlipidemia     Surgical or other procedure not carried out because of patient's decision     colonoscopy refused     Unspecified pleural effusion        Past Surgical History:        Procedure Laterality Date    LUNG DECORTICATION Right 2006    MRSA at that time. Social History:    Social History     Tobacco Use    Smoking status: Former Smoker     Quit date: 10/22/2010     Years since quittin.1    Smokeless tobacco: Never Used   Substance Use Topics    Alcohol use: No     Comment: Sober since 1989                                Counseling given: Not Answered      Vital Signs (Current):   Vitals:    21 0028 21 0608 21 0622 21 0736   BP: 106/61      Pulse: 76 80  72   Resp: 16   16   Temp: 98.3 °F (36.8 °C)      TempSrc: Axillary      SpO2: 91%      Weight:   190 lb 4.1 oz (86.3 kg)    Height:   6' (1.829 m)                                               BP Readings from Last 3 Encounters:   21 106/61   10/07/19 118/68   19 (!) 151/76       NPO Status: Time of last liquid consumption:                         Time of last solid consumption:                         Date of last liquid consumption: 21                        Date of last solid food consumption: 21    BMI:   Wt Readings from Last 3 Encounters:   21 190 lb 4.1 oz (86.3 kg)   10/07/19 190 lb (86.2 kg)   19 207 lb 12.8 oz (94.3 kg)     Body mass index is 25.8 kg/m².     CBC:   Lab Results   Component Value Date    WBC 7.5 2021    RBC 3.79 2021    HGB 11.1 2021    HCT 34.7 2021    MCV 91.6 2021    RDW 12.7 2021     2021       CMP:   Lab Results   Component Value Date     2021    K 4.3 2021     2021    CO2 18 12/27/2021    BUN 23 12/27/2021    CREATININE 0.96 12/27/2021    GFRAA >60 12/27/2021    LABGLOM >60 12/27/2021    GLUCOSE 97 12/27/2021    PROT 5.8 12/25/2021    CALCIUM 8.4 12/27/2021    BILITOT 0.66 12/25/2021    ALKPHOS 84 12/25/2021    AST 30 12/25/2021    ALT 20 12/25/2021       POC Tests: No results for input(s): POCGLU, POCNA, POCK, POCCL, POCBUN, POCHEMO, POCHCT in the last 72 hours. Coags:   Lab Results   Component Value Date    PROTIME 10.8 08/17/2019    INR 1.0 08/17/2019    APTT 64.3 12/26/2021       HCG (If Applicable): No results found for: PREGTESTUR, PREGSERUM, HCG, HCGQUANT     ABGs: No results found for: PHART, PO2ART, VHD8TSS, OAA4ERT, BEART, A9JNYQAI     Type & Screen (If Applicable):  No results found for: LABABO, LABRH    Drug/Infectious Status (If Applicable):  No results found for: HIV, HEPCAB    COVID-19 Screening (If Applicable):   Lab Results   Component Value Date    COVID19 Not Detected 12/23/2021         EKG 12/24/2021  Normal sinus rhythm  Right bundle branch block  Possible Lateral infarct , age undetermined  Inferior infarct (cited on or before 23-DEC-2021)  Abnormal ECG  When compared with ECG of 24-DEC-2021 14:45,  Premature supraventricular complexes are no longer Present  Borderline criteria for Lateral infarct are now Present        Echocardiogram: 08/2019  Normal left ventricle size, wall thickness and low normal function. Calculated EF via Reinoso's method is 50 %. Cannot rule out wall motion abnormalities due to poor visualization. Right atrial dilatation. Aortic valve is sclerotic but opens well. Trivial aortic insufficiency.        Coronary Angiography: 12/23/2021  LMCA: Normal 0% stenosis. LAD: Single stenosis. Lesion on Prox LAD: 80% stenosis. LCx: Multiple stenosis.     Lesion on 1st Ob Melodie: Proximal subsection. 90% stenosis.     Lesion on Mid CX: 80% stenosis.     Lesion on 2nd Ob Melodie: Ostial.80% stenosis.     RCA: Acute occlusion.       Lesion on Mid RCA: 100% stenosis 20 mm length reduced to 20%. Pre     procedure MARIFER 0 flow was noted. Post Procedure MARIFER III flow was     present. Poor runoff was present. The lesion was diagnosed as High Risk     (C). Coronary Tree Dominance: Right   LV Analysis LV function assessed as:Normal. . LVEF: 50%.    Conclusions:  Acute occlusion of mid RCA underwent PTCA with restoration of MARIFER III    flow. Multi-vessel Coronary Artery Disease. Normal LV systolic function. Anesthesia Evaluation    Airway: Mallampati: III  TM distance: >3 FB   Neck ROM: full  Mouth opening: > = 3 FB Dental:    (+) other and upper dentures  Comment: Full upper dentures--out    Pulmonary:   (+) pneumonia:  COPD:  decreased breath sounds,                             Cardiovascular:    (+) hypertension:, past MI:, CAD:, CABG/stent:,       ECG reviewed      Echocardiogram reviewed                  Neuro/Psych:   Negative Neuro/Psych ROS              GI/Hepatic/Renal:   (+) GERD:,           Endo/Other:                     Abdominal:             Vascular:   + DVT, . Other Findings:           Anesthesia Plan      general     ASA 4     (Chart review)  Induction: intravenous. arterial line, BIS, central line, CVP and MINH  MIPS: Postoperative ventilation. Anesthetic plan and risks discussed with patient and child/children. Use of blood products discussed with patient whom consented to blood products. Plan discussed with CRNA.                 Judie Medina MD   12/27/2021

## 2021-12-27 NOTE — BRIEF OP NOTE
Brief Postoperative Note      Patient: Jose Álvarez  YOB: 1950  MRN: [id number]    Date of Procedure: 12/27/2021    Pre-Op Diagnosis: CAD    Post-Op Diagnosis: Same       Procedure(s):  CORONARY ARTERY BYPASS X3; MINH PER ANESTHESIA    Surgeon(s):  Tyrone White MD    Assistant:  Surgical Assistant: Chayo Garg RN  First Assistant: Cosmo Gastelum RN    Anesthesia: General    Estimated Blood Loss (mL): N/A    Complications: None    Specimens:   ID Type Source Tests Collected by Time Destination   1 : PERICARDIAL FLUID  Body Fluid Fluid GRAM STAIN (Canceled), CULTURE, ANAEROBIC AND AEROBIC Tyrone White MD 12/27/2021 1136        Implants:  * No implants in log *      Drains:   Chest Tube 1 Left (Active)       Chest Tube 2 Right (Active)       [REMOVED] Urethral Catheter 16 fr (Removed)       Findings: CABG X 3 w/ LIMA to LAD, RSVG1 to OM, RSVG2 to distal RCA/RPDA junction palple pulses in all grafts with no EKG or Echo evidence of any ischemia.     Electronically signed by Tyrone White MD on 12/27/2021 at 2:30 PM

## 2021-12-27 NOTE — PLAN OF CARE
Problem: Falls - Risk of:  Goal: Will remain free from falls  Description: Will remain free from falls  Outcome: Met This Shift  Goal: Absence of physical injury  Description: Absence of physical injury  Outcome: Met This Shift     Problem: Cardiac Output - Decreased:  Goal: Hemodynamic stability will improve  Description: Hemodynamic stability will improve  Outcome: Met This Shift     Problem: Pain:  Goal: Pain level will decrease  Description: Pain level will decrease  Outcome: Met This Shift     Problem: Tissue Perfusion - Cardiopulmonary, Altered:  Goal: Absence of angina  Description: Absence of angina  Outcome: Met This Shift

## 2021-12-27 NOTE — PROGRESS NOTES
Physical Therapy         Physical Therapy Cancel Note      DATE: 2021    NAME: Salma Juárez  MRN: [id number]   : 1950      Patient not seen this date for Physical Therapy due to:    Surgery/Procedure: CABG today     Next Scheduled Treatment: Ck post op        Electronically signed by Heather Kimble PT on 2021 at 8:39 AM

## 2021-12-27 NOTE — PROGRESS NOTES
[facility])  Occupational Therapy Not Seen Note    DATE: 2021    NAME: Astrid Duong  MRN: [id number]   : 1950      Patient not seen this date for Occupational Therapy due to:    Surgery/Procedure: CABG today    Next Scheduled Treatment: Ck post op      Electronically signed by Fredrick Chapman OT on 2021 at 7:42 AM

## 2021-12-27 NOTE — CARE COORDINATION
CABG today, goal is home with daughter and [facility] [place], [facility].

## 2021-12-27 NOTE — ANESTHESIA PROCEDURE NOTES
Procedure Performed: MINH      Start Time:  12/27/2021 9:25 AM       End Time:      Preanesthesia Checklist:  Patient identified, IV assessed, risks and benefits discussed, monitors and equipment assessed, procedure being performed at surgeon's request and anesthesia consent obtained. General Procedure Information  Diagnostic Indications for Echo:  hemodynamic monitoring, suspected pericardial effusion and assessment of valve function  Physician Requesting Echo: Adonay Martel MD  Location performed:  OR  Intubated  Heart visualized  Probe Type:  3D  Modalities:  2D only, continuous wave Doppler, pulse wave Doppler and M-mode                   Name:  Berenice Zurita                                         Age:  80 y.o. MRN:  [id number]           Procedure (Scheduled):  MINH  Requested by Surgeon: Dr. Karen Rodriguez  Performed by Dr. Lawrence Lovelacearch: Transesophageal Echo    Today's Date: 12/27/2021    Patient seen and examined. History and Physical reviewed. Labs reviewed. MINH:    Structures:    LA: Normal  RA: Normal  RV: Normal size and function  LV: Normal size and function. Estimated LVEF 45-50 %  LV apex: No LV apical thrombus identified  Aorta: Mild atheromatous disease Asc Aorta, arch and descending Aorta  Percardium: Mild  pericardial effusion  JACOB: No appendage thrombus identified  Septum: No intracardiac shunt via color Doppler. Valves:    Mitral Valve: Structurally normal. moderate regurgitation is identified. Aortic Valve: The aortic valve is trileaflet and opens adequately. No stenosis is identified. mild regurgitiation is identified. Tricuspid valve: Structurally normal.  No regurgitation is identified. Pulmonary valve: Normal. No significant regurgitation  No valvular vegetations or thrombus identified. Summary:     1. A MINH was performed without complications. 2. LVEF 45-50 % preop.    3. Pre-op Valvular abnormalities Mild to moderate mitral regurgitation,  Mild aortic insufficiency  4. No Aortic dissection  5. Mild pericardial effusion. 6. Significant findings were communicated to CTS. After separation from CPB the following findings were obtained:  1. Significantly improved LV function, with EF estimated at 60%  2,  No RWMA. 3.  Mitral regurgitation remains mild to moderate,  Aortic insufficiency is mild. 4.  No aortic dissection. 5.  These findings were shared with CTS.     Electronically signed by Osiel Banuelos MD on 12/27/2021 at 9:55 AM

## 2021-12-28 ENCOUNTER — APPOINTMENT (OUTPATIENT)
Dept: GENERAL RADIOLOGY | Age: 71
DRG: 232 | End: 2021-12-28
Payer: OTHER GOVERNMENT

## 2021-12-28 PROBLEM — Z95.1 S/P CABG X 3: Chronic | Status: ACTIVE | Noted: 2021-12-27

## 2021-12-28 PROBLEM — R19.7 DIARRHEA: Status: RESOLVED | Noted: 2019-08-17 | Resolved: 2021-12-28

## 2021-12-28 LAB
ABO/RH: NORMAL
ANION GAP SERPL CALCULATED.3IONS-SCNC: 14 MMOL/L (ref 9–17)
ANTIBODY SCREEN: NEGATIVE
ARM BAND NUMBER: NORMAL
BLD PROD TYP BPU: NORMAL
BUN BLDV-MCNC: 17 MG/DL (ref 8–23)
BUN/CREAT BLD: ABNORMAL (ref 9–20)
CALCIUM SERPL-MCNC: 8.3 MG/DL (ref 8.6–10.4)
CHLORIDE BLD-SCNC: 104 MMOL/L (ref 98–107)
CO2: 20 MMOL/L (ref 20–31)
CREAT SERPL-MCNC: 1.04 MG/DL (ref 0.7–1.2)
CROSSMATCH RESULT: NORMAL
CULTURE: NORMAL
CULTURE: NORMAL
DISPENSE STATUS BLOOD BANK: NORMAL
EKG ATRIAL RATE: 76 BPM
EKG P AXIS: 74 DEGREES
EKG P-R INTERVAL: 210 MS
EKG Q-T INTERVAL: 442 MS
EKG QRS DURATION: 152 MS
EKG QTC CALCULATION (BAZETT): 497 MS
EKG R AXIS: 41 DEGREES
EKG T AXIS: 8 DEGREES
EKG VENTRICULAR RATE: 76 BPM
EXPIRATION DATE: NORMAL
GFR AFRICAN AMERICAN: >60 ML/MIN
GFR NON-AFRICAN AMERICAN: >60 ML/MIN
GFR SERPL CREATININE-BSD FRML MDRD: ABNORMAL ML/MIN/{1.73_M2}
GFR SERPL CREATININE-BSD FRML MDRD: ABNORMAL ML/MIN/{1.73_M2}
GLUCOSE BLD-MCNC: 104 MG/DL (ref 70–99)
GLUCOSE BLD-MCNC: 104 MG/DL (ref 75–110)
GLUCOSE BLD-MCNC: 75 MG/DL (ref 75–110)
GLUCOSE BLD-MCNC: 83 MG/DL (ref 75–110)
GLUCOSE BLD-MCNC: 88 MG/DL (ref 75–110)
GLUCOSE BLD-MCNC: 93 MG/DL (ref 75–110)
GLUCOSE BLD-MCNC: 95 MG/DL (ref 75–110)
GLUCOSE BLD-MCNC: 96 MG/DL (ref 75–110)
HCT VFR BLD CALC: 32.6 % (ref 40.7–50.3)
HEMOGLOBIN: 10.5 G/DL (ref 13–17)
INR BLD: 1
Lab: NORMAL
Lab: NORMAL
MAGNESIUM: 2.3 MG/DL (ref 1.6–2.6)
MCH RBC QN AUTO: 29 PG (ref 25.2–33.5)
MCHC RBC AUTO-ENTMCNC: 32.2 G/DL (ref 28.4–34.8)
MCV RBC AUTO: 90.1 FL (ref 82.6–102.9)
NRBC AUTOMATED: 0 PER 100 WBC
PDW BLD-RTO: 12.8 % (ref 11.8–14.4)
PLATELET # BLD: 170 K/UL (ref 138–453)
PMV BLD AUTO: 10.1 FL (ref 8.1–13.5)
POC ANGLE TEG W HEP: 69.6 DEG (ref 59–74)
POC ANGLE TEG: 69.5 DEG (ref 59–74)
POC ANGLE TEG: 70.8 DEG (ref 59–74)
POC EPL TEG W/HEP: ABNORMAL % (ref 0–15)
POC EPL TEG: ABNORMAL % (ref 0–15)
POC EPL TEG: NORMAL % (ref 0–15)
POC KINETICS TEG W HEP: 1.5 MIN (ref 1–3)
POC KINETICS TEG: 1.2 MIN (ref 1–3)
POC KINETICS TEG: 1.5 MIN (ref 1–3)
POC LY30(LYSIS) TEG W HEP: ABNORMAL % (ref 0–8)
POC LY30(LYSIS) TEG: ABNORMAL % (ref 0–8)
POC LY30(LYSIS) TEG: NORMAL % (ref 0–8)
POC MA(MAX CLOT) TEG: 71.8 MM (ref 55–74)
POC MA(MAX CLOT) TEG: 78.8 MM (ref 55–74)
POC MAX CLOT TEG W HEP: 77.8 MM (ref 55–74)
POC REACTION TIME TEG W HEP: 8 MIN (ref 4–9)
POC REACTION TIME TEG: 6.2 MIN (ref 4–9)
POC REACTION TIME TEG: 7.9 MIN (ref 4–9)
POTASSIUM SERPL-SCNC: 4.7 MMOL/L (ref 3.7–5.3)
PROTHROMBIN TIME: 10.7 SEC (ref 9.1–12.3)
RBC # BLD: 3.62 M/UL (ref 4.21–5.77)
SODIUM BLD-SCNC: 138 MMOL/L (ref 135–144)
SPECIMEN DESCRIPTION: NORMAL
SPECIMEN DESCRIPTION: NORMAL
TEG COMMENT: ABNORMAL
TEG COMMENT: ABNORMAL
TEG COMMENT: NORMAL
TRANSFUSION STATUS: NORMAL
UNIT DIVISION: 0
UNIT NUMBER: NORMAL
WBC # BLD: 15.7 K/UL (ref 3.5–11.3)

## 2021-12-28 PROCEDURE — 82947 ASSAY GLUCOSE BLOOD QUANT: CPT

## 2021-12-28 PROCEDURE — 99232 SBSQ HOSP IP/OBS MODERATE 35: CPT | Performed by: INTERNAL MEDICINE

## 2021-12-28 PROCEDURE — 97535 SELF CARE MNGMENT TRAINING: CPT

## 2021-12-28 PROCEDURE — 80048 BASIC METABOLIC PNL TOTAL CA: CPT

## 2021-12-28 PROCEDURE — APPNB30 APP NON BILLABLE TIME 0-30 MINS: Performed by: NURSE PRACTITIONER

## 2021-12-28 PROCEDURE — 6360000002 HC RX W HCPCS: Performed by: PHYSICIAN ASSISTANT

## 2021-12-28 PROCEDURE — 6360000002 HC RX W HCPCS: Performed by: NURSE PRACTITIONER

## 2021-12-28 PROCEDURE — 97162 PT EVAL MOD COMPLEX 30 MIN: CPT

## 2021-12-28 PROCEDURE — 2140000001 HC CVICU INTERMEDIATE R&B

## 2021-12-28 PROCEDURE — 94761 N-INVAS EAR/PLS OXIMETRY MLT: CPT

## 2021-12-28 PROCEDURE — 85027 COMPLETE CBC AUTOMATED: CPT

## 2021-12-28 PROCEDURE — 99024 POSTOP FOLLOW-UP VISIT: CPT | Performed by: NURSE PRACTITIONER

## 2021-12-28 PROCEDURE — 97530 THERAPEUTIC ACTIVITIES: CPT

## 2021-12-28 PROCEDURE — 83735 ASSAY OF MAGNESIUM: CPT

## 2021-12-28 PROCEDURE — 97116 GAIT TRAINING THERAPY: CPT

## 2021-12-28 PROCEDURE — 6370000000 HC RX 637 (ALT 250 FOR IP): Performed by: PHYSICIAN ASSISTANT

## 2021-12-28 PROCEDURE — 2580000003 HC RX 258: Performed by: PHYSICIAN ASSISTANT

## 2021-12-28 PROCEDURE — 6370000000 HC RX 637 (ALT 250 FOR IP): Performed by: STUDENT IN AN ORGANIZED HEALTH CARE EDUCATION/TRAINING PROGRAM

## 2021-12-28 PROCEDURE — 85610 PROTHROMBIN TIME: CPT

## 2021-12-28 PROCEDURE — P9041 ALBUMIN (HUMAN),5%, 50ML: HCPCS | Performed by: PHYSICIAN ASSISTANT

## 2021-12-28 PROCEDURE — 6370000000 HC RX 637 (ALT 250 FOR IP): Performed by: NURSE PRACTITIONER

## 2021-12-28 PROCEDURE — 97166 OT EVAL MOD COMPLEX 45 MIN: CPT

## 2021-12-28 PROCEDURE — 2700000000 HC OXYGEN THERAPY PER DAY

## 2021-12-28 PROCEDURE — 71045 X-RAY EXAM CHEST 1 VIEW: CPT

## 2021-12-28 RX ORDER — PANTOPRAZOLE SODIUM 40 MG/1
40 TABLET, DELAYED RELEASE ORAL
Status: DISCONTINUED | OUTPATIENT
Start: 2021-12-28 | End: 2021-12-31 | Stop reason: HOSPADM

## 2021-12-28 RX ORDER — KETOROLAC TROMETHAMINE 30 MG/ML
15 INJECTION, SOLUTION INTRAMUSCULAR; INTRAVENOUS ONCE
Status: COMPLETED | OUTPATIENT
Start: 2021-12-28 | End: 2021-12-28

## 2021-12-28 RX ORDER — FUROSEMIDE 10 MG/ML
20 INJECTION INTRAMUSCULAR; INTRAVENOUS ONCE
Status: COMPLETED | OUTPATIENT
Start: 2021-12-28 | End: 2021-12-28

## 2021-12-28 RX ORDER — POTASSIUM CHLORIDE 20 MEQ/1
40 TABLET, EXTENDED RELEASE ORAL PRN
Status: DISCONTINUED | OUTPATIENT
Start: 2021-12-28 | End: 2021-12-31 | Stop reason: HOSPADM

## 2021-12-28 RX ORDER — TAMSULOSIN HYDROCHLORIDE 0.4 MG/1
0.4 CAPSULE ORAL DAILY
Status: DISCONTINUED | OUTPATIENT
Start: 2021-12-28 | End: 2021-12-31 | Stop reason: HOSPADM

## 2021-12-28 RX ORDER — POTASSIUM CHLORIDE 7.45 MG/ML
10 INJECTION INTRAVENOUS PRN
Status: DISCONTINUED | OUTPATIENT
Start: 2021-12-28 | End: 2021-12-31 | Stop reason: HOSPADM

## 2021-12-28 RX ORDER — CARVEDILOL 3.12 MG/1
3.12 TABLET ORAL 2 TIMES DAILY WITH MEALS
Status: DISCONTINUED | OUTPATIENT
Start: 2021-12-28 | End: 2021-12-29

## 2021-12-28 RX ADMIN — FENTANYL CITRATE 50 MCG: 50 INJECTION, SOLUTION INTRAMUSCULAR; INTRAVENOUS at 04:00

## 2021-12-28 RX ADMIN — DEXTROSE MONOHYDRATE 2000 MG: 50 INJECTION, SOLUTION INTRAVENOUS at 05:54

## 2021-12-28 RX ADMIN — AMIODARONE HYDROCHLORIDE 200 MG: 200 TABLET ORAL at 20:34

## 2021-12-28 RX ADMIN — PANTOPRAZOLE SODIUM 40 MG: 40 TABLET, DELAYED RELEASE ORAL at 08:30

## 2021-12-28 RX ADMIN — ALCOHOL 1 TABLET: 70.47 GEL TOPICAL at 08:30

## 2021-12-28 RX ADMIN — ACETAMINOPHEN 650 MG: 325 TABLET ORAL at 20:34

## 2021-12-28 RX ADMIN — TAMSULOSIN HYDROCHLORIDE 0.4 MG: 0.4 CAPSULE ORAL at 08:30

## 2021-12-28 RX ADMIN — ACETAMINOPHEN 650 MG: 325 TABLET ORAL at 12:43

## 2021-12-28 RX ADMIN — ALBUMIN (HUMAN) 25 G: 12.5 INJECTION, SOLUTION INTRAVENOUS at 13:10

## 2021-12-28 RX ADMIN — DEXTROSE MONOHYDRATE 2000 MG: 50 INJECTION, SOLUTION INTRAVENOUS at 23:15

## 2021-12-28 RX ADMIN — Medication 81 MG: at 12:12

## 2021-12-28 RX ADMIN — AMIODARONE HYDROCHLORIDE 200 MG: 200 TABLET ORAL at 08:30

## 2021-12-28 RX ADMIN — MUPIROCIN: 20 OINTMENT TOPICAL at 12:12

## 2021-12-28 RX ADMIN — CARVEDILOL 3.12 MG: 3.12 TABLET, FILM COATED ORAL at 08:30

## 2021-12-28 RX ADMIN — FUROSEMIDE 20 MG: 10 INJECTION, SOLUTION INTRAMUSCULAR; INTRAVENOUS at 20:34

## 2021-12-28 RX ADMIN — OXYCODONE HYDROCHLORIDE AND ACETAMINOPHEN 1 TABLET: 5; 325 TABLET ORAL at 05:54

## 2021-12-28 RX ADMIN — KETOROLAC TROMETHAMINE 15 MG: 30 INJECTION, SOLUTION INTRAMUSCULAR at 08:29

## 2021-12-28 RX ADMIN — CARVEDILOL 3.12 MG: 3.12 TABLET, FILM COATED ORAL at 20:33

## 2021-12-28 RX ADMIN — DESMOPRESSIN ACETATE 40 MG: 0.2 TABLET ORAL at 20:34

## 2021-12-28 RX ADMIN — MUPIROCIN: 20 OINTMENT TOPICAL at 20:34

## 2021-12-28 RX ADMIN — SODIUM CHLORIDE, PRESERVATIVE FREE 10 ML: 5 INJECTION INTRAVENOUS at 20:34

## 2021-12-28 RX ADMIN — DEXTROSE MONOHYDRATE 2000 MG: 50 INJECTION, SOLUTION INTRAVENOUS at 13:10

## 2021-12-28 RX ADMIN — CLOPIDOGREL 75 MG: 75 TABLET, FILM COATED ORAL at 08:30

## 2021-12-28 RX ADMIN — POLYETHYLENE GLYCOL 3350 17 G: 17 POWDER, FOR SOLUTION ORAL at 08:30

## 2021-12-28 ASSESSMENT — PAIN SCALES - GENERAL
PAINLEVEL_OUTOF10: 6
PAINLEVEL_OUTOF10: 5
PAINLEVEL_OUTOF10: 2
PAINLEVEL_OUTOF10: 3
PAINLEVEL_OUTOF10: 7

## 2021-12-28 ASSESSMENT — ENCOUNTER SYMPTOMS: TACHYPNEA: 1

## 2021-12-28 ASSESSMENT — PAIN DESCRIPTION - PAIN TYPE: TYPE: SURGICAL PAIN

## 2021-12-28 ASSESSMENT — PAIN DESCRIPTION - DESCRIPTORS: DESCRIPTORS: ACHING

## 2021-12-28 ASSESSMENT — PAIN DESCRIPTION - LOCATION: LOCATION: CHEST;INCISION

## 2021-12-28 ASSESSMENT — PAIN DESCRIPTION - FREQUENCY: FREQUENCY: CONTINUOUS

## 2021-12-28 NOTE — PROGRESS NOTES
[facility]  Internal Medicine Teaching Residency Program  Inpatient Daily Progress Note  ______________________________________________________________________________    Patient: Danilo Diaz  YOB: 1950   DVZ:[id number]    Acct: [de-identified]     Room: [address] [place] [address]  Admit date: 12/23/2021  Today's date: 12/28/21  Number of days in the hospital: 5    SUBJECTIVE   Admitting Diagnosis: NSTEMI (non-ST elevated myocardial infarction) ([facility] [address]  CC: Chest pain    - Pt examined at bedside. Chart & results reviewed. - Post-op day 1 of three-vessel CABG  - No acute events overnight  - Diarrhea resolved  - Patient has new scrotal swelling and erythema  - Afebrile  - Vital signs stable    Plan for today   -Follow-up on CTS recommendations  -Continue to monitor scrotal swelling and erythema    ROS:  Constitutional:  negative for chills, fevers, sweats  Respiratory:  negative for cough, dyspnea on exertion, hemoptysis, shortness of breath, wheezing  Cardiovascular:  negative for chest pain, chest pressure/discomfort, lower extremity edema, palpitations  Gastrointestinal:  negative for abdominal pain, constipation, diarrhea, nausea, vomiting  Neurological:  negative for dizziness, headache    BRIEF HISTORY     The patient is a pleasant 71 y. o. male with a PMHx significant for CAD (unstable angina), COPD, empyema, GERD, HTN, history of blood clots and HLD who presents with a chief complaint of chest pain. Pt states he has also experienced nausea and diaphoresis since Tuesday this week. Pt stated that the pain woke him from sleep. He described the pain and an elephant stepping on his chest and rated the pain 9/10 in intensity radiating up the neck and down both of his arms. Pt denies prior stents. Pt received ASA and nitroglycerin which did not alleviate the pain. Pt denied headache, blurry vision, shortness of breath, abdominal pain or weakness.  Troponin on arrival 2,699 and trended up to 6,203. WBC elevated at 20.2 decreased to 19.1. Pt was taken for cardiac catheterization shortly after arrival and underwent PTCA.     OBJECTIVE     Vital Signs:  /64   Pulse 86   Temp 98.6 °F (37 °C)   Resp 20   Ht 6' (1.829 m)   Wt 190 lb 4.1 oz (86.3 kg)   SpO2 99%   BMI 25.80 kg/m²     Temp (24hrs), Av °F (35.6 °C), Min:91.6 °F (33.1 °C), Max:98.6 °F (37 °C)    In: 3216.4   Out: 3160 [Urine:2150]    Physical Exam:  Physical Exam  Vitals and nursing note reviewed. Constitutional:       Appearance: Normal appearance. HENT:      Head: Normocephalic and atraumatic. Nose: Nose normal.      Mouth/Throat:      Mouth: Mucous membranes are moist.      Pharynx: Oropharynx is clear. Eyes:      Extraocular Movements: Extraocular movements intact. Conjunctiva/sclera: Conjunctivae normal.      Pupils: Pupils are equal, round, and reactive to light. Cardiovascular:      Rate and Rhythm: Normal rate and regular rhythm. Pulses: Normal pulses. Heart sounds: Normal heart sounds. No murmur heard. No friction rub. No gallop. Pulmonary:      Effort: Pulmonary effort is normal. No respiratory distress. Breath sounds: Normal breath sounds. No stridor. No wheezing, rhonchi or rales. Chest:      Chest wall: Tenderness present. Abdominal:      General: Abdomen is flat. Bowel sounds are normal. There is no distension. Palpations: Abdomen is soft. There is no mass. Tenderness: There is no abdominal tenderness. There is no guarding or rebound. Hernia: No hernia is present. Musculoskeletal:         General: No swelling, tenderness, deformity or signs of injury. Normal range of motion. Cervical back: Normal range of motion. Right lower leg: No edema. Left lower leg: No edema. Skin:     General: Skin is warm. Neurological:      General: No focal deficit present. Mental Status: He is alert and oriented to person, place, and time. Mental status is at baseline. Psychiatric:         Mood and Affect: Mood normal.         Behavior: Behavior normal.         Thought Content:  Thought content normal.         Judgment: Judgment normal.           Medications:  Scheduled Medications:    sodium chloride flush  10 mL IntraVENous 2 times per day    aspirin  81 mg Oral Daily    clopidogrel  75 mg Oral Daily    amiodarone  200 mg Oral BID    chlorhexidine  15 mL Mouth/Throat BID    mupirocin   Nasal BID    multivitamin  1 tablet Oral Daily with breakfast    metoprolol tartrate  25 mg Oral BID    atorvastatin  40 mg Oral Nightly    ceFAZolin (ANCEF) IVPB  2,000 mg IntraVENous Q8H    insulin glargine  0.15 Units/kg SubCUTAneous Nightly    polyethylene glycol  17 g Oral Daily     Continuous Infusions:    sodium chloride      insulin 0.92 Units/hr (12/28/21 0055)    dextrose      norepinephrine Stopped (12/27/21 1445)    propofol Stopped (12/27/21 1606)     PRN Medicationssodium chloride flush, 10 mL, PRN  sodium chloride, 25 mL, PRN  ondansetron, 4 mg, Q8H PRN  acetaminophen, 650 mg, Q4H PRN  acetaminophen, 650 mg, Q4H PRN  oxyCODONE-acetaminophen, 1 tablet, Q4H PRN   Or  oxyCODONE-acetaminophen, 2 tablet, Q4H PRN  fentanNYL, 25 mcg, Q1H PRN   Or  fentanNYL, 50 mcg, Q1H PRN  hydrALAZINE, 5 mg, Q5 Min PRN  sodium bicarbonate, 50 mEq, Q30 Min PRN  diphenhydrAMINE, 25 mg, Nightly PRN  calcium chloride IVPB, 1,000 mg, PRN  magnesium sulfate, 1,000 mg, PRN  potassium chloride, 20 mEq, PRN  albumin human, 25 g, PRN  glucose, 15 g, PRN  dextrose, 12.5 g, PRN  glucagon (rDNA), 1 mg, PRN  dextrose, 100 mL/hr, PRN  bisacodyl, 5 mg, Daily PRN  bisacodyl, 10 mg, Daily PRN        Diagnostic Labs:  CBC:   Recent Labs     12/26/21  0229 12/27/21  0446 12/27/21  1443   WBC 9.3 7.5 16.6*   RBC 4.17* 3.79* 3.70*   HGB 11.9* 11.1* 11.0*   HCT 37.9* 34.7* 33.1*   MCV 90.9 91.6 89.5   RDW 12.8 12.7 12.6    179 130*     BMP:   Recent Labs     12/26/21  0229 12/26/21  0229 12/27/21  0446 12/27/21  1428 12/27/21  1443   *  --  136  --  137   K 4.3  --  4.3  --  4.4     --  107  --  106   CO2 16*  --  18*  --  20   BUN 28*  --  23  --  20   CREATININE 1.22*   < > 0.96 1.04 0.89    < > = values in this interval not displayed. BNP: No results for input(s): BNP in the last 72 hours. PT/INR:   Recent Labs     12/27/21  1443   PROTIME 11.4   INR 1.1     APTT:   Recent Labs     12/25/21  1749 12/26/21  0029 12/27/21  1443   APTT 62.5* 64.3* 30.3     CARDIAC ENZYMES: No results for input(s): CKMB, CKMBINDEX, TROPONINI in the last 72 hours. Invalid input(s): CKTOTAL;3  FASTING LIPID PANEL:  Lab Results   Component Value Date    CHOL 116 12/24/2021    HDL 60 12/24/2021    TRIG 52 12/24/2021     LIVER PROFILE:   Recent Labs     12/25/21  0440   AST 30   ALT 20   BILITOT 0.66   ALKPHOS 84      MICROBIOLOGY:   Lab Results   Component Value Date/Time    CULTURE PENDING 12/27/2021 11:59 AM       Imaging:    XR CHEST PORTABLE    Result Date: 12/27/2021  Postsurgical changes with support tubes and lines as above. No appreciable extrapleural air. XR CHEST PORTABLE    Result Date: 12/24/2021  No acute process detected. XR CHEST PORTABLE    Result Date: 12/23/2021  New variable nearly diffuse interstitial thickening concerning for viral process. CT CHEST PULMONARY EMBOLISM W CONTRAST    Result Date: 12/23/2021  1. No evidence of pulmonary embolism. 2.  Findings compatible with scarring in the left upper lobe and lung bases. No convincing evidence for acute airspace disease. 3.  Mild emphysematous disease. RECOMMENDATIONS: Unavailable       ASSESSMENT & PLAN     Assessment and Plan:    Principal Problem:    NSTEMI (non-ST elevated myocardial infarction) ([facility] [address]  Active Problems:    Essential hypertension, benign    Diarrhea    Chest pain due to myocardial ischemia  Resolved Problems:    * No resolved hospital problems.  *      IMPRESSION  This is a 71

## 2021-12-28 NOTE — PROGRESS NOTES
[facility]  Daily Progress Note    Surgeon: Dr Madhavi Daly   S/P:  CABG x3  POD#: 1  EF: 50%      Subjective:  Mr. Bree Mata feels well today. Patient complains of pain/edema to testicles. Testicles elevated, will monitor. Pain is controlled on current medication regimen. OOBTC and ambulating. Last BMs prior to aurgery. Bowel regimen in place. Good appetite. Denies chest pain or shortness of breath. Plan of care reviewed and questions answered. Physical Exam  Vital Signs: /68   Pulse 90   Temp 98.6 °F (37 °C)   Resp 18   Ht 6' (1.829 m)   Wt 195 lb 5.2 oz (88.6 kg)   SpO2 99%   BMI 26.49 kg/m²  O2 Flow Rate (L/min): 4 L/min   Admit Weight: Weight: 192 lb (87.1 kg)   WEIGHTWeight: 195 lb 5.2 oz (88.6 kg)     General: alert and oriented to person, place and time, well-developed and well-nourished, in no acute distress. Up in chair  Heart: Normal S1 and S2.  Regular rhythm. No murmurs, gallops, or rubs. Pacing Wires Yes   Lungs: clear to auscultation bilaterally and diminished breath sounds bibasilar  Abdomen: soft, non tender, non distended, BSx4  Extremities: 1+, pitting and generalized edema  Wounds: clean and dry, healing appropriately.      Scheduled Meds:    carvedilol  3.125 mg Oral BID WC    pantoprazole  40 mg Oral QAM AC    tamsulosin  0.4 mg Oral Daily    ketorolac  15 mg IntraVENous Once    sodium chloride flush  10 mL IntraVENous 2 times per day    aspirin  81 mg Oral Daily    clopidogrel  75 mg Oral Daily    amiodarone  200 mg Oral BID    chlorhexidine  15 mL Mouth/Throat BID    mupirocin   Nasal BID    multivitamin  1 tablet Oral Daily with breakfast    atorvastatin  40 mg Oral Nightly    ceFAZolin (ANCEF) IVPB  2,000 mg IntraVENous Q8H    insulin glargine  0.15 Units/kg SubCUTAneous Nightly    polyethylene glycol  17 g Oral Daily     Continuous Infusions:    sodium chloride      insulin 1.08 Units/hr (12/28/21 0556)    dextrose Data:  CBC:   Recent Labs     12/27/21  0446 12/27/21  1443 12/28/21  0416   WBC 7.5 16.6* 15.7*   HGB 11.1* 11.0* 10.5*   HCT 34.7* 33.1* 32.6*   MCV 91.6 89.5 90.1    130* 170     BMP:   Recent Labs     12/27/21  0446 12/27/21  1428 12/27/21  1443 12/28/21  0416     --  137 138   K 4.3  --  4.4 4.7     --  106 104   CO2 18*  --  20 20   BUN 23  --  20 17   CREATININE 0.96 1.04 0.89 1.04     PT/INR:   Recent Labs     12/27/21  1443 12/28/21  0416   PROTIME 11.4 10.7   INR 1.1 1.0     APTT:   Recent Labs     12/25/21  1749 12/26/21  0029 12/27/21  1443   APTT 62.5* 64.3* 30.3       Chest X-Ray:  Imaging reviewed, report pending    I/O:  I/O last 3 completed shifts: In: 4044.4 [I.V.:3244.4; Blood:800]  Out: 3650 [Urine:2535; Blood:775; Chest Tube:340]      Assessment:  · Multivessel CAD s/p CABG x3  · POD 1  · No complication  · Acute blood loss anemia secondary to above  · NSTEMI  · Emphysema   ? Pulmonology following. Cleared for surgery. · Essential hypertension  · Hyperlipidemia  · Urinary retention  · Chronic, on Flomax at home      Plan:    Remains inpatient on telemetry, level of care is currently Cardiac CCU    Monitor vitals closely including continuous pulse oximetry   Oxygen as needed via nasal cannula to maintain SpO2 > 92%   Chest x-ray daily   Maintain chest tubes to continuous wall suction   Discontinue river for strict I&Os  o Monitor closely for retention issues   Encourage incentive spirometry    Monitor CBC, BMP, INR and Mag daily   Restart home dose of Flomax   Currently taking carvedilol,    Patient is on BB, ASA, Statin therapy per protocol   ?  ACE-I/ARB not indicated with EF of 50%   Amiodarone 200 mg twice daily   Percocet for pain control   SCDs while stationary    Protonix for GI prophylaxis   Replace electrolytes as needed per sliding scale and recheck per policy   PT/OT evalutation for discharge recommendation and ambulation 3x daily   Case Management consult for discharge planning        The above recommendations including medications and orders were discussed and agreed upon with Dr. Katharina Snyder, the attending on service for the cardiothoracic surgery group today. Electronically signed by CHRISTOPH Palacios CNP on 12/28/2021 at 8:11 AM    On this date 12/28/2021 I have spent 28 minutes reviewing previous notes, test results and face to face with the patient discussing the diagnosis and importance of compliance with the treatment plan as well as documenting on the day of the visit. At least 50% of the time documented was spent with the patient to provide counseling and/or coordination of care. This note was created with the assistance of a speech-recognition program.  Although the intention is to generate a document that actually reflects the content of the visit, no guarantees can be provided that every mistake has been identified and corrected by editing. Note was updated later by me after  physical examination and  completion of the assessment.

## 2021-12-28 NOTE — PROGRESS NOTES
Occupational Therapy   Occupational Therapy Initial Assessment  Date: 2021   Patient Name: Antoine Crabtree  MRN: [id number]     : 1950    Date of Service: 2021     Chief Complaint   Patient presents with    Chest Pain       Discharge Recommendations:  Patient would benefit from continued therapy after discharge  OT Equipment Recommendations  Equipment Needed: Yes  Mobility Devices: ADL Assistive Devices  ADL Assistive Devices: Shower Chair with back    Assessment   Performance deficits / Impairments: Decreased functional mobility ; Decreased ADL status; Decreased endurance;Decreased high-level IADLs  Assessment: Pt seated in bedside chair upon arrival. Pt completed functional sit<>stand transfers with CGA and functional mobility with CGA. Use of RW. Good use of heart hugger throughout. Completed grooming task IND at sinkside and toileting with CGA. Pt is expected to require skilled OT services during their acute hospitalization stay to address the above noted deficits through skilled occupational therapy intervention for promotion of increased independence throughout ADLs, IADLs and functional mobility tasks. Pt is currently unsafe to return to their prior living arrangements without 24/ assist/supervision to safely engage in all aspects of ADLs, IADLs and functional mobility tasks. Prognosis: Good  Decision Making: Medium Complexity  Patient Education: Educated on OT role, OT POC, activity promotion, use of heart hugger, sternal precautions, transfer training-good return  REQUIRES OT FOLLOW UP: Yes  Activity Tolerance  Activity Tolerance: Patient Tolerated treatment well  Safety Devices  Safety Devices in place: Yes  Type of devices: Gait belt;Call light within reach;Nurse notified; Left in chair  Restraints  Initially in place: No           Patient Diagnosis(es): The primary encounter diagnosis was Chest pain due to myocardial ischemia, unspecified ischemic chest pain type.  Diagnoses of NSTEMI (non-ST elevated myocardial infarction) ([facility] [address] and S/P CABG x 3 were also pertinent to this visit. has a past medical history of Acute upper respiratory infections of unspecified site, CAD (coronary artery disease), COPD (chronic obstructive pulmonary disease) ([facility] [address] Empyema without mention of fistula, Esophageal reflux, Essential hypertension, benign, History of blood transfusion, Hx of blood clots, Localized osteoarthrosis not specified whether primary or secondary, unspecified site, Other and unspecified hyperlipidemia, Surgical or other procedure not carried out because of patient's decision, and Unspecified pleural effusion. has a past surgical history that includes Lung decortication (Right, 2006) and Coronary artery bypass graft (N/A, 12/27/2021). Restrictions  Restrictions/Precautions  Restrictions/Precautions: Surgical Protocols,Cardiac  Required Braces or Orthoses?: Yes  Required Braces or Orthoses  Other: Heart Hugger Brace  Position Activity Restriction  Sternal Precautions: 5# Lifting Restrictions  Other position/activity restrictions: 12/27 CABG x 3; up in chair for meals, Ambulate pt, chest tubes    Subjective   General  Patient assessed for rehabilitation services?: Yes  Family / Caregiver Present: No  General Comment  Comments: RN ok'd for OT yma this PM. Pt agreeable to session, pleasent/cooperative throughout. Social/Functional History  Social/Functional History  Lives With: Alone  Type of Home: House  Home Layout: Two level,Bed/Bath upstairs,1/2 bath on main level  Home Access: Stairs to enter without rails  Entrance Stairs - Number of Steps: 1 small threshold step  Bathroom Shower/Tub: Tub/Shower unit  Bathroom Toilet: Standard  Bathroom Equipment: Grab bars in shower,Hand-held shower  Home Equipment: Cane (Does not use AD at baseline.  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  Additional Comments: No AD prior to admit       Objective        Orientation  Overall Orientation Status: Within Functional Limits     Balance  Sitting Balance: Stand by assistance (Seated edge of recliner unsupported for education and ROM; seated at toilet unsupported; ~20 min)  Standing Balance: Contact guard assistance  Standing Balance  Time: 5 min  Activity: standing for ADL tasks sinkside, static standing edge of bedside chair    Functional Mobility  Functional - Mobility Device: Rolling Walker  Activity: To/from bathroom  Assist Level: Contact guard assistance    Toilet Transfers  Toilet - Technique: Ambulating  Equipment Used: Standard toilet  Toilet Transfer: Contact guard assistance  Toilet Transfers Comments: Good  use of heart hugger    ADL  Feeding: Independent  Grooming: Independent (OT faciliated oral hygiene standing sinkside)  UE Bathing: Stand by assistance;Setup; Increased time to complete  LE Bathing: Minimal assistance; Increased time to complete  UE Dressing: Minimal assistance; Increased time to complete  LE Dressing: Minimal assistance; Increased time to complete  Toileting: Contact guard assistance (Pt reported needing to use restroom for BM. Unable to go, but completed clothing management standing with CGA.)     Tone RUE  RUE Tone: Normotonic  Tone LUE  LUE Tone: Normotonic  Coordination  Movements Are Fluid And Coordinated: Yes     Bed mobility  Comment: Pt seated at bedside recliner upon entry and exit.      Transfers  Sit to stand: Contact guard assistance  Stand to sit: Contact guard assistance  Transfer Comments: Good use of heart hugger Cognition  Overall Cognitive Status: WFL        Sensation  Overall Sensation Status: Impaired (Numbness/tingling in B hands)        LUE AROM (degrees)  LUE AROM : WFL  Left Hand AROM (degrees)  Left Hand AROM: WFL  RUE AROM (degrees)  RUE AROM : WFL  Right Hand AROM (degrees)  Right Hand AROM: WFL  LUE Strength  LUE Strength Comment: Did not assess d/t sternal precautions  RUE Strength  RUE Strength Comment: Did not assess d/t sternal precautions          Plan   Plan  Times per week: 4-6x/wk (CABGx3)  Current Treatment Recommendations: Safety Education & Training,Patient/Caregiver Education & Training,Self-Care / ADL,Functional Mobility Training,Equipment Evaluation, Education, & procurement,Home Management Training,Endurance Training      AM-PAC Score        AM-PAC Inpatient Daily Activity Raw Score: 20 (12/28/21 1711)  AM-PAC Inpatient ADL T-Scale Score : 42.03 (12/28/21 1711)  ADL Inpatient CMS 0-100% Score: 38.32 (12/28/21 1711)  ADL Inpatient CMS G-Code Modifier : Annelise Combs (12/28/21 1711)    Goals  Short term goals  Time Frame for Short term goals: By discharge, pt will:  Short term goal 1: Demo functional sit<>stand transfers and functional mobility with SUP and LRD PRN  Short term goal 2: Demo +10 minutes of dynamic standing balance with UE unilateral relase at SBA throughout ADLs  Short term goal 3: Demo UB ADLs with Mod IND  Short term goal 4: Demo LB ADLs/toileting with SUP and DME PRN  Short term goal 5: Demo adherence to sternal precautions throughout all functional mobility/ADLs with 100% accuracy       Therapy Time   Individual Concurrent Group Co-treatment   Time In 1437         Time Out 1515         Minutes 38         Timed Code Treatment Minutes: 30 Minutes       DESTINY Vargas/L

## 2021-12-28 NOTE — ANESTHESIA POSTPROCEDURE EVALUATION
Department of Anesthesiology  Postprocedure Note    Patient: Karla James  MRN: [id number]  YOB: 1950  Date of evaluation: 12/27/2021  Time:  11:42 PM     Procedure Summary     Date: 12/27/21 Room / Location: [address]    Anesthesia Start: 8367 Anesthesia Stop: 1788    Procedure: CORONARY ARTERY BYPASS X3; MINH PER ANESTHESIA (N/A ) Diagnosis: (CAD)    Surgeons: Kishan Ernst MD Responsible Provider: Unique Rashid MD    Anesthesia Type: general ASA Status: 4          Anesthesia Type: general    Deloris Phase I:      Deloris Phase II:      Last vitals: Reviewed and per EMR flowsheets. POST-OP ANESTHESIA NOTE       /61   Pulse 83   Temp 98.6 °F (37 °C)   Resp 20   Ht 6' (1.829 m)   Wt 190 lb 4.1 oz (86.3 kg)   SpO2 97%   BMI 25.80 kg/m²    Pain Assessment: 0-10  Pain Level: 4       Anesthesia Post Evaluation    Patient location during evaluation: ICU  Patient participation: complete - patient participated  Level of consciousness: awake  Pain score: 4  Airway patency: patent  Nausea & Vomiting: no nausea and no vomiting  Complications: no  Cardiovascular status: hemodynamically stable  Respiratory status: acceptable  Hydration status: stable    Patient was extubated without problems earlier this evening.,  No apparent anesthesia related problems.

## 2021-12-28 NOTE — PROGRESS NOTES
Dr. Zena Dyson updated on patients intermittent air leak in chest tube chamber. No new orders.  Will continue to monitor    Regan Mckeon RN

## 2021-12-28 NOTE — PLAN OF CARE
Problem: Falls - Risk of:  Goal: Will remain free from falls  Description: Will remain free from falls  12/27/2021 1933 by Felipe Coles RN  Outcome: Ongoing     Problem: Falls - Risk of:  Goal: Absence of physical injury  Description: Absence of physical injury  12/27/2021 1933 by Felipe Coles RN  Outcome: Ongoing     Problem: Discharge Planning:  Goal: Discharged to appropriate level of care  Description: Discharged to appropriate level of care  12/27/2021 1933 by Felipe Coles RN  Outcome: Ongoing     Problem: Cardiac Output - Decreased:  Goal: Hemodynamic stability will improve  Description: Hemodynamic stability will improve  12/27/2021 1933 by Felipe Coles RN  Outcome: Ongoing     Problem: Serum Glucose Level - Abnormal:  Goal: Ability to maintain appropriate glucose levels will improve  Description: Ability to maintain appropriate glucose levels will improve  12/27/2021 1933 by Felipe Coles RN  Outcome: Ongoing     Problem: Pain:  Goal: Pain level will decrease  Description: Pain level will decrease  12/27/2021 1933 by Felipe Coles RN  Outcome: Ongoing     Problem: Pain:  Goal: Control of acute pain  Description: Control of acute pain  12/27/2021 1933 by Felipe Coles RN  Outcome: Ongoing     Problem: Pain:  Goal: Control of chronic pain  Description: Control of chronic pain  12/27/2021 1933 by Felipe Coles RN  Outcome: Ongoing     Problem: Tissue Perfusion - Cardiopulmonary, Altered:  Goal: Absence of angina  Description: Absence of angina  12/27/2021 1933 by Felipe Coles RN  Outcome: Ongoing     Problem: Tissue Perfusion - Cardiopulmonary, Altered:  Goal: Circulatory function within specified parameters  Description: Circulatory function within specified parameters  12/27/2021 1933 by Felipe Coles RN  Outcome: Ongoing     Problem: Tissue Perfusion - Cardiopulmonary, Altered:  Goal: Hemodynamic stability will improve  Description: Hemodynamic stability will improve  12/27/2021 1933 by May Garcia RN  Outcome: Ongoing     Problem: Tobacco Use:  Goal: Will participate in inpatient tobacco-use cessation counseling  Description: Will participate in inpatient tobacco-use cessation counseling  12/27/2021 1933 by May Garcia RN  Outcome: Ongoing     Problem: Bleeding:  Goal: Will show no signs and symptoms of excessive bleeding  Description: Will show no signs and symptoms of excessive bleeding  12/27/2021 1933 by May Garcia RN  Outcome: Ongoing     Problem: OXYGENATION/RESPIRATORY FUNCTION  Goal: Patient will maintain patent airway  12/27/2021 1933 by May Garcia RN  Outcome: Ongoing     Problem: OXYGENATION/RESPIRATORY FUNCTION  Goal: Patient will achieve/maintain normal respiratory rate/effort  Description: Respiratory rate and effort will be within normal limits for the patient  12/27/2021 1933 by May Garcia RN  Outcome: Ongoing     Problem: MECHANICAL VENTILATION  Goal: Patient will maintain patent airway  12/27/2021 1933 by May Garcia RN  Outcome: Ongoing     Problem: MECHANICAL VENTILATION  Goal: Oral health is maintained or improved  12/27/2021 1933 by May Garcia RN  Outcome: Ongoing     Problem: MECHANICAL VENTILATION  Goal: ET tube will be managed safely  12/27/2021 1933 by May Garcia RN  Outcome: Ongoing     Problem: MECHANICAL VENTILATION  Goal: Ability to express needs and understand communication  12/27/2021 1933 by May Garcia RN  Outcome: Ongoing     Problem: SKIN INTEGRITY  Goal: Skin integrity is maintained or improved  12/27/2021 1933 by May Garcia RN  Outcome: Ongoing

## 2021-12-28 NOTE — PROGRESS NOTES
Patient out from [facility] 23 to room 1002 accompanied by or staff rns and crna. Patient hooked up to monitor.

## 2021-12-28 NOTE — CARE COORDINATION
Transitional planning    Writer spoke with Blayne at [facility] in reference to referral sent, patient states hes going home with his daughter and is requesting home care, provided agency list.    Writer spoke with daughter Crescencio Gomez and clarified agencys of choice and referrals sent to [facility] and [facility].      [id number] Writer spoke with Lorena Chinchilla at [facility] and cannot accept patient due to insurance. Writer also spoke to [facility] at [facility] and referral pending, running insurance. 1640  Writer spoke with [facility] at [facility] and they are able to accept the referral and can see the patient Friday or Saturday if discharged for [facility], patient updated.

## 2021-12-28 NOTE — PLAN OF CARE
Problem: Falls - Risk of:  Goal: Will remain free from falls  Description: Will remain free from falls  12/27/2021 2252 by Josr Nunes RN  Outcome: Ongoing  12/27/2021 1933 by Marycruz Sloan RN  Outcome: Ongoing  12/27/2021 1439 by Brii Dumont RCP  Outcome: Ongoing  Goal: Absence of physical injury  Description: Absence of physical injury  12/27/2021 2252 by Josr Nunes RN  Outcome: Ongoing  12/27/2021 1933 by Marycruz Sloan RN  Outcome: Ongoing  12/27/2021 1439 by Brii Dumont RCP  Outcome: Ongoing     Problem: Discharge Planning:  Goal: Discharged to appropriate level of care  Description: Discharged to appropriate level of care  12/27/2021 2252 by Josr Nunes RN  Outcome: Ongoing  12/27/2021 1933 by Marycruz Sloan RN  Outcome: Ongoing  12/27/2021 1439 by Brii Dumont RCP  Outcome: Ongoing     Problem: Cardiac Output - Decreased:  Goal: Hemodynamic stability will improve  Description: Hemodynamic stability will improve  12/27/2021 2252 by Josr Nunes RN  Outcome: Ongoing  12/27/2021 1933 by Marycruz Sloan RN  Outcome: Ongoing  12/27/2021 1439 by Brii Dumont RCP  Outcome: Ongoing     Problem: Serum Glucose Level - Abnormal:  Goal: Ability to maintain appropriate glucose levels will improve  Description: Ability to maintain appropriate glucose levels will improve  12/27/2021 2252 by Josr Nunes RN  Outcome: Ongoing  12/27/2021 1933 by Marycruz Sloan RN  Outcome: Ongoing  12/27/2021 1439 by Brii Dumont RCP  Outcome: Ongoing     Problem: Pain:  Goal: Pain level will decrease  Description: Pain level will decrease  12/27/2021 2252 by Josr Nunes RN  Outcome: Ongoing  12/27/2021 1933 by Marycruz Sloan RN  Outcome: Ongoing  12/27/2021 1439 by Brii Dumont RCP  Outcome: Ongoing  Goal: Control of acute pain  Description: Control of acute pain  12/27/2021 2252 by Josr Nunes RN  Outcome: Ongoing  12/27/2021 1933 by Milagros Shin RN  Outcome: Ongoing  12/27/2021 1439 by Bessie Remitch, RCP  Outcome: Ongoing  Goal: Control of chronic pain  Description: Control of chronic pain  12/27/2021 2252 by Emeka Rizzo RN  Outcome: Ongoing  12/27/2021 1933 by Milagros Shin RN  Outcome: Ongoing  12/27/2021 1439 by Bessie Remitch, RCP  Outcome: Ongoing     Problem: Tissue Perfusion - Cardiopulmonary, Altered:  Goal: Absence of angina  Description: Absence of angina  12/27/2021 2252 by Emeka Rizzo RN  Outcome: Ongoing  12/27/2021 1933 by Milagros Shin RN  Outcome: Ongoing  12/27/2021 1439 by Bessie Remitch, RCP  Outcome: Ongoing  Goal: Circulatory function within specified parameters  Description: Circulatory function within specified parameters  12/27/2021 2252 by Emeka Rizzo RN  Outcome: Ongoing  12/27/2021 1933 by Milagros Shin RN  Outcome: Ongoing  12/27/2021 1439 by Bessie Remitch, RCP  Outcome: Ongoing  Goal: Hemodynamic stability will improve  Description: Hemodynamic stability will improve  12/27/2021 2252 by Emeka Rizzo RN  Outcome: Ongoing  12/27/2021 1933 by Milagros Shin RN  Outcome: Ongoing  12/27/2021 1439 by Bessie Remitch, RCP  Outcome: Ongoing     Problem: Tobacco Use:  Goal: Will participate in inpatient tobacco-use cessation counseling  Description: Will participate in inpatient tobacco-use cessation counseling  12/27/2021 2252 by Emeka Rizzo RN  Outcome: Ongoing  12/27/2021 1933 by Milagros Shin RN  Outcome: Ongoing  12/27/2021 1439 by Bessie Remitch, RCP  Outcome: Ongoing     Problem: Bleeding:  Goal: Will show no signs and symptoms of excessive bleeding  Description: Will show no signs and symptoms of excessive bleeding  12/27/2021 2252 by Emeka Rizzo RN  Outcome: Ongoing  12/27/2021 1933 by Milagros Shin RN  Outcome: Ongoing  12/27/2021 1439 by Bessie Remitch, RCP  Outcome: Ongoing     Problem: Uriel Cordon RN  Outcome: Ongoing  12/27/2021 1439 by Eddie Zhao RCP  Outcome: Ongoing     Problem: Skin Integrity:  Goal: Will show no infection signs and symptoms  Description: Will show no infection signs and symptoms  Outcome: Ongoing  Goal: Absence of new skin breakdown  Description: Absence of new skin breakdown  Outcome: Ongoing

## 2021-12-28 NOTE — PROGRESS NOTES
Srinivasan out at 0730- pt has not voided- bladder scan showed 175 in bladder - orders received to give albumin per Josh Klinefelter NP

## 2021-12-28 NOTE — PROGRESS NOTES
Physical Therapy    Facility/Department: [facility]  Initial Assessment    NAME: Shweta Hood  : 1950  MRN: [id number]  80years old gentleman who came into the hospital because of chest pain.  Has been going on for the last few days.  Found to be having elevated troponins.  Admitted for NSTEMI.  Underwent cardiac catheterization and shows multivessel disease.  He also received balloon angioplasty to RCA. Keep the patient on the heparin drip,  Keep aspirin, statin, beta-blockers  Cardiothoracic surgery has been consulted and follow the recommendations   Date of Service: 2021    Discharge Recommendations:  Patient would benefit from continued therapy after discharge   PT Equipment Recommendations  Equipment Needed: Yes  Mobility Devices: Sam Knee: Rolling    Assessment   Body structures, Functions, Activity limitations: Decreased functional mobility ; Decreased endurance;Decreased strength;Decreased safe awareness  Assessment: Patient was able to ambulate on room air, with walker, fatigue and some shortness of breath but not distressed. Minimal to no pain. Heart hugger education started. Patient will need further PT to regain functional independence. Prognosis: Good  Decision Making: Medium Complexity  Clinical Presentation: evolving  PT Education: Goals; General Safety;Gait Training;Precautions; Functional Mobility Training;Plan of Care;Family Education;Disease Specific Education;PT Role;Transfer Training  REQUIRES PT FOLLOW UP: Yes  Activity Tolerance  Activity Tolerance: Patient limited by endurance       Patient Diagnosis(es): The primary encounter diagnosis was Chest pain due to myocardial ischemia, unspecified ischemic chest pain type. Diagnoses of NSTEMI (non-ST elevated myocardial infarction) ([address] and S/P CABG x 3 were also pertinent to this visit.      has a past medical history of Acute upper respiratory infections of unspecified site, CAD (coronary artery disease), COPD (chronic obstructive pulmonary disease) ([facility] [address] Empyema without mention of fistula, Esophageal reflux, Essential hypertension, benign, History of blood transfusion, Hx of blood clots, Localized osteoarthrosis not specified whether primary or secondary, unspecified site, Other and unspecified hyperlipidemia, Surgical or other procedure not carried out because of patient's decision, and Unspecified pleural effusion. has a past surgical history that includes Lung decortication (Right, 2006). Restrictions  Restrictions/Precautions  Restrictions/Precautions: Surgical Protocols  Required Braces or Orthoses?: Yes  Required Braces or Orthoses  Other: Heart Hugger Brace  Position Activity Restriction  Sternal Precautions: No Pushing,No Pulling,5# Lifting Restrictions  Other position/activity restrictions: Up in chair for meals, ambulate 3x/day. Has chest tubes, ok to remove suction for ambulation  Vision/Hearing  Vision: Within Functional Limits  Hearing: Within functional limits     Subjective  General  Chart Reviewed: Yes  Patient assessed for rehabilitation services?: Yes  Family / Caregiver Present: Yes (Two female visitors)  Follows Commands: Within Functional Limits  Pain Screening  Patient Currently in Pain: Denies  Vital Signs  Patient Currently in Pain: Denies       Orientation  Orientation  Overall Orientation Status: Within Functional Limits  Social/Functional History  Social/Functional History  Lives With: Alone  Type of Home: House  Home Layout: Two level,Bed/Bath upstairs  Home Access: Stairs to enter without rails  Entrance Stairs - Number of Steps: 1 small threshold step  ADL Assistance: Independent  Homemaking Assistance: Independent  Ambulation Assistance: Independent  Transfer Assistance: Independent  Additional Comments: No AD prior to admit  Cognition   Cognition  Overall Cognitive Status: WFL    Objective     Observation/Palpation  Observation: Heart hugger on. Up in chair. Murphy hose on. Chest tubes in.  On portable telemetry. Complaining of scrotal swelling. Two bandages on right leg at vein graft sites. Strength RLE  Strength RLE: Exception  R Hip Flexion: 3/5; At least  R Hip Extension: 4+/5  R Ankle Dorsiflexion: 5/5  R Ankle Plantar flexion: 5/5  Strength LLE  Strength LLE: Exception  L Hip Flexion: 3/5; At least  L Hip Extension: 4+/5  L Ankle Dorsiflexion: 5/5  L Ankle Plantar Flexion: 5/5           Transfers  Sit to Stand: Contact guard assistance  Stand to sit: Contact guard assistance  Ambulation  Ambulation?: Yes  Ambulation 1  Surface: level tile  Device: Rolling Walker  Assistance: Contact guard assistance  Quality of Gait: Slow, fatigued, steady, on room air. Distance: 90'     Balance  Sitting - Static: Good  Sitting - Dynamic: Good  Standing - Static: Fair  Standing - Dynamic: [address]  Times per week: 6-7x/wk, 1-2x/day  Current Treatment Recommendations: Reji Teague Education & Training,Home Exercise Program,Patient/Caregiver Education & Training,Functional Mobility Training,Transfer Training,Gait Training,Stair training,Equipment Evaluation, Education, & procurement  Safety Devices  Type of devices: All fall risk precautions in place,Call light within reach,Patient at risk for falls,Gait belt,Nurse notified,Left in chair                                   AM-PAC Score  AM-PAC Inpatient Mobility Raw Score : 17 (12/28/21 1025)  AM-PAC Inpatient T-Scale Score : 42.13 (12/28/21 1025)  Mobility Inpatient CMS 0-100% Score: 50.57 (12/28/21 1025)  Mobility Inpatient CMS G-Code Modifier : CK (12/28/21 1025)          Goals  Short term goals  Time Frame for Short term goals: 14 visits  Short term goal 1: Sit to/from stand with SBA with appropriate use of heart hugger. Short term goal 2: Ambulate 200' with or without walker with SBA. Short term goal 3: Negotiate up and down 8 steps with one railing with CGA.        Therapy Time   Individual Concurrent Group Co-treatment   Time In 0940         Time Out 1012         Minutes 32         Timed Code Treatment Minutes: Daksha 1690, PT

## 2021-12-28 NOTE — PROGRESS NOTES
[facility]   Progress Note                 Date:   12/27/2021  Patient name:  Cayla Lundberg  Date of admission:  12/23/2021  6:01 AM  MRN:   [id number]  YOB: 1950  PCP:    Margie Abad MD    Reason for Admission: NSTEMI (non-ST elevated myocardial infarction) [facility]) [I21.4]  Chest pain due to myocardial ischemia, unspecified ischemic chest pain type [I25.9]      Subjective:       Clinical Changes / Abnormalities:     Patient underwent CABG yesterday. Extubated successfully yesterday. I/O last 3 completed shifts: In: 4170 [P.O.:800; I.V.:1850; Blood:800]  Out: 2805 [Urine:1950; Blood:775; Chest Tube:80]  I/O this shift:   In: 916.4 [I.V.:916.4]  Out: 399 [Urine:575; Chest Tube:70]            Medications:   Scheduled Meds:    sodium chloride flush  10 mL IntraVENous 2 times per day    aspirin  81 mg Oral Daily    [START ON 12/28/2021] clopidogrel  75 mg Oral Daily    amiodarone  200 mg Oral BID    chlorhexidine  15 mL Mouth/Throat BID    mupirocin   Nasal BID    [START ON 12/28/2021] multivitamin  1 tablet Oral Daily with breakfast    [START ON 12/28/2021] metoprolol tartrate  25 mg Oral BID    [START ON 12/28/2021] atorvastatin  40 mg Oral Nightly    ceFAZolin (ANCEF) IVPB  2,000 mg IntraVENous Q8H    [START ON 12/28/2021] insulin glargine  0.15 Units/kg SubCUTAneous Nightly    polyethylene glycol  17 g Oral Daily     Continuous Infusions:    sodium chloride      insulin 1.84 Units/hr (12/27/21 2100)    dextrose      norepinephrine Stopped (12/27/21 1445)    propofol Stopped (12/27/21 1606)     CBC:   Recent Labs     12/26/21 0229 12/27/21  0446 12/27/21  1443   WBC 9.3 7.5 16.6*   HGB 11.9* 11.1* 11.0*    179 130*     BMP:    Recent Labs     12/26/21 0229 12/26/21 0229 12/27/21  0446 12/27/21  1428 12/27/21  1443   *  --  136  --  137   K 4.3  --  4.3  --  4.4     --  107  --  106   CO2 16*  --  18*  --  20   BUN 28*  --  23  -- 20   CREATININE 1.22*   < > 0.96 1.04 0.89   GLUCOSE 77  --  97  --  144*    < > = values in this interval not displayed. Hepatic:   Recent Labs     12/25/21  0440   AST 30   ALT 20   BILITOT 0.66   ALKPHOS 84     Troponin: No results for input(s): TROPHS in the last 72 hours. BNP: No results for input(s): BNP in the last 72 hours. Lipids: No results for input(s): CHOL, HDL in the last 72 hours. Invalid input(s): LDLCALCU  INR:   Recent Labs     12/27/21  1443   INR 1.1       Objective:   Vitals: /61   Pulse 76   Temp 98.6 °F (37 °C)   Resp 13   Ht 6' (1.829 m)   Wt 190 lb 4.1 oz (86.3 kg)   SpO2 99%   BMI 25.80 kg/m²   General appearance: Alert. No acute distress. HEENT: Head: Normal, normocephalic, atraumatic. Neck: no JVD, trachea midline  Lungs: Clear to auscultation bilaterally, no wheeze or crackles  Heart: Regular rate and rhythm, S1, S2 normal, no murmur  Abdomen: Soft, non-tender  Extremities: No edema  Neurologic: No focal neurologic deficits    Echocardiogram: 08/2019  Summary  Normal left ventricle size, wall thickness and low normal function. Calculated EF via Reinoso's method is 50 %. Cannot rule out wall motion abnormalities due to poor visualization. Right atrial dilatation. Aortic valve is sclerotic but opens well. Trivial aortic insufficiency.        Coronary Angiography: 12/23  LMCA: Normal 0% stenosis. LAD: Single stenosis.       Lesion on Prox LAD: 80% stenosis.       LCx: Multiple stenosis.       Lesion on 1st Ob Melodie: Proximal subsection. 90% stenosis.       Lesion on Mid CX: 80% stenosis.       Lesion on 2nd Ob Melodie: Ostial.80% stenosis.       RCA: Acute occlusion.         Lesion on Mid RCA: 100% stenosis 20 mm length reduced to 20%. Pre     procedure MARIFER 0 flow was noted. Post Procedure MARIFER III flow was     present. Poor runoff was present. The lesion was diagnosed as High Risk     (C).        Coronary Tree Dominance: Right       LV Analysis LV function assessed as:Normal.   The LV gram was performed in the CHOUDHARY 30 position. LVEF: 50%. Conclusions:  Acute occlusion of mid RCA underwent PTCA with restoration of MARIFER III    flow. Multi-vessel Coronary Artery Disease. Normal LV systolic function. Assessment:   1. NSTEMI s/p PRCA of mid RCA, s/p CABG X 3 w/ LIMA to LAD, RSVG1 to OM, RSVG2 to distal RCA/RPDA on 12/27/21  2. LVEF 50%  3. HTN          Plan / Recommendations:   1. Continue ASA and plavix  2. High intensity statin  3. Continue amiodarone 200 mg TID per CT surgery recs  4. Switch to coreg instead of lopressor  5. Good glycemic index  6. Incentive spirometry  7. Post op management per CT surgery      Thank you for allowing us to participate in [facility]. Electronically signed on 12/27/21 at 9:30 PM by:    Wendie Christie MD, MD   Fellow, [address] [place] [address]    I performed a history and physical examination of the patient and discussed management with the resident. I reviewed the residents note and agree with the documented findings and plan of care. Any areas of disagreement are noted on the chart. I was personally present for the key portions of any procedures. I have documented in the chart those procedures where I was not present during the key portions. I have personally evaluated this patient and have completed at least one if not all key elements of the E/M (history, physical exam, and MDM). Additional findings are as noted.     Juanita He MD

## 2021-12-29 ENCOUNTER — APPOINTMENT (OUTPATIENT)
Dept: GENERAL RADIOLOGY | Age: 71
DRG: 232 | End: 2021-12-29
Payer: OTHER GOVERNMENT

## 2021-12-29 LAB
ANION GAP SERPL CALCULATED.3IONS-SCNC: 12 MMOL/L (ref 9–17)
BUN BLDV-MCNC: 18 MG/DL (ref 8–23)
BUN/CREAT BLD: ABNORMAL (ref 9–20)
CALCIUM SERPL-MCNC: 8.1 MG/DL (ref 8.6–10.4)
CHLORIDE BLD-SCNC: 101 MMOL/L (ref 98–107)
CO2: 21 MMOL/L (ref 20–31)
CREAT SERPL-MCNC: 1.05 MG/DL (ref 0.7–1.2)
GFR AFRICAN AMERICAN: >60 ML/MIN
GFR NON-AFRICAN AMERICAN: >60 ML/MIN
GFR SERPL CREATININE-BSD FRML MDRD: ABNORMAL ML/MIN/{1.73_M2}
GFR SERPL CREATININE-BSD FRML MDRD: ABNORMAL ML/MIN/{1.73_M2}
GLUCOSE BLD-MCNC: 100 MG/DL (ref 70–99)
GLUCOSE BLD-MCNC: 82 MG/DL (ref 75–110)
GLUCOSE BLD-MCNC: 94 MG/DL (ref 75–110)
HCT VFR BLD CALC: 29.7 % (ref 40.7–50.3)
HEMOGLOBIN: 9.6 G/DL (ref 13–17)
INR BLD: 1
MAGNESIUM: 2.1 MG/DL (ref 1.6–2.6)
MCH RBC QN AUTO: 29.7 PG (ref 25.2–33.5)
MCHC RBC AUTO-ENTMCNC: 32.3 G/DL (ref 28.4–34.8)
MCV RBC AUTO: 92 FL (ref 82.6–102.9)
NRBC AUTOMATED: 0 PER 100 WBC
PDW BLD-RTO: 12.7 % (ref 11.8–14.4)
PLATELET # BLD: 174 K/UL (ref 138–453)
PMV BLD AUTO: 10.1 FL (ref 8.1–13.5)
POTASSIUM SERPL-SCNC: 4 MMOL/L (ref 3.7–5.3)
PROTHROMBIN TIME: 10.7 SEC (ref 9.1–12.3)
RBC # BLD: 3.23 M/UL (ref 4.21–5.77)
SODIUM BLD-SCNC: 134 MMOL/L (ref 135–144)
WBC # BLD: 13.7 K/UL (ref 3.5–11.3)

## 2021-12-29 PROCEDURE — 2140000001 HC CVICU INTERMEDIATE R&B

## 2021-12-29 PROCEDURE — 97116 GAIT TRAINING THERAPY: CPT

## 2021-12-29 PROCEDURE — APPNB30 APP NON BILLABLE TIME 0-30 MINS: Performed by: NURSE PRACTITIONER

## 2021-12-29 PROCEDURE — 36415 COLL VENOUS BLD VENIPUNCTURE: CPT

## 2021-12-29 PROCEDURE — 6370000000 HC RX 637 (ALT 250 FOR IP): Performed by: NURSE PRACTITIONER

## 2021-12-29 PROCEDURE — 97535 SELF CARE MNGMENT TRAINING: CPT

## 2021-12-29 PROCEDURE — 85027 COMPLETE CBC AUTOMATED: CPT

## 2021-12-29 PROCEDURE — 83735 ASSAY OF MAGNESIUM: CPT

## 2021-12-29 PROCEDURE — 80048 BASIC METABOLIC PNL TOTAL CA: CPT

## 2021-12-29 PROCEDURE — 6370000000 HC RX 637 (ALT 250 FOR IP): Performed by: PHYSICIAN ASSISTANT

## 2021-12-29 PROCEDURE — 6360000002 HC RX W HCPCS: Performed by: PHYSICIAN ASSISTANT

## 2021-12-29 PROCEDURE — 6370000000 HC RX 637 (ALT 250 FOR IP): Performed by: STUDENT IN AN ORGANIZED HEALTH CARE EDUCATION/TRAINING PROGRAM

## 2021-12-29 PROCEDURE — 97110 THERAPEUTIC EXERCISES: CPT

## 2021-12-29 PROCEDURE — 82947 ASSAY GLUCOSE BLOOD QUANT: CPT

## 2021-12-29 PROCEDURE — 97530 THERAPEUTIC ACTIVITIES: CPT

## 2021-12-29 PROCEDURE — 85610 PROTHROMBIN TIME: CPT

## 2021-12-29 PROCEDURE — 99024 POSTOP FOLLOW-UP VISIT: CPT | Performed by: NURSE PRACTITIONER

## 2021-12-29 PROCEDURE — 71045 X-RAY EXAM CHEST 1 VIEW: CPT

## 2021-12-29 PROCEDURE — 99232 SBSQ HOSP IP/OBS MODERATE 35: CPT | Performed by: INTERNAL MEDICINE

## 2021-12-29 PROCEDURE — 2580000003 HC RX 258: Performed by: PHYSICIAN ASSISTANT

## 2021-12-29 RX ORDER — SENNA AND DOCUSATE SODIUM 50; 8.6 MG/1; MG/1
2 TABLET, FILM COATED ORAL DAILY
Status: DISCONTINUED | OUTPATIENT
Start: 2021-12-29 | End: 2021-12-31 | Stop reason: HOSPADM

## 2021-12-29 RX ORDER — BUDESONIDE AND FORMOTEROL FUMARATE DIHYDRATE 160; 4.5 UG/1; UG/1
2 AEROSOL RESPIRATORY (INHALATION) 2 TIMES DAILY
Status: DISCONTINUED | OUTPATIENT
Start: 2021-12-29 | End: 2021-12-30

## 2021-12-29 RX ADMIN — MUPIROCIN: 20 OINTMENT TOPICAL at 20:15

## 2021-12-29 RX ADMIN — PANTOPRAZOLE SODIUM 40 MG: 40 TABLET, DELAYED RELEASE ORAL at 08:00

## 2021-12-29 RX ADMIN — TAMSULOSIN HYDROCHLORIDE 0.4 MG: 0.4 CAPSULE ORAL at 08:00

## 2021-12-29 RX ADMIN — AMIODARONE HYDROCHLORIDE 200 MG: 200 TABLET ORAL at 08:00

## 2021-12-29 RX ADMIN — ACETAMINOPHEN 650 MG: 325 TABLET ORAL at 22:59

## 2021-12-29 RX ADMIN — Medication 81 MG: at 08:00

## 2021-12-29 RX ADMIN — DESMOPRESSIN ACETATE 40 MG: 0.2 TABLET ORAL at 20:15

## 2021-12-29 RX ADMIN — METOPROLOL TARTRATE 25 MG: 25 TABLET ORAL at 08:00

## 2021-12-29 RX ADMIN — METOPROLOL TARTRATE 25 MG: 25 TABLET ORAL at 20:15

## 2021-12-29 RX ADMIN — DEXTROSE MONOHYDRATE 2000 MG: 50 INJECTION, SOLUTION INTRAVENOUS at 08:30

## 2021-12-29 RX ADMIN — ALCOHOL 1 TABLET: 70.47 GEL TOPICAL at 08:00

## 2021-12-29 RX ADMIN — MUPIROCIN: 20 OINTMENT TOPICAL at 08:00

## 2021-12-29 RX ADMIN — SODIUM CHLORIDE, PRESERVATIVE FREE 10 ML: 5 INJECTION INTRAVENOUS at 20:15

## 2021-12-29 RX ADMIN — AMIODARONE HYDROCHLORIDE 200 MG: 200 TABLET ORAL at 20:15

## 2021-12-29 RX ADMIN — CLOPIDOGREL 75 MG: 75 TABLET, FILM COATED ORAL at 08:00

## 2021-12-29 RX ADMIN — DIPHENHYDRAMINE HCL 25 MG: 25 TABLET ORAL at 00:01

## 2021-12-29 RX ADMIN — POLYETHYLENE GLYCOL 3350 17 G: 17 POWDER, FOR SOLUTION ORAL at 08:00

## 2021-12-29 RX ADMIN — SODIUM CHLORIDE, PRESERVATIVE FREE 10 ML: 5 INJECTION INTRAVENOUS at 08:00

## 2021-12-29 ASSESSMENT — PAIN SCALES - GENERAL
PAINLEVEL_OUTOF10: 3
PAINLEVEL_OUTOF10: 0

## 2021-12-29 ASSESSMENT — PAIN DESCRIPTION - ORIENTATION: ORIENTATION: MID

## 2021-12-29 ASSESSMENT — PAIN DESCRIPTION - PAIN TYPE: TYPE: SURGICAL PAIN

## 2021-12-29 ASSESSMENT — PAIN DESCRIPTION - LOCATION: LOCATION: CHEST

## 2021-12-29 ASSESSMENT — PAIN DESCRIPTION - FREQUENCY: FREQUENCY: CONTINUOUS

## 2021-12-29 ASSESSMENT — PAIN DESCRIPTION - PROGRESSION: CLINICAL_PROGRESSION: GRADUALLY IMPROVING

## 2021-12-29 ASSESSMENT — PAIN DESCRIPTION - DESCRIPTORS: DESCRIPTORS: ACHING

## 2021-12-29 NOTE — PROGRESS NOTES
Nutrition Education    · Verbally reviewed information with Patient  · Educated on heart healthy diet  · Written educational materials provided. · Contact name and number provided. · Refer to Patient Education activity for more details.     Electronically signed by Edith King DTR on 12/29/21 at 9:32 AM EST

## 2021-12-29 NOTE — PROGRESS NOTES
Occupational Therapy  Facility/Department: [facility] CAR 1  Daily Treatment Note  NAME: Kiara Santana  : 1950  MRN: [id number]    Date of Service: 2021    Discharge Recommendations:  Patient would benefit from continued therapy after discharge   Equipment Recommendations: RW, Hip Kit to assist w/LB ADL's (has shower chair and grab bars)       Assessment   Performance deficits / Impairments: Decreased functional mobility ; Decreased ADL status; Decreased endurance;Decreased high-level IADLs  Assessment: Pt would benefit from continued acute care OT to address above listed deficits. Treatment Diagnosis: CABG x3 21  Prognosis: Good  REQUIRES OT FOLLOW UP: Yes  Activity Tolerance  Activity Tolerance: Patient Tolerated treatment well;Patient limited by pain  Safety Devices  Safety Devices in place: Yes  Type of devices: Call light within reach;Gait belt;Left in chair;Nurse notified         Patient Diagnosis(es): The primary encounter diagnosis was Chest pain due to myocardial ischemia, unspecified ischemic chest pain type. Diagnoses of NSTEMI (non-ST elevated myocardial infarction) ([address] and S/P CABG x 3 were also pertinent to this visit. 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 has a past surgical history that includes Lung decortication (Right, ) and Coronary artery bypass graft (N/A, 2021).     Restrictions  Restrictions/Precautions  Restrictions/Precautions: Cardiac,Surgical Protocols,Fall Risk,Up as Tolerated  Required Braces or Orthoses?: Yes (Heart Hugger)  Required Braces or Orthoses  Other: Heart Hugger Brace  Position Activity Restriction  Sternal Precautions: No Pushing,No Pulling,5# Lifting Restrictions  Sternal Precautions: CABG X3 12/27/21  Other position/activity restrictions: Up w/assist, CT to wall suction  Subjective   General  Patient assessed for rehabilitation services?: Yes  Family / Caregiver Present: No  General Comment  Comments: RN ok'd for OT tx. Pt agreeable to session, pleasent/cooperative throughout. Pain Assessment  Pain Assessment: 0-10  Pain Level: 3  Patient's Stated Pain Goal: No pain  Pain Type: Surgical pain  Pain Location: Chest  Pain Orientation: Mid  Pain Descriptors: Aching  Pain Frequency: Continuous  Clinical Progression: Gradually improving  Response to Pain Intervention: Patient Satisfied  Vital Signs  Patient Currently in Pain: Yes   Orientation  Orientation  Overall Orientation Status: Within Functional Limits  Objective    ADL  Feeding: Independent (able to open containers and feed self)  Grooming: Stand by assistance;Setup;Verbal cueing; Increased time to complete (shaved w/electic razor seated,stood to wash face,brush teeth)  UE Bathing: Minimal assistance;Setup;Verbal cueing; Increased time to complete (assist to wash back)  LE Bathing: Minimal assistance;Setup;Verbal cueing; Increased time to complete (assist to wash feet)  UE Dressing: Minimal assistance;Setup;Verbal cueing; Increased time to complete (A to snap, thread arms, tie gown, don/doff heart hugger)  LE Dressing: Maximum assistance;Setup;Verbal cueing; Increased time to complete (assist to dff/don [place] hose and footies)  Toileting: Contact guard assistance (stood to use urinal and for maureen care)      Pt up in chair upon arrival. STS w/CGA and func mob to sink side to complete self care using sx soap (see above for LOF).  Pt stood for grooming and sat for washing U/LB with increased time and assist d/t ADLs/toileting with SUP and DME PRN  Short term goal 5: Demo adherence to sternal precautions throughout all functional mobility/ADLs with 100% accuracy     Therapy Time   Individual Concurrent Group Co-treatment   Time In 1040         Time Out  1142         Minutes 62 total tx time                 THI TAYLOR, PEREZ/L

## 2021-12-29 NOTE — CARE COORDINATION
Spoke with Santosh Cohen, plan for DC Friday, home with daughter, [facility] [place], [facility].

## 2021-12-29 NOTE — PROGRESS NOTES
[facility]  Internal Medicine Teaching Residency Program  Inpatient Daily Progress Note  ______________________________________________________________________________    Patient: Eliazar Tatum  YOB: 1950   CVY:[id number]    Acct: [de-identified]     Room: [address] [place] [address]  Admit date: 12/23/2021  Today's date: 12/29/21  Number of days in the hospital: 6    SUBJECTIVE   Admitting Diagnosis: NSTEMI (non-ST elevated myocardial infarction) ([facility]  CC: chest pain    No acute issues overnight  Pt had difficulty passing urine yesterday after the removal of river, was given albumin and lasix  Since last night patient passing urine    Pt examined at bedside. Vitals have been stable    Chart & results reviewed. Sodium 134  Blood glucose levels acceptable  Wbc 13.7, trending down  Hb 9.6    Patient is eating ok, sleeping ok, normal bowel/ bladder movements. Patient on room air       ROS:  Constitutional:  negative for chills, fevers, sweats  Respiratory:  negative for cough, dyspnea on exertion, hemoptysis, shortness of breath, wheezing  Cardiovascular:  negative for chest pain, chest pressure/discomfort, lower extremity edema, palpitations  Gastrointestinal:  negative for abdominal pain, constipation, diarrhea, nausea, vomiting  Neurological:  negative for dizziness, headache    BRIEF HISTORY     The patient is a pleasant 71 y. o. male with a PMHx significant for CAD (unstable angina), COPD, empyema, GERD, HTN, history of blood clots and HLD who presents with a chief complaint of chest pain. Pt states he has also experienced nausea and diaphoresis since Tuesday this week. Pt stated that the pain woke him from sleep. He described the pain and an elephant stepping on his chest and rated the pain 9/10 in intensity radiating up the neck and down both of his arms. Pt denies prior stents. Pt received ASA and nitroglycerin which did not alleviate the pain.  Pt denied headache, blurry vision, shortness of breath, abdominal pain or weakness. Troponin on arrival 2,699 and trended up to 6,203. WBC elevated at 20.2 decreased to 19.1. Pt was taken for cardiac catheterization shortly after arrival and underwent PTCA.     OBJECTIVE     Vital Signs:  /74   Pulse 97   Temp 98.4 °F (36.9 °C)   Resp 18   Ht 6' (1.829 m)   Wt 200 lb 2.8 oz (90.8 kg)   SpO2 97%   BMI 27.15 kg/m²     Temp (24hrs), Av.4 °F (36.9 °C), Min:98 °F (36.7 °C), Max:98.7 °F (37.1 °C)    In: 1379   Out: 1285 [Urine:925]    Physical Exam:  Physical Exam  Constitutional:       Appearance: Normal appearance. HENT:      Mouth/Throat:      Mouth: Mucous membranes are dry. Eyes:      Extraocular Movements: Extraocular movements intact. Cardiovascular:      Rate and Rhythm: Normal rate and regular rhythm. Pulses: Normal pulses. Heart sounds: Normal heart sounds. No murmur heard. Pulmonary:      Effort: No respiratory distress. Breath sounds: Normal breath sounds. No wheezing. Abdominal:      General: There is no distension. Palpations: Abdomen is soft. Tenderness: There is no abdominal tenderness. Musculoskeletal:         General: No swelling. Normal range of motion. Cervical back: Normal range of motion. No rigidity. Right lower leg: No edema. Left lower leg: No edema. Skin:     General: Skin is warm. Coloration: Skin is not jaundiced. Findings: No bruising or rash. Neurological:      Mental Status: He is alert and oriented to person, place, and time.            Medications:  Scheduled Medications:    metoprolol tartrate  25 mg Oral BID    sennosides-docusate sodium  2 tablet Oral Daily    pantoprazole  40 mg Oral QAM AC    tamsulosin  0.4 mg Oral Daily    sodium chloride flush  10 mL IntraVENous 2 times per day    aspirin  81 mg Oral Daily    clopidogrel  75 mg Oral Daily    amiodarone  200 mg Oral BID    chlorhexidine  15 mL Mouth/Throat BID  mupirocin   Nasal BID    multivitamin  1 tablet Oral Daily with breakfast    atorvastatin  40 mg Oral Nightly    [Held by provider] insulin glargine  0.15 Units/kg SubCUTAneous Nightly    polyethylene glycol  17 g Oral Daily     Continuous Infusions:    sodium chloride      insulin Stopped (12/28/21 0842)    dextrose       PRN Medicationspotassium chloride, 40 mEq, PRN   Or  potassium alternative oral replacement, 40 mEq, PRN   Or  potassium chloride, 10 mEq, PRN  sodium chloride flush, 10 mL, PRN  sodium chloride, 25 mL, PRN  ondansetron, 4 mg, Q8H PRN  acetaminophen, 650 mg, Q4H PRN  acetaminophen, 650 mg, Q4H PRN  oxyCODONE-acetaminophen, 1 tablet, Q4H PRN   Or  oxyCODONE-acetaminophen, 2 tablet, Q4H PRN  fentanNYL, 25 mcg, Q1H PRN   Or  fentanNYL, 50 mcg, Q1H PRN  hydrALAZINE, 5 mg, Q5 Min PRN  diphenhydrAMINE, 25 mg, Nightly PRN  calcium chloride IVPB, 1,000 mg, PRN  magnesium sulfate, 1,000 mg, PRN  potassium chloride, 20 mEq, PRN  albumin human, 25 g, PRN  glucose, 15 g, PRN  dextrose, 12.5 g, PRN  glucagon (rDNA), 1 mg, PRN  dextrose, 100 mL/hr, PRN  bisacodyl, 10 mg, Daily PRN        Diagnostic Labs:  CBC:   Recent Labs     12/27/21  1443 12/28/21  0416 12/29/21  0401   WBC 16.6* 15.7* 13.7*   RBC 3.70* 3.62* 3.23*   HGB 11.0* 10.5* 9.6*   HCT 33.1* 32.6* 29.7*   MCV 89.5 90.1 92.0   RDW 12.6 12.8 12.7   * 170 174     BMP:   Recent Labs     12/27/21  1443 12/28/21  0416 12/29/21  0401    138 134*   K 4.4 4.7 4.0    104 101   CO2 20 20 21   BUN 20 17 18   CREATININE 0.89 1.04 1.05     BNP: No results for input(s): BNP in the last 72 hours. PT/INR:   Recent Labs     12/27/21  1443 12/28/21  0416 12/29/21  0401   PROTIME 11.4 10.7 10.7   INR 1.1 1.0 1.0     APTT:   Recent Labs     12/27/21  1443   APTT 30.3     CARDIAC ENZYMES: No results for input(s): CKMB, CKMBINDEX, TROPONINI in the last 72 hours.     Invalid input(s): CKTOTAL;3  FASTING LIPID PANEL:  Lab Results   Component Value Date    CHOL 116 12/24/2021    HDL 60 12/24/2021    TRIG 52 12/24/2021     LIVER PROFILE: No results for input(s): AST, ALT, ALB, BILIDIR, BILITOT, ALKPHOS in the last 72 hours. MICROBIOLOGY:   Lab Results   Component Value Date/Time    CULTURE NO GROWTH 2 DAYS 12/27/2021 11:59 AM       Imaging:    XR CHEST PORTABLE    Result Date: 12/29/2021  Small bilateral effusions with stable chest tubes. XR CHEST PORTABLE    Result Date: 12/29/2021  Interval extubation. Otherwise no significant change. XR CHEST PORTABLE    Result Date: 12/27/2021  Postsurgical changes with support tubes and lines as above. No appreciable extrapleural air. XR CHEST PORTABLE    Result Date: 12/24/2021  No acute process detected. XR CHEST PORTABLE    Result Date: 12/23/2021  New variable nearly diffuse interstitial thickening concerning for viral process. CT CHEST PULMONARY EMBOLISM W CONTRAST    Result Date: 12/23/2021  1. No evidence of pulmonary embolism. 2.  Findings compatible with scarring in the left upper lobe and lung bases. No convincing evidence for acute airspace disease. 3.  Mild emphysematous disease. RECOMMENDATIONS: Unavailable       ASSESSMENT & PLAN     Assessment and Plan:    Principal Problem:    NSTEMI (non-ST elevated myocardial infarction) ([facility]  Active Problems:    Essential hypertension, benign    Chest pain due to myocardial ischemia    S/P CABG x 3  Resolved Problems:    Diarrhea      1. NSTEMI (non-ST elevated myocardial infarction)   - Echocardiogram showing LV hypertrophy and LV systolic dysfunction with EF of 45-50%  - Continue to monitor for chest pain    - Continue with medical management   - Post-op day 1 of CABG > management per CTS  - continue aspirin, Plavix  - continue amiodarone as per CTS  - continue metoprolol        2. Hypertension - currently hypotensive  - Hold BP medications due to hypotension   - maintain BP > 110/60     3.  COPD   - Continue with Albuterol, Symbicort and Spiriva      4. GERD - stable      5.  Hyperlipidemia -  Stable   - Lipitor      Diet: Regular low fat, low cholesterol and high fiber > NPO after midnight   DVT ppx: Heparin hold heparin per CTS  GI ppx: None      PT/OT/SW: Consulted   Discharge Planning: In process        Vanda Chung  PGY-1  Internal Medicine  [facility]   12:19 [place] 12/29/2021

## 2021-12-29 NOTE — OP NOTE
[facility] ([place], [place]                                OPERATIVE REPORT    PATIENT NAME: Marcus Cotter                   :        1950  MED REC NO:   [id number]                             ROOM:       4118  ACCOUNT NO:   [de-identified]                           ADMIT DATE: 2021  PROVIDER:     Katya Block MD    DATE OF PROCEDURE:  2021    PRIMARY ATTENDING SURGEON:  Katya Block MD    OTHER ASSISTANTS:  Included Pura Fuller. Francie Rubio RN, RFA and Tegan Reilly RN, RFA. PREOPERATIVE DIAGNOSES:  Severe multivessel coronary artery disease,  unstable angina, CHF, volume overload. POSTOPERATIVE DIAGNOSES:  Severe multivessel coronary artery disease,  unstable angina, CHF, volume overload, severe Dressler syndrome with  intrapericardial adhesions and bloody pericardial effusion. OPERATIONS PERFORMED:  Median sternotomy, aorto-right atrial  cardiopulmonary bypass, MINH, endoscopic vein harvest, extensive  intrapericardial lysis of adhesions, CABG x3 with LIMA to LAD, reverse  saphenous vein graft 1 to OM2 and reverse saphenous vein graft 2 to  RCA/RPDA junction. COMPLICATIONS:  None. CONDITION:  Stable. DISPOSITION:  To CVICU. ESTIMATED BLOOD LOSS:  Not applicable. ANESTHESIA:  General endotracheal with Elio Mendiola MD.    CARDIOPULMONARY BYPASS TIME:  91 minutes. CROSS-CLAMP TIME:  75 minutes. INDICATIONS FOR SURGERY:  The patient is a 43-year-old man who was  admitted to the [address] [place] [address] and Vascular Center after  cardiac catheterization and revealed the above-mentioned diagnoses, for  which surgical revascularization was considered. At the time of the  catheterization, there was complete occlusion of the distal RCA at the  RPDA junction which was considered to be the culprit vessel.   This was  reopened with balloon angioplasty and flow was reestablished. Because  of the other lesions in his other coronary arteries, it was not felt  advisable that stents to place at this time and he was referred for  surgical revascularization after successful angioplasty of the RCA  culprit lesion. We agreed that he was a good candidate. I took him to  Surgery with the consent of he and his family on Monday, 12/27/2021. FINDINGS AT SURGERY:  There was good quality left internal mammary  artery and there was good quality reverse saphenous vein graft. There  was extensive Dressler syndrome after the acute occlusion of the distal  RCA causing myocardial infarction which resulted in intrapericardial  adhesions and pericardial effusion. All the fluid was drained and the  adhesions were lysed. I placed the CABG x3 as described above to the  above-mentioned vessels. There were palpable pulses in all the grafts  with no EKG or echo evidence of any ischemia at the conclusion of the  case. SURGERY IN DETAIL:  The patient was identified in his bed in the CVICU. He was transported to the operating room where he was induced with  general endotracheal anesthesia without difficulty. This included an  uneventful endotracheal intubation and the appropriate placement of  lines and access for cardiac surgery. A time-out was performed and  documented and he was prepped and draped in a normal sterile fashion. The operation then began with the performance of a midline median  sternotomy through which the left internal mammary artery was taken  down. This was made difficult by dense left pleural adhesions secondary  to previous pleural scraping that he had had for what was presumed to be  infected pleural space. Nonetheless, I was able to get the left  internal mammary artery down successfully and the endoscopic vein  harvest was simultaneously performed. Once all the conduit was taken  and deemed appropriate, then a pericardial well was created.   A  preliminary dissection was performed including the extensive lysis of  adhesions as mentioned above in order to achieve aorto-right atrial  cardiopulmonary bypass, which was achieved with excellent flows and  drainage after appropriate heparinization. An ascending aorta  cardioplegia needle was placed in the ascending aorta. A cross-clamp  was applied and a dose of cold [place] solution was given to achieve an  adequate diastolic arrest.  Topical ice was applied. I looked about the  heart. The above-mentioned findings were found. I thus conducted the  operation by performing the distal anastomosis of the reversed saphenous  vein graft first to the distal RCA/RPDA origin. I then performed the  distal anastomosis of the reversed saphenous vein graft to the large  OM2. I lastly performed the anastomosis of the left internal mammary  artery to the LAD. I then performed a proximal anastomosis of the  reversed saphenous vein graft to their takeoffs at the ascending aorta. The heart was de-aired, the cross-clamp was removed, and there was the  eventual return of normal sinus rhythm. A ventricular wire was placed. I was able to wean the patient from cardiopulmonary bypass with good  hemodynamics on minimal inotropic support at this time. Satisfied with  this, I thus decannulated and administered protamine. Hemostasis was  achieved and verified. The chest tubes were placed. Ultimately, his  chest was closed with my double stainless steel wire technique. The  remainder of the incisions were closed in the usual layered fashion. The patient tolerated the operation well and was transported to CVICU in  stable condition at the conclusion of the case. There were no adequately trained or available residents for assistance  in this operation, and the presence of Chris Steve and Rola Wallace were critical for the first assistance necessary to complete the  operation. GEOVANNI Garcia MD    D: 12/28/2021 12:47:00       T: 12/28/2021 21:38:24     [email or web address]  Job#: [id number]     Doc#: [id number]    CC:

## 2021-12-29 NOTE — PROGRESS NOTES
Physical Therapy  Facility/Department: [facility] CAR 1  Daily Treatment Note  NAME: Winsome Barker  : 1950  MRN: [id number]    Date of Service: 2021    Discharge Recommendations:  Patient would benefit from continued therapy after discharge     PT Equipment Recommendations  Equipment Needed: No,    Assessment     Body structures, Functions, Activity limitations: Decreased functional mobility ; Decreased endurance;Decreased strength;Decreased safe awareness  Assessment: Patient was able to ambulate 300' on room air, with RW. Minimal to no pain. Heart hugger education reinforced. Patient will need further PT to regain functional independence. Prognosis: Good  Decision Making: Medium Complexity  Clinical Presentation: evolving  PT Education: Goals; General Safety;Gait Training;Precautions; Functional Mobility Training;Plan of Care;Family Education;Disease Specific Education;PT Role;Transfer Training  REQUIRES PT FOLLOW UP: Yes  Activity Tolerance  Activity Tolerance: Patient limited by endurance         Patient Diagnosis(es): The primary encounter diagnosis was Chest pain due to myocardial ischemia, unspecified ischemic chest pain type. Diagnoses of NSTEMI (non-ST elevated myocardial infarction) ([facility] and S/P CABG x 3 were also pertinent to this visit. 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    has a past surgical history that includes Lung decortication (Right, ) and Coronary artery bypass graft (N/A, 12/27/2021). Restrictions  Restrictions/Precautions  Restrictions/Precautions: Surgical Protocols,Cardiac  Required Braces or Orthoses?: Yes  Required Braces or Orthoses  Other: Heart Hugger Brace  Position Activity Restriction  Sternal Precautions: 5# Lifting Restrictions  Other position/activity restrictions: 12/27 CABG x 3; up in chair for meals, Ambulate pt, chest tubes  Subjective    Pt sitting up in her chair. Pt c/o 3/10 Sternal pain. Orientation    Overall Orientation Status: Within Functional Limits  Objective     Transfers  Sit to Stand: Contact guard assistance  Stand to sit: Contact guard assistance  Ambulation  Ambulation?: Yes  Ambulation 1  Surface: level tile  Device: Rolling Walker  Assistance: Contact guard assistance  Quality of Gait: Slow, fatigued, steady, on room air. Distance: 300'  Balance  Sitting - Static: Good  Sitting - Dynamic: Good  Standing - Static: Good  Standing - Dynamic: Fair+   Ex's  Seated LE exercise program: Long Arc Quads,heel/toe raises, and marches. Reps: 20 x each with 2 lb   wt on B L's. Pt was educated on his Cardiac HEP. Pt voiced understanding and has no further questions at this time. AM-PAC Score  AM-PAC Inpatient Mobility Raw Score : 18 (12/29/21 0912)  AM-PAC Inpatient T-Scale Score : 43.63 (12/29/21 0912)  Mobility Inpatient CMS 0-100% Score: 46.58 (12/29/21 0912)  Mobility Inpatient CMS G-Code Modifier : CK (12/29/21 0912)   Goals  Short term goals  Time Frame for Short term goals: 14 visits  Short term goal 1: Sit to/from stand with SBA with appropriate use of heart hugger. Short term goal 2: Ambulate 200' with or without walker with SBA. Short term goal 3: Negotiate up and down 8 steps with one railing with CGA.     Plan    Plan  Times per week: 6-7x/wk, 1-2x/day  Current Treatment Recommendations: Saeed Sapp Education & Training,Home Exercise Program,Patient/Caregiver Education & Training,Functional Mobility Training,Transfer Training,Gait Training,Stair training,Equipment Evaluation, Education, & procurement  Safety Devices  Type of devices:  All fall risk precautions in place,Call light within reach,Patient at risk for falls,Gait belt,Nurse notified,Left in chair     Therapy Time   Individual Concurrent Group Co-treatment   Time In  800         Time Out  845         Minutes  [address] [place], [place]

## 2021-12-29 NOTE — PROGRESS NOTES
Pulmonary Progress Note  [facility]      Patient - Renetta Mckinley,  Age - 80 y.o.    - 1950      Room Number - 1002/1002-01   MRN -  [id number]   [facility]  Date of Admission -  2021  6:01 AM    SUBJECTIVE  Well-known to our practice. Follows for previous tuberculosis empyema, benign pulmonary nodules, and chronic bronchitis. Postop CABG. Doing well. Pain control adequate. Ambulates around unit. Anticipating discharge home tomorrow. OBJECTIVE    VITALS    height is 6' (1.829 m) and weight is 195 lb 5.2 oz (88.6 kg). His axillary temperature is 97.6 °F (36.4 °C). His blood pressure is 113/59 (abnormal) and his pulse is 91. His respiration is 6 (abnormal) and oxygen saturation is 97%. Temperature Range: Temp: 97.6 °F (36.4 °C) Temp  Av.6 °F (36.4 °C)  Min: 97.6 °F (36.4 °C)  Max: 97.6 °F (36.4 °C)  BP Range:  Systolic (76XOV), RYX:952 , Min:113 , JWK:586     Diastolic (00YVI), LAS:40, Min:58, Max:79    Pulse Range: Pulse  Av.4  Min: 83  Max: 91  Respiration Range: Resp  Av.7  Min: 6  Max: 25  Current Pulse Ox[de-identified]  SpO2: 97 %  24HR Pulse Ox Range:  SpO2  Av.3 %  Min: 96 %  Max: 100 %  Oxygen Amount and Delivery:  O2 Flow Rate (L/min): 4 L/min      Wt Readings from Last 3 Encounters:   21 195 lb 5.2 oz (88.6 kg)   10/07/19 190 lb (86.2 kg)   19 207 lb 12.8 oz (94.3 kg)       I/O (24 Hours)    Intake/Output Summary (Last 24 hours) at 2021  Last data filed at 2021 1946  Gross per 24 hour   Intake 2207 ml   Output 1140 ml   Net 1067 ml     EXAM  General Appearance  Alert, Oriented, and Cooperative = 0noneoriented to person, place, time, and general circumstances  HEENT - Normal, Head is normocephalic, atraumatic. Neck - Supple, symmetrical, while supineNo cervical, supraclavicular or axillary lymphadenopathy noted. Lungs - positive findings: Median sternotomy clean dry and well approximated.   Decreased breath sounds in both lung bases. Cardiovascular - Cor RRR and No murmurs                                                                            \"}  Abdomen - soft, nontender, no HSM, no guarding, no rebound, no masses  Neurologic - There are no focal motor or sensory deficits  Skin - No bruising or bleeding  Extremities - No cyanosis, clubbing none    MEDS   carvedilol  3.125 mg Oral BID WC    pantoprazole  40 mg Oral QAM AC    tamsulosin  0.4 mg Oral Daily    sodium chloride flush  10 mL IntraVENous 2 times per day    aspirin  81 mg Oral Daily    clopidogrel  75 mg Oral Daily    amiodarone  200 mg Oral BID    chlorhexidine  15 mL Mouth/Throat BID    mupirocin   Nasal BID    multivitamin  1 tablet Oral Daily with breakfast    atorvastatin  40 mg Oral Nightly    ceFAZolin (ANCEF) IVPB  2,000 mg IntraVENous Q8H    insulin glargine  0.15 Units/kg SubCUTAneous Nightly    polyethylene glycol  17 g Oral Daily      sodium chloride      insulin Stopped (12/28/21 0842)    dextrose       potassium chloride **OR** potassium alternative oral replacement **OR** potassium chloride, sodium chloride flush, sodium chloride, ondansetron, acetaminophen, acetaminophen, oxyCODONE-acetaminophen **OR** oxyCODONE-acetaminophen, fentanNYL **OR** fentanNYL, hydrALAZINE, diphenhydrAMINE, calcium chloride IVPB, magnesium sulfate, potassium chloride, albumin human, glucose, dextrose, glucagon (rDNA), dextrose, bisacodyl, bisacodyl    LABS  CBC   Recent Labs     12/28/21  0416   WBC 15.7*   HGB 10.5*   HCT 32.6*   MCV 90.1        BMP:   Recent Labs     12/28/21  0416      K 4.7      CO2 20   BUN 17   CREATININE 1.04   GLUCOSE 104*   MG 2.3     No results found for: PH, PCO2, PO2, HCO3, O2SAT  Lab Results   Component Value Date    MODE CPAP/PS 12/27/2021     LIVER PROFILE No results for input(s): AST, ALT, LIPASE, BILIDIR, BILITOT, ALKPHOS in the last 72 hours. Invalid input(s):   AMYLASE,  ALB  INR   Recent Labs 12/28/21  0416   INR 1.0     PTT   Lab Results   Component Value Date    APTT 30.3 12/27/2021     CBC:   Lab Results   Component Value Date    WBC 15.7 12/28/2021    RBC 3.62 12/28/2021    HGB 10.5 12/28/2021    HCT 32.6 12/28/2021    MCV 90.1 12/28/2021    MCH 29.0 12/28/2021    MCHC 32.2 12/28/2021    RDW 12.8 12/28/2021     12/28/2021    MPV 10.1 12/28/2021     CMP:    Lab Results   Component Value Date     12/28/2021    K 4.7 12/28/2021     12/28/2021    CO2 20 12/28/2021    BUN 17 12/28/2021    CREATININE 1.04 12/28/2021    GFRAA >60 12/28/2021    LABGLOM >60 12/28/2021    GLUCOSE 104 12/28/2021    PROT 5.8 12/25/2021    LABALBU 3.2 12/25/2021    CALCIUM 8.3 12/28/2021    BILITOT 0.66 12/25/2021    ALKPHOS 84 12/25/2021    AST 30 12/25/2021    ALT 20 12/25/2021     Calcium:    Lab Results   Component Value Date    CALCIUM 8.3 12/28/2021     Ionized Calcium:  No results found for: IONCA  Magnesium:    Lab Results   Component Value Date    MG 2.3 12/28/2021     Phosphorus:    Lab Results   Component Value Date    PHOS 2.8 08/17/2019     ABG:    Lab Results   Component Value Date    MTR9ZMS NOT REPORTED 12/27/2021       CULTURES  Not applied. RADIOLOGY  XR CHEST PORTABLE    Result Date: 12/28/2021  Interval extubation. Otherwise no significant change. XR CHEST PORTABLE    Result Date: 12/27/2021  Postsurgical changes with support tubes and lines as above. No appreciable extrapleural air. XR CHEST PORTABLE    Result Date: 12/24/2021  No acute process detected. XR CHEST PORTABLE    Result Date: 12/23/2021  New variable nearly diffuse interstitial thickening concerning for viral process. CT CHEST PULMONARY EMBOLISM W CONTRAST    Result Date: 12/23/2021  1. No evidence of pulmonary embolism. 2.  Findings compatible with scarring in the left upper lobe and lung bases. No convincing evidence for acute airspace disease. 3.  Mild emphysematous disease.  RECOMMENDATIONS: Unavailable (See actual reports for details)    ASSESSMENT  Patient Active Problem List   Diagnosis    Esophageal reflux    Acute upper respiratory infection    Localized osteoarthrosis    Essential hypertension, benign    Pleural effusion    Empyema of pleura ([address]    Procedure not carried out because of patient's decision    Other and unspecified hyperlipidemia    Bronchitis, acute    Pneumonia due to infectious organism    ANY (acute kidney injury) ([address]    Urinary retention    NSTEMI (non-ST elevated myocardial infarction) ([address]    Chest pain due to myocardial ischemia    S/P CABG x 3       PLAN  1. Okay for discharge at discretion of cardiothoracic surgery. 2. Encourage regular exercise. 3. Follow-up as scheduled with our practice. 4. No further pulmonary intervention planned. Will sign off.       Electronically signed by Dorthula Sacks, DO on 12/28/2021 at 9:05 PM

## 2021-12-29 NOTE — PLAN OF CARE
Problem: Falls - Risk of:  Goal: Will remain free from falls  Description: Will remain free from falls  Outcome: Ongoing  Goal: Absence of physical injury  Description: Absence of physical injury  Outcome: Ongoing     Problem: Discharge Planning:  Goal: Discharged to appropriate level of care  Description: Discharged to appropriate level of care  Outcome: Ongoing     Problem: Cardiac Output - Decreased:  Goal: Hemodynamic stability will improve  Description: Hemodynamic stability will improve  Outcome: Ongoing     Problem: Serum Glucose Level - Abnormal:  Goal: Ability to maintain appropriate glucose levels will improve  Description: Ability to maintain appropriate glucose levels will improve  Outcome: Ongoing     Problem: Pain:  Goal: Pain level will decrease  Description: Pain level will decrease  Outcome: Ongoing  Goal: Control of acute pain  Description: Control of acute pain  Outcome: Ongoing  Goal: Control of chronic pain  Description: Control of chronic pain  Outcome: Ongoing     Problem: Tissue Perfusion - Cardiopulmonary, Altered:  Goal: Absence of angina  Description: Absence of angina  Outcome: Ongoing  Goal: Circulatory function within specified parameters  Description: Circulatory function within specified parameters  Outcome: Ongoing  Goal: Hemodynamic stability will improve  Description: Hemodynamic stability will improve  Outcome: Ongoing     Problem: Tobacco Use:  Goal: Will participate in inpatient tobacco-use cessation counseling  Description: Will participate in inpatient tobacco-use cessation counseling  Outcome: Ongoing     Problem: Bleeding:  Goal: Will show no signs and symptoms of excessive bleeding  Description: Will show no signs and symptoms of excessive bleeding  Outcome: Ongoing     Problem: Skin Integrity:  Goal: Will show no infection signs and symptoms  Description: Will show no infection signs and symptoms  Outcome: Ongoing  Goal: Absence of new skin breakdown  Description: Absence of new skin breakdown  Outcome: Ongoing     Problem: IP BALANCE  Goal: BALANCE EDUCATION  Description: Educate patients on maintaining dynamic/static standing/sitting balance, with/without upper extremity support.   Outcome: Ongoing     Problem: IP MOBILITY  Goal: LTG - patient will ambulate community distance  Outcome: Ongoing     Problem: Pain:  Goal: Pain level will decrease  Description: Pain level will decrease  Outcome: Ongoing  Goal: Control of acute pain  Description: Control of acute pain  Outcome: Ongoing  Goal: Control of chronic pain  Description: Control of chronic pain  Outcome: Ongoing   Electronically signed by Shital Ames RN on 12/29/2021 at 6:39 AM

## 2021-12-29 NOTE — PROGRESS NOTES
[facility]   Progress Note                 Date:   12/28/2021  Patient name:  Josi Junior  Date of admission:  12/23/2021  6:01 AM  MRN:   [id number]  YOB: 1950  PCP:    Renae Mtz MD    Reason for Admission: NSTEMI (non-ST elevated myocardial infarction) [facility]) [I21.4]  Chest pain due to myocardial ischemia, unspecified ischemic chest pain type [I25.9]      Subjective:       Clinical Changes / Abnormalities:     S/p CABG POD 2  No acute events over the night        I/O last 3 completed shifts: In: 1744.4 [I.V.:1744.4]  Out: 3063 [Urine:1560; Chest Tube:260]  I/O this shift:  In: 1379 [I.V.:1379]  Out: 190 [Urine:50; Chest Tube:140]            Medications:   Scheduled Meds:    carvedilol  3.125 mg Oral BID WC    pantoprazole  40 mg Oral QAM AC    tamsulosin  0.4 mg Oral Daily    sodium chloride flush  10 mL IntraVENous 2 times per day    aspirin  81 mg Oral Daily    clopidogrel  75 mg Oral Daily    amiodarone  200 mg Oral BID    chlorhexidine  15 mL Mouth/Throat BID    mupirocin   Nasal BID    multivitamin  1 tablet Oral Daily with breakfast    atorvastatin  40 mg Oral Nightly    ceFAZolin (ANCEF) IVPB  2,000 mg IntraVENous Q8H    insulin glargine  0.15 Units/kg SubCUTAneous Nightly    polyethylene glycol  17 g Oral Daily     Continuous Infusions:    sodium chloride      insulin Stopped (12/28/21 0842)    dextrose       CBC:   Recent Labs     12/27/21  0446 12/27/21  1443 12/28/21  0416   WBC 7.5 16.6* 15.7*   HGB 11.1* 11.0* 10.5*    130* 170     BMP:    Recent Labs     12/27/21  0446 12/27/21  1428 12/27/21  1443 12/28/21  0416     --  137 138   K 4.3  --  4.4 4.7     --  106 104   CO2 18*  --  20 20   BUN 23  --  20 17   CREATININE 0.96 1.04 0.89 1.04   GLUCOSE 97  --  144* 104*     Hepatic:   No results for input(s): AST, ALT, ALB, BILITOT, ALKPHOS in the last 72 hours.   Troponin: No results for input(s): TROPHS in the last 72 hours.  BNP: No results for input(s): BNP in the last 72 hours. Lipids: No results for input(s): CHOL, HDL in the last 72 hours. Invalid input(s): LDLCALCU  INR:   Recent Labs     12/27/21  1443 12/28/21  0416   INR 1.1 1.0       Objective:   Vitals: BP (!) 113/59   Pulse 91   Temp 97.6 °F (36.4 °C) (Axillary)   Resp (!) 6   Ht 6' (1.829 m)   Wt 195 lb 5.2 oz (88.6 kg)   SpO2 97%   BMI 26.49 kg/m²   General appearance: Alert. No acute distress. HEENT: Head: Normal, normocephalic, atraumatic. Neck: no JVD, trachea midline  Lungs: Clear to auscultation bilaterally, no wheeze or crackles  Heart: Regular rate and rhythm, S1, S2 normal, no murmur  Abdomen: Soft, non-tender  Extremities: No edema  Neurologic: No focal neurologic deficits    Echocardiogram: 08/2019  Summary  Normal left ventricle size, wall thickness and low normal function. Calculated EF via Reinoso's method is 50 %. Cannot rule out wall motion abnormalities due to poor visualization. Right atrial dilatation. Aortic valve is sclerotic but opens well. Trivial aortic insufficiency.        Coronary Angiography: 12/23  LMCA: Normal 0% stenosis. LAD: Single stenosis.       Lesion on Prox LAD: 80% stenosis.       LCx: Multiple stenosis.       Lesion on 1st Ob Mel[address]ie: Proximal subsection. 90% stenosis.       Lesion on Mid CX: 80% stenosis.       Lesion on 2nd Ob Mel[address]ie: Ostial.80% stenosis.       RCA: Acute occlusion.         Lesion on Mid RCA: 100% stenosis 20 mm length reduced to 20%. Pre     procedure MARIFER 0 flow was noted. Post Procedure MARIFER III flow was     present. Poor runoff was present. The lesion was diagnosed as High Risk     (C).     Coronary Tree Dominance: Right       LV Analysis LV function assessed as:Normal.   The LV gram was performed in the CHOUDHARY 30 position. LVEF: 50%. Conclusions:  Acute occlusion of mid RCA underwent PTCA with restoration of MARIFER III    flow. Multi-vessel Coronary Artery Disease.    Normal LV systolic function. Assessment:   1. NSTEMI s/p PRCA of mid RCA, s/p CABG X 3 w/ LIMA to LAD, RSVG1 to OM, RSVG2 to distal RCA/RPDA on 12/27/21  2. LVEF 50%  3. HTN        Plan / Recommendations:   1. Continue ASA and plavix  2. High intensity statin  3. Continue amiodarone 200 mg TID per CT surgery recs  4. Lopressor 25 mg BID  5. Good glycemic index  6. Incentive spirometry  7. Post op management per CT surgery      Thank you for allowing us to participate in [facility]. Electronically signed on 12/28/21 at 9:02 PM by:    Romel Muniz MD, MD   Fellow, [address] [place] [address]    I performed a history and physical examination of the patient and discussed management with the resident. I reviewed the residents note and agree with the documented findings and plan of care. Any areas of disagreement are noted on the chart. I was personally present for the key portions of any procedures. I have documented in the chart those procedures where I was not present during the key portions. I have personally evaluated this patient and have completed at least one if not all key elements of the E/M (history, physical exam, and MDM). Additional findings are as noted.     Debbie Saini MD

## 2021-12-29 NOTE — PLAN OF CARE
Problem: Falls - Risk of:  Goal: Will remain free from falls  Description: Will remain free from falls  12/29/2021 0847 by Tyson Owens RN  Outcome: Ongoing  12/29/2021 0639 by Jaclyn Hale RN  Outcome: Ongoing  Goal: Absence of physical injury  Description: Absence of physical injury  12/29/2021 0847 by Tyson Owens RN  Outcome: Ongoing  12/29/2021 0639 by Jaclyn Hale RN  Outcome: Ongoing     Problem: Discharge Planning:  Goal: Discharged to appropriate level of care  Description: Discharged to appropriate level of care  12/29/2021 0847 by Tyson Owens RN  Outcome: Ongoing  12/29/2021 0639 by Jaclyn Hale RN  Outcome: Ongoing     Problem: Cardiac Output - Decreased:  Goal: Hemodynamic stability will improve  Description: Hemodynamic stability will improve  12/29/2021 0847 by Tyson Owens RN  Outcome: Ongoing  12/29/2021 0639 by Jaclyn Hale RN  Outcome: Ongoing     Problem: Serum Glucose Level - Abnormal:  Goal: Ability to maintain appropriate glucose levels will improve  Description: Ability to maintain appropriate glucose levels will improve  12/29/2021 0847 by Tyson Owens RN  Outcome: Ongoing  12/29/2021 0639 by Jaclyn Hale RN  Outcome: Ongoing     Problem: Pain:  Goal: Pain level will decrease  Description: Pain level will decrease  12/29/2021 0847 by Tyson Owens RN  Outcome: Ongoing  12/29/2021 0639 by Jaclyn Hale RN  Outcome: Ongoing  Goal: Control of acute pain  Description: Control of acute pain  12/29/2021 0847 by Tyson Owens RN  Outcome: Ongoing  12/29/2021 0639 by Jaclyn Hale RN  Outcome: Ongoing  Goal: Control of chronic pain  Description: Control of chronic pain  12/29/2021 0847 by Tyson Owens RN  Outcome: Ongoing  12/29/2021 0639 by Jaclyn Hale RN  Outcome: Ongoing     Problem: Tissue Perfusion - Cardiopulmonary, Altered:  Goal: Absence of angina  Description: Absence of angina  12/29/2021 0847 by Tyson Owens RN  Outcome: Ongoing  12/29/2021 0639 by Rajiv Starr RN  Outcome: Ongoing  Goal: Circulatory function within specified parameters  Description: Circulatory function within specified parameters  12/29/2021 0847 by Brandie Rush RN  Outcome: Ongoing  12/29/2021 0639 by Rajiv Starr RN  Outcome: Ongoing  Goal: Hemodynamic stability will improve  Description: Hemodynamic stability will improve  12/29/2021 0847 by Brandie Rush RN  Outcome: Ongoing  12/29/2021 0639 by Rajiv Starr RN  Outcome: Ongoing     Problem: Tobacco Use:  Goal: Will participate in inpatient tobacco-use cessation counseling  Description: Will participate in inpatient tobacco-use cessation counseling  12/29/2021 0847 by Brandie Rush RN  Outcome: Ongoing  12/29/2021 0639 by Rajiv Starr RN  Outcome: Ongoing     Problem: Bleeding:  Goal: Will show no signs and symptoms of excessive bleeding  Description: Will show no signs and symptoms of excessive bleeding  12/29/2021 0847 by Brandie Rush RN  Outcome: Ongoing  12/29/2021 0639 by Rajiv Starr RN  Outcome: Ongoing     Problem: Skin Integrity:  Goal: Will show no infection signs and symptoms  Description: Will show no infection signs and symptoms  12/29/2021 0847 by Brandie Rush RN  Outcome: Ongoing  12/29/2021 0639 by Rajiv Starr RN  Outcome: Ongoing  Goal: Absence of new skin breakdown  Description: Absence of new skin breakdown  12/29/2021 0847 by Brandie Rush RN  Outcome: Ongoing  12/29/2021 0639 by Rajiv Starr RN  Outcome: Ongoing     Problem: IP BALANCE  Goal: BALANCE EDUCATION  Description: Educate patients on maintaining dynamic/static standing/sitting balance, with/without upper extremity support.   12/29/2021 0847 by Brandie Rush RN  Outcome: Ongoing  12/29/2021 0639 by Rajiv Starr RN  Outcome: Ongoing     Problem: IP MOBILITY  Goal: LTG - patient will ambulate community distance  12/29/2021 0847 by Brandie Rush RN  Outcome: Ongoing  12/29/2021 2627 by Hortencia Carbone RN  Outcome: Ongoing     Problem: Pain:  Goal: Pain level will decrease  Description: Pain level will decrease  12/29/2021 0847 by Beatriz Pelayo RN  Outcome: Ongoing  12/29/2021 0639 by Hortencia Carbone RN  Outcome: Ongoing  Goal: Control of acute pain  Description: Control of acute pain  12/29/2021 0847 by Beatriz Pelayo RN  Outcome: Ongoing  12/29/2021 0639 by Hortencia Carbone RN  Outcome: Ongoing  Goal: Control of chronic pain  Description: Control of chronic pain  12/29/2021 0847 by Beatriz Pelayo RN  Outcome: Ongoing  12/29/2021 0639 by Hortencia Carbone RN  Outcome: Ongoing home attendant

## 2021-12-29 NOTE — PROGRESS NOTES
[id number] [facility]  Daily Progress Note    Surgeon: Dr Amelie Freeman   S/P:  CABG x3  POD#: 2  EF: 50%      Subjective:  Mr. Ximena Polanco feels well today. Patient complains of continuing pain/edema to testicles. Discussed the need to follow up outpatient with Urologist. Pain is controlled on current medication regimen. OOBTC and ambulating. Last BMs prior to surgery. Bowel regimen in place. Good appetite. Denies chest pain or shortness of breath. Plan of care reviewed and questions answered. Physical Exam  Vital Signs: /74   Pulse 97   Temp 98.4 °F (36.9 °C)   Resp 18   Ht 6' (1.829 m)   Wt 200 lb 2.8 oz (90.8 kg)   SpO2 97%   BMI 27.15 kg/m²  O2 Flow Rate (L/min): 4 L/min   Admit Weight: Weight: 192 lb (87.1 kg)   WEIGHTWeight: 200 lb 2.8 oz (90.8 kg)     General: alert and oriented to person, place and time, well-developed and well-nourished, in no acute distress. Up in chair  Heart: Normal S1 and S2.  Regular rhythm. No murmurs, gallops, or rubs. Pacing Wires Yes   Lungs: clear to auscultation bilaterally and diminished breath sounds bibasilar  Abdomen: soft, non tender, non distended, BSx4  Extremities: 1+, pitting and generalized edema  Wounds: clean and dry, healing appropriately.      Scheduled Meds:    metoprolol tartrate  25 mg Oral BID    sennosides-docusate sodium  2 tablet Oral Daily    pantoprazole  40 mg Oral QAM AC    tamsulosin  0.4 mg Oral Daily    sodium chloride flush  10 mL IntraVENous 2 times per day    aspirin  81 mg Oral Daily    clopidogrel  75 mg Oral Daily    amiodarone  200 mg Oral BID    chlorhexidine  15 mL Mouth/Throat BID    mupirocin   Nasal BID    multivitamin  1 tablet Oral Daily with breakfast    atorvastatin  40 mg Oral Nightly    [Held by provider] insulin glargine  0.15 Units/kg SubCUTAneous Nightly    polyethylene glycol  17 g Oral Daily     Continuous Infusions:    sodium chloride      insulin Stopped (12/28/21 0842)   Miranda Metz dextrose         Data:  CBC:   Recent Labs     12/27/21  1443 12/28/21  0416 12/29/21  0401   WBC 16.6* 15.7* 13.7*   HGB 11.0* 10.5* 9.6*   HCT 33.1* 32.6* 29.7*   MCV 89.5 90.1 92.0   * 170 174     BMP:   Recent Labs     12/27/21  1443 12/28/21  0416 12/29/21  0401    138 134*   K 4.4 4.7 4.0    104 101   CO2 20 20 21   BUN 20 17 18   CREATININE 0.89 1.04 1.05     PT/INR:   Recent Labs     12/27/21  1443 12/28/21  0416 12/29/21  0401   PROTIME 11.4 10.7 10.7   INR 1.1 1.0 1.0     APTT:   Recent Labs     12/27/21  1443   APTT 30.3       Chest X-Ray:  Imaging reviewed, report pending    I/O:  I/O last 3 completed shifts: In: 8982 [I.V.:1379]  Out: 840 [Urine:700; Chest Tube:140]      Assessment:  · Multivessel CAD s/p CABG x3  · POD 2  · No complication  · Acute blood loss anemia secondary to above  · NSTEMI  · Emphysema   ? Pulmonology following. Cleared for surgery. · Essential hypertension  · Hyperlipidemia  · Urinary retention  · Chronic, on Flomax at home      Plan:    Remains inpatient on telemetry, level of care is currently Cardiac CCU    Monitor vitals closely including continuous pulse oximetry   Oxygen as needed via nasal cannula to maintain SpO2 > 92%   Chest x-ray daily   Maintain chest tubes to continuous wall suction   Discontinue river for strict I&Os  o Monitor closely for retention issues   Encourage incentive spirometry    Monitor CBC, BMP, INR and Mag daily   Restart home dose of Flomax   Currently taking carvedilol,    Patient is on BB, ASA, Statin therapy per protocol   ?  ACE-I/ARB not indicated with EF of 50%   Amiodarone 200 mg twice daily   Percocet for pain control   SCDs while stationary    Protonix for GI prophylaxis   Replace electrolytes as needed per sliding scale and recheck per policy   PT/OT evalutation for discharge recommendation and ambulation 3x daily   Case Management consult for discharge planning        The above recommendations including medications and orders were discussed and agreed upon with Dr. Jenaro Rodriguez, the attending on service for the cardiothoracic surgery group today. Electronically signed by CHRISTOPH Ardon CNP on 12/29/2021 at 10:59 AM    On this date 12/29/2021 I have spent 28 minutes reviewing previous notes, test results and face to face with the patient discussing the diagnosis and importance of compliance with the treatment plan as well as documenting on the day of the visit. At least 50% of the time documented was spent with the patient to provide counseling and/or coordination of care. This note was created with the assistance of a speech-recognition program.  Although the intention is to generate a document that actually reflects the content of the visit, no guarantees can be provided that every mistake has been identified and corrected by editing. Note was updated later by me after  physical examination and  completion of the assessment.

## 2021-12-29 NOTE — PROGRESS NOTES
Writer contacted Ryder Fraser NP about patient not urinating since river was pulled out at 7 am with 450 mL when bladder scanned. New orders placed for 20 mg IV lasix and straight cath.

## 2021-12-30 ENCOUNTER — APPOINTMENT (OUTPATIENT)
Dept: GENERAL RADIOLOGY | Age: 71
DRG: 232 | End: 2021-12-30
Payer: OTHER GOVERNMENT

## 2021-12-30 LAB
ANION GAP SERPL CALCULATED.3IONS-SCNC: 15 MMOL/L (ref 9–17)
BUN BLDV-MCNC: 23 MG/DL (ref 8–23)
BUN/CREAT BLD: ABNORMAL (ref 9–20)
CALCIUM SERPL-MCNC: 8.5 MG/DL (ref 8.6–10.4)
CHLORIDE BLD-SCNC: 101 MMOL/L (ref 98–107)
CO2: 20 MMOL/L (ref 20–31)
CREAT SERPL-MCNC: 0.97 MG/DL (ref 0.7–1.2)
GFR AFRICAN AMERICAN: >60 ML/MIN
GFR NON-AFRICAN AMERICAN: >60 ML/MIN
GFR SERPL CREATININE-BSD FRML MDRD: ABNORMAL ML/MIN/{1.73_M2}
GFR SERPL CREATININE-BSD FRML MDRD: ABNORMAL ML/MIN/{1.73_M2}
GLUCOSE BLD-MCNC: 112 MG/DL (ref 75–110)
GLUCOSE BLD-MCNC: 89 MG/DL (ref 70–99)
HCT VFR BLD CALC: 34.5 % (ref 40.7–50.3)
HEMOGLOBIN: 10.9 G/DL (ref 13–17)
INR BLD: 0.9
MAGNESIUM: 2.4 MG/DL (ref 1.6–2.6)
MCH RBC QN AUTO: 28.9 PG (ref 25.2–33.5)
MCHC RBC AUTO-ENTMCNC: 31.6 G/DL (ref 28.4–34.8)
MCV RBC AUTO: 91.5 FL (ref 82.6–102.9)
NRBC AUTOMATED: 0 PER 100 WBC
PDW BLD-RTO: 12.9 % (ref 11.8–14.4)
PLATELET # BLD: 207 K/UL (ref 138–453)
PMV BLD AUTO: 10.2 FL (ref 8.1–13.5)
POTASSIUM SERPL-SCNC: 4.1 MMOL/L (ref 3.7–5.3)
PROTHROMBIN TIME: 10 SEC (ref 9.1–12.3)
RBC # BLD: 3.77 M/UL (ref 4.21–5.77)
SODIUM BLD-SCNC: 136 MMOL/L (ref 135–144)
WBC # BLD: 14.2 K/UL (ref 3.5–11.3)

## 2021-12-30 PROCEDURE — 80048 BASIC METABOLIC PNL TOTAL CA: CPT

## 2021-12-30 PROCEDURE — 97110 THERAPEUTIC EXERCISES: CPT

## 2021-12-30 PROCEDURE — 97116 GAIT TRAINING THERAPY: CPT

## 2021-12-30 PROCEDURE — 6370000000 HC RX 637 (ALT 250 FOR IP): Performed by: PHYSICIAN ASSISTANT

## 2021-12-30 PROCEDURE — 85610 PROTHROMBIN TIME: CPT

## 2021-12-30 PROCEDURE — 83735 ASSAY OF MAGNESIUM: CPT

## 2021-12-30 PROCEDURE — 6370000000 HC RX 637 (ALT 250 FOR IP): Performed by: STUDENT IN AN ORGANIZED HEALTH CARE EDUCATION/TRAINING PROGRAM

## 2021-12-30 PROCEDURE — 6370000000 HC RX 637 (ALT 250 FOR IP): Performed by: NURSE PRACTITIONER

## 2021-12-30 PROCEDURE — 85027 COMPLETE CBC AUTOMATED: CPT

## 2021-12-30 PROCEDURE — 94640 AIRWAY INHALATION TREATMENT: CPT

## 2021-12-30 PROCEDURE — 99232 SBSQ HOSP IP/OBS MODERATE 35: CPT | Performed by: INTERNAL MEDICINE

## 2021-12-30 PROCEDURE — 71045 X-RAY EXAM CHEST 1 VIEW: CPT

## 2021-12-30 PROCEDURE — 2140000001 HC CVICU INTERMEDIATE R&B

## 2021-12-30 PROCEDURE — APPNB30 APP NON BILLABLE TIME 0-30 MINS: Performed by: NURSE PRACTITIONER

## 2021-12-30 PROCEDURE — 6360000002 HC RX W HCPCS: Performed by: NURSE PRACTITIONER

## 2021-12-30 PROCEDURE — 2580000003 HC RX 258: Performed by: PHYSICIAN ASSISTANT

## 2021-12-30 PROCEDURE — 99024 POSTOP FOLLOW-UP VISIT: CPT | Performed by: NURSE PRACTITIONER

## 2021-12-30 PROCEDURE — 36415 COLL VENOUS BLD VENIPUNCTURE: CPT

## 2021-12-30 PROCEDURE — 82947 ASSAY GLUCOSE BLOOD QUANT: CPT

## 2021-12-30 PROCEDURE — 6360000002 HC RX W HCPCS: Performed by: PHYSICIAN ASSISTANT

## 2021-12-30 RX ORDER — BUDESONIDE AND FORMOTEROL FUMARATE DIHYDRATE 160; 4.5 UG/1; UG/1
2 AEROSOL RESPIRATORY (INHALATION) 2 TIMES DAILY
Status: DISCONTINUED | OUTPATIENT
Start: 2021-12-30 | End: 2021-12-31 | Stop reason: HOSPADM

## 2021-12-30 RX ORDER — FUROSEMIDE 10 MG/ML
20 INJECTION INTRAMUSCULAR; INTRAVENOUS DAILY
Status: DISCONTINUED | OUTPATIENT
Start: 2021-12-30 | End: 2021-12-31 | Stop reason: HOSPADM

## 2021-12-30 RX ADMIN — PANTOPRAZOLE SODIUM 40 MG: 40 TABLET, DELAYED RELEASE ORAL at 09:37

## 2021-12-30 RX ADMIN — SODIUM CHLORIDE, PRESERVATIVE FREE 10 ML: 5 INJECTION INTRAVENOUS at 21:27

## 2021-12-30 RX ADMIN — CHLORHEXIDINE GLUCONATE 0.12% ORAL RINSE 15 ML: 1.2 LIQUID ORAL at 21:20

## 2021-12-30 RX ADMIN — TIOTROPIUM BROMIDE INHALATION SPRAY 2 PUFF: 3.12 SPRAY, METERED RESPIRATORY (INHALATION) at 14:59

## 2021-12-30 RX ADMIN — Medication 81 MG: at 09:38

## 2021-12-30 RX ADMIN — OXYCODONE HYDROCHLORIDE AND ACETAMINOPHEN 1 TABLET: 5; 325 TABLET ORAL at 21:20

## 2021-12-30 RX ADMIN — TAMSULOSIN HYDROCHLORIDE 0.4 MG: 0.4 CAPSULE ORAL at 09:37

## 2021-12-30 RX ADMIN — SODIUM CHLORIDE, PRESERVATIVE FREE 10 ML: 5 INJECTION INTRAVENOUS at 09:39

## 2021-12-30 RX ADMIN — AMIODARONE HYDROCHLORIDE 200 MG: 200 TABLET ORAL at 21:20

## 2021-12-30 RX ADMIN — AMIODARONE HYDROCHLORIDE 200 MG: 200 TABLET ORAL at 09:37

## 2021-12-30 RX ADMIN — CLOPIDOGREL 75 MG: 75 TABLET, FILM COATED ORAL at 09:38

## 2021-12-30 RX ADMIN — DESMOPRESSIN ACETATE 40 MG: 0.2 TABLET ORAL at 21:20

## 2021-12-30 RX ADMIN — MUPIROCIN: 20 OINTMENT TOPICAL at 21:20

## 2021-12-30 RX ADMIN — ALCOHOL 1 TABLET: 70.47 GEL TOPICAL at 09:37

## 2021-12-30 RX ADMIN — FUROSEMIDE 20 MG: 10 INJECTION, SOLUTION INTRAMUSCULAR; INTRAVENOUS at 12:01

## 2021-12-30 RX ADMIN — METOPROLOL TARTRATE 25 MG: 25 TABLET ORAL at 09:37

## 2021-12-30 RX ADMIN — BUDESONIDE AND FORMOTEROL FUMARATE DIHYDRATE 2 PUFF: 160; 4.5 AEROSOL RESPIRATORY (INHALATION) at 11:33

## 2021-12-30 RX ADMIN — DOCUSATE SODIUM 50MG AND SENNOSIDES 8.6MG 2 TABLET: 8.6; 5 TABLET, FILM COATED ORAL at 14:53

## 2021-12-30 RX ADMIN — FENTANYL CITRATE 25 MCG: 50 INJECTION INTRAMUSCULAR; INTRAVENOUS at 11:31

## 2021-12-30 RX ADMIN — METOPROLOL TARTRATE 25 MG: 25 TABLET ORAL at 21:20

## 2021-12-30 ASSESSMENT — PAIN SCALES - GENERAL
PAINLEVEL_OUTOF10: 6
PAINLEVEL_OUTOF10: 1

## 2021-12-30 ASSESSMENT — PAIN DESCRIPTION - PROGRESSION
CLINICAL_PROGRESSION: GRADUALLY IMPROVING

## 2021-12-30 NOTE — FLOWSHEET NOTE
SPIRITUAL CARE DEPARTMENT - [facility] [address]  PROGRESS NOTE    Shift date: 12/30/21  Shift day: Thursday   Shift # 2    Room # 1002/1002-01   Name: Salma Juárez            Age: 80 y.o. Gender: male          Protestant:    Place of Taoist:     Referral: Routine Visit    Admit Date & Time: 12/23/2021  6:01 AM    PATIENT/EVENT DESCRIPTION:  Salma Juárez is a 80 y.o. male  Post Precedure      SPIRITUAL ASSESSMENT/INTERVENTION:   was welcomed in room by patient. Patient engaged in conversation and stated he was 'feeling better' and looked forward to going home. Patient talked about his Yazdanism community, being on prayer chains, and having loving support from family and a dear friend.  listened and validated feelings and concerns along with his magno. Patient expressed gratitude for stopping by room. SPIRITUAL CARE FOLLOW-UP PLAN:  Chaplains will remain available to offer spiritual and emotional support as needed.       Electronically signed by Arpita Mendoza Resident, on 12/30/2021 at [address] [place] [address]  [phone]       12/30/21 1814   Encounter Summary   Services provided to: Patient   Referral/Consult From: Rounding   Continue Visiting   (12/30/21)   Complexity of Encounter Moderate   Spiritual Assessment Completed Yes   Routine   Type Post-procedure   Assessment Hopeful;Coping   Intervention Active listening;Explored feelings, thoughts, concerns;Sustaining presence/ Ministry of presence   Outcome Expressed gratitude

## 2021-12-30 NOTE — PROGRESS NOTES
[facility]  Daily Progress Note    Surgeon: Dr Panda Kruger   S/P:  CABG x3  POD#: 3  EF: 50%      Subjective:  Mr. Natty Bains feels well today. Pain is controlled on current medication regimen. Chest tubes remain in place with decreased output overnight. Plan for removal today with repeat CXR. Encourage OOBTC and ambulating. Last BMs prior to surgery. Bowel regimen in place. Good appetite. Denies chest pain or shortness of breath. Plan of care reviewed and questions answered. Physical Exam  Vital Signs: /67   Pulse 80   Temp 98 °F (36.7 °C) (Oral)   Resp 20   Ht 6' (1.829 m)   Wt 198 lb 6.6 oz (90 kg)   SpO2 94%   BMI 26.91 kg/m²  O2 Flow Rate (L/min): 4 L/min   Admit Weight: Weight: 192 lb (87.1 kg)   WEIGHTWeight: 198 lb 6.6 oz (90 kg)     General: alert and oriented to person, place and time, well-developed and well-nourished, in no acute distress. Up in chair  Heart: Normal S1 and S2.  Regular rhythm. No murmurs, gallops, or rubs. Pacing Wires Yes   Lungs: clear to auscultation bilaterally and diminished breath sounds bibasilar  Abdomen: soft, non tender, non distended, BSx4  Extremities: 1+, pitting and generalized edema  Wounds: clean and dry, healing appropriately.      Scheduled Meds:    furosemide  20 mg IntraVENous Daily    metoprolol tartrate  25 mg Oral BID    sennosides-docusate sodium  2 tablet Oral Daily    budesonide-formoterol  2 puff Inhalation BID    tiotropium  2 puff Inhalation Daily    pantoprazole  40 mg Oral QAM AC    tamsulosin  0.4 mg Oral Daily    sodium chloride flush  10 mL IntraVENous 2 times per day    aspirin  81 mg Oral Daily    clopidogrel  75 mg Oral Daily    amiodarone  200 mg Oral BID    chlorhexidine  15 mL Mouth/Throat BID    mupirocin   Nasal BID    multivitamin  1 tablet Oral Daily with breakfast    atorvastatin  40 mg Oral Nightly    [Held by provider] insulin glargine  0.15 Units/kg SubCUTAneous Nightly    polyethylene glycol  17 g Oral Daily     Continuous Infusions:    sodium chloride      insulin Stopped (12/28/21 0842)    dextrose         Data:  CBC:   Recent Labs     12/28/21 0416 12/29/21  0401 12/30/21  0519   WBC 15.7* 13.7* 14.2*   HGB 10.5* 9.6* 10.9*   HCT 32.6* 29.7* 34.5*   MCV 90.1 92.0 91.5    174 207     BMP:   Recent Labs     12/28/21 0416 12/29/21  0401 12/30/21  0519    134* 136   K 4.7 4.0 4.1    101 101   CO2 20 21 20   BUN 17 18 23   CREATININE 1.04 1.05 0.97     PT/INR:   Recent Labs     12/28/21 0416 12/29/21  0401 12/30/21  0519   PROTIME 10.7 10.7 10.0   INR 1.0 1.0 0.9     APTT:   Recent Labs     12/27/21  1443   APTT 30.3       Chest X-Ray:  Imaging reviewed, report pending    I/O:  I/O last 3 completed shifts: In: 240 [P.O.:240]  Out: 9391 [Urine:1400; Chest Tube:330]      Assessment:  · Multivessel CAD s/p CABG x3  · POD 3  · No complication  · Acute blood loss anemia secondary to above  · NSTEMI  · Emphysema   ? Pulmonology following. Cleared for surgery. · Essential hypertension  · Hyperlipidemia  · Urinary retention  · Chronic, on Flomax at home      Plan:    Remains inpatient on telemetry, level of care is currently Cardiac CCU    Monitor vitals closely including continuous pulse oximetry   Oxygen as needed via nasal cannula to maintain SpO2 > 92%   Chest x-ray daily   Remove chest tubes and repeat chest x-ray 3 hours after   Encourage incentive spirometry    Monitor CBC, BMP, INR and Mag daily   Restart home dose of Flomax   Currently taking carvedilol,    Patient is on BB, ASA, Statin therapy per protocol   ?  ACE-I/ARB not indicated with EF of 50%   Amiodarone 200 mg twice daily   Percocet for pain control   SCDs while stationary    Protonix for GI prophylaxis   Replace electrolytes as needed per sliding scale and recheck per policy   PT/OT evalutation for discharge recommendation and ambulation 3x daily   Case Management consult for discharge planning        The above recommendations including medications and orders were discussed and agreed upon with Dr. Madelyn Richey, the attending on service for the cardiothoracic surgery group today. Electronically signed by CHRISTOPH Abdul CNP on 12/30/2021 at 7:22 AM    On this date 12/30/2021 I have spent 28 minutes reviewing previous notes, test results and face to face with the patient discussing the diagnosis and importance of compliance with the treatment plan as well as documenting on the day of the visit. At least 50% of the time documented was spent with the patient to provide counseling and/or coordination of care. This note was created with the assistance of a speech-recognition program.  Although the intention is to generate a document that actually reflects the content of the visit, no guarantees can be provided that every mistake has been identified and corrected by editing. Note was updated later by me after  physical examination and  completion of the assessment.

## 2021-12-30 NOTE — PROGRESS NOTES
Physical Therapy  Facility/Department: [facility] 1  Daily Treatment Note  NAME: Marcella Crawford  : 1950  MRN: [id number]    Date of Service: 2021    Discharge Recommendations:  Patient would benefit from continued therapy after discharge     PT Equipment Recommendations  Equipment Needed: No,    Assessment     Body structures, Functions, Activity limitations: Decreased functional mobility ; Decreased endurance;Decreased strength;Decreased safe awareness  Assessment: Patient was able to ambulate 300' with RW. Heart hugger education reinforced. Patient will need further PT to regain functional independence. Prognosis: Good  Decision Making: Medium Complexity  Clinical Presentation: evolving  PT Education: Goals; General Safety;Gait Training;Precautions; Functional Mobility Training;Plan of Care;Family Education;Disease Specific Education;PT Role;Transfer Training  REQUIRES PT FOLLOW UP: Yes  Activity Tolerance  Activity Tolerance: Patient limited by endurance         Patient Diagnosis(es): The primary encounter diagnosis was Chest pain due to myocardial ischemia, unspecified ischemic chest pain type. Diagnoses of NSTEMI (non-ST elevated myocardial infarction) ([address] and S/P CABG x 3 were also pertinent to this visit. 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    has a past surgical history that includes Lung decortication (Right, ) and Coronary artery bypass graft (N/A, 12/27/2021). Restrictions  Restrictions/Precautions  Restrictions/Precautions: Cardiac,Surgical Protocols,Fall Risk,Up as Tolerated  Required Braces or Orthoses?: Yes (Heart Hugger)  Required Braces or Orthoses  Other: Heart Hugger Brace  Position Activity Restriction  Sternal Precautions: No Pushing,No Pulling,5# Lifting Restrictions  Sternal Precautions: CABG X3 12/27/21  Other position/activity restrictions: Up w/assist, CT to wall suction  Subjective    Pt sitting up in his chair. Pt c/o 2/10 Sternal pain. Orientation    Overall Orientation Status: Within Functional Limits  Objective     Transfers  Sit to Stand: SBA  Stand to sit: SBA  Comment: with good tech. use of HH. Ambulation  Ambulation?: Yes  Ambulation 1  Surface: level tile  Device: Rolling Walker (Pt still has chest tubes)  Assistance: SBA  Quality of Gait: Slow,steady,   Distance: 300'  Balance  Sitting - Static: Good  Sitting - Dynamic: Good  Standing - Static: Good  Standing - Dynamic: Fair+   Ex's  Standing exercise program: Heel/toe raises, hip flexion, hip abduction, mini squats, hip extension, and hamstring curls. Reps: 10 x each with 2 lb wt on B L's.  AM-PAC Score  AM-PAC Inpatient Mobility Raw Score : 19 (12/30/21 0916)  AM-PAC Inpatient T-Scale Score : 45.44 (12/30/21 0916)  Mobility Inpatient CMS 0-100% Score: 41.77 (12/30/21 0916)  Mobility Inpatient CMS G-Code Modifier : CK (12/30/21 0916)   Goals  Short term goals  Time Frame for Short term goals: 14 visits  Short term goal 1: Sit to/from stand with SBA with appropriate use of heart hugger. Short term goal 2: Ambulate 200' with or without walker with SBA. Short term goal 3: Negotiate up and down 8 steps with one railing with CGA.     Plan    Plan  Times per week: 6-7x/wk, 1-2x/day  Current Treatment Recommendations: Preethi Carrera Education & Training,Home Exercise Program,Patient/Caregiver Education & Training,Functional Mobility Training,Transfer Training,Gait Training,Stair training,Equipment Evaluation, Education, & procurement  Safety Devices  Type of devices:  All fall risk precautions in place,Call light within reach,Patient at risk for falls,Gait belt,Nurse notified,Left in chair     Therapy Time   Individual Concurrent Group Co-treatment   Time In   830         Time Out   900         Minutes   [address]

## 2021-12-30 NOTE — PROGRESS NOTES
[facility]  Internal Medicine Teaching Residency Program  Inpatient Daily Progress Note  ______________________________________________________________________________    Patient: David Almonte  YOB: 1950   SWD:[id number]    Acct: [de-identified]     Room: [address] [place] [address]-  Admit date: 12/23/2021  Today's date: 12/30/21  Number of days in the hospital: 7    SUBJECTIVE   Admitting Diagnosis: NSTEMI (non-ST elevated myocardial infarction) ([facility] [facility]  CC: Chest pain     - Pt examined at bedside. Chart & results reviewed. - No acute events overnight   - Pt resting comfortably in the chair this morning   - No diarrhea   - No chest pain   - No shortness of breath   - Afebrile   - VSS    Plan for today:  - Post-op management per CTS  - Encourage incentive spirometry  - Discharge planning > home with daughter and home care        ROS:  Constitutional:  negative for chills, fevers, sweats  Respiratory:  negative for cough, dyspnea on exertion, hemoptysis, shortness of breath, wheezing  Cardiovascular:  negative for chest pain, chest pressure/discomfort, lower extremity edema, palpitations  Gastrointestinal:  negative for abdominal pain, constipation, diarrhea, nausea, vomiting  Neurological:  negative for dizziness, headache    BRIEF HISTORY     The patient is a pleasant 71 y. o. male with a PMHx significant for CAD (unstable angina), COPD, empyema, GERD, HTN, history of blood clots and HLD who presents with a chief complaint of chest pain. Pt states he has also experienced nausea and diaphoresis since Tuesday this week. Pt stated that the pain woke him from sleep. He described the pain and an elephant stepping on his chest and rated the pain 9/10 in intensity radiating up the neck and down both of his arms. Pt denies prior stents. Pt received ASA and nitroglycerin which did not alleviate the pain.  Pt denied headache, blurry vision, shortness of breath, abdominal pain or weakness. Troponin on arrival 2,699 and trended up to 6,203. WBC elevated at 20.2 decreased to 19.1. Pt was taken for cardiac catheterization shortly after arrival and underwent PTCA.     OBJECTIVE     Vital Signs:  /67   Pulse 80   Temp 98 °F (36.7 °C) (Oral)   Resp 20   Ht 6' (1.829 m)   Wt 198 lb 6.6 oz (90 kg)   SpO2 94%   BMI 26.91 kg/m²     Temp (24hrs), Av.9 °F (36.6 °C), Min:97.4 °F (36.3 °C), Max:98.4 °F (36.9 °C)    In: 240   Out: 1210 [Urine:1100]    Physical Exam:  Physical Exam  Vitals and nursing note reviewed. Constitutional:       Appearance: Normal appearance. HENT:      Head: Normocephalic and atraumatic. Nose: Nose normal.      Mouth/Throat:      Mouth: Mucous membranes are moist.      Pharynx: Oropharynx is clear. Eyes:      Extraocular Movements: Extraocular movements intact. Conjunctiva/sclera: Conjunctivae normal.      Pupils: Pupils are equal, round, and reactive to light. Cardiovascular:      Rate and Rhythm: Normal rate and regular rhythm. Pulses: Normal pulses. Heart sounds: Normal heart sounds. No murmur heard. No friction rub. No gallop. Pulmonary:      Effort: Pulmonary effort is normal. No respiratory distress. Breath sounds: Normal breath sounds. No stridor. No wheezing, rhonchi or rales. Chest:      Chest wall: No tenderness. Abdominal:      General: Abdomen is flat. Bowel sounds are normal. There is no distension. Palpations: Abdomen is soft. There is no mass. Tenderness: There is no abdominal tenderness. There is no guarding or rebound. Hernia: No hernia is present. Musculoskeletal:         General: No swelling, tenderness, deformity or signs of injury. Normal range of motion. Cervical back: Normal range of motion. Right lower leg: No edema. Left lower leg: No edema. Skin:     General: Skin is warm. Neurological:      General: No focal deficit present.       Mental Status: He is alert and oriented to person, place, and time. Mental status is at baseline. Psychiatric:         Mood and Affect: Mood normal.         Behavior: Behavior normal.         Thought Content:  Thought content normal.         Judgment: Judgment normal.           Medications:  Scheduled Medications:    furosemide  20 mg IntraVENous Daily    metoprolol tartrate  25 mg Oral BID    sennosides-docusate sodium  2 tablet Oral Daily    budesonide-formoterol  2 puff Inhalation BID    tiotropium  2 puff Inhalation Daily    pantoprazole  40 mg Oral QAM AC    tamsulosin  0.4 mg Oral Daily    sodium chloride flush  10 mL IntraVENous 2 times per day    aspirin  81 mg Oral Daily    clopidogrel  75 mg Oral Daily    amiodarone  200 mg Oral BID    chlorhexidine  15 mL Mouth/Throat BID    mupirocin   Nasal BID    multivitamin  1 tablet Oral Daily with breakfast    atorvastatin  40 mg Oral Nightly    [Held by provider] insulin glargine  0.15 Units/kg SubCUTAneous Nightly    polyethylene glycol  17 g Oral Daily     Continuous Infusions:    sodium chloride      insulin Stopped (12/28/21 0842)    dextrose       PRN Medicationspotassium chloride, 40 mEq, PRN   Or  potassium alternative oral replacement, 40 mEq, PRN   Or  potassium chloride, 10 mEq, PRN  sodium chloride flush, 10 mL, PRN  sodium chloride, 25 mL, PRN  ondansetron, 4 mg, Q8H PRN  acetaminophen, 650 mg, Q4H PRN  acetaminophen, 650 mg, Q4H PRN  oxyCODONE-acetaminophen, 1 tablet, Q4H PRN   Or  oxyCODONE-acetaminophen, 2 tablet, Q4H PRN  fentanNYL, 25 mcg, Q1H PRN   Or  fentanNYL, 50 mcg, Q1H PRN  hydrALAZINE, 5 mg, Q5 Min PRN  diphenhydrAMINE, 25 mg, Nightly PRN  calcium chloride IVPB, 1,000 mg, PRN  magnesium sulfate, 1,000 mg, PRN  potassium chloride, 20 mEq, PRN  albumin human, 25 g, PRN  glucose, 15 g, PRN  dextrose, 12.5 g, PRN  glucagon (rDNA), 1 mg, PRN  dextrose, 100 mL/hr, PRN  bisacodyl, 10 mg, Daily PRN        Diagnostic Labs:  CBC:   Recent Labs 12/28/21 0416 12/29/21 0401 12/30/21  0519   WBC 15.7* 13.7* 14.2*   RBC 3.62* 3.23* 3.77*   HGB 10.5* 9.6* 10.9*   HCT 32.6* 29.7* 34.5*   MCV 90.1 92.0 91.5   RDW 12.8 12.7 12.9    174 207     BMP:   Recent Labs     12/28/21 0416 12/29/21  0401 12/30/21  0519    134* 136   K 4.7 4.0 4.1    101 101   CO2 20 21 20   BUN 17 18 23   CREATININE 1.04 1.05 0.97     BNP: No results for input(s): BNP in the last 72 hours. PT/INR:   Recent Labs     12/28/21 0416 12/29/21 0401 12/30/21  0519   PROTIME 10.7 10.7 10.0   INR 1.0 1.0 0.9     APTT:   Recent Labs     12/27/21  1443   APTT 30.3     CARDIAC ENZYMES: No results for input(s): CKMB, CKMBINDEX, TROPONINI in the last 72 hours. Invalid input(s): CKTOTAL;3  FASTING LIPID PANEL:  Lab Results   Component Value Date    CHOL 116 12/24/2021    HDL 60 12/24/2021    TRIG 52 12/24/2021     LIVER PROFILE: No results for input(s): AST, ALT, ALB, BILIDIR, BILITOT, ALKPHOS in the last 72 hours. MICROBIOLOGY:   Lab Results   Component Value Date/Time    CULTURE NO GROWTH 2 DAYS 12/27/2021 11:59 AM       Imaging:    XR CHEST PORTABLE    Result Date: 12/29/2021  Small bilateral effusions with stable chest tubes. XR CHEST PORTABLE    Result Date: 12/29/2021  Interval extubation. Otherwise no significant change. XR CHEST PORTABLE    Result Date: 12/27/2021  Postsurgical changes with support tubes and lines as above. No appreciable extrapleural air. XR CHEST PORTABLE    Result Date: 12/24/2021  No acute process detected. CT CHEST PULMONARY EMBOLISM W CONTRAST    Result Date: 12/23/2021  1. No evidence of pulmonary embolism. 2.  Findings compatible with scarring in the left upper lobe and lung bases. No convincing evidence for acute airspace disease. 3.  Mild emphysematous disease.  RECOMMENDATIONS: Unavailable       ASSESSMENT & PLAN     Assessment and Plan:    Principal Problem:    NSTEMI (non-ST elevated myocardial infarction) ([facility] [address]  Active Problems:    Essential hypertension, benign    Chest pain due to myocardial ischemia    S/P CABG x 3  Resolved Problems:    Diarrhea    NSTEMI (non-ST elevated myocardial infarction)   - Echocardiogram showing LV hypertrophy and LV systolic dysfunction with EF of 45-50%  - Continue to monitor for chest pain    - Continue with medical management   - Post-op day 3 of CABG > management per CTS  - Continue aspirin & Plavix  - Continue amiodarone as per CTS  - Continue metoprolol      Hypertension - currently hypotensive  - Hold BP medications due to hypotension   - Maintain BP > 110/60     COPD   - Continue with Albuterol, Symbicort and Spiriva      GERD - stable      Hyperlipidemia -  Stable   - Lipitor      Diet: Regular low fat, low cholesterol and high fiber   DVT ppx: Plavix  GI ppx: None      PT/OT/SW: Consulted   Discharge Planning: In process > plan for discharge home with daughter and home care tomorrow         Akosua Romero MD  Internal Medicine Resident, PGY-1  [facility];  [place], [place]   7:27 AM 12/30/2021

## 2021-12-30 NOTE — PROGRESS NOTES
Comprehensive Nutrition Assessment    Type and Reason for Visit:  RD Nutrition Re-Screen/LOS    Nutrition Recommendations/Plan:   - Continue current diet, HH  - Monitor pt wt and intake    Nutrition Assessment:  81 yo M admitted w/ chest pain, found to be NSTEMI, no s/p CABG. Per pt, good appetite w/ % intake. Reviewed HH diet education w/ pt and answered questions. Malnutrition Assessment:  Malnutrition Status:     No malnutrition    Nutrition Related Findings:  Labs/meds reviewed      Wounds:  Surgical Incision       Current Nutrition Therapies:    ADULT DIET; Regular; Low Fat/Low Chol/High Fiber/NITO; No Added Salt (3-4 gm)    Anthropometric Measures:  · Height: 6' (182.9 cm)  · Current Body Weight: 198 lb 6.6 oz (90 kg) (12/30, standing)   · Ideal Body Weight: 178 lbs; % Ideal Body Weight 111.5 %   · BMI: 26.9  · BMI Categories: Overweight (BMI 25.0-29. 9)       Nutrition Diagnosis:   · No nutrition diagnosis at this time     Nutrition Interventions:   Food and/or Nutrient Delivery:  Continue Current Diet  Nutrition Education/Counseling:  Education completed (Reinforced HH diet.)   Coordination of Nutrition Care:  No recommendation at this time    Nutrition Monitoring and Evaluation:   Behavioral-Environmental Outcomes:  None Identified   Food/Nutrient Intake Outcomes:  Food and Nutrient Intake  Physical Signs/Symptoms Outcomes:  Biochemical Data,Weight     Discharge Planning:    No discharge needs at this time     Electronically signed by Jay Rice MS, RD, LD on 12/30/21 at 2:12 PM EST    Contact: [id number] or floor RD

## 2021-12-30 NOTE — PLAN OF CARE
Problem: Falls - Risk of:  Goal: Will remain free from falls  Description: Will remain free from falls  Outcome: Ongoing  Goal: Absence of physical injury  Description: Absence of physical injury  Outcome: Ongoing     Problem: Discharge Planning:  Goal: Discharged to appropriate level of care  Description: Discharged to appropriate level of care  Outcome: Ongoing     Problem: Cardiac Output - Decreased:  Goal: Hemodynamic stability will improve  Description: Hemodynamic stability will improve  Outcome: Ongoing     Problem: Serum Glucose Level - Abnormal:  Goal: Ability to maintain appropriate glucose levels will improve  Description: Ability to maintain appropriate glucose levels will improve  Outcome: Ongoing     Problem: Pain:  Goal: Pain level will decrease  Description: Pain level will decrease  Outcome: Ongoing  Goal: Control of acute pain  Description: Control of acute pain  Outcome: Ongoing  Goal: Control of chronic pain  Description: Control of chronic pain  Outcome: Ongoing     Problem: Tissue Perfusion - Cardiopulmonary, Altered:  Goal: Absence of angina  Description: Absence of angina  Outcome: Ongoing  Goal: Circulatory function within specified parameters  Description: Circulatory function within specified parameters  Outcome: Ongoing  Goal: Hemodynamic stability will improve  Description: Hemodynamic stability will improve  Outcome: Ongoing     Problem: Tobacco Use:  Goal: Will participate in inpatient tobacco-use cessation counseling  Description: Will participate in inpatient tobacco-use cessation counseling  Outcome: Ongoing     Problem: Bleeding:  Goal: Will show no signs and symptoms of excessive bleeding  Description: Will show no signs and symptoms of excessive bleeding  Outcome: Ongoing     Problem: Skin Integrity:  Goal: Will show no infection signs and symptoms  Description: Will show no infection signs and symptoms  Outcome: Ongoing  Goal: Absence of new skin breakdown  Description: Absence of new skin breakdown  Outcome: Ongoing     Problem: IP BALANCE  Goal: BALANCE EDUCATION  Description: Educate patients on maintaining dynamic/static standing/sitting balance, with/without upper extremity support.   Outcome: Ongoing     Problem: IP MOBILITY  Goal: LTG - patient will ambulate community distance  Outcome: Ongoing     Problem: Pain:  Goal: Pain level will decrease  Description: Pain level will decrease  Outcome: Ongoing  Goal: Control of acute pain  Description: Control of acute pain  Outcome: Ongoing  Goal: Control of chronic pain  Description: Control of chronic pain  Outcome: Ongoing   Electronically signed by Kenney Sanchez RN on 12/30/2021 at 3:19 AM

## 2021-12-31 ENCOUNTER — APPOINTMENT (OUTPATIENT)
Dept: GENERAL RADIOLOGY | Age: 71
DRG: 232 | End: 2021-12-31
Payer: OTHER GOVERNMENT

## 2021-12-31 VITALS
TEMPERATURE: 98.1 F | DIASTOLIC BLOOD PRESSURE: 59 MMHG | WEIGHT: 199.74 LBS | HEIGHT: 72 IN | BODY MASS INDEX: 27.05 KG/M2 | SYSTOLIC BLOOD PRESSURE: 95 MMHG | RESPIRATION RATE: 20 BRPM | OXYGEN SATURATION: 96 % | HEART RATE: 79 BPM

## 2021-12-31 LAB
ANION GAP SERPL CALCULATED.3IONS-SCNC: 14 MMOL/L (ref 9–17)
BUN BLDV-MCNC: 19 MG/DL (ref 8–23)
BUN/CREAT BLD: ABNORMAL (ref 9–20)
CALCIUM SERPL-MCNC: 8.2 MG/DL (ref 8.6–10.4)
CHLORIDE BLD-SCNC: 100 MMOL/L (ref 98–107)
CO2: 20 MMOL/L (ref 20–31)
CREAT SERPL-MCNC: 0.84 MG/DL (ref 0.7–1.2)
GFR AFRICAN AMERICAN: >60 ML/MIN
GFR NON-AFRICAN AMERICAN: >60 ML/MIN
GFR SERPL CREATININE-BSD FRML MDRD: ABNORMAL ML/MIN/{1.73_M2}
GFR SERPL CREATININE-BSD FRML MDRD: ABNORMAL ML/MIN/{1.73_M2}
GLUCOSE BLD-MCNC: 102 MG/DL (ref 70–99)
GLUCOSE BLD-MCNC: 109 MG/DL (ref 75–110)
GLUCOSE BLD-MCNC: 145 MG/DL (ref 75–110)
HCT VFR BLD CALC: 32.6 % (ref 40.7–50.3)
HEMOGLOBIN: 10.6 G/DL (ref 13–17)
INR BLD: 1
MAGNESIUM: 2.3 MG/DL (ref 1.6–2.6)
MCH RBC QN AUTO: 29.9 PG (ref 25.2–33.5)
MCHC RBC AUTO-ENTMCNC: 32.5 G/DL (ref 28.4–34.8)
MCV RBC AUTO: 92.1 FL (ref 82.6–102.9)
NRBC AUTOMATED: 0 PER 100 WBC
PDW BLD-RTO: 12.7 % (ref 11.8–14.4)
PLATELET # BLD: 248 K/UL (ref 138–453)
PMV BLD AUTO: 9.6 FL (ref 8.1–13.5)
POTASSIUM SERPL-SCNC: 3.8 MMOL/L (ref 3.7–5.3)
PROTHROMBIN TIME: 10.5 SEC (ref 9.1–12.3)
RBC # BLD: 3.54 M/UL (ref 4.21–5.77)
SODIUM BLD-SCNC: 134 MMOL/L (ref 135–144)
WBC # BLD: 11.7 K/UL (ref 3.5–11.3)

## 2021-12-31 PROCEDURE — 71045 X-RAY EXAM CHEST 1 VIEW: CPT

## 2021-12-31 PROCEDURE — 85027 COMPLETE CBC AUTOMATED: CPT

## 2021-12-31 PROCEDURE — 6360000002 HC RX W HCPCS: Performed by: NURSE PRACTITIONER

## 2021-12-31 PROCEDURE — 6370000000 HC RX 637 (ALT 250 FOR IP): Performed by: STUDENT IN AN ORGANIZED HEALTH CARE EDUCATION/TRAINING PROGRAM

## 2021-12-31 PROCEDURE — 36415 COLL VENOUS BLD VENIPUNCTURE: CPT

## 2021-12-31 PROCEDURE — 2580000003 HC RX 258: Performed by: PHYSICIAN ASSISTANT

## 2021-12-31 PROCEDURE — APPNB30 APP NON BILLABLE TIME 0-30 MINS: Performed by: NURSE PRACTITIONER

## 2021-12-31 PROCEDURE — 82947 ASSAY GLUCOSE BLOOD QUANT: CPT

## 2021-12-31 PROCEDURE — 83735 ASSAY OF MAGNESIUM: CPT

## 2021-12-31 PROCEDURE — 97110 THERAPEUTIC EXERCISES: CPT

## 2021-12-31 PROCEDURE — 97530 THERAPEUTIC ACTIVITIES: CPT

## 2021-12-31 PROCEDURE — 97116 GAIT TRAINING THERAPY: CPT

## 2021-12-31 PROCEDURE — 85610 PROTHROMBIN TIME: CPT

## 2021-12-31 PROCEDURE — 6370000000 HC RX 637 (ALT 250 FOR IP): Performed by: PHYSICIAN ASSISTANT

## 2021-12-31 PROCEDURE — 94640 AIRWAY INHALATION TREATMENT: CPT

## 2021-12-31 PROCEDURE — 80048 BASIC METABOLIC PNL TOTAL CA: CPT

## 2021-12-31 PROCEDURE — 99231 SBSQ HOSP IP/OBS SF/LOW 25: CPT | Performed by: INTERNAL MEDICINE

## 2021-12-31 PROCEDURE — 6370000000 HC RX 637 (ALT 250 FOR IP): Performed by: NURSE PRACTITIONER

## 2021-12-31 RX ORDER — ASPIRIN 81 MG/1
81 TABLET ORAL DAILY
Qty: 30 TABLET | Refills: 3 | Status: SHIPPED | OUTPATIENT
Start: 2022-01-01

## 2021-12-31 RX ORDER — PANTOPRAZOLE SODIUM 40 MG/1
40 TABLET, DELAYED RELEASE ORAL
Qty: 30 TABLET | Refills: 3 | Status: SHIPPED | OUTPATIENT
Start: 2022-01-01

## 2021-12-31 RX ORDER — AMIODARONE HYDROCHLORIDE 200 MG/1
200 TABLET ORAL 2 TIMES DAILY
Qty: 30 TABLET | Refills: 0 | Status: SHIPPED | OUTPATIENT
Start: 2021-12-31 | End: 2022-01-12 | Stop reason: ALTCHOICE

## 2021-12-31 RX ORDER — CLOPIDOGREL BISULFATE 75 MG/1
75 TABLET ORAL DAILY
Qty: 30 TABLET | Refills: 3 | Status: SHIPPED | OUTPATIENT
Start: 2022-01-01

## 2021-12-31 RX ORDER — OXYCODONE HYDROCHLORIDE AND ACETAMINOPHEN 5; 325 MG/1; MG/1
1 TABLET ORAL EVERY 8 HOURS PRN
Qty: 21 TABLET | Refills: 0 | Status: SHIPPED | OUTPATIENT
Start: 2021-12-31 | End: 2022-01-07

## 2021-12-31 RX ORDER — ATORVASTATIN CALCIUM 40 MG/1
40 TABLET, FILM COATED ORAL NIGHTLY
Qty: 30 TABLET | Refills: 3 | Status: SHIPPED | OUTPATIENT
Start: 2021-12-31

## 2021-12-31 RX ORDER — MULTIVITAMIN WITH IRON
1 TABLET ORAL DAILY
Qty: 30 TABLET | Refills: 0 | Status: SHIPPED | OUTPATIENT
Start: 2021-12-31

## 2021-12-31 RX ADMIN — AMIODARONE HYDROCHLORIDE 200 MG: 200 TABLET ORAL at 08:05

## 2021-12-31 RX ADMIN — FUROSEMIDE 20 MG: 10 INJECTION, SOLUTION INTRAMUSCULAR; INTRAVENOUS at 08:04

## 2021-12-31 RX ADMIN — CLOPIDOGREL 75 MG: 75 TABLET, FILM COATED ORAL at 08:04

## 2021-12-31 RX ADMIN — SODIUM CHLORIDE, PRESERVATIVE FREE 10 ML: 5 INJECTION INTRAVENOUS at 08:05

## 2021-12-31 RX ADMIN — PANTOPRAZOLE SODIUM 40 MG: 40 TABLET, DELAYED RELEASE ORAL at 08:04

## 2021-12-31 RX ADMIN — TAMSULOSIN HYDROCHLORIDE 0.4 MG: 0.4 CAPSULE ORAL at 08:04

## 2021-12-31 RX ADMIN — DOCUSATE SODIUM 50MG AND SENNOSIDES 8.6MG 2 TABLET: 8.6; 5 TABLET, FILM COATED ORAL at 08:04

## 2021-12-31 RX ADMIN — ALCOHOL 1 TABLET: 70.47 GEL TOPICAL at 08:04

## 2021-12-31 RX ADMIN — OXYCODONE HYDROCHLORIDE AND ACETAMINOPHEN 2 TABLET: 5; 325 TABLET ORAL at 08:37

## 2021-12-31 RX ADMIN — Medication 81 MG: at 08:05

## 2021-12-31 RX ADMIN — METOPROLOL TARTRATE 25 MG: 25 TABLET ORAL at 08:05

## 2021-12-31 RX ADMIN — OXYCODONE HYDROCHLORIDE AND ACETAMINOPHEN 2 TABLET: 5; 325 TABLET ORAL at 14:04

## 2021-12-31 ASSESSMENT — PAIN DESCRIPTION - PROGRESSION
CLINICAL_PROGRESSION: GRADUALLY IMPROVING

## 2021-12-31 ASSESSMENT — PAIN SCALES - GENERAL
PAINLEVEL_OUTOF10: 7
PAINLEVEL_OUTOF10: 6
PAINLEVEL_OUTOF10: 6
PAINLEVEL_OUTOF10: 0
PAINLEVEL_OUTOF10: 0

## 2021-12-31 NOTE — PROGRESS NOTES
[facility]  Internal Medicine Teaching Residency Program  Inpatient Daily Progress Note  ______________________________________________________________________________    Patient: Marcella Crawford  YOB: 1950   EHX:[id number]    Acct: [de-identified]     Room: [address] [place] [address]  Admit date: 12/23/2021  Today's date: 12/31/21  Number of days in the hospital: 8    SUBJECTIVE   Admitting Diagnosis: NSTEMI (non-ST elevated myocardial infarction) ([facility] [address]  CC: chest pain    No acute issues overnight  Pt examined at bedside. awake alert and talking  Chest tubes removed  Follow up CXR normal  Vitals, afebrile and hemodynamically stable    Chart & results reviewed. Cbc and bmp unremarkable    Patient is eating ok, sleeping ok, normal bowel/ bladder movements. Patient is able to ambulate with assistance  Patient on room air       ROS:  Constitutional:  negative for chills, fevers, sweats  Respiratory:  negative for cough, dyspnea on exertion, hemoptysis, shortness of breath, wheezing  Cardiovascular:  negative for chest pain, chest pressure/discomfort, lower extremity edema, palpitations  Gastrointestinal:  negative for abdominal pain, constipation, diarrhea, nausea, vomiting  Neurological:  negative for dizziness, headache    BRIEF HISTORY     The patient is a pleasant 71 y. o. male with a PMHx significant for CAD (unstable angina), COPD, empyema, GERD, HTN, history of blood clots and HLD who presents with a chief complaint of chest pain. Pt states he has also experienced nausea and diaphoresis since Tuesday this week. Pt stated that the pain woke him from sleep. He described the pain and an elephant stepping on his chest and rated the pain 9/10 in intensity radiating up the neck and down both of his arms. Pt denies prior stents. Pt received ASA and nitroglycerin which did not alleviate the pain.  Pt denied headache, blurry vision, shortness of breath, abdominal pain or weakness. Troponin on arrival 2,699 and trended up to 6,203. WBC elevated at 20.2 decreased to 19.1. Pt was taken for cardiac catheterization shortly after arrival and underwent PTCA.        OBJECTIVE     Vital Signs:  BP (!) 95/59   Pulse 79   Temp 98.1 °F (36.7 °C) (Oral)   Resp 20   Ht 6' (1.829 m)   Wt 199 lb 11.8 oz (90.6 kg)   SpO2 96%   BMI 27.09 kg/m²     Temp (24hrs), Av.1 °F (36.7 °C), Min:98 °F (36.7 °C), Max:98.2 °F (36.8 °C)    In: 980   Out: 1200 [Urine:1200]    Physical Exam:  Physical Exam  Constitutional:       Appearance: Normal appearance. Eyes:      Extraocular Movements: Extraocular movements intact. Cardiovascular:      Rate and Rhythm: Normal rate and regular rhythm. Pulses: Normal pulses. Heart sounds: Normal heart sounds. No murmur heard. Pulmonary:      Effort: No respiratory distress. Breath sounds: Normal breath sounds. No wheezing. Abdominal:      General: There is no distension. Palpations: Abdomen is soft. Tenderness: There is no abdominal tenderness. Musculoskeletal:         General: No swelling. Cervical back: Normal range of motion. No rigidity. Right lower leg: No edema. Left lower leg: No edema. Skin:     General: Skin is warm. Coloration: Skin is not jaundiced. Findings: No bruising or rash. Neurological:      General: No focal deficit present. Mental Status: He is alert and oriented to person, place, and time.    Psychiatric:         Mood and Affect: Mood normal.         Behavior: Behavior normal.           Medications:  Scheduled Medications:    furosemide  20 mg IntraVENous Daily    budesonide-formoterol  2 puff Inhalation BID    tiotropium  2 puff Inhalation Daily    metoprolol tartrate  25 mg Oral BID    sennosides-docusate sodium  2 tablet Oral Daily    pantoprazole  40 mg Oral QAM AC    tamsulosin  0.4 mg Oral Daily    sodium chloride flush  10 mL IntraVENous 2 times per day    aspirin  81 mg Oral Daily    clopidogrel  75 mg Oral Daily    amiodarone  200 mg Oral BID    chlorhexidine  15 mL Mouth/Throat BID    mupirocin   Nasal BID    multivitamin  1 tablet Oral Daily with breakfast    atorvastatin  40 mg Oral Nightly    [Held by provider] insulin glargine  0.15 Units/kg SubCUTAneous Nightly    polyethylene glycol  17 g Oral Daily     Continuous Infusions:    sodium chloride      insulin Stopped (12/28/21 0842)    dextrose       PRN Medicationspotassium chloride, 40 mEq, PRN   Or  potassium alternative oral replacement, 40 mEq, PRN   Or  potassium chloride, 10 mEq, PRN  sodium chloride flush, 10 mL, PRN  sodium chloride, 25 mL, PRN  ondansetron, 4 mg, Q8H PRN  acetaminophen, 650 mg, Q4H PRN  acetaminophen, 650 mg, Q4H PRN  oxyCODONE-acetaminophen, 1 tablet, Q4H PRN   Or  oxyCODONE-acetaminophen, 2 tablet, Q4H PRN  fentanNYL, 25 mcg, Q1H PRN   Or  fentanNYL, 50 mcg, Q1H PRN  hydrALAZINE, 5 mg, Q5 Min PRN  diphenhydrAMINE, 25 mg, Nightly PRN  calcium chloride IVPB, 1,000 mg, PRN  magnesium sulfate, 1,000 mg, PRN  potassium chloride, 20 mEq, PRN  albumin human, 25 g, PRN  glucose, 15 g, PRN  dextrose, 12.5 g, PRN  glucagon (rDNA), 1 mg, PRN  dextrose, 100 mL/hr, PRN  bisacodyl, 10 mg, Daily PRN        Diagnostic Labs:  CBC:   Recent Labs     12/29/21  0401 12/30/21  0519 12/31/21  0408   WBC 13.7* 14.2* 11.7*   RBC 3.23* 3.77* 3.54*   HGB 9.6* 10.9* 10.6*   HCT 29.7* 34.5* 32.6*   MCV 92.0 91.5 92.1   RDW 12.7 12.9 12.7    207 248     BMP:   Recent Labs     12/29/21  0401 12/30/21  0519 12/31/21  0408   * 136 134*   K 4.0 4.1 3.8    101 100   CO2 21 20 20   BUN 18 23 19   CREATININE 1.05 0.97 0.84     BNP: No results for input(s): BNP in the last 72 hours. PT/INR:   Recent Labs     12/29/21  0401 12/30/21  0519 12/31/21  0408   PROTIME 10.7 10.0 10.5   INR 1.0 0.9 1.0     APTT: No results for input(s): APTT in the last 72 hours.   CARDIAC ENZYMES: No results for input(s): CKMB, CKMBINDEX, TROPONINI in the last 72 hours. Invalid input(s): CKTOTAL;3  FASTING LIPID PANEL:  Lab Results   Component Value Date    CHOL 116 12/24/2021    HDL 60 12/24/2021    TRIG 52 12/24/2021     LIVER PROFILE: No results for input(s): AST, ALT, ALB, BILIDIR, BILITOT, ALKPHOS in the last 72 hours. MICROBIOLOGY:   Lab Results   Component Value Date/Time    CULTURE NO GROWTH 4 DAYS 12/27/2021 11:59 AM       Imaging:    XR CHEST PORTABLE    Result Date: 12/31/2021  No significant interval change. Persistent left basilar opacity, likely atelectasis and small effusion. XR CHEST PORTABLE    Result Date: 12/30/2021  No pneumothorax status post left tube removal.  No other interval change. XR CHEST PORTABLE    Result Date: 12/30/2021  No significant interval change. XR CHEST PORTABLE    Result Date: 12/29/2021  Small bilateral effusions with stable chest tubes. XR CHEST PORTABLE    Result Date: 12/29/2021  Interval extubation. Otherwise no significant change. XR CHEST PORTABLE    Result Date: 12/27/2021  Postsurgical changes with support tubes and lines as above. No appreciable extrapleural air. XR CHEST PORTABLE    Result Date: 12/24/2021  No acute process detected. ASSESSMENT & PLAN     Assessment and Plan:    Principal Problem:    NSTEMI (non-ST elevated myocardial infarction) ([address]  Active Problems:    Essential hypertension, benign    Chest pain due to myocardial ischemia    S/P CABG x 3  Resolved Problems:    Diarrhea      1. NSTEMI (non-ST elevated myocardial infarction)   - Echocardiogram showing LV hypertrophy and LV systolic dysfunction with EF of 45-50%  - Continue to monitor for chest pain    - Continue with medical management   - Post-op day 4 of CABG > management per CTS  - Continue aspirin & Plavix  - Continue amiodarone as per CTS  - Continue metoprolol      2. Hypertension - currently borderline  - continue home med's  - Maintain BP > 110/60     3. COPD   - Continue with Albuterol, Symbicort and Spiriva      4. GERD - stable      5.  Hyperlipidemia -  Stable   - Lipitor      Diet: Regular low fat, low cholesterol and high fiber   DVT ppx: Plavix     PT/OT/SW: Consulted   Discharge Planning:pt going home     Vanda Chung  PGY-1  Internal Medicine  [facility]   03:76 AM 12/31/2021

## 2021-12-31 NOTE — DISCHARGE INSTR - COC
Continuity of Care Form    Patient Name: Lizbet Trujillo   :  1950  MRN:  [id number]    Admit date:  2021  Discharge date:  21    Code Status Order: Full Code   Advance Directives:   Advance Care Flowsheet Documentation       Date/Time Healthcare Directive Type of Healthcare Directive Copy in [address] Agent's Name Healthcare Agent's Phone Number    [phone] Yes, patient has an advance directive for healthcare treatment Living will Yes, copy in chart Adult Children -- --            Admitting Physician:  Beth Kimbrough MD  PCP: Delbert Duane, MD    Discharging Nurse: [facility] Unit/Room#: 1002/1002-01  Discharging Unit Phone Number: [phone]    Emergency Contact:   Extended Emergency Contact Information  Primary Emergency Contact: [address] [place] [address] Phone: [phone]  Relation: Brother/Sister  Secondary Emergency Contact: Jose Miguel Gil  Mobile Phone: [phone]  Relation: Child    Past Surgical History:  Past Surgical History:   Procedure Laterality Date    CORONARY ARTERY BYPASS GRAFT N/A 2021    CORONARY ARTERY BYPASS X3; MINH PER ANESTHESIA performed by Ovi Fernández MD at [address] [place] [address] 2006    MRSA at that time. Immunization History: There is no immunization history on file for this patient.     Active Problems:  Patient Active Problem List   Diagnosis Code    Esophageal reflux K21.9    Acute upper respiratory infection J06.9    Localized osteoarthrosis M19.90    Essential hypertension, benign I10    Pleural effusion J90    Empyema of pleura (HCC) J86.9    Procedure not carried out because of patient's decision Z53.20    Other and unspecified hyperlipidemia E78.5    Bronchitis, acute J20.9    Pneumonia due to infectious organism J18.9    ANY (acute kidney injury) ([facility] [address] N17.9    Urinary retention R33.9    NSTEMI (non-ST elevated myocardial infarction) ([facility] [address] I21.4    Chest pain ventilator support    Rehab Therapies: Physical Therapy and Occupational Therapy  Weight Bearing Status/Restrictions: No weight bearing restirctions  Other Medical Equipment (for information only, NOT a DME order):  ***  Other Treatments: ***    Patient's personal belongings (please select all that are sent with patient):  Nando    RN SIGNATURE:  Electronically signed by Bobby Gonzalez on 12/31/21 at 11:02 AM EST    CASE MANAGEMENT/SOCIAL WORK SECTION    Inpatient Status Date: 12-23    Readmission Risk Assessment Score:  Readmission Risk              Risk of Unplanned Readmission:  16           Discharging to Facility/ Agency   Name: [facility]  Address:  Phone:  Fax:    Dialysis Facility (if applicable)   Name:  Address:  Dialysis Schedule:  Phone:  Fax:    / signature: Electronically signed by Maricarmen Ricardo RN on 12/31/21 at 12:59 PM EST    PHYSICIAN SECTION    Prognosis: Good    Condition at Discharge: Stable    Rehab Potential (if transferring to Rehab): Good    Recommended Labs or Other Treatments After Discharge:   Skilled nursing cardio/pulmonary assessment EVERY shift/visit with vital signs and SaO2 call abnormal results to Surgeon's office  Notify Surgeon's office for temp >101.5 F  Daily weight and record. Call for weight gain of 3 lbs in 3 days or 5-7lbs in 7 days    Monitor surgical incisions for signs of infection (redness, warmth, pain, purulent drainage, odor) and call with abnormal findings. Leave incisions open to air unless drainage is prohibitive. Call with any signs of separation or dehiscence. Shower daily with warm water and mild soap. Pat dry with clean towel, do not rub.    Heart Hugger and Surgical bra on during daytime  Large breasted patients need to wear surgical or soft sports bra at all times to reduce tension on sternal incision  Murphy hose on during day time, rinse and dry overnight to prevent infection of leg incisions    Strict Sternal Precautions: No lifting/pushing/pulling over 5lbs x 4 weeks, may increase 5-10 lbs per week after that as tolerated. Physical therapy BID, plus ambulation 3x per day in addition. Up in chair for all meals  Reinforce Cardiac and Diabetic  Diet, medication compliance and activity instructions    Arrange for transportation to all appointments  Patient needs to see Surgeon and Cardiologist within 2 weeks of discharge. Physician Certification: I certify the above information and transfer of Rosa Wang  is necessary for the continuing treatment of the diagnosis listed and that he requires [address] for less 30 days.      Update Admission H&P: No change in H&P    PHYSICIAN SIGNATURE:  Electronically signed by Dr. Luis Cloud MD on 12/31/21 at 9:19 AM EST

## 2021-12-31 NOTE — PROGRESS NOTES
Physical Therapy  Facility/Department: [facility]  Daily Treatment Note  NAME: Kiara Santana  : 1950  MRN: [id number]    Date of Service: 2021    Discharge Recommendations:  Patient would benefit from continued therapy after discharge   PT Equipment Recommendations  Equipment Needed: Yes  Mobility Devices: Caretha Lick: Rolling    Assessment   Assessment: Pt is able to complete task on room air, slight SOB noted but able to complete gait training with standing rest break. Pt ascended and descended x8 8 in. steps with bilateral hand rails along with CGA for safety. Pt requires increased time and effort to complete task and demos a slow sharon. Pt is limited by decreased endurance and would benefit from continued PT following discharge to address functional deficits. Prognosis: Good  Decision Making: Medium Complexity  Clinical Presentation: evolving  PT Education: Goals; General Safety;Gait Training;Precautions; Functional Mobility Training;Plan of Care;Family Education;Disease Specific Education;PT Role;Transfer Training  REQUIRES PT FOLLOW UP: Yes  Activity Tolerance  Activity Tolerance: Patient limited by endurance     Patient Diagnosis(es): The primary encounter diagnosis was Chest pain due to myocardial ischemia, unspecified ischemic chest pain type. Diagnoses of NSTEMI (non-ST elevated myocardial infarction) ([address] S/P CABG x 3, and Post-op pain were also pertinent to this visit.      has a past medical history of Acute upper respiratory infections of unspecified site, CAD (coronary artery disease), COPD (chronic obstructive pulmonary disease) ([facility] [address] Empyema without mention of fistula, Esophageal reflux, Essential hypertension, benign, History of blood transfusion, Hx of blood clots, Localized osteoarthrosis not specified whether primary or secondary, unspecified site, Other and unspecified hyperlipidemia, Surgical or other procedure not carried out because of patient's decision, and Unspecified pleural effusion. has a past surgical history that includes Lung decortication (Right, 2006) and Coronary artery bypass graft (N/A, 12/27/2021). Restrictions  Restrictions/Precautions  Restrictions/Precautions: Cardiac,Surgical Protocols,Fall Risk,Up as Tolerated  Required Braces or Orthoses?: Yes  Required Braces or Orthoses  Other: Heart Hugger Brace  Position Activity Restriction  Sternal Precautions: No Pushing,No Pulling,5# Lifting Restrictions  Sternal Precautions: CABG X3 12/27/21  Other position/activity restrictions: Up w/assist  Subjective   General  Chart Reviewed: Yes  Response To Previous Treatment: Patient with no complaints from previous session. Family / Caregiver Present: No  Subjective  Subjective: RN and pt agreeable to PT. Pain Screening  Patient Currently in Pain: No  Pain Assessment  Pain Assessment: 0-10  Pain Level: 6  Vital Signs  Patient Currently in Pain: No       Orientation  Orientation  Overall Orientation Status: Within Functional Limits  Cognition   Cognition  Overall Cognitive Status: WFL  Objective   Bed mobility  Bridging: Unable to assess  Rolling to Left: Unable to assess  Rolling to Right: Unable to assess  Supine to Sit: Unable to assess  Sit to Supine: Unable to assess  Scooting: Unable to assess  Transfers  Sit to Stand: Contact guard assistance  Stand to sit: Contact guard assistance  Ambulation  Ambulation?: Yes  More Ambulation?: No  Ambulation 1  Surface: level tile  Device: No Device  Assistance: Contact guard assistance  Quality of Gait: Slow, fatigued, steady, on room air. Distance: 300 ft.   Stairs/Curb  Stairs?: Yes  Stairs  # Steps : 8  Stairs Height: 8\"  Rails: Bilateral     Balance  Posture: Good  Sitting - Static: Good  Sitting - Dynamic: Good  Standing - Static: Fair  Standing - Dynamic: Fair  Comments: Assessed with RW  Exercises  Hip Flexion: x15 BLE  Hip Abduction: x15 BLE  Knee Long Arc Quad: x15 BLE  Ankle Pumps: x15 BLE AM-PAC Score  AM-PAC Inpatient Mobility Raw Score : 19 (12/31/21 0931)  AM-PAC Inpatient T-Scale Score : 45.44 (12/31/21 0931)  Mobility Inpatient CMS 0-100% Score: 41.77 (12/31/21 0931)  Mobility Inpatient CMS G-Code Modifier : CK (12/31/21 0931)          Goals  Short term goals  Time Frame for Short term goals: 14 visits  Short term goal 1: Sit to/from stand with SBA with appropriate use of heart hugger. Short term goal 2: Ambulate 200' with or without walker with SBA. Short term goal 3: Negotiate up and down 8 steps with one railing with CGA. Plan    Plan  Times per week: 6-7x/wk, 1-2x/day  Current Treatment Recommendations: Librado Rangel Education & Training,Home Exercise Program,Patient/Caregiver Education & Training,Functional Mobility Training,Transfer Training,Gait Training,Stair training,Equipment Evaluation, Education, & procurement  Safety Devices  Type of devices:  All fall risk precautions in place,Call light within reach,Patient at risk for falls,Gait belt,Nurse notified,Left in chair  Restraints  Initially in place: No     Therapy Time   Individual Concurrent Group Co-treatment   Time In 0842         Time Out 0920         Minutes 38         Timed Code Treatment Minutes: [address], [place]

## 2021-12-31 NOTE — PROGRESS NOTES
[facility]   Progress Note                 Date:   12/31/2021  Patient name:  Cayla Lundberg  Date of admission:  12/23/2021  6:01 AM  MRN:   [id number]  YOB: 1950  PCP:    Margie Abad MD    Reason for Admission: NSTEMI (non-ST elevated myocardial infarction) [facility]) [I21.4]  Chest pain due to myocardial ischemia, unspecified ischemic chest pain type [I25.9]      Subjective:       Clinical Changes / Abnormalities:     S/p CABG POD 4  No acute events over the night        I/O last 3 completed shifts: In: 980 [P.O.:980]  Out: 1260 [Urine:1200; Chest Tube:60]  No intake/output data recorded. Medications:   Scheduled Meds:    furosemide  20 mg IntraVENous Daily    budesonide-formoterol  2 puff Inhalation BID    tiotropium  2 puff Inhalation Daily    metoprolol tartrate  25 mg Oral BID    sennosides-docusate sodium  2 tablet Oral Daily    pantoprazole  40 mg Oral QAM AC    tamsulosin  0.4 mg Oral Daily    sodium chloride flush  10 mL IntraVENous 2 times per day    aspirin  81 mg Oral Daily    clopidogrel  75 mg Oral Daily    amiodarone  200 mg Oral BID    chlorhexidine  15 mL Mouth/Throat BID    mupirocin   Nasal BID    multivitamin  1 tablet Oral Daily with breakfast    atorvastatin  40 mg Oral Nightly    [Held by provider] insulin glargine  0.15 Units/kg SubCUTAneous Nightly    polyethylene glycol  17 g Oral Daily     Continuous Infusions:    sodium chloride      insulin Stopped (12/28/21 0842)    dextrose       CBC:   Recent Labs     12/29/21  0401 12/30/21  0519 12/31/21  0408   WBC 13.7* 14.2* 11.7*   HGB 9.6* 10.9* 10.6*    207 248     BMP:    Recent Labs     12/29/21  0401 12/30/21  0519 12/31/21  0408   * 136 134*   K 4.0 4.1 3.8    101 100   CO2 21 20 20   BUN 18 23 19   CREATININE 1.05 0.97 0.84   GLUCOSE 100* 89 102*     Hepatic:   No results for input(s): AST, ALT, ALB, BILITOT, ALKPHOS in the last 72 hours.   Troponin: No results for input(s): TROPHS in the last 72 hours. BNP: No results for input(s): BNP in the last 72 hours. Lipids: No results for input(s): CHOL, HDL in the last 72 hours. Invalid input(s): LDLCALCU  INR:   Recent Labs     12/29/21  0401 12/30/21  0519 12/31/21  0408   INR 1.0 0.9 1.0       Objective:   Vitals: BP (!) 107/58   Pulse 84   Temp 98 °F (36.7 °C) (Oral)   Resp 18   Ht 6' (1.829 m)   Wt 198 lb 6.6 oz (90 kg)   SpO2 91%   BMI 26.91 kg/m²   General appearance: Alert. No acute distress. HEENT: Head: Normal, normocephalic, atraumatic. Neck: no JVD, trachea midline  Lungs: Clear to auscultation bilaterally, no wheeze or crackles  Heart: Regular rate and rhythm, S1, S2 normal, no murmur  Abdomen: Soft, non-tender  Extremities: No edema  Neurologic: No focal neurologic deficits    Echocardiogram: 08/2019  Summary  Normal left ventricle size, wall thickness and low normal function. Calculated EF via Reinoso's method is 50 %. Cannot rule out wall motion abnormalities due to poor visualization. Right atrial dilatation. Aortic valve is sclerotic but opens well. Trivial aortic insufficiency. Coronary Angiography: 12/23  LMCA: Normal 0% stenosis. LAD: Single stenosis. Lesion on Prox LAD: 80% stenosis. LCx: Multiple stenosis. Lesion on 1st Ob Melodie: Proximal subsection. 90% stenosis. Lesion on Mid CX: 80% stenosis. Lesion on 2nd Ob Melodie: Ostial.80% stenosis. RCA: Acute occlusion. Lesion on Mid RCA: 100% stenosis 20 mm length reduced to 20%. Pre     procedure MARIFER 0 flow was noted. Post Procedure MARIFER III flow was     present. Poor runoff was present. The lesion was diagnosed as High Risk     (C). Coronary Tree Dominance: Right       LV Analysis LV function assessed as:Normal.   The LV gram was performed in the CHOUDHARY 30 position. LVEF: 50%. Conclusions:  Acute occlusion of mid RCA underwent PTCA with restoration of MARIFER III    flow.

## 2021-12-31 NOTE — DISCHARGE INSTR - ACTIVITY
Activity as tolerated. Take short frequent walks, gradually increase frequency and distance as tolerated. Do not push/pull/lift anything over 5 pounds.

## 2021-12-31 NOTE — CARE COORDINATION
Transitional planning. Plan is home with daughter and Christiane. Needed information will be obtained in [facility]. Called [place] and left VM of pt's D/C today. [address] calls back from Christiane.  Aware of D/C    Discharge [facility] Case Management Department  Written by: Jaxson Moore RN    Patient Name: Antoine Crabtree  Attending Provider: Nick Ackerman MD  Admit Date: 2021  6:01 AM  MRN: [id number]  Account: [de-identified]                     : 1950  Discharge Date:       Disposition: home with daughter and Brisa Phelan RN

## 2021-12-31 NOTE — PROGRESS NOTES
Pt discharged home with home care accompanied by his sister and daughter at this time. Pt was discharged home with all personal belongings admitted with, post [facility] education book, printed AVS, IS, Acapella, chlorhexidine soap, x2 sets CAMDEN hose with slider, and heart hugger. CVICU RN provided thorough post [facility] discharge instructions including, but not limited to, incision care, daily weights/temperature, medications, freq walks and IS usage, and things to monitor for and complications to call surgeon for if experienced after discharge. All questions and concerns answered and addressed.

## 2021-12-31 NOTE — PLAN OF CARE
[id number] [facility] Cardiothoracic Surgery  Progress Note    12/31/2021 12:59 PM  Surgeon: Surgeon CTS: Dr. Gomez Johnson MD   POD # 4  S/P: CABGx3     Subjective:  Mr. Denise Zhao for DC. No CP or SOB. Up walking  Objective:  BP (!) 95/59   Pulse 79   Temp 98.1 °F (36.7 °C) (Oral)   Resp 20   Ht 6' (1.829 m)   Wt 199 lb 11.8 oz (90.6 kg)   SpO2 96%   BMI 27.09 kg/m²   Chest: pacing wires: no, chest tubes:no, air leak no, 0 +  CV: no murmur noted, Normal S1, S2,   Lungs: clear to auscultation, no wheezes, rales, or rhonchi  Abd: normal bowel sounds   Lower Extremities: Trace edema  Saph Incison: no sign of drainage or infection  Sternal Incison: dressing applied-no excessive drainage or signs of infection noted    Labs: Labs reviewed today  CBC: Recent Labs     12/29/21  0401 12/30/21  0519 12/31/21  0408   WBC 13.7* 14.2* 11.7*   HGB 9.6* 10.9* 10.6*   HCT 29.7* 34.5* 32.6*   MCV 92.0 91.5 92.1    207 248     BMP:   Recent Labs     12/29/21  0401 12/30/21  0519 12/31/21  0408   * 136 134*   K 4.0 4.1 3.8    101 100   CO2 21 20 20   BUN 18 23 19   CREATININE 1.05 0.97 0.84       I/O: I/O last 3 completed shifts:   In: 980 [P.O.:980]  Out: 1400 [Urine:1400]  Scheduled Meds:   furosemide  20 mg IntraVENous Daily    budesonide-formoterol  2 puff Inhalation BID    tiotropium  2 puff Inhalation Daily    metoprolol tartrate  25 mg Oral BID    sennosides-docusate sodium  2 tablet Oral Daily    pantoprazole  40 mg Oral QAM AC    tamsulosin  0.4 mg Oral Daily    sodium chloride flush  10 mL IntraVENous 2 times per day    aspirin  81 mg Oral Daily    clopidogrel  75 mg Oral Daily    amiodarone  200 mg Oral BID    chlorhexidine  15 mL Mouth/Throat BID    mupirocin   Nasal BID    multivitamin  1 tablet Oral Daily with breakfast    atorvastatin  40 mg Oral Nightly    [Held by provider] insulin glargine  0.15 Units/kg SubCUTAneous Nightly    polyethylene glycol  17 g Oral Daily     Continuous Infusions:   sodium chloride      insulin Stopped (12/28/21 0842)    dextrose       PRN Meds:potassium chloride **OR** potassium alternative oral replacement **OR** potassium chloride, sodium chloride flush, sodium chloride, ondansetron, acetaminophen, acetaminophen, oxyCODONE-acetaminophen **OR** oxyCODONE-acetaminophen, fentanNYL **OR** fentanNYL, hydrALAZINE, diphenhydrAMINE, calcium chloride IVPB, magnesium sulfate, potassium chloride, albumin human, glucose, dextrose, glucagon (rDNA), dextrose, bisacodyl        Daily Nursing Care:  Please keep SCDS in place as DVT prophylaxis  If not intubated, Please have patient use IS and acapella 10X/hr or during commercial breaks  Please continue BM management  Daily labs and CXR  Daily PT/OT and ambulation  Continue with Case Management for DC planning      Assessment/ Plan:    DC home today with [facility]  Pacer wires cut  +BMs    Time spent with patient 10  Time spent in [address], APRN - NP

## 2022-01-01 LAB
CULTURE: NORMAL
DIRECT EXAM: NORMAL
DIRECT EXAM: NORMAL
Lab: NORMAL
SPECIMEN DESCRIPTION: NORMAL

## 2022-01-03 ENCOUNTER — CARE COORDINATION (OUTPATIENT)
Dept: CASE MANAGEMENT | Age: 72
End: 2022-01-03

## 2022-01-03 NOTE — CARE COORDINATION
[facility] Transitions Initial Follow Up Call    Call within 2 business days of discharge: Yes    Patient: Jose Álvarez Patient : 1950    MRN: [id number]  Reason for Admission: NSTEMI (non-ST elevated myocardial infarction    Discharge Date: 21 RARS: Readmission Risk Score: 10 ( )      Last Discharge [address] [address]       Complaint Diagnosis Description Type Department Provider    21 Chest Pain Chest pain due to myocardial ischemia, unspecified ischemic chest pain type . .. ED to Hosp-Admission (Discharged) (ADMITTED) Alfonso De Guzman 1 Deb Atwood MD; Sharyle Callow, ... Spoke with: Javier Nielsen who states he is doing very well, he denies chest pain, has some discomfort at incision but states it is not pain. Has SOB with exertion. Denies dizziness, nausea. Is eating andd drinking well, normal bowel and bladder elimination. medications reviewed and taking all as directed. Has some swelling to lower extremities taking waight daily he is up . 2 ls since discharge, wearing chest hugger appropriately, using insentive spirometer, [facility] has been to home. And has all appointment made no concerns    Facility: [facility]    Non-face-to-face services provided:  Obtained and reviewed discharge summary and/or continuity of care documents  Transitions of Care Initial Call    Was this an external facility discharge? No     Challenges to be reviewed by the provider   Additional needs identified to be addressed with provider: No  none             Method of communication with provider : none      Advance Care Planning:   Does patient have an Advance Directive: reviewed and current, reviewed and needs to be updated, not on file; education provided, patient declined education, decision maker updated and referral to internal ACP facilitator. Was this a readmission?  No   Patient stated reason for admission: NSTEMI (non-ST elevated myocardial infarction    Patients top risk factors for readmission: functional physical ability  lack of knowledge about disease  medication management  multiple health system providers    Care Transition Nurse (CTN) contacted the patient by telephone to perform post hospital discharge assessment. Verified name and  with patient as identifiers. Provided introduction to self, and explanation of the CTN role. CTN reviewed discharge instructions, medical action plan and red flags with patient who verbalized understanding. Patient given an opportunity to ask questions and does not have any further questions or concerns at this time. Were discharge instructions available to patient? Yes. Reviewed appropriate site of care based on symptoms and resources available to patient including: PCP, Specialist and Home health. The patient agrees to contact the PCP office for questions related to their healthcare. Medication reconciliation was performed with patient, who verbalizes understanding of administration of home medications. Advised obtaining a 90-day supply of all daily and as-needed medications. Covid Risk Education     Educated patient about risk for severe COVID-19 due to risk factors according to CDC guidelines. LPN CC reviewed discharge instructions, medical action plan and red flag symptoms with the patient who verbalized understanding. Discussed COVID vaccination status: Yes. Education provided on COVID-19 vaccination as appropriate. Discussed exposure protocols and quarantine with CDC Guidelines. Patient was given an opportunity to verbalize any questions and concerns and agrees to contact LPN CC or health care provider for questions related to their healthcare. Reviewed and educated patient on any new and changed medications related to discharge diagnosis. Was patient discharged with a pulse oximeter? No Discussed and confirmed pulse oximeter discharge instructions and when to notify provider or seek emergency care. LPN CC provided contact information. No further follow-up call indicated based on severity of symptoms and risk factors. Care Transitions 24 Hour Call    Do you have any ongoing symptoms?: Yes  Patient-reported symptoms: Shortness of Breath  Interventions for patient-reported symptoms: Notified Home Care  Do you have a copy of your discharge instructions?: Yes  Do you have all of your prescriptions and are they filled?: Yes  Have you been contacted by a [facility] Pharmacist?: No  Have you scheduled your follow up appointment?: Yes  How are you going to get to your appointment?: Car - family or friend to transport  Were you discharged with any Home Care or Post Acute Services: Yes  Post Acute Services: Home Health (Comment: [facility])  Do you feel like you have everything you need to keep you well at home?: Yes  Care Transitions Interventions         Follow Up  No future appointments.     Ana Guerra LPN

## 2022-01-04 ASSESSMENT — ENCOUNTER SYMPTOMS
GASTROINTESTINAL NEGATIVE: 1
ALLERGIC/IMMUNOLOGIC NEGATIVE: 1
RESPIRATORY NEGATIVE: 1
EYES NEGATIVE: 1

## 2022-01-04 NOTE — DISCHARGE SUMMARY
[facility] Cardiothoracic Surgery  Discharge Summary    Patient's Name/Date of Birth: Lizbet Trujillo / 1950 (2 y.o.)    Admission Date: 12/23/2021  6:01 AM  Discharge Date: 12/31/2021  2:15 PM  Discharge Physician: Dr. Efe Bolivar  Discharge Unit: [facility]  Discharge condition: stable  Disposition: Home      Reason For Admission:   Chief Complaint   Patient presents with    Chest Pain       HPI: Lizbet Trujillo is a 80 y.o.  male who presented to the emergency department with onset of chest pain. Patient was diagnosed as NSTEMI. Cardiology was consulted for cardiac catheterization. After cardiac catheterization patient noted to have multivessel CAD therefore cardiothoracic surgery was consulted. Patient had uneventful postop recovery. Chest tubes were removed on appropriate date. Acute blood loss anemia was due to surgery. It is determined that patient was safe to go home with home care. Patient will follow up in cardiothoracic clinic on scheduled date    Procedures completed in hospital: Median sternotomy, aorto-right atrial  cardiopulmonary bypass, MINH, endoscopic vein harvest, extensive  intrapericardial lysis of adhesions, CABG x3 with LIMA to LAD, reverse  saphenous vein graft 1 to OM2 and reverse saphenous vein graft 2 to  RCA/RPDA junction. Review of Systems   Constitutional: Negative. HENT: Negative. Eyes: Negative. Respiratory: Negative. Cardiovascular: Negative. Gastrointestinal: Negative. Endocrine: Negative. Genitourinary: Negative. Musculoskeletal: Negative. Allergic/Immunologic: Negative. Neurological: Negative. Hematological: Negative. Psychiatric/Behavioral: Negative. Physical Exam:    Weight: Weight: 199 lb 11.8 oz (90.6 kg)    Weight: 192 lb (87.1 kg)    No intake/output data recorded. General: alert and oriented to person, place and time, well-developed and well-nourished, in no acute distress.  Up in chair, No apparent distress. Heart:Normal S1 and S2.  Regular rhythm. No murmurs, gallops, or rubs. Pacing Wires No   Lungs: clear to auscultation bilaterally  Abdomen: soft, non tender, non distended, BSx4  Extremities: negative  Wounds: clean and dry, healing appropriately. 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     Esophageal reflux     Essential hypertension, benign     History of blood transfusion     Hx of blood clots     RLE DVT,     Localized osteoarthrosis not specified whether primary or secondary, unspecified site     Other and unspecified hyperlipidemia     Surgical or other procedure not carried out because of patient's decision     colonoscopy refused     Unspecified pleural effusion      Past Surgical History:   Procedure Laterality Date    CORONARY ARTERY BYPASS GRAFT N/A 2021    CORONARY ARTERY BYPASS X3; MINH PER ANESTHESIA performed by Ovi Fernández MD at [address] 2006    MRSA at that time.      No Known Allergies  Family History   Problem Relation Age of Onset    Diabetes Mother     Heart Disease Mother     Heart Disease Father     Heart Disease Paternal Uncle      Social History     Socioeconomic History    Marital status:      Spouse name: Not on file    Number of children: Not on file    Years of education: Not on file    Highest education level: Not on file   Occupational History    Not on file   Tobacco Use    Smoking status: Former Smoker     Quit date: 10/22/2010     Years since quittin.2    Smokeless tobacco: Never Used   Substance and Sexual Activity    Alcohol use: No     Comment: Sober since 1989    Drug use: No    Sexual activity: Not on file   Other Topics Concern    Not on file   Social History Narrative    Not on file Social Determinants of Health     Financial Resource Strain:     Difficulty of Paying Living Expenses: Not on file   Food Insecurity:     Worried About Running Out of Food in the Last Year: Not on file    Shari of Food in the Last Year: Not on file   Transportation Needs:     Lack of Transportation (Medical): Not on file    Lack of Transportation (Non-Medical):  Not on file   Physical Activity:     Days of Exercise per Week: Not on file    Minutes of Exercise per Session: Not on file   Stress:     Feeling of Stress : Not on file   Social Connections:     Frequency of Communication with Friends and Family: Not on file    Frequency of Social Gatherings with Friends and Family: Not on file    Attends Nondenominational Services: Not on file    Active Member of [address] [place] [address] Screen[employer]er or Organizations: Not on file    Attends Club or Organization Meetings: Not on file    Marital Status: Not on file   Intimate Partner Violence:     Fear of Current or Ex-Partner: Not on file    Emotionally Abused: Not on file    Physically Abused: Not on file    Sexually Abused: Not on file   Housing Stability:     Unable to Pay for Housing in the Last Year: Not on file    Number of [place] in the Last Year: Not on file    Unstable Housing in the Last Year: Not on file          Medication List      START taking these medications    amiodarone 200 MG tablet  Commonly known as: CORDARONE  Take 1 tablet by mouth 2 times daily     aspirin 81 MG EC tablet  Take 1 tablet by mouth daily     atorvastatin 40 MG tablet  Commonly known as: LIPITOR  Take 1 tablet by mouth nightly     clopidogrel 75 MG tablet  Commonly known as: PLAVIX  Take 1 tablet by mouth daily     metoprolol tartrate 25 MG tablet  Commonly known as: LOPRESSOR  Take 1 tablet by mouth 2 times daily     multivitamin Tabs tablet  Take 1 tablet by mouth daily     oxyCODONE-acetaminophen 5-325 MG per tablet  Commonly known as: PERCOCET  Take 1 tablet by mouth every 8 hours as needed for Pain for up to 7 days. pantoprazole 40 MG tablet  Commonly known as: PROTONIX  Take 1 tablet by mouth every morning (before breakfast)        CONTINUE taking these medications    albuterol sulfate  (90 Base) MCG/ACT inhaler  Inhale 2 puffs into the lungs 4 times daily as needed for Wheezing or Shortness of Breath     Spiriva Respimat 2.5 MCG/ACT Aers inhaler  Generic drug: tiotropium     Symbicort 160-4.5 MCG/ACT Aero  Generic drug: budesonide-formoterol     tamsulosin 0.4 MG capsule  Commonly known as: FLOMAX  Take 1 capsule by mouth daily        STOP taking these medications    amLODIPine 2.5 MG tablet  Commonly known as: NORVASC           Where to Get Your Medications      These medications were sent to [address], [place] [address] [place] [address] [phone]  [address] [place] [address],[address]    Phone: [phone]   · amiodarone 200 MG tablet  · aspirin 81 MG EC tablet  · atorvastatin 40 MG tablet  · clopidogrel 75 MG tablet  · metoprolol tartrate 25 MG tablet  · multivitamin Tabs tablet  · oxyCODONE-acetaminophen 5-325 MG per tablet  · pantoprazole 40 MG tablet           Data:    CBC: No results for input(s): WBC, HGB, HCT, MCV, PLT in the last 72 hours. BMP: No results for input(s): NA, K, CL, CO2, PHOS, BUN, CREATININE, CA, MG in the last 72 hours. Accucheck Glucoses:   No results for input(s): POCGLU in the last 72 hours. Cardiac Enzymes: No results for input(s): CKTOTAL, CKMB, CKMBINDEX, TROPONINI in the last 72 hours. PTT/PT/INR:   No results for input(s): PROTIME, INR in the last 72 hours. No results for input(s): APTT in the last 72 hours.   Liver Profile:  Lab Results   Component Value Date    AST 30 12/25/2021    ALT 20 12/25/2021    BILITOT 0.66 12/25/2021    ALKPHOS 84 12/25/2021     Lab Results   Component Value Date    CHOL 116 12/24/2021    HDL 60 12/24/2021    TRIG 52 12/24/2021     TSH:   Lab Results   Component Value Date    TSH 1.63 10/24/2013 UA:   Lab Results   Component Value Date    COLORU Yellow 12/24/2021    PHUR 5.0 12/24/2021    WBCUA None 08/17/2019    RBCUA 2 TO 5 08/17/2019    MUCUS 1+ 08/17/2019    TRICHOMONAS NOT REPORTED 08/17/2019    YEAST NOT REPORTED 08/17/2019    BACTERIA FEW 08/17/2019    SPECGRAV 1.023 12/24/2021    LEUKOCYTESUR NEGATIVE 12/24/2021    UROBILINOGEN Normal 12/24/2021    BILIRUBINUR NEGATIVE 12/24/2021    GLUCOSEU NEGATIVE 12/24/2021    AMORPHOUS NOT REPORTED 08/17/2019       Problem List Items Addressed This Visit        Cardiothoracic    * (Principal) NSTEMI (non-ST elevated myocardial infarction) ([facility] [address]       Other    S/P CABG x 3 (Chronic)    Relevant Medications    oxyCODONE-acetaminophen (PERCOCET) 5-325 MG per tablet    Other Relevant Orders    [facility] [address]    Chest pain due to myocardial ischemia - Primary      Other Visit Diagnoses     Post-op pain        Relevant Medications    oxyCODONE-acetaminophen (PERCOCET) 5-325 MG per tablet              Discharge Plan:  Follow up with CT Surgery  in 1 week. Call office at [phone] for any problems. Follow up with PCP and cardiology in 1-2 weeks. Patient was discharged on Aspirin, Plavix, Coumadin, BB, and Statin therapy per protocol.    Preserved EF no ACEI     CHRISTOPH Almodovar NP, CNP  Phone: [phone]

## 2022-01-12 ENCOUNTER — OFFICE VISIT (OUTPATIENT)
Dept: CARDIOTHORACIC SURGERY | Age: 72
End: 2022-01-12

## 2022-01-12 VITALS
OXYGEN SATURATION: 94 % | DIASTOLIC BLOOD PRESSURE: 72 MMHG | HEART RATE: 88 BPM | BODY MASS INDEX: 25.33 KG/M2 | HEIGHT: 72 IN | SYSTOLIC BLOOD PRESSURE: 127 MMHG | TEMPERATURE: 98.8 F | WEIGHT: 187 LBS | RESPIRATION RATE: 18 BRPM

## 2022-01-12 DIAGNOSIS — Z95.1 S/P CABG X 3: Primary | Chronic | ICD-10-CM

## 2022-01-12 PROCEDURE — 99024 POSTOP FOLLOW-UP VISIT: CPT | Performed by: NURSE PRACTITIONER

## 2022-01-12 ASSESSMENT — ENCOUNTER SYMPTOMS
ABDOMINAL PAIN: 0
COLOR CHANGE: 0
SHORTNESS OF BREATH: 0

## 2022-01-12 NOTE — PROGRESS NOTES
[facility]  Office Visit      Subjective:  Mr. Last Jensen is a 80 y.o. male s/p CABG x3 on 12/27/2021 with Dr. Lynn Santos, at [facility]. He feels well today. Pain is controlled, He is not requiring narcotics for pain relief. he is taking tylenol as needed. Denies chest pain or shortness of breath. Increasing activity as tolerated. Appetite is  returning to normal. Bowel movements are regular. Denies fever, chills, or signs of infection at incision sites. Plan of care reviewed and questions answered. Chest xray reviewed. Mr. Last Jensen is recovering at home, rehab - [facility]. ROS  Review of Systems   Constitutional: Negative for activity change, appetite change, chills, fatigue and fever. HENT: Negative for congestion. Eyes: Negative for visual disturbance. Respiratory: Negative for shortness of breath. Cardiovascular: Negative for chest pain and leg swelling. Gastrointestinal: Negative for abdominal pain. Genitourinary: Negative for dysuria. Musculoskeletal: Negative for gait problem. Skin: Negative for color change. Neurological: Negative for dizziness and weakness. Psychiatric/Behavioral: Negative for suicidal ideas. The patient is not nervous/anxious. Physical Exam  Vitals:  Vitals:    01/12/22 0844   BP: 127/72   Pulse:    Resp:    Temp:    SpO2:        Physical Exam  Vitals reviewed. Constitutional:       General: He is not in acute distress. Appearance: Normal appearance. He is normal weight. He is not ill-appearing. HENT:      Head: Normocephalic. Nose: Nose normal.      Mouth/Throat:      Mouth: Mucous membranes are moist.      Pharynx: Oropharynx is clear. Eyes:      Extraocular Movements: Extraocular movements intact. Conjunctiva/sclera: Conjunctivae normal.      Pupils: Pupils are equal, round, and reactive to light. Cardiovascular:      Rate and Rhythm: Normal rate and regular rhythm.       Pulses: Normal pulses. Heart sounds: Normal heart sounds. Pulmonary:      Effort: Pulmonary effort is normal.      Breath sounds: Normal breath sounds. Abdominal:      Palpations: Abdomen is soft. Tenderness: There is no abdominal tenderness. Musculoskeletal:         General: Normal range of motion. Cervical back: Normal range of motion. Right lower leg: No edema. Left lower leg: No edema. Skin:     General: Skin is warm and dry. Comments: Surgical incisions well approximated with no erythema, edema or drainage noted. Steri-strips in place to chest tube insertion sites. Neurological:      General: No focal deficit present. Mental Status: He is alert and oriented to person, place, and time.    Psychiatric:         Mood and Affect: Mood normal.         Behavior: Behavior normal.          Current Medications:  Current Outpatient Medications:     amiodarone (CORDARONE) 200 MG tablet, Take 1 tablet by mouth 2 times daily, Disp: 30 tablet, Rfl: 0    aspirin 81 MG EC tablet, Take 1 tablet by mouth daily, Disp: 30 tablet, Rfl: 3    atorvastatin (LIPITOR) 40 MG tablet, Take 1 tablet by mouth nightly, Disp: 30 tablet, Rfl: 3    clopidogrel (PLAVIX) 75 MG tablet, Take 1 tablet by mouth daily, Disp: 30 tablet, Rfl: 3    metoprolol tartrate (LOPRESSOR) 25 MG tablet, Take 1 tablet by mouth 2 times daily, Disp: 60 tablet, Rfl: 3    Multiple Vitamin (MULTIVITAMIN) TABS tablet, Take 1 tablet by mouth daily, Disp: 30 tablet, Rfl: 0    pantoprazole (PROTONIX) 40 MG tablet, Take 1 tablet by mouth every morning (before breakfast), Disp: 30 tablet, Rfl: 3    tamsulosin (FLOMAX) 0.4 MG capsule, Take 1 capsule by mouth daily, Disp: 30 capsule, Rfl: 3    albuterol sulfate  (90 Base) MCG/ACT inhaler, Inhale 2 puffs into the lungs 4 times daily as needed for Wheezing or Shortness of Breath, Disp: 3 Inhaler, Rfl: 1    tiotropium (SPIRIVA RESPIMAT) 2.5 MCG/ACT AERS inhaler, Inhale 2 puffs into the lungs daily, Disp: , Rfl:     budesonide-formoterol (SYMBICORT) 160-4.5 MCG/ACT AERO, Inhale 2 puffs into the lungs 2 times daily, Disp: , Rfl:     Past Surgical History:   Procedure Laterality Date    CORONARY ARTERY BYPASS GRAFT N/A 12/27/2021    CORONARY ARTERY BYPASS X3; MINH PER ANESTHESIA performed by Onofre Trent MD at [address] 2006    MRSA at that time. Social Hx: reports that he quit smoking about 11 years ago. He has never used smokeless tobacco.    Assessment & Plan:   Aarti Acevedo was seen today for post-op check. Diagnoses and all orders for this visit:    S/P CABG x 3         1. Incisions healing well  2. Continue current medications. Discontinue Amiodarone as discussed  3. Follow up with cardiology (Dr. Denise Bai, Tuesday @ 2:15 am), and PCP as instructed at hospital discharge  4. Follow up with CT surgery group as needed    On this date 1/12/2022 I have spent 24 minutes reviewing previous notes, test results and face to face with the patient discussing the diagnosis and importance of compliance with the treatment plan as well as documenting on the day of the visit.       Carolina Pino, APRN - CNP,APRN CNP

## 2022-02-21 ENCOUNTER — HOSPITAL ENCOUNTER (OUTPATIENT)
Dept: CARDIAC REHAB | Age: 72
Setting detail: THERAPIES SERIES
Discharge: HOME | End: 2022-02-21
Payer: MEDICARE

## 2022-02-21 VITALS
HEIGHT: 72 IN | WEIGHT: 189.7 LBS | SYSTOLIC BLOOD PRESSURE: 128 MMHG | DIASTOLIC BLOOD PRESSURE: 70 MMHG | BODY MASS INDEX: 25.7 KG/M2 | OXYGEN SATURATION: 100 % | RESPIRATION RATE: 18 BRPM

## 2022-02-21 PROCEDURE — 93798 PHYS/QHP OP CAR RHAB W/ECG: CPT

## 2022-02-21 NOTE — PROGRESS NOTES
Pt oriented to Cardiac Rehab, goals set and ITP initiated. Code status reviewed. CAD Risk Factors & Prevention video viewed. Education and Fall Risk assessments performed. Medications and allergies reconciled and updated in Epic. Education provided on Cardiac diet and exercise. Consents signed.

## 2022-02-21 NOTE — PLAN OF CARE
[address] [place] [address] Cardiac Rehabilitation  Individual Treatment Plan    Patient Name:  Salma Juárez  :  1950  Age:  80 y.o.   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     Esophageal reflux     Essential hypertension, benign     History of blood transfusion     Hx of blood clots     RLE DVT,     Localized osteoarthrosis not specified whether primary or secondary, unspecified site     Other and unspecified hyperlipidemia     Surgical or other procedure not carried out because of patient's decision     colonoscopy refused     Unspecified pleural effusion      Allergies: No Known Allergies  Patient Goal: COMPLETE AN ENTIRE GOLF GAME    Amount of Minutes per piece  Week  Warm Up  Leg Arm  Leg  Arm  Leg  Arm  Duration   1 4 7 3 7 3 7 - 31   2 4 7 3 7 3 7 3 34   3 4 8 3 8 3 8 3 37   4 4 8 4 8 4 8 4 40   5 4 9 4 9 4 9 4 43   6 4 9 5 9 5 9 5 46   Max 7 4 10 5 10 5 10 5 49        Exercise Prescription:    Type of exercise:  Legs- Treadmill, Airdyne, Nustep  Arms- Free weights, Airdyne, Ergometer, Nustep                               Frequency:  3 times per week         Plan of Progresssion:  Amount and duration will increase every       2-3 visits    I        Phase 2 Cardiac Rehabilitation  Individualized Cardiac Treatment Plan    Individual Cardiac Treatment Plan - EXERCISE    EXERCISE  ASSESSMENT/PLAN   Initial Assessment   Stages of Change    []Contemplation   [x]Action   []Relapse  Exercise Limitations:   []Yes [x]No  If yes ______________   EXERCISE ASSESSMENT   Home Exercise   [x]Yes    []No  Type:  Aerobics   []  Bicycling  []  Cardiovascular  []  Gardening  []  Hiking  []  House Cleaning   [x]  Run/Jog  []  Swim  []  Walk  [x]  Yard Work  []  Other  []  Frequency:  Daily  []  Q2 days  []  Q3 days  []  Biweekly  []  Irregularly  [x]  Other  []    Duration:   Seconds []  Minutes [x] 30  Hours []  Other []   EXERCISE PLAN   *Interventions*   Education:   [x]Self Pulse   [x]Medication HR/BP   [x]Exercise Safety   [x]S/S to report   [x]RPE scale   [x]Equip. [place]. [x]Warmup/cool down   [x]Hand    [x]Home exercise encouraged   Met Level: 2.4  Target Goal:  Follow individual exercise Rx, aerobic exercise, 30+minutes 5x/week     Individual Cardiac Treatment Plan - EDUCATION    Initial Assessment  Learning Barriers   Speech  []  Hearing  []  Vision  []  Cognitive  []  Ready to Learn [x]     Pre-Cardiac test score: Tobacco Use  []Y  [x]N    []Quit    HTN []Y  [x]N     Diabetic []Y  [x]N     Intervention/Education   []Referal to smoking cessation  [] Smoking cessation encouraged  [x]CV Risk Factors video viewed   [x]CAD   [x]Risk factors   [x]Med Compliance   [x]Cardiac A/P   [x]Angina S/S   [x]NTG use   [x]Sexual activity    Target Goal:  Increase knowledge of risk factors     Individual Cardiac Treatment Plan - NUTRITION    NUTRITION  ASSESSMENT/PLAN    Initial Assessment   Stages of Change    []Contemplation   [x]Action   []Relapse   NUTRITION ASSESSMENT   Weight Management  Weight: 189.7 LB [unfilled]      Height:  Ht Readings from Last 1 Encounters:   02/21/22 6' (1.829 m)          BMI:  Body mass index is 25.73 kg/m².    Date:  Lipids:  Cholesterol (mg/dL)   Date Value   12/24/2021 116     HDL (mg/dL)   Date Value   12/24/2021 60     LDL Cholesterol (mg/dL)   Date Value   12/24/2021 46     Chol/HDL Ratio (no units)   Date Value   12/24/2021 1.9     Triglycerides (mg/dL)   Date Value   12/24/2021 52     VLDL (mg/dL)   Date Value   12/24/2021 NOT REPORTED      Cholesterol Drug Therapy:   [x]Y  []N  Why:   Diabetic:   []Y  [x]N  Education Needed    []Y  [x]N   Goal: Maintain Heart Healthy Weight. Professional Referral  Please check if needed. []Dietician Consult    [x]Nurse/Patient Ed   []Wt. Management Referral   []Diet Class   []Other:    Goal:  Understand lipid results; Target: LDL below 70, HDL above 40. Individual Cardiac Treatment Plan - PYSCHOSOCIAL    PSYCHOSOCIAL  ASSESSMENT/PLAN    Initial Assessment   Psychosocial test:  PHQ 9 Score_______  Med Therapy []Y [x]N   Intervention/  Education  If PHQ 9 score 5 or above:   []Psych consult   []Physician notified  If pt declined, Why?    [x]Relaxation technique   [x]S/S of depression   [x]Coping techniques   Target goal:  Decreased stress levels, increase healthy lifestyle.

## 2022-02-23 ENCOUNTER — HOSPITAL ENCOUNTER (OUTPATIENT)
Dept: CARDIAC REHAB | Age: 72
Setting detail: THERAPIES SERIES
Discharge: HOME | End: 2022-02-23
Payer: MEDICARE

## 2022-02-23 VITALS — WEIGHT: 187.6 LBS | BODY MASS INDEX: 25.44 KG/M2

## 2022-02-23 PROCEDURE — 93798 PHYS/QHP OP CAR RHAB W/ECG: CPT

## 2022-02-28 ENCOUNTER — HOSPITAL ENCOUNTER (OUTPATIENT)
Dept: CARDIAC REHAB | Age: 72
Setting detail: THERAPIES SERIES
Discharge: HOME | End: 2022-02-28
Payer: MEDICARE

## 2022-02-28 VITALS — WEIGHT: 188.9 LBS | BODY MASS INDEX: 25.62 KG/M2

## 2022-02-28 PROCEDURE — 93798 PHYS/QHP OP CAR RHAB W/ECG: CPT

## 2022-03-02 ENCOUNTER — HOSPITAL ENCOUNTER (OUTPATIENT)
Dept: CARDIAC REHAB | Age: 72
Setting detail: THERAPIES SERIES
Discharge: HOME | End: 2022-03-02
Payer: MEDICARE

## 2022-03-02 VITALS — WEIGHT: 187.2 LBS | BODY MASS INDEX: 25.39 KG/M2

## 2022-03-02 PROCEDURE — 93798 PHYS/QHP OP CAR RHAB W/ECG: CPT

## 2022-03-07 ENCOUNTER — HOSPITAL ENCOUNTER (OUTPATIENT)
Dept: CARDIAC REHAB | Age: 72
Setting detail: THERAPIES SERIES
End: 2022-03-07
Payer: MEDICARE

## 2022-03-09 ENCOUNTER — HOSPITAL ENCOUNTER (OUTPATIENT)
Dept: CARDIAC REHAB | Age: 72
Setting detail: THERAPIES SERIES
Discharge: HOME | End: 2022-03-09
Payer: MEDICARE

## 2022-03-09 VITALS — BODY MASS INDEX: 25.74 KG/M2 | WEIGHT: 189.8 LBS

## 2022-03-09 PROCEDURE — 93798 PHYS/QHP OP CAR RHAB W/ECG: CPT

## 2022-03-14 ENCOUNTER — HOSPITAL ENCOUNTER (OUTPATIENT)
Dept: CARDIAC REHAB | Age: 72
Setting detail: THERAPIES SERIES
Discharge: HOME | End: 2022-03-14
Payer: MEDICARE

## 2022-03-14 VITALS — WEIGHT: 192.5 LBS | BODY MASS INDEX: 26.11 KG/M2

## 2022-03-14 PROCEDURE — 93798 PHYS/QHP OP CAR RHAB W/ECG: CPT

## 2022-03-16 ENCOUNTER — HOSPITAL ENCOUNTER (OUTPATIENT)
Dept: CARDIAC REHAB | Age: 72
Setting detail: THERAPIES SERIES
Discharge: HOME | End: 2022-03-16
Payer: MEDICARE

## 2022-03-16 VITALS — WEIGHT: 189.5 LBS | BODY MASS INDEX: 25.7 KG/M2

## 2022-03-16 PROCEDURE — 93798 PHYS/QHP OP CAR RHAB W/ECG: CPT

## 2022-03-16 NOTE — PROGRESS NOTES
Discussed goal of completing a golf game. Patient stated that he sees his Cardiologist at the end of the week and will be discussing with him if he is able to get back to golf. Patient stating he feels cardiac rehab is helping with strength and endurance. Patient stating he is learning how to pace activities with rest periods.      Electronically signed by Miki Moise RN on 3/16/2022 at 8:03 AM

## 2022-03-21 ENCOUNTER — HOSPITAL ENCOUNTER (OUTPATIENT)
Dept: CARDIAC REHAB | Age: 72
Setting detail: THERAPIES SERIES
Discharge: HOME | End: 2022-03-21
Payer: MEDICARE

## 2022-03-21 VITALS — BODY MASS INDEX: 25.81 KG/M2 | WEIGHT: 190.3 LBS

## 2022-03-21 PROCEDURE — 93798 PHYS/QHP OP CAR RHAB W/ECG: CPT

## 2022-03-21 ASSESSMENT — PATIENT HEALTH QUESTIONNAIRE - PHQ9
SUM OF ALL RESPONSES TO PHQ9 QUESTIONS 1 & 2: 0
1. LITTLE INTEREST OR PLEASURE IN DOING THINGS: 0
SUM OF ALL RESPONSES TO PHQ QUESTIONS 1-9: 0
2. FEELING DOWN, DEPRESSED OR HOPELESS: 0
SUM OF ALL RESPONSES TO PHQ QUESTIONS 1-9: 0

## 2022-03-21 NOTE — PLAN OF CARE
[facility] Based Cardiac Rehabilitation  Individual Treatment Plan    Patient Name:  Lizbet Trujillo  :  1950  Age:  80 y.o.   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     Esophageal reflux     Essential hypertension, benign     History of blood transfusion     Hx of blood clots     RLE DVT,     Localized osteoarthrosis not specified whether primary or secondary, unspecified site     Other and unspecified hyperlipidemia     Surgical or other procedure not carried out because of patient's decision     colonoscopy refused     Unspecified pleural effusion      Allergies: No Known Allergies  Patient Goal: To complete a golf game     Amount of Minutes per piece  Week  Warm Up  Leg Arm  Leg  Arm  Leg  Arm  Duration   1 4 7 3 7 3 7 - 31   2 4 7 3 7 3 7 3 34   3 4 8 3 8 3 8 3 37   4 4 8 4 8 4 8 4 40   5 4 9 4 9 4 9 4 43   6 4 9 5 9 5 9 5 46   Max 7 4 10 5 10 5 10 5 49        Exercise Prescription:    Type of exercise:  Legs- Treadmill, Airdyne, Nustep  Arms- Free weights, Airdyne, Ergometer, Nustep                               Frequency:  2-3 times per week         Plan of Progresssion:  Amount and duration will increase every       2-3 visits          Phase 2 Cardiac Rehabilitation  Individualized Cardiac Treatment Plan    Individual Cardiac Treatment Plan - EXERCISE  EXERCISE  REASSESSMENT   Re-Assessment   Stages of Change    [x]Contemplation- stated doing ALDs and works at  home; stated to start riding bike outside with weather changing    []Action   []Relapse   EXERCISE ASSESSMENT   Home Exercise   []Yes    [x]No- stated to start biking outdoors with changing weather Type:  Aerobics   []  Bicycling  []   Cardiovascular  []  Gardening  []  Hiking  []  House Cleaning   []  Run/Jog  []  Swim  []  Walk  []  Yard Work  []  Other  []     Frequency:  Daily  []  Q2 days  []  Q3 days  []  Biweekly  []  Irregularly  []  Other  []     Duration:   Seconds []  Minutes []  Hours []  Other []   EXERCISE PLAN   Interventions*  Education:   [x]Self Pulse   [x]Medication HR/BP   [x]Exercise Safety   [x]S/S to report   [x]RPE scale   [x]Equip. [place]. [x]Warmup/cool down   [x]Hand    [x]Home exercise encouraged   Target Goal:  Follow individual exercise Rx, aerobic exercise, 30+minutes 5x/week   Exercise Prescription  (per physician & CR staff)  Met Level:3.7       Individual Cardiac Treatment Plan - EDUCATION  Reassessment  Learning Barriers   Speech  []  Hearing  []  Vision  [] wears glasses  Cognitive  []  Ready to Learn [x]     Tobacco Use  []Y  [x]N  []Quit    HTN []Y  [x]N      Diabetic []Y  [x]N     Pre-cardiac test:__100%___     Intervention/Education   []Referal to smoking cessation  []Encouraged smoking cessation   [x]CAD   [x]Risk factors   [x]Med Compliance   [x]Cardiac A/P   [x]Angina S/S   [x]NTG use   [x]Sexual activity    Target Goal:  Increase knowledge of risk factors     Individual Cardiac Treatment Plan - NUTRITION  NUTRITION  REASSESSMENT      Stages of Change    []Contemplation   [x]Action stated eating heart healthy with no added salt   []Relapse   NUTRITION ASSESSMENT   Weight Management  Weight: 190lb 4.8 oz      Height:  Ht Readings from Last 1 Encounters:   02/21/22 6' (1.829 m)          BMI:  Body mass index is 25.81 kg/m².    Date:  Lipids:  Cholesterol (mg/dL)   Date Value   12/24/2021 116     HDL (mg/dL)   Date Value   12/24/2021 60     LDL Cholesterol (mg/dL)   Date Value   12/24/2021 46     Chol/HDL Ratio (no units)   Date Value   12/24/2021 1.9     Triglycerides (mg/dL)   Date Value   12/24/2021 52     VLDL (mg/dL)   Date Value   12/24/2021 NOT REPORTED      Cholesterol Drug Therapy:   [x]Y  []N  Why:   Diabetic:   []Y  [x]N  Education Needed    []Y  [x]N   Goal: Maintain Heart Healthy Weight. Professional Referral  Please check if needed. []Dietician Consult    []Nurse/Patient Ed   []Wt. Management Referral   []Other:   Goal: Understand Lipid Results; Target:  LDL below 70, HDL above 40          Individual Cardiac Treatment Plan - PYSCHOSOCIAL  PSYCHOSOCIAL  REASSESSMENT      Psychosocial test:  PHQ 9 score:_0____   Intervention/  Education  If score 5 or above:   []Psych consult   []Physician notified  If pt declined, Why?    [x]Relaxation technique   [x]S/S of depression   [x]Coping techniques   Target goal:  Decreased stress levels        3/21/2022 ITP completed. Medications Reviewed. Stated following cardiac diet at home with no added salt. Stated doing all ADLs and walks around at  home while working. Stated to start intentional exercise by way of outdoor biking as weather is beginning to change. Stated at Cardiologist this past Friday and he will not be able to resume golf until September.     Electronically signed by Brynn Dhaliwal RN on 3/21/2022 at 8:37 AM

## 2022-03-23 ENCOUNTER — HOSPITAL ENCOUNTER (OUTPATIENT)
Dept: CARDIAC REHAB | Age: 72
Setting detail: THERAPIES SERIES
Discharge: HOME | End: 2022-03-23
Payer: MEDICARE

## 2022-03-23 VITALS — WEIGHT: 190.9 LBS | BODY MASS INDEX: 25.89 KG/M2

## 2022-03-23 PROCEDURE — 93798 PHYS/QHP OP CAR RHAB W/ECG: CPT

## 2022-03-28 ENCOUNTER — HOSPITAL ENCOUNTER (OUTPATIENT)
Dept: CARDIAC REHAB | Age: 72
Setting detail: THERAPIES SERIES
Discharge: HOME | End: 2022-03-28
Payer: MEDICARE

## 2022-03-28 VITALS — BODY MASS INDEX: 25.89 KG/M2 | WEIGHT: 190.9 LBS

## 2022-03-28 PROCEDURE — 93798 PHYS/QHP OP CAR RHAB W/ECG: CPT

## 2022-03-28 NOTE — PROGRESS NOTES
Goals reviewed, pt states he is feeling stronger since starting Cardiac Rehab, unable to play golf yet, continues to work on these goals.

## 2022-03-30 ENCOUNTER — HOSPITAL ENCOUNTER (OUTPATIENT)
Dept: CARDIAC REHAB | Age: 72
Setting detail: THERAPIES SERIES
Discharge: HOME | End: 2022-03-30
Payer: MEDICARE

## 2022-03-30 VITALS — BODY MASS INDEX: 25.89 KG/M2 | WEIGHT: 190.9 LBS

## 2022-03-30 PROCEDURE — 93798 PHYS/QHP OP CAR RHAB W/ECG: CPT

## 2022-04-04 ENCOUNTER — HOSPITAL ENCOUNTER (OUTPATIENT)
Dept: CARDIAC REHAB | Age: 72
Setting detail: THERAPIES SERIES
Discharge: HOME | End: 2022-04-04
Payer: MEDICARE

## 2022-04-04 VITALS — WEIGHT: 192.6 LBS | BODY MASS INDEX: 26.12 KG/M2

## 2022-04-04 PROCEDURE — 93798 PHYS/QHP OP CAR RHAB W/ECG: CPT

## 2022-04-05 ENCOUNTER — HOSPITAL ENCOUNTER (OUTPATIENT)
Age: 72
Setting detail: SPECIMEN
Discharge: HOME | End: 2022-04-05

## 2022-04-05 LAB
ALT SERPL-CCNC: 9 U/L (ref 5–41)
AST SERPL-CCNC: 12 U/L
CHOLESTEROL/HDL RATIO: 2.6
CHOLESTEROL: 137 MG/DL
HDLC SERPL-MCNC: 53 MG/DL
LDL CHOLESTEROL: 74 MG/DL (ref 0–130)
TRIGL SERPL-MCNC: 52 MG/DL

## 2022-04-06 ENCOUNTER — HOSPITAL ENCOUNTER (OUTPATIENT)
Dept: CARDIAC REHAB | Age: 72
Setting detail: THERAPIES SERIES
Discharge: HOME | End: 2022-04-06
Payer: MEDICARE

## 2022-04-06 VITALS — BODY MASS INDEX: 26.07 KG/M2 | WEIGHT: 192.2 LBS

## 2022-04-06 PROCEDURE — 93798 PHYS/QHP OP CAR RHAB W/ECG: CPT

## 2022-04-06 NOTE — PROGRESS NOTES
Goals reviewed, pt states feeling stronger since starting Cardiac Rehab, continues to work on getting back to golfing.

## 2022-04-11 ENCOUNTER — HOSPITAL ENCOUNTER (OUTPATIENT)
Dept: CARDIAC REHAB | Age: 72
Setting detail: THERAPIES SERIES
Discharge: HOME | End: 2022-04-11
Payer: MEDICARE

## 2022-04-11 VITALS — WEIGHT: 190.2 LBS | BODY MASS INDEX: 25.8 KG/M2

## 2022-04-11 PROCEDURE — 93798 PHYS/QHP OP CAR RHAB W/ECG: CPT

## 2022-04-13 ENCOUNTER — HOSPITAL ENCOUNTER (OUTPATIENT)
Dept: CARDIAC REHAB | Age: 72
Setting detail: THERAPIES SERIES
Discharge: HOME | End: 2022-04-13
Payer: MEDICARE

## 2022-04-13 VITALS — BODY MASS INDEX: 25.92 KG/M2 | WEIGHT: 191.1 LBS

## 2022-04-13 PROCEDURE — 93798 PHYS/QHP OP CAR RHAB W/ECG: CPT

## 2022-04-18 ENCOUNTER — APPOINTMENT (OUTPATIENT)
Dept: CARDIAC REHAB | Age: 72
End: 2022-04-18
Payer: MEDICARE

## 2022-04-20 ENCOUNTER — HOSPITAL ENCOUNTER (OUTPATIENT)
Dept: CARDIAC REHAB | Age: 72
Setting detail: THERAPIES SERIES
End: 2022-04-20
Payer: MEDICARE

## 2022-04-25 ENCOUNTER — HOSPITAL ENCOUNTER (OUTPATIENT)
Dept: CARDIAC REHAB | Age: 72
Setting detail: THERAPIES SERIES
Discharge: HOME | End: 2022-04-25
Payer: MEDICARE

## 2022-04-27 ENCOUNTER — APPOINTMENT (OUTPATIENT)
Dept: GENERAL RADIOLOGY | Age: 72
DRG: 193 | End: 2022-04-27
Payer: OTHER GOVERNMENT

## 2022-04-27 ENCOUNTER — APPOINTMENT (OUTPATIENT)
Dept: CARDIAC REHAB | Age: 72
End: 2022-04-27
Payer: MEDICARE

## 2022-04-27 ENCOUNTER — HOSPITAL ENCOUNTER (INPATIENT)
Age: 72
LOS: 4 days | Discharge: HOME | DRG: 193 | End: 2022-05-01
Attending: EMERGENCY MEDICINE | Admitting: INTERNAL MEDICINE
Payer: OTHER GOVERNMENT

## 2022-04-27 DIAGNOSIS — I50.23 ACUTE ON CHRONIC SYSTOLIC CONGESTIVE HEART FAILURE (HCC): ICD-10-CM

## 2022-04-27 DIAGNOSIS — N17.9 AKI (ACUTE KIDNEY INJURY) (HCC): ICD-10-CM

## 2022-04-27 DIAGNOSIS — J18.9 PNEUMONIA OF LEFT LOWER LOBE DUE TO INFECTIOUS ORGANISM: Primary | ICD-10-CM

## 2022-04-27 LAB
-: ABNORMAL
ABSOLUTE EOS #: 0.03 K/UL (ref 0–0.44)
ABSOLUTE IMMATURE GRANULOCYTE: 0.16 K/UL (ref 0–0.3)
ABSOLUTE LYMPH #: 1.7 K/UL (ref 1.1–3.7)
ABSOLUTE MONO #: 0.71 K/UL (ref 0.1–1.2)
ANION GAP SERPL CALCULATED.3IONS-SCNC: 16 MMOL/L (ref 9–17)
BASOPHILS # BLD: 0 % (ref 0–2)
BASOPHILS ABSOLUTE: 0.07 K/UL (ref 0–0.2)
BILIRUBIN URINE: NEGATIVE
BUN BLDV-MCNC: 37 MG/DL (ref 8–23)
CALCIUM SERPL-MCNC: 9.2 MG/DL (ref 8.6–10.4)
CASTS UA: ABNORMAL /LPF (ref 0–2)
CASTS UA: ABNORMAL /LPF (ref 0–2)
CHLORIDE BLD-SCNC: 94 MMOL/L (ref 98–107)
CO2: 23 MMOL/L (ref 20–31)
COLOR: YELLOW
CREAT SERPL-MCNC: 1.45 MG/DL (ref 0.7–1.2)
EOSINOPHILS RELATIVE PERCENT: 0 % (ref 1–4)
EPITHELIAL CELLS UA: ABNORMAL /HPF (ref 0–5)
FLU A ANTIGEN: NEGATIVE
FLU B ANTIGEN: NEGATIVE
GFR AFRICAN AMERICAN: 58 ML/MIN
GFR NON-AFRICAN AMERICAN: 48 ML/MIN
GFR SERPL CREATININE-BSD FRML MDRD: ABNORMAL ML/MIN/{1.73_M2}
GLUCOSE BLD-MCNC: 102 MG/DL (ref 70–99)
GLUCOSE URINE: NEGATIVE
HCT VFR BLD CALC: 40 % (ref 40.7–50.3)
HEMOGLOBIN: 13.1 G/DL (ref 13–17)
IMMATURE GRANULOCYTES: 1 %
INR BLD: 0.9
KETONES, URINE: NEGATIVE
LACTIC ACID, WHOLE BLOOD: 1.2 MMOL/L (ref 0.7–2.1)
LACTIC ACID, WHOLE BLOOD: 3.1 MMOL/L (ref 0.7–2.1)
LEUKOCYTE ESTERASE, URINE: NEGATIVE
LYMPHOCYTES # BLD: 11 % (ref 24–43)
MCH RBC QN AUTO: 28.3 PG (ref 25.2–33.5)
MCHC RBC AUTO-ENTMCNC: 32.8 G/DL (ref 28.4–34.8)
MCV RBC AUTO: 86.4 FL (ref 82.6–102.9)
MONOCYTES # BLD: 4 % (ref 3–12)
MUCUS: ABNORMAL
NITRITE, URINE: NEGATIVE
NRBC AUTOMATED: 0 PER 100 WBC
PARTIAL THROMBOPLASTIN TIME: 29.3 SEC (ref 20.5–30.5)
PDW BLD-RTO: 14.1 % (ref 11.8–14.4)
PH UA: 5 (ref 5–8)
PLATELET # BLD: 248 K/UL (ref 138–453)
PMV BLD AUTO: 9.9 FL (ref 8.1–13.5)
POTASSIUM SERPL-SCNC: 3.8 MMOL/L (ref 3.7–5.3)
PRO-BNP: 2804 PG/ML
PROTEIN UA: ABNORMAL
PROTHROMBIN TIME: 10 SEC (ref 9.1–12.3)
RBC # BLD: 4.63 M/UL (ref 4.21–5.77)
RBC UA: ABNORMAL /HPF (ref 0–2)
REASON FOR REJECTION: NORMAL
SARS-COV-2, RAPID: NOT DETECTED
SEG NEUTROPHILS: 84 % (ref 36–65)
SEGMENTED NEUTROPHILS ABSOLUTE COUNT: 13.44 K/UL (ref 1.5–8.1)
SODIUM BLD-SCNC: 133 MMOL/L (ref 135–144)
SPECIFIC GRAVITY UA: 1.02 (ref 1–1.03)
SPECIMEN DESCRIPTION: NORMAL
TROPONIN, HIGH SENSITIVITY: 107 NG/L (ref 0–22)
TROPONIN, HIGH SENSITIVITY: 74 NG/L (ref 0–22)
TURBIDITY: CLEAR
URINE HGB: NEGATIVE
UROBILINOGEN, URINE: NORMAL
WBC # BLD: 16.1 K/UL (ref 3.5–11.3)
WBC UA: ABNORMAL /HPF (ref 0–5)
ZZ NTE CLEAN UP: ORDERED TEST: NORMAL
ZZ NTE WITH NAME CLEAN UP: SPECIMEN SOURCE: NORMAL

## 2022-04-27 PROCEDURE — 71045 X-RAY EXAM CHEST 1 VIEW: CPT

## 2022-04-27 PROCEDURE — 87804 INFLUENZA ASSAY W/OPTIC: CPT

## 2022-04-27 PROCEDURE — 87635 SARS-COV-2 COVID-19 AMP PRB: CPT

## 2022-04-27 PROCEDURE — 93005 ELECTROCARDIOGRAM TRACING: CPT | Performed by: STUDENT IN AN ORGANIZED HEALTH CARE EDUCATION/TRAINING PROGRAM

## 2022-04-27 PROCEDURE — 99223 1ST HOSP IP/OBS HIGH 75: CPT | Performed by: INTERNAL MEDICINE

## 2022-04-27 PROCEDURE — 85025 COMPLETE CBC W/AUTO DIFF WBC: CPT

## 2022-04-27 PROCEDURE — 96375 TX/PRO/DX INJ NEW DRUG ADDON: CPT

## 2022-04-27 PROCEDURE — 1200000000 HC SEMI PRIVATE

## 2022-04-27 PROCEDURE — 6360000002 HC RX W HCPCS: Performed by: STUDENT IN AN ORGANIZED HEALTH CARE EDUCATION/TRAINING PROGRAM

## 2022-04-27 PROCEDURE — 96368 THER/DIAG CONCURRENT INF: CPT

## 2022-04-27 PROCEDURE — 83605 ASSAY OF LACTIC ACID: CPT

## 2022-04-27 PROCEDURE — 87040 BLOOD CULTURE FOR BACTERIA: CPT

## 2022-04-27 PROCEDURE — 84145 PROCALCITONIN (PCT): CPT

## 2022-04-27 PROCEDURE — 94640 AIRWAY INHALATION TREATMENT: CPT

## 2022-04-27 PROCEDURE — 83880 ASSAY OF NATRIURETIC PEPTIDE: CPT

## 2022-04-27 PROCEDURE — 81001 URINALYSIS AUTO W/SCOPE: CPT

## 2022-04-27 PROCEDURE — 80048 BASIC METABOLIC PNL TOTAL CA: CPT

## 2022-04-27 PROCEDURE — 6370000000 HC RX 637 (ALT 250 FOR IP)

## 2022-04-27 PROCEDURE — 36415 COLL VENOUS BLD VENIPUNCTURE: CPT

## 2022-04-27 PROCEDURE — 99285 EMERGENCY DEPT VISIT HI MDM: CPT

## 2022-04-27 PROCEDURE — 85730 THROMBOPLASTIN TIME PARTIAL: CPT

## 2022-04-27 PROCEDURE — 2700000000 HC OXYGEN THERAPY PER DAY

## 2022-04-27 PROCEDURE — 84484 ASSAY OF TROPONIN QUANT: CPT

## 2022-04-27 PROCEDURE — 85610 PROTHROMBIN TIME: CPT

## 2022-04-27 PROCEDURE — 2580000003 HC RX 258: Performed by: STUDENT IN AN ORGANIZED HEALTH CARE EDUCATION/TRAINING PROGRAM

## 2022-04-27 PROCEDURE — 6370000000 HC RX 637 (ALT 250 FOR IP): Performed by: STUDENT IN AN ORGANIZED HEALTH CARE EDUCATION/TRAINING PROGRAM

## 2022-04-27 PROCEDURE — 96366 THER/PROPH/DIAG IV INF ADDON: CPT

## 2022-04-27 PROCEDURE — 2580000003 HC RX 258

## 2022-04-27 PROCEDURE — 96365 THER/PROPH/DIAG IV INF INIT: CPT

## 2022-04-27 RX ORDER — ALBUTEROL SULFATE 90 UG/1
2 AEROSOL, METERED RESPIRATORY (INHALATION) 4 TIMES DAILY PRN
Status: DISCONTINUED | OUTPATIENT
Start: 2022-04-27 | End: 2022-05-01 | Stop reason: HOSPADM

## 2022-04-27 RX ORDER — ONDANSETRON 2 MG/ML
4 INJECTION INTRAMUSCULAR; INTRAVENOUS EVERY 6 HOURS PRN
Status: DISCONTINUED | OUTPATIENT
Start: 2022-04-27 | End: 2022-05-01 | Stop reason: HOSPADM

## 2022-04-27 RX ORDER — ONDANSETRON 4 MG/1
4 TABLET, ORALLY DISINTEGRATING ORAL EVERY 8 HOURS PRN
Status: DISCONTINUED | OUTPATIENT
Start: 2022-04-27 | End: 2022-05-01 | Stop reason: HOSPADM

## 2022-04-27 RX ORDER — PANTOPRAZOLE SODIUM 40 MG/1
40 TABLET, DELAYED RELEASE ORAL
Status: DISCONTINUED | OUTPATIENT
Start: 2022-04-28 | End: 2022-05-01 | Stop reason: HOSPADM

## 2022-04-27 RX ORDER — SODIUM CHLORIDE 9 MG/ML
INJECTION, SOLUTION INTRAVENOUS PRN
Status: DISCONTINUED | OUTPATIENT
Start: 2022-04-27 | End: 2022-05-01 | Stop reason: HOSPADM

## 2022-04-27 RX ORDER — ACETAMINOPHEN 325 MG/1
650 TABLET ORAL EVERY 6 HOURS PRN
Status: DISCONTINUED | OUTPATIENT
Start: 2022-04-27 | End: 2022-05-01 | Stop reason: HOSPADM

## 2022-04-27 RX ORDER — BUDESONIDE AND FORMOTEROL FUMARATE DIHYDRATE 160; 4.5 UG/1; UG/1
2 AEROSOL RESPIRATORY (INHALATION) 2 TIMES DAILY
Status: DISCONTINUED | OUTPATIENT
Start: 2022-04-27 | End: 2022-05-01 | Stop reason: HOSPADM

## 2022-04-27 RX ORDER — ATORVASTATIN CALCIUM 40 MG/1
40 TABLET, FILM COATED ORAL NIGHTLY
Status: DISCONTINUED | OUTPATIENT
Start: 2022-04-27 | End: 2022-05-01 | Stop reason: HOSPADM

## 2022-04-27 RX ORDER — IPRATROPIUM BROMIDE AND ALBUTEROL SULFATE 2.5; .5 MG/3ML; MG/3ML
1 SOLUTION RESPIRATORY (INHALATION) EVERY 4 HOURS PRN
Status: DISCONTINUED | OUTPATIENT
Start: 2022-04-27 | End: 2022-04-28

## 2022-04-27 RX ORDER — 0.9 % SODIUM CHLORIDE 0.9 %
500 INTRAVENOUS SOLUTION INTRAVENOUS ONCE
Status: COMPLETED | OUTPATIENT
Start: 2022-04-27 | End: 2022-04-27

## 2022-04-27 RX ORDER — TAMSULOSIN HYDROCHLORIDE 0.4 MG/1
0.4 CAPSULE ORAL DAILY
Status: DISCONTINUED | OUTPATIENT
Start: 2022-04-27 | End: 2022-05-01

## 2022-04-27 RX ORDER — ACETAMINOPHEN 650 MG/1
650 SUPPOSITORY RECTAL EVERY 6 HOURS PRN
Status: DISCONTINUED | OUTPATIENT
Start: 2022-04-27 | End: 2022-05-01 | Stop reason: HOSPADM

## 2022-04-27 RX ORDER — METHYLPREDNISOLONE SODIUM SUCCINATE 125 MG/2ML
125 INJECTION, POWDER, LYOPHILIZED, FOR SOLUTION INTRAMUSCULAR; INTRAVENOUS ONCE
Status: COMPLETED | OUTPATIENT
Start: 2022-04-27 | End: 2022-04-27

## 2022-04-27 RX ORDER — MULTIVITAMIN WITH IRON
1 TABLET ORAL DAILY
Status: DISCONTINUED | OUTPATIENT
Start: 2022-04-27 | End: 2022-05-01 | Stop reason: HOSPADM

## 2022-04-27 RX ORDER — ASPIRIN 81 MG/1
81 TABLET ORAL DAILY
Status: DISCONTINUED | OUTPATIENT
Start: 2022-04-27 | End: 2022-05-01 | Stop reason: HOSPADM

## 2022-04-27 RX ORDER — CLOPIDOGREL BISULFATE 75 MG/1
75 TABLET ORAL DAILY
Status: DISCONTINUED | OUTPATIENT
Start: 2022-04-27 | End: 2022-05-01 | Stop reason: HOSPADM

## 2022-04-27 RX ORDER — SODIUM CHLORIDE 0.9 % (FLUSH) 0.9 %
5-40 SYRINGE (ML) INJECTION EVERY 12 HOURS SCHEDULED
Status: DISCONTINUED | OUTPATIENT
Start: 2022-04-27 | End: 2022-05-01 | Stop reason: HOSPADM

## 2022-04-27 RX ORDER — ACETAMINOPHEN 325 MG/1
650 TABLET ORAL EVERY 4 HOURS PRN
Status: DISCONTINUED | OUTPATIENT
Start: 2022-04-27 | End: 2022-05-01 | Stop reason: HOSPADM

## 2022-04-27 RX ORDER — SODIUM CHLORIDE 0.9 % (FLUSH) 0.9 %
5-40 SYRINGE (ML) INJECTION PRN
Status: DISCONTINUED | OUTPATIENT
Start: 2022-04-27 | End: 2022-05-01 | Stop reason: HOSPADM

## 2022-04-27 RX ORDER — ENOXAPARIN SODIUM 100 MG/ML
40 INJECTION SUBCUTANEOUS DAILY
Status: DISCONTINUED | OUTPATIENT
Start: 2022-04-28 | End: 2022-05-01 | Stop reason: HOSPADM

## 2022-04-27 RX ORDER — POLYETHYLENE GLYCOL 3350 17 G/17G
17 POWDER, FOR SOLUTION ORAL DAILY PRN
Status: DISCONTINUED | OUTPATIENT
Start: 2022-04-27 | End: 2022-05-01 | Stop reason: HOSPADM

## 2022-04-27 RX ADMIN — METHYLPREDNISOLONE SODIUM SUCCINATE 125 MG: 125 INJECTION, POWDER, FOR SOLUTION INTRAMUSCULAR; INTRAVENOUS at 13:21

## 2022-04-27 RX ADMIN — Medication 500 MG: at 15:29

## 2022-04-27 RX ADMIN — CEFTRIAXONE SODIUM 1000 MG: 1 INJECTION, POWDER, FOR SOLUTION INTRAMUSCULAR; INTRAVENOUS at 14:46

## 2022-04-27 RX ADMIN — SODIUM CHLORIDE, PRESERVATIVE FREE 10 ML: 5 INJECTION INTRAVENOUS at 21:32

## 2022-04-27 RX ADMIN — IPRATROPIUM BROMIDE AND ALBUTEROL SULFATE 1 AMPULE: .5; 3 SOLUTION RESPIRATORY (INHALATION) at 14:27

## 2022-04-27 RX ADMIN — DESMOPRESSIN ACETATE 40 MG: 0.2 TABLET ORAL at 21:31

## 2022-04-27 RX ADMIN — SODIUM CHLORIDE 500 ML: 9 INJECTION, SOLUTION INTRAVENOUS at 14:43

## 2022-04-27 ASSESSMENT — ENCOUNTER SYMPTOMS
NAUSEA: 0
ABDOMINAL PAIN: 0
VOMITING: 0
WHEEZING: 1
COUGH: 1
SHORTNESS OF BREATH: 1
PHOTOPHOBIA: 0

## 2022-04-27 ASSESSMENT — PAIN - FUNCTIONAL ASSESSMENT: PAIN_FUNCTIONAL_ASSESSMENT: NONE - DENIES PAIN

## 2022-04-27 NOTE — [facility] REMOTE MONITORING
Spoke with primary RN Remayonathan Campbell regarding 6 hour Sepsis bundle.     Thank you  Renetta Dunlap MSN, [address]  [phone]

## 2022-04-27 NOTE — ED TRIAGE NOTES
PT presents to ED with c/o SOB that has been getting worse since last Wednesday.  PT states he has a hx of COPD

## 2022-04-27 NOTE — ED NOTES
The following labs labeled with pt sticker and tubed to lab:     [] Blue     [] Lavender   [] on ice  [x] Green/yellow  [] Green/black [] on ice  [] Yellow  [] Red  [] Pink      [] COVID-19 swab    [] Rapid  [] PCR  [] Flu swab  [] Peds Viral Panel     [] Urine Sample  [] Pelvic Cultures  [] Blood Cultures            Logan Persaud RN  04/27/22 1912

## 2022-04-27 NOTE — ED PROVIDER NOTES
[facility]  ED  Emergency Department Encounter  EmergencyMedicine Resident     Pt Name:Marcus Michelle  MRN: [id number]  [place] 1950  Date of evaluation: 4/27/22  PCP:  Moreno Camejo MD    CHIEF COMPLAINT       Chief Complaint   Patient presents with    Respiratory Distress       HISTORY OF PRESENT ILLNESS  (Location/Symptom, Timing/Onset, Context/Setting, Quality, Duration, Modifying Factors, Severity.)      Fay Moody is a 80 y.o. male who presents with shortness of breath. Patient with a history of emphysema and COPD, history of recent CABG approximately 4 months ago. Reports that he has some chronic shortness of breath at baseline but it has been significantly worsening over the past 1 week. Shortness of breath made worse by ambulation. Also reports associated cough which is productive of whitish colored sputum. Reports that he has had fevers of greater than 101 at home approximately 2 days ago. No fevers today. No associated chest pain at this time. No syncope, palpitations, lightheadedness, abdominal pain, nausea or vomiting. PAST MEDICAL / SURGICAL / SOCIAL / FAMILY HISTORY      has a past medical history of Acute upper respiratory infections of unspecified site, CAD (coronary artery disease), COPD (chronic obstructive pulmonary disease) ([address] Empyema without mention of fistula, Esophageal reflux, Essential hypertension, benign, History of blood transfusion, Hx of blood clots, Localized osteoarthrosis not specified whether primary or secondary, unspecified site, Other and unspecified hyperlipidemia, Surgical or other procedure not carried out because of patient's decision, and Unspecified pleural effusion. has a past surgical history that includes Lung decortication (Right, 2006) and Coronary artery bypass graft (N/A, 12/27/2021).       Social History     Socioeconomic History    Marital status:      Spouse name: Not on file    Number of children: Not on file    Years of education: Not on file    Highest education level: Not on file   Occupational History    Not on file   Tobacco Use    Smoking status: Former Smoker     Quit date: 10/22/2010     Years since quittin.5    Smokeless tobacco: Never Used   Substance and Sexual Activity    Alcohol use: No     Comment: Sober since 1989    Drug use: No    Sexual activity: Not on file   Other Topics Concern    Not on file   Social History Narrative    Not on file     Social Determinants of Health     Financial Resource Strain:     Difficulty of Paying Living Expenses: Not on file   Food Insecurity:     Worried About [address] [employer] in the Last Year: Not on file    Shari of Food in the Last Year: Not on file   Transportation Needs:     Lack of Transportation (Medical): Not on file    Lack of Transportation (Non-Medical):  Not on file   Physical Activity:     Days of Exercise per Week: Not on file    Minutes of Exercise per Session: Not on file   Stress:     Feeling of Stress : Not on file   Social Connections:     Frequency of Communication with Friends and Family: Not on file    Frequency of Social Gatherings with Friends and Family: Not on file    Attends Hoahaoism Services: Not on file    Active Member of [address] [place] [address] [employer] or Organizations: Not on file    Attends Club or Organization Meetings: Not on file    Marital Status: Not on file   Intimate Partner Violence:     Fear of Current or Ex-Partner: Not on file    Emotionally Abused: Not on file    Physically Abused: Not on file    Sexually Abused: Not on file   Housing Stability:     Unable to Pay for Housing in the Last Year: Not on file    Number of [place] in the Last Year: Not on file    Unstable Housing in the Last Year: Not on file       Family History   Problem Relation Age of Onset    Diabetes Mother     Heart Disease Mother     Heart Disease Father     Heart Disease Paternal Uncle        Allergies:  Patient has no known allergies. Home Medications:  Prior to Admission medications    Medication Sig Start Date End Date Taking? Authorizing Provider   fluticasone-salmeterol (ADVAIR) 250-50 MCG/DOSE AEPB Inhale 1 puff into the lungs every 12 hours     Historical Provider, MD   aspirin 81 MG EC tablet Take 1 tablet by mouth daily 1/1/22   [facility], APRN - NP   atorvastatin (LIPITOR) 40 MG tablet Take 1 tablet by mouth nightly 12/31/21   [facility], APRN - NP   clopidogrel (PLAVIX) 75 MG tablet Take 1 tablet by mouth daily 1/1/22   [facility], APRN - NP   metoprolol tartrate (LOPRESSOR) 25 MG tablet Take 1 tablet by mouth 2 times daily 12/31/21   [facility], APRN - NP   Multiple Vitamin (MULTIVITAMIN) TABS tablet Take 1 tablet by mouth daily 12/31/21   [facility], APRN - NP   pantoprazole (PROTONIX) 40 MG tablet Take 1 tablet by mouth every morning (before breakfast) 1/1/22   [facility], APRN - NP   tamsulosin [facility]) 0.4 MG capsule Take 1 capsule by mouth daily 8/20/19   Janette Murillo MD   albuterol sulfate  (90 Base) MCG/ACT inhaler Inhale 2 puffs into the lungs 4 times daily as needed for Wheezing or Shortness of Breath 8/19/19   Janette Murillo MD   tiotropium (SPIRIVA RESPIMAT) 2.5 MCG/ACT AERS inhaler Inhale 2 puffs into the lungs daily    Historical Provider, MD   budesonide-formoterol (SYMBICORT) 160-4.5 MCG/ACT AERO Inhale 2 puffs into the lungs 2 times daily    Historical Provider, MD       REVIEW OF SYSTEMS    (2-9 systems for level 4, 10 or more for level 5)      Review of Systems   Constitutional: Positive for fatigue and fever. Negative for chills. Eyes: Negative for photophobia and visual disturbance. Respiratory: Positive for cough, shortness of breath and wheezing. Cardiovascular: Negative for chest pain and leg swelling. Gastrointestinal: Negative for abdominal pain, nausea and vomiting. Genitourinary: Negative for dysuria and flank pain. Musculoskeletal: Negative for neck pain. Skin: Negative for rash and wound. Neurological: Negative for weakness, numbness and headaches. Psychiatric/Behavioral: Negative for confusion. PHYSICAL EXAM   (up to 7 for level 4, 8 or more for level 5)      INITIAL VITALS:   BP 91/80   Pulse 102   Temp 98.1 °F (36.7 °C) (Oral)   Resp 28   SpO2 94%     Physical Exam  Constitutional:       General: He is not in acute distress. Appearance: He is not toxic-appearing. HENT:      Head: Normocephalic and atraumatic. Cardiovascular:      Rate and Rhythm: Normal rate. Heart sounds: No murmur heard. No friction rub. No gallop. Pulmonary:      Breath sounds: Wheezing and rales present. No rhonchi. Abdominal:      Tenderness: There is no abdominal tenderness. There is no guarding or rebound. Musculoskeletal:      Right lower leg: No edema. Left lower leg: No edema. Skin:     Findings: No bruising, lesion or rash. Neurological:      Mental Status: He is alert. Motor: No weakness.       Gait: Gait normal.         DIFFERENTIAL  DIAGNOSIS     PLAN (LABS / IMAGING / EKG):  Orders Placed This Encounter   Procedures    COVID-19, Rapid    RAPID INFLUENZA A/B ANTIGENS    Culture, Blood 1    Culture, Blood 1    XR CHEST PORTABLE    CBC with Auto Differential    Basic Metabolic Panel w/ Reflex to MG    Troponin    Brain Natriuretic Peptide    Lactic Acid    Protime-INR    APTT    Urinalysis with Reflex to Culture    Microscopic Urinalysis    SPECIMEN REJECTION    PREVIOUS SPECIMEN    Troponin    Inpatient consult to Internal Medicine    Initiate ED RT Aerosol protocol    EKG 12 Lead    ADMIT TO INPATIENT       MEDICATIONS ORDERED:  Orders Placed This Encounter   Medications    methylPREDNISolone sodium (SOLU-MEDROL) injection 125 mg    0.9 % sodium chloride bolus    cefTRIAXone (ROCEPHIN) 1000 mg IVPB in NS 50ml minibag     Order Specific Question:   Antimicrobial Indications     Answer:   Pneumonia (CAP)    azithromycin (ZITHROMAX) 500 mg in dextrose 5% 250 mL IVPB     Order Specific Question:   Antimicrobial Indications     Answer:   Pneumonia (CAP)    ipratropium-albuterol (DUONEB) nebulizer solution 1 ampule     Order Specific Question:   Initiate RT Bronchodilator Protocol     Answer:   Yes       DDX: Pneumonia, bronchitis, COPD exacerbation, CHF, NSTEMI    DIAGNOSTIC RESULTS / EMERGENCY DEPARTMENT COURSE / MDM   LAB RESULTS:  Results for orders placed or performed during the hospital encounter of 04/27/22   COVID-19, Rapid    Specimen: Nasopharyngeal Swab   Result Value Ref Range    Specimen Description . NASOPHARYNGEAL SWAB     SARS-CoV-2, Rapid Not Detected Not Detected   RAPID INFLUENZA A/B ANTIGENS    Specimen: Nasopharyngeal   Result Value Ref Range    Flu A Antigen NEGATIVE NEGATIVE    Flu B Antigen NEGATIVE NEGATIVE   Culture, Blood 1    Specimen: Blood   Result Value Ref Range    Specimen Description . BLOOD     Special Requests 81575 Sr 56 L AC     Culture NO GROWTH <24 HRS    Culture, Blood 1    Specimen: Blood   Result Value Ref Range    Specimen Description . BLOOD     Special Requests 2CC R HAND     Culture NO GROWTH <24 HRS    CBC with Auto Differential   Result Value Ref Range    WBC 16.1 (H) 3.5 - 11.3 k/uL    RBC 4.63 4.21 - 5.77 m/uL    Hemoglobin 13.1 13.0 - 17.0 g/dL    Hematocrit 40.0 (L) 40.7 - 50.3 %    MCV 86.4 82.6 - 102.9 fL    MCH 28.3 25.2 - 33.5 pg    MCHC 32.8 28.4 - 34.8 g/dL    RDW 14.1 11.8 - 14.4 %    Platelets 466 452 - 225 k/uL    MPV 9.9 8.1 - 13.5 fL    NRBC Automated 0.0 0.0 per 100 WBC    Seg Neutrophils 84 (H) 36 - 65 %    Lymphocytes 11 (L) 24 - 43 %    Monocytes 4 3 - 12 %    Eosinophils % 0 (L) 1 - 4 %    Basophils 0 0 - 2 %    Immature Granulocytes 1 (H) 0 %    Segs Absolute 13.44 (H) 1.50 - 8.10 k/uL    Absolute Lymph # 1.70 1.10 - 3.70 k/uL    Absolute Mono # 0.71 0.10 - 1.20 k/uL    Absolute Eos # 0.03 0.00 - 0.44 k/uL Basophils Absolute 0.07 0.00 - 0.20 k/uL    Absolute Immature Granulocyte 0.16 0.00 - 0.30 k/uL   Basic Metabolic Panel w/ Reflex to MG   Result Value Ref Range    Glucose 102 (H) 70 - 99 mg/dL    BUN 37 (H) 8 - 23 mg/dL    CREATININE 1.45 (H) 0.70 - 1.20 mg/dL    Calcium 9.2 8.6 - 10.4 mg/dL    Sodium 133 (L) 135 - 144 mmol/L    Potassium 3.8 3.7 - 5.3 mmol/L    Chloride 94 (L) 98 - 107 mmol/L    CO2 23 20 - 31 mmol/L    Anion Gap 16 9 - 17 mmol/L    GFR Non-African American 48 (L) >60 mL/min    GFR  58 (L) >60 mL/min    GFR Comment         Troponin   Result Value Ref Range    Troponin, High Sensitivity 107 (HH) 0 - 22 ng/L   Brain Natriuretic Peptide   Result Value Ref Range    Pro-BNP 2,804 (H) <300 pg/mL   Lactic Acid   Result Value Ref Range    Lactic Acid, Whole Blood 3.1 (H) 0.7 - 2.1 mmol/L   Protime-INR   Result Value Ref Range    Protime 10.0 9.1 - 12.3 sec    INR 0.9    APTT   Result Value Ref Range    PTT 29.3 20.5 - 30.5 sec   Urinalysis with Reflex to Culture   Result Value Ref Range    Color, UA Yellow Yellow    Turbidity UA Clear Clear    Glucose, Ur NEGATIVE NEGATIVE    Bilirubin Urine NEGATIVE NEGATIVE    Ketones, Urine NEGATIVE NEGATIVE    Specific Gravity, UA 1.021 1.005 - 1.030    Urine Hgb NEGATIVE NEGATIVE    pH, UA 5.0 5.0 - 8.0    Protein, UA TRACE (A) NEGATIVE    Urobilinogen, Urine Normal Normal    Nitrite, Urine NEGATIVE NEGATIVE    Leukocyte Esterase, Urine NEGATIVE NEGATIVE   Microscopic Urinalysis   Result Value Ref Range    -          WBC, UA None 0 - 5 /HPF    RBC, UA None 0 - 2 /HPF    Casts UA COARSELY GRANULAR 0 - 2 /LPF    Casts UA 0 TO 2 0 - 2 /LPF    Epithelial Cells UA 0 TO 2 0 - 5 /HPF    Mucus, UA 1+ (A) None   SPECIMEN REJECTION   Result Value Ref Range    Specimen Source . BLOOD     Ordered Test TROPI     Reason for Rejection Unable to perform testing: Specimen hemolyzed.         IMPRESSION: Chronically ill-appearing 69-year-old male with history of prior CABG, COPD presenting with worsening shortness of breath, productive cough and fevers at home. Vital signs on arrival with mild tachycardia, patient also had hypoxia of 89% on room air. Placed on 3 L nasal cannula. Exam with rales at left lung base. Plan for infectious work-up, cardiac work-up including EKG, troponins, BNP. Likely pneumonia versus CHF exacerbation. Plan for admission    RADIOLOGY:  XR CHEST PORTABLE    Result Date: 4/27/2022  EXAMINATION: ONE XRAY VIEW OF THE CHEST 4/27/2022 1:21 pm COMPARISON: 12/31/2021 HISTORY: ORDERING SYSTEM PROVIDED HISTORY: Shortness of breath, basilar rales TECHNOLOGIST PROVIDED HISTORY: Shortness of breath, basilar rales FINDINGS: Status post median sternotomy. Left basilar opacity. There is no effusion or pneumothorax. The cardiomediastinal silhouette is stable. The osseous structures are stable. Left basilar opacity decreased since the previous exam suggesting scarring. Otherwise, no acute cardiopulmonary process       EKG  EKG Interpretation    Interpreted by me    Rhythm: Sinus tachycardia  Rate: 106 bpm  Axis: normal  Ectopy: none  Conduction: Right bundle branch block  ST Segments: no acute change  T Waves: no acute change  Q Waves: none    Clinical Impression: Right bundle branch block, nonspecific EKG    All EKG's are interpreted by the Emergency Department Physician who either signs or Co-signs this chart in the absence of a cardiologist.    EMERGENCY DEPARTMENT COURSE:  ED Course as of 04/27/22 1647   Wed Apr 27, 2022   1431 Patient noted to have ANY on BMP with creatinine 1.45 today which is significantly elevated compared to his baseline. Initial troponin 107, last troponin at previous visit was greater than 5000 during admission for CABG. Will trend troponin.  [ZW]   1432 CBC with white count of 16.1, there is some left basilar opacity which radiology read as possible scarring however with reported fevers at home, productive cough and new onset shortness of breath will treat as pneumonia at this time. Sepsis labs ordered including blood cultures, lactic acid, coags. Plan for Rocephin and azithromycin after blood cultures obtained [ZW]   8167 Patient admitted to internal medicine service [ZW]      ED Course User Index  [ZW] Rui Ha DO     Sepsis Times and Checklist  Vital Signs: BP: 91/80  Pulse: 102  Resp: 28  Temp: 98.1 °F (36.7 °C) SpO2: 94 %  SIRS (>2)   Temp > 38.3C or < 36C   HR > 90   RR > 20   WBC > 12 or < 4 or >10% bands  SIRS (>2) and confirmed or suspected source of infection = Sepsis    Sepsis Identified   Date: 4/27/22  Time: 1316   Sepsis Orders:   CBC: Yes   CMP: Yes   PT/PTT: Yes   Blood Cultures x2: Yes   Urinalysis and Urine Culture: Yes   Lactate: Yes   Broad Spectrum Antibiotics Given (within 3 hours of sepsis identification, after blood cultures): Yes              IV Crystalloid given: Yes    If lactate >2.0 MUST repeat within 6 hours    Is lactate > 4.0:  No  If lactate >4.0 OR hypotension 30ml/kg crystalloid MUST be ordered. Fluids must be completed within 3 hours of sepsis identification. PROCEDURES:  None    CONSULTS:  IP CONSULT TO INTERNAL MEDICINE    CRITICAL CARE:  Please see attending note    FINAL IMPRESSION      1. Pneumonia of left lower lobe due to infectious organism    2. ANY (acute kidney injury) ([facility] [address]          DISPOSITION / PLAN     DISPOSITION Admitted 04/27/2022 04:41:53 PM      PATIENT REFERRED TO:  No follow-up provider specified.     DISCHARGE MEDICATIONS:  New Prescriptions    No medications on file       Rui Ha DO  Emergency Medicine Resident    (Please note that portions of thisnote were completed with a voice recognition program.  Efforts were made to edit the dictations but occasionally words are mis-transcribed.)        Rui Ha DO  Resident  04/27/22 [id number]

## 2022-04-27 NOTE — ED PROVIDER NOTES
[facility]     Emergency Department     Faculty Attestation    I performed a history and physical examination of the patient and discussed management with the resident. I reviewed the residents note and agree with the documented findings and plan of care. Any areas of disagreement are noted on the chart. I was personally present for the key portions of any procedures. I have documented in the chart those procedures where I was not present during the key portions. I have reviewed the emergency nurses triage note. I agree with the chief complaint, past medical history, past surgical history, allergies, medications, social and family history as documented unless otherwise noted below. For Physician Assistant/ Nurse Practitioner cases/documentation I have personally evaluated this patient and have completed at least one if not all key elements of the E/M (history, physical exam, and MDM). Additional findings are as noted. I have personally seen and evaluated the patient. I find the patient's history and physical exam are consistent with the NP/PA documentation. I agree with the care provided, treatment rendered, disposition and follow-up plan. This patient was evaluated in the Emergency Department for symptoms described in the history of present illness. The patient was evaluated in the context of the global COVID-19 pandemic, which necessitated consideration that the patient might be at risk for infection with the SARS-CoV-2 virus that causes COVID-19. Institutional protocols and algorithms that pertain to the evaluation of patients at risk for COVID-19 are in a state of rapid change based on information released by regulatory bodies including the CDC and federal and state organizations. These policies and algorithms were followed during the patient's care in the ED.     25-year-old male with a recent triple bypass in December 2021, history of COPD/emphysema, presenting with progressively worsening dyspnea. Has had cough worse recently. No shortness of breath worsening since Wednesday specifically. Has been using oxygen more frequently at home. Denies chest pain, does not feel like this is a cardiac event. Exam:  General: Sitting on the bed, awake, alert and in no acute distress  CV: regular rhythm and Mild tachycardia  Lungs: Rales present bilaterally, expiratory wheezes present.   4-5 word conversational dyspnea  Abdomen: soft, non-tender, non-distended    Plan:  Cardiac work-up including troponin, BNP to rule out CHF  Chest x-ray to rule out pneumonia  Anticipate admission based on increased oxygen requirement, worsening shortness of breath, and history of both cardiac and pulmonary disease        Neida Shields MD   Attending Emergency  Physician    (Please note that portions of this note were completed with a voice recognition program. Efforts were made to edit the dictations but occasionally words are mis-transcribed.)              Neida Shields MD  04/27/22 [id number]

## 2022-04-28 ENCOUNTER — APPOINTMENT (OUTPATIENT)
Dept: ULTRASOUND IMAGING | Age: 72
DRG: 193 | End: 2022-04-28
Payer: OTHER GOVERNMENT

## 2022-04-28 PROBLEM — E87.1 HYPONATREMIA: Status: ACTIVE | Noted: 2022-04-28

## 2022-04-28 PROBLEM — I25.10 CAD (CORONARY ARTERY DISEASE): Status: ACTIVE | Noted: 2022-04-28

## 2022-04-28 PROBLEM — R33.9 URINARY RETENTION: Chronic | Status: ACTIVE | Noted: 2019-08-17

## 2022-04-28 PROBLEM — A48.1 LEGIONELLA INFECTION (HCC): Status: ACTIVE | Noted: 2022-04-28

## 2022-04-28 PROBLEM — I50.23 ACUTE ON CHRONIC SYSTOLIC CONGESTIVE HEART FAILURE (HCC): Status: ACTIVE | Noted: 2022-04-28

## 2022-04-28 LAB
ABSOLUTE EOS #: 0 K/UL (ref 0–0.4)
ABSOLUTE IMMATURE GRANULOCYTE: 0.14 K/UL (ref 0–0.3)
ABSOLUTE LYMPH #: 1.23 K/UL (ref 1–4.8)
ABSOLUTE MONO #: 0.14 K/UL (ref 0.1–0.8)
ANION GAP SERPL CALCULATED.3IONS-SCNC: 14 MMOL/L (ref 9–17)
BASOPHILS # BLD: 0 % (ref 0–2)
BASOPHILS ABSOLUTE: 0 K/UL (ref 0–0.2)
BUN BLDV-MCNC: 34 MG/DL (ref 8–23)
CALCIUM SERPL-MCNC: 8.7 MG/DL (ref 8.6–10.4)
CARBOXYHEMOGLOBIN: 0.8 % (ref 0–5)
CHLORIDE BLD-SCNC: 97 MMOL/L (ref 98–107)
CO2: 20 MMOL/L (ref 20–31)
CREAT SERPL-MCNC: 1.21 MG/DL (ref 0.7–1.2)
EOSINOPHILS RELATIVE PERCENT: 0 % (ref 1–4)
FIO2: ABNORMAL
GFR AFRICAN AMERICAN: >60 ML/MIN
GFR NON-AFRICAN AMERICAN: 59 ML/MIN
GFR SERPL CREATININE-BSD FRML MDRD: ABNORMAL ML/MIN/{1.73_M2}
GLUCOSE BLD-MCNC: 158 MG/DL (ref 70–99)
HCO3 VENOUS: 21.2 MMOL/L (ref 24–30)
HCT VFR BLD CALC: 35.7 % (ref 40.7–50.3)
HEMOGLOBIN: 11.8 G/DL (ref 13–17)
IMMATURE GRANULOCYTES: 1 %
LYMPHOCYTES # BLD: 9 % (ref 24–44)
MCH RBC QN AUTO: 28 PG (ref 25.2–33.5)
MCHC RBC AUTO-ENTMCNC: 33.1 G/DL (ref 28.4–34.8)
MCV RBC AUTO: 84.8 FL (ref 82.6–102.9)
MONOCYTES # BLD: 1 % (ref 1–7)
MORPHOLOGY: NORMAL
NEGATIVE BASE EXCESS, VEN: 1.8 MMOL/L (ref 0–2)
NRBC AUTOMATED: 0 PER 100 WBC
O2 SAT, VEN: 76.4 % (ref 60–85)
PATIENT TEMP: 37
PCO2, VEN: 32 (ref 39–55)
PDW BLD-RTO: 14.2 % (ref 11.8–14.4)
PH VENOUS: 7.44 (ref 7.32–7.42)
PLATELET # BLD: 212 K/UL (ref 138–453)
PMV BLD AUTO: 9.7 FL (ref 8.1–13.5)
PO2, VEN: 40.7 (ref 30–50)
POTASSIUM SERPL-SCNC: 4.4 MMOL/L (ref 3.7–5.3)
PROCALCITONIN: 0.89 NG/ML
RBC # BLD: 4.21 M/UL (ref 4.21–5.77)
SEG NEUTROPHILS: 89 % (ref 36–66)
SEGMENTED NEUTROPHILS ABSOLUTE COUNT: 12.19 K/UL (ref 1.8–7.7)
SODIUM BLD-SCNC: 131 MMOL/L (ref 135–144)
WBC # BLD: 13.7 K/UL (ref 3.5–11.3)

## 2022-04-28 PROCEDURE — 82570 ASSAY OF URINE CREATININE: CPT

## 2022-04-28 PROCEDURE — 6370000000 HC RX 637 (ALT 250 FOR IP)

## 2022-04-28 PROCEDURE — 36415 COLL VENOUS BLD VENIPUNCTURE: CPT

## 2022-04-28 PROCEDURE — 6360000002 HC RX W HCPCS

## 2022-04-28 PROCEDURE — 1200000000 HC SEMI PRIVATE

## 2022-04-28 PROCEDURE — 85025 COMPLETE CBC W/AUTO DIFF WBC: CPT

## 2022-04-28 PROCEDURE — 76775 US EXAM ABDO BACK WALL LIM: CPT

## 2022-04-28 PROCEDURE — 94640 AIRWAY INHALATION TREATMENT: CPT

## 2022-04-28 PROCEDURE — 99233 SBSQ HOSP IP/OBS HIGH 50: CPT | Performed by: INTERNAL MEDICINE

## 2022-04-28 PROCEDURE — 2580000003 HC RX 258

## 2022-04-28 PROCEDURE — 82805 BLOOD GASES W/O2 SATURATION: CPT

## 2022-04-28 PROCEDURE — 82436 ASSAY OF URINE CHLORIDE: CPT

## 2022-04-28 PROCEDURE — 86738 MYCOPLASMA ANTIBODY: CPT

## 2022-04-28 PROCEDURE — 87449 NOS EACH ORGANISM AG IA: CPT

## 2022-04-28 PROCEDURE — 2580000003 HC RX 258: Performed by: STUDENT IN AN ORGANIZED HEALTH CARE EDUCATION/TRAINING PROGRAM

## 2022-04-28 PROCEDURE — 87899 AGENT NOS ASSAY W/OPTIC: CPT

## 2022-04-28 PROCEDURE — 94761 N-INVAS EAR/PLS OXIMETRY MLT: CPT

## 2022-04-28 PROCEDURE — 99223 1ST HOSP IP/OBS HIGH 75: CPT | Performed by: INTERNAL MEDICINE

## 2022-04-28 PROCEDURE — 6360000002 HC RX W HCPCS: Performed by: STUDENT IN AN ORGANIZED HEALTH CARE EDUCATION/TRAINING PROGRAM

## 2022-04-28 PROCEDURE — 84300 ASSAY OF URINE SODIUM: CPT

## 2022-04-28 PROCEDURE — 80048 BASIC METABOLIC PNL TOTAL CA: CPT

## 2022-04-28 PROCEDURE — 84156 ASSAY OF PROTEIN URINE: CPT

## 2022-04-28 PROCEDURE — 51798 US URINE CAPACITY MEASURE: CPT

## 2022-04-28 PROCEDURE — 2700000000 HC OXYGEN THERAPY PER DAY

## 2022-04-28 RX ORDER — ALBUTEROL SULFATE 2.5 MG/3ML
2.5 SOLUTION RESPIRATORY (INHALATION) EVERY 6 HOURS PRN
Status: DISCONTINUED | OUTPATIENT
Start: 2022-04-28 | End: 2022-05-01 | Stop reason: HOSPADM

## 2022-04-28 RX ORDER — LEVOFLOXACIN 5 MG/ML
750 INJECTION, SOLUTION INTRAVENOUS EVERY 24 HOURS
Status: DISCONTINUED | OUTPATIENT
Start: 2022-04-28 | End: 2022-04-29

## 2022-04-28 RX ORDER — ALBUTEROL SULFATE 2.5 MG/3ML
2.5 SOLUTION RESPIRATORY (INHALATION) 3 TIMES DAILY
Status: DISCONTINUED | OUTPATIENT
Start: 2022-04-28 | End: 2022-05-01 | Stop reason: HOSPADM

## 2022-04-28 RX ORDER — METHYLPREDNISOLONE SODIUM SUCCINATE 40 MG/ML
40 INJECTION, POWDER, LYOPHILIZED, FOR SOLUTION INTRAMUSCULAR; INTRAVENOUS EVERY 12 HOURS
Status: DISCONTINUED | OUTPATIENT
Start: 2022-04-28 | End: 2022-05-01 | Stop reason: HOSPADM

## 2022-04-28 RX ORDER — FUROSEMIDE 10 MG/ML
20 INJECTION INTRAMUSCULAR; INTRAVENOUS ONCE
Status: COMPLETED | OUTPATIENT
Start: 2022-04-28 | End: 2022-04-28

## 2022-04-28 RX ADMIN — LEVOFLOXACIN 750 MG: 5 INJECTION, SOLUTION INTRAVENOUS at 02:46

## 2022-04-28 RX ADMIN — BUDESONIDE AND FORMOTEROL FUMARATE DIHYDRATE 2 PUFF: 160; 4.5 AEROSOL RESPIRATORY (INHALATION) at 20:00

## 2022-04-28 RX ADMIN — LEVOFLOXACIN 750 MG: 5 INJECTION, SOLUTION INTRAVENOUS at 22:03

## 2022-04-28 RX ADMIN — DESMOPRESSIN ACETATE 40 MG: 0.2 TABLET ORAL at 21:02

## 2022-04-28 RX ADMIN — TIOTROPIUM BROMIDE INHALATION SPRAY 2 PUFF: 3.12 SPRAY, METERED RESPIRATORY (INHALATION) at 08:39

## 2022-04-28 RX ADMIN — CLOPIDOGREL 75 MG: 75 TABLET, FILM COATED ORAL at 12:05

## 2022-04-28 RX ADMIN — ALCOHOL 1 TABLET: 70.47 GEL TOPICAL at 12:04

## 2022-04-28 RX ADMIN — SODIUM CHLORIDE: 9 INJECTION, SOLUTION INTRAVENOUS at 02:42

## 2022-04-28 RX ADMIN — IPRATROPIUM BROMIDE AND ALBUTEROL SULFATE 1 AMPULE: .5; 3 SOLUTION RESPIRATORY (INHALATION) at 08:37

## 2022-04-28 RX ADMIN — BUDESONIDE AND FORMOTEROL FUMARATE DIHYDRATE 2 PUFF: 160; 4.5 AEROSOL RESPIRATORY (INHALATION) at 08:39

## 2022-04-28 RX ADMIN — SODIUM CHLORIDE, PRESERVATIVE FREE 10 ML: 5 INJECTION INTRAVENOUS at 21:02

## 2022-04-28 RX ADMIN — ALBUTEROL SULFATE 2.5 MG: 2.5 SOLUTION RESPIRATORY (INHALATION) at 15:01

## 2022-04-28 RX ADMIN — ENOXAPARIN SODIUM 40 MG: 100 INJECTION SUBCUTANEOUS at 12:06

## 2022-04-28 RX ADMIN — PANTOPRAZOLE SODIUM 40 MG: 40 TABLET, DELAYED RELEASE ORAL at 07:22

## 2022-04-28 RX ADMIN — TAMSULOSIN HYDROCHLORIDE 0.4 MG: 0.4 CAPSULE ORAL at 12:04

## 2022-04-28 RX ADMIN — FUROSEMIDE 20 MG: 10 INJECTION, SOLUTION INTRAMUSCULAR; INTRAVENOUS at 03:50

## 2022-04-28 RX ADMIN — Medication 81 MG: at 12:05

## 2022-04-28 RX ADMIN — ALBUTEROL SULFATE 2.5 MG: 2.5 SOLUTION RESPIRATORY (INHALATION) at 20:00

## 2022-04-28 RX ADMIN — SODIUM CHLORIDE: 9 INJECTION, SOLUTION INTRAVENOUS at 22:02

## 2022-04-28 ASSESSMENT — ENCOUNTER SYMPTOMS
BACK PAIN: 0
SHORTNESS OF BREATH: 1
VOICE CHANGE: 0
ABDOMINAL PAIN: 0
COUGH: 1
NAUSEA: 0
VOMITING: 0

## 2022-04-28 ASSESSMENT — VISUAL ACUITY: OU: 1

## 2022-04-28 NOTE — PROGRESS NOTES
Physician Progress Note      PATIENT:               Alexander Monaco  CSN #:                  [id number]  :                       1950  ADMIT DATE:       2022 1:09 PM  DISCH DATE:  RESPONDING  PROVIDER #:        Alondra Livingston          QUERY TEXT:    Pt admitted with pneumonia/AECHF. Pt noted to have SIRS. If possible, please   document in the progress notes and discharge summary if you are evaluating and   /or treating any of the following: The medical record reflects the following:  Risk Factors: pneumonia  Clinical Indicators: CXR shows Left basilar opacity, WBC 16.1, HR 90's-100's,   lactic acid 3.1, procal 0.89, blood cx pending, acute resp failure and acute   CHF  Treatment: gentle IV fluids initially, ceftriaxone and azithromycin changed to   Levaquin, continuous O2 @ 5L, IV Lasix, nebs, CXR, labs, cont pulse ox    Thank you, Pilar Arenas, CDI  email - [email or web address]  cell- [phone]  office hours [id number]  Options provided:  -- Sepsis, present on admission  -- pneumonia without Sepsis  -- Other - I will add my own diagnosis  -- Disagree - Not applicable / Not valid  -- Disagree - Clinically unable to determine / Unknown  -- Refer to Clinical Documentation Reviewer    PROVIDER RESPONSE TEXT:    This patient has pneumonia without Sepsis.     Query created by: Ev Grady on 2022 9:59 AM      Electronically signed by:  Alondra Livingston 2022 12:41 PM

## 2022-04-28 NOTE — PROGRESS NOTES
Stacia Palacio, PPatient Assessment complete. Pneumonia [J18.9]  ANY (acute kidney injury) ([facility] [address] [N17.9]  Pneumonia of left lower lobe due to infectious organism [J18.9] . Vitals:    04/28/22 0839   BP:    Pulse:    Resp: 20   Temp:    SpO2: 95%   . Patients home meds are   Prior to Admission medications    Medication Sig Start Date End Date Taking? Authorizing Provider   fluticasone-salmeterol (ADVAIR) 250-50 MCG/DOSE AEPB Inhale 1 puff into the lungs every 12 hours     Historical Provider, MD   aspirin 81 MG EC tablet Take 1 tablet by mouth daily 1/1/22   Phill Nesbitt APRN - NP   atorvastatin (LIPITOR) 40 MG tablet Take 1 tablet by mouth nightly 12/31/21   Phill Nesbitt APRN - NP   clopidogrel (PLAVIX) 75 MG tablet Take 1 tablet by mouth daily 1/1/22   Phill Nesbitt APRN - NP   metoprolol tartrate (LOPRESSOR) 25 MG tablet Take 1 tablet by mouth 2 times daily 12/31/21   Phill Nesbitt APRN - NP   Multiple Vitamin (MULTIVITAMIN) TABS tablet Take 1 tablet by mouth daily 12/31/21   Phill Nesbitt APRN - NP   pantoprazole (PROTONIX) 40 MG tablet Take 1 tablet by mouth every morning (before breakfast) 1/1/22   Phill Nesbitt APRN - NP   tamsulosin [facility]) 0.4 MG capsule Take 1 capsule by mouth daily 8/20/19   Sravan Lee MD   albuterol sulfate  (90 Base) MCG/ACT inhaler Inhale 2 puffs into the lungs 4 times daily as needed for Wheezing or Shortness of Breath 8/19/19   Sravan Lee MD   tiotropium (SPIRIVA RESPIMAT) 2.5 MCG/ACT AERS inhaler Inhale 2 puffs into the lungs daily    Historical Provider, MD   budesonide-formoterol (SYMBICORT) 160-4.5 MCG/ACT AERO Inhale 2 puffs into the lungs 2 times daily    Historical Provider, MD   .  Recent Surgical History: None = 0     Assessment: Patient takes Symbicort and Spiriva at home.     RR 18  Breath Sounds: Clear/Diminished      · Bronchodilator assessment at level  2  · Hyperinflation assessment at level   · Secretion Management assessment at level    ·   · [x]    Bronchodilator Assessment  BRONCHODILATOR ASSESSMENT SCORE  Score 0 1 2 3 4 5   Breath Sounds   []  Patient Baseline []  No Wheeze good aeration [x]  Faint, scattered wheezing, good aeration []  Expiratory Wheezing and or moderately diminished []  Insp/Exp wheeze and/or very diminished []  Insp/Exp and/ or marked distress   Respiratory Rate   []  Patient Baseline [x]  Less than 20 []  Less than 20 []  20-25 []  Greater than 25 []  Greater than 25   Peak flow % of Pred or PB [x]  NA   []  Greater than 90%  []  81-90% []  71-80% []  Less than or equal to 70%  or unable to perform []  Unable due to Respiratory Distress   Dyspnea re []  Patient Baseline []  No SOB []  No SOB [x]  SOB on exertion []  SOB min activity []  At rest/acute   e FEV% Predicted       [x]  NA []  Above 69%  []  Unable []  Above 60-69%  []  Unable []  Above 50-59%  []  Unable []  Above 35-49%  []  Unable []  Less than 35%  []  Unable

## 2022-04-28 NOTE — PROGRESS NOTES
Physical Therapy        Physical Therapy Cancel Note      DATE: 2022    NAME: Nader Porter  MRN: [id number]   : 1950      Patient not seen this date for Physical Therapy due to:    Patient independent with functional mobility. Will defer PT evaluation at this time. Please reorder PT if future needs arise.        Electronically signed by Shailesh Rice PT on 2022 at 3:45 PM

## 2022-04-28 NOTE — CARE COORDINATION
Case Management Initial Discharge Plan  Rebecca Granados,             Met with:patient to discuss discharge plans. Information verified: address, contacts, phone number, , insurance Yes  Insurance Provider: Medicare/ [facility]    Emergency Contact/Next of Kin name & number:   Surinder Rojas (sister) [phone]  Mariam Maxwell (daughter) [phone]  Who are involved in patient's support system? Sister, brother and daughter    PCP: Mary Calvert MD  Date of last visit: 2 weeks ago      Discharge Planning    Living Arrangements:  alone    Home has is apartment   0 stairs to climb to get into front door    Patient able to perform ADL's:Independent    Current Services (outpatient & in home) none  DME equipment: none  DME provider: n/a    Is patient receiving oral anticoagulation therapy? No    If indicated:   Physician managing anticoagulation treatment: n/a  Where does patient obtain lab work for ATC treatment? n/a    Does patient have any issues/concerns obtaining medications? No  If yes, what are patient's concerns? Is there a preferred Pharmacy after hours or on weekends? Yes    If yes, which pharmacy? [facility] on [address] Assistance Needed:       Patient agreeable to home care: No  Basin of choice provided:  n/a    Prior SNF/Rehab Placement and Facility: [facility]  Agreeable to SNF/Rehab: No  Basin of choice provided: n/a     Evaluation: n/a    Expected Discharge date:       Patient expects to be discharged to:  home    If home: is the family and/or caregiver wiling & able to provide support at home? yes  Who will be providing this support?  Sister who lives close by    Follow Up Appointment: Best Day/ Time:      Transportation provider: sister or daughter  Transportation arrangements needed for discharge: No    Readmission Risk              Risk of Unplanned Readmission:  19             Does patient have a readmission risk score greater than 14?: Yes  If yes, follow-up appointment must be made within 7 days of discharge. Goals of Care:       Educated patient on transitional options, provided freedom of choice and are agreeable with plan      Discharge Plan: Home independently.           Electronically signed by General Rustam RN on 4/28/22 at 11:42 AM EDT

## 2022-04-28 NOTE — CONSULTS
PULMONARY & CRITICAL CARE MEDICINE CONSULT NOTE     Patient:  rOi Jones  MRN: [id number]  [address] date: 4/27/2022  Primary Care Physician: Kinga Guerrero MD  Consulting Physician: Dexter Braxton MD  CODE Status: Full Code  LOS: 1     SUBJECTIVE     CHIEF COMPLAINT/REASON FOR CONSULT: Pneumonia    HISTORY OF PRESENT ILLNESS:  The patient is a 80 y.o. male with past medical history of tuberculosis, empyema s/p decortication (2006) pulmonary nodules, COPD and recent CABG (December 2021). He is currently following with pulmonologist at the [address] [place] [address]. He is now admitted with worsening shortness of breath for about a week. He reports increased cough and sputum production with yellowish-green. He had fever. Denies any sick contacts or recent travel. Chest x-ray shows left basilar opacity which seems to have improved since his last x-ray in December. He has been started on ceftriaxone and azithromycin. PAST MEDICAL HISTORY:        Diagnosis Date    Acute upper respiratory infections of unspecified site     CAD (coronary artery disease)     COPD (chronic obstructive pulmonary disease) (HCC)     Empyema without mention of fistula     Esophageal reflux     Essential hypertension, benign     History of blood transfusion     Hx of blood clots     RLE DVT,     Localized osteoarthrosis not specified whether primary or secondary, unspecified site     Other and unspecified hyperlipidemia     Surgical or other procedure not carried out because of patient's decision     colonoscopy refused     Unspecified pleural effusion      PAST SURGICAL HISTORY:        Procedure Laterality Date    CORONARY ARTERY BYPASS GRAFT N/A 12/27/2021    CORONARY ARTERY BYPASS X3; MINH PER ANESTHESIA performed by Lillian Hallman MD at [address] 2006    MRSA at that time.      FAMILY HISTORY:       Problem Relation Age of Onset    Diabetes Mother     Heart Disease Mother     Heart Disease Father    Ben Heart Disease Paternal Uncle      SOCIAL HISTORY:   TOBACCO:   reports that he quit smoking about 11 years ago. He has never used smokeless tobacco.  ETOH:  reports no history of alcohol use. DRUGS: reports no history of drug use. ALLERGIES:    No Known Allergies      HOME MEDICATIONS:  Prior to Admission medications    Medication Sig Start Date End Date Taking? Authorizing Provider   fluticasone-salmeterol (ADVAIR) 250-50 MCG/DOSE AEPB Inhale 1 puff into the lungs every 12 hours     Historical Provider, MD   aspirin 81 MG EC tablet Take 1 tablet by mouth daily 1/1/22   Jovana Sanabria APRN - NP   atorvastatin (LIPITOR) 40 MG tablet Take 1 tablet by mouth nightly 12/31/21   CHRISTOPH Pisano - AMANDA   clopidogrel (PLAVIX) 75 MG tablet Take 1 tablet by mouth daily 1/1/22   CHRISTOPH Pisano - AMANDA   metoprolol tartrate (LOPRESSOR) 25 MG tablet Take 1 tablet by mouth 2 times daily 12/31/21   CHRISTOPH Pisano - AMANDA   Multiple Vitamin (MULTIVITAMIN) TABS tablet Take 1 tablet by mouth daily 12/31/21   CHRISTOPH Pisano - AMANDA   pantoprazole (PROTONIX) 40 MG tablet Take 1 tablet by mouth every morning (before breakfast) 1/1/22   CHRISTOPH Pisano - AMANDA   tamsulosin [facility]) 0.4 MG capsule Take 1 capsule by mouth daily 8/20/19   Orlando Echols MD   albuterol sulfate  (90 Base) MCG/ACT inhaler Inhale 2 puffs into the lungs 4 times daily as needed for Wheezing or Shortness of Breath 8/19/19   Orlando Echols MD   tiotropium (SPIRIVA RESPIMAT) 2.5 MCG/ACT AERS inhaler Inhale 2 puffs into the lungs daily    Historical Provider, MD   budesonide-formoterol (SYMBICORT) 160-4.5 MCG/ACT AERO Inhale 2 puffs into the lungs 2 times daily    Historical Provider, MD     IMMUNIZATIONS:    There is no immunization history on file for this patient. REVIEW OF SYSTEMS:  Review of Systems   Constitutional: Positive for fatigue. Negative for fever. HENT: Negative for congestion and voice change.     Eyes: Negative for visual disturbance. Respiratory: Positive for cough and shortness of breath. Cardiovascular: Negative for chest pain and leg swelling. Gastrointestinal: Negative for abdominal pain, nausea and vomiting. Genitourinary: Negative for dysuria and urgency. Musculoskeletal: Negative for back pain and joint swelling. Skin: Negative for rash. Neurological: Positive for weakness. Hematological: Does not bruise/bleed easily. Psychiatric/Behavioral: Negative for agitation and confusion. OBJECTIVE     PaO2/FiO2 RATIO:  No results for input(s): POCPO2 in the last 72 hours. VITAL SIGNS:   LAST:  /79   Pulse 104   Temp 97.9 °F (36.6 °C) (Oral)   Resp 20   Ht 5' 11\" (1.803 m)   Wt 191 lb (86.6 kg)   SpO2 95%   BMI 26.64 kg/m²   8-24 HR RANGE:  TEMP Temp  Av.9 °F (36.6 °C)  Min: 97.8 °F (36.6 °C)  Max: 97.9 °F (32.6 °C)   BP Systolic (87MBP), BZZ:998 , Min:120 , YIK:433      Diastolic (66RKR), AMI:39, Min:69, Max:79     PULSE Pulse  Av  Min: 92  Max: 104   RR Resp  Av  Min: 16  Max: 20   O2 SAT SpO2  Av %  Min: 95 %  Max: 95 %   OXYGEN DELIVERY O2 Flow Rate (L/min)  Av L/min  Min: 5 L/min  Max: 5 L/min        Physical Exam  Constitutional:       General: He is not in acute distress. Appearance: He is ill-appearing. HENT:      Head: Normocephalic and atraumatic. Mouth/Throat:      Mouth: Mucous membranes are moist.   Eyes:      General: Vision grossly intact. No scleral icterus. Pupils: Pupils are equal.   Neck:      Thyroid: No thyromegaly. Vascular: No JVD. Cardiovascular:      Rate and Rhythm: Normal rate and regular rhythm. Pulses: Normal pulses. Heart sounds: S1 normal and S2 normal. No murmur heard. Pulmonary:      Effort: Prolonged expiration present. No accessory muscle usage or respiratory distress. Breath sounds: Examination of the left-lower field reveals decreased breath sounds.  Decreased breath sounds present. Abdominal:      General: Bowel sounds are normal. There is no distension. Palpations: Abdomen is soft. Tenderness: There is no abdominal tenderness. Musculoskeletal:      Cervical back: Neck supple. Right lower leg: No edema. Left lower leg: No edema. Lymphadenopathy:      Cervical: No cervical adenopathy. Skin:     General: Skin is warm. Coloration: Skin is not pale. Neurological:      General: No focal deficit present. Mental Status: He is alert and oriented to person, place, and time. DATA REVIEW     Medications: Current Inpatient  Scheduled Meds:   levofloxacin  750 mg IntraVENous Q24H    albuterol  2.5 mg Nebulization TID    sodium chloride flush  5-40 mL IntraVENous 2 times per day    aspirin  81 mg Oral Daily    atorvastatin  40 mg Oral Nightly    clopidogrel  75 mg Oral Daily    budesonide-formoterol  2 puff Inhalation BID    [Held by provider] metoprolol tartrate  25 mg Oral BID    multivitamin  1 tablet Oral Daily    pantoprazole  40 mg Oral QAM AC    tamsulosin  0.4 mg Oral Daily    tiotropium  2 puff Inhalation Daily    sodium chloride flush  5-40 mL IntraVENous 2 times per day    enoxaparin  40 mg SubCUTAneous Daily     Continuous Infusions:   sodium chloride 100 mL/hr at 04/28/22 0242    sodium chloride         INPUT/OUTPUT:  In: -   Out: 1300 [Urine:1300]  Date 04/28/22 0000 - 04/28/22 2359   Shift 2742-8973 3686-6585 8447-3205 24 Hour Total   INTAKE   Shift Total(mL/kg)       OUTPUT   Urine 1000   1000   Shift Total(mL/kg) 1000   1000(11.5)   Weight (kg)  86.6 86.6 86.6        LABS:  ABGs:   No results for input(s): POCPH, POCPCO2, POCPO2, POCHCO3, TPPF2MBW in the last 72 hours.   CBC:   Recent Labs     04/27/22  1316 04/28/22  0255   WBC 16.1* 13.7*   HGB 13.1 11.8*   HCT 40.0* 35.7*   MCV 86.4 84.8    212   LYMPHOPCT 11* 9*   RBC 4.63 4.21   MCH 28.3 28.0   MCHC 32.8 33.1   RDW 14.1 14.2     CRP:   No results for input(s): CRP in the last 72 hours. LDH:   No results for input(s): LDH in the last 72 hours. BMP:   Recent Labs     04/27/22  1316 04/28/22  0255   * 131*   K 3.8 4.4   CL 94* 97*   CO2 23 20   BUN 37* 34*   CREATININE 1.45* 1.21*   GLUCOSE 102* 158*     Liver Function Test:   No results for input(s): PROT, LABALBU, ALT, AST, GGT, ALKPHOS, BILITOT in the last 72 hours. Coagulation Profile:   Recent Labs     04/27/22  1316   INR 0.9   PROTIME 10.0   APTT 29.3     D-Dimer:  No results for input(s): DDIMER in the last 72 hours. Lactic Acid:  No results for input(s): LACTA in the last 72 hours. Cardiac Enzymes:  No results for input(s): CKTOTAL, CKMB, CKMBINDEX, TROPONINI in the last 72 hours. Invalid input(s): TROPONIN, HSTROP  BNP/ProBNP:   Recent Labs     04/27/22  1316   PROBNP 2,804*     Triglycerides:  No results for input(s): TRIG in the last 72 hours. Microbiology:  Urine Culture:  No components found for: CURINE  Blood Culture:  No components found for: CBLOOD, CFUNGUSBL  Sputum Culture:  No components found for: [place]     04/27/22  1432 04/27/22  1432 04/27/22  1438   SPECDESC . BLOOD   < > .NASOPHARYNGEAL SWAB   SPECIAL 18CC L AC  --   --    CULTURE NO GROWTH 12 HOURS  --   --     < > = values in this interval not displayed. Recent Labs     04/27/22  1426 04/27/22  1432   SPECIAL 2CC R HAND 18CC L AC   CULTURE NO GROWTH 12 HOURS NO GROWTH 12 HOURS          Radiology Reports:  US RETROPERITONEAL LIMITED   Final Result   1. Simple cyst at the inferior pole left kidney measuring 1.7 cm.   2. Otherwise, unremarkable renal ultrasound. No hydronephrosis. XR CHEST PORTABLE   Final Result   Left basilar opacity decreased since the previous exam suggesting scarring.    Otherwise, no acute cardiopulmonary process              Echocardiogram:   Results for orders placed during the hospital encounter of 12/23/21    ECHO Complete 2D W Doppler W Color    Summary  Left ventricle is normal in size. Global left ventricular systolic function  is mildly reduced. Estimated ejection fraction is 45-50 % . Calculated EF via Reinoso's method is 49 %. Mild left ventricular hypertrophy. No significant valvular regurgitation or stenosis seen. ASSESSMENT AND PLAN     Assessment:    // Acute hypoxic respiratory failure  // COPD exacerbation  // Suspected left lower lobe pneumonia  // Leukocytosis  // Mild ANY  // Recent CABG (2021)  // Former smoker  // History of pulmonary nodules  // Remote history of empyema and decortication    Plan:    I personally interviewed/examined the patient; reviewed interval history, interpreted all available radiographic and laboratory data at the time of service. Patient is hemodynamically stable and is currently saturating well on O2 Flow Rate (L/min)  Av L/min  Min: 5 L/min  Max: 5 L/min  Continue supplemental oxygen to keep oxygen saturation >90%  Encourage incentive spirometry  Continue pulmonary toilet, aspiration precautions and bronchodilators  Monitor I/O, electrolytes with a goal of even/negative fluid balance  Tolerating oral diet  Stress ulcer/chemical DVT prophylaxis  Antimicrobials reviewed; continue Levaquin for 7 days  Will recommend IV steroids  Physical/occupational therapy    We will continue to follow. I would like to thank you for allowing me to participate in the care of this patient. Please feel free to call with any further questions or concerns. Eda Levy MD  Pulmonary and Critical Care Medicine           2022, 2:33 PM    Please note that this chart was generated using voice recognition Dragon dictation software. Although every effort was made to ensure the accuracy of this automated transcription, some errors in transcription may have occurred.

## 2022-04-28 NOTE — H&P
[address]     Department of Internal Medicine - Staff Internal Medicine Teaching Service          ADMISSION NOTE/HISTORY AND PHYSICAL EXAMINATION   Date: 4/28/2022  Patient Name: Ishan Johnson  Date of admission: 4/27/2022  1:09 PM  YOB: 1950  PCP: Chad Marinelli MD  History Obtained From:  patient, electronic medical record    CHIEF COMPLAINT     Chief complaint: Respiratory distress    HISTORY OF PRESENTING ILLNESS     The patient is a pleasant 80 y.o. male with a history of emphysema, COPD, history of CAD recent CABG 4 months, GERD, hypertension, history of blood clots and hyperlipidemia ago presents with a chief complaint of shortness of breath for 1 week associated with new productive cough of whitish colored sputum. Dyspnea is progressively worsening. He has been using oxygen more frequently at home. Patient desaturated to 88% SPO2 and required 3 L oxygen. He also reported history of fevers greater than 101 at home 2 days ago. He denied chest pain, nausea, vomiting, syncope, palpitations, lightheadedness, abdominal pain or urinary symptoms. In the ER he was found to have no fever. Leukocytosis noted WBC 16.1-13.7 labs showed hyponatremia with sodium 131, potassium 4.4, chloride 97, BUN and creatinine elevated at 37 and 1.45 with a picture of ANY. .  proBNP elevated and troponins trended down from 10 7-74. Hemoglobin 11.8. EKG showed concern for right bundle tyron block.       Review of Systems:  General ROS: Completed and except as mentioned above were negative   HEENT ROS: Completed and except as mentioned above were negative   Allergy and Immunology ROS:  Completed and except as mentioned above were negative  Hematological and Lymphatic ROS:  Completed and except as mentioned above were negative  Respiratory ROS:  Completed and except as mentioned above were negative  Cardiovascular ROS:  Completed and except as mentioned above were negative  Gastrointestinal ROS: Completed and except as mentioned above were negative  Genito-Urinary ROS:  Completed and except as mentioned above were negative  Musculoskeletal ROS:  Completed and except as mentioned above were negative  Neurological ROS:  Completed and except as mentioned above were negative  Skin & Dermatological ROS:  Completed and except as mentioned above were negative  Psychological ROS:  Completed and except as mentioned above were negative    PAST MEDICAL HISTORY     Past Medical History:   Diagnosis Date    Acute upper respiratory infections of unspecified site     CAD (coronary artery disease)     COPD (chronic obstructive pulmonary disease) ([facility] [address]     Empyema without mention of fistula     Esophageal reflux     Essential hypertension, benign     History of blood transfusion     Hx of blood clots     RLE DVT,     Localized osteoarthrosis not specified whether primary or secondary, unspecified site     Other and unspecified hyperlipidemia     Surgical or other procedure not carried out because of patient's decision     colonoscopy refused     Unspecified pleural effusion        PAST SURGICAL HISTORY     Past Surgical History:   Procedure Laterality Date    CORONARY ARTERY BYPASS GRAFT N/A 12/27/2021    CORONARY ARTERY BYPASS X3; MINH PER ANESTHESIA performed by Sherryle Citizen, MD at [address] 2006    MRSA at that time. ALLERGIES     Patient has no known allergies. MEDICATIONS PRIOR TO ADMISSION     Prior to Admission medications    Medication Sig Start Date End Date Taking?  Authorizing Provider   fluticasone-salmeterol (ADVAIR) 250-50 MCG/DOSE AEPB Inhale 1 puff into the lungs every 12 hours     Historical Provider, MD   aspirin 81 MG EC tablet Take 1 tablet by mouth daily 1/1/22   CHRISTOPH Zuniga NP   atorvastatin (LIPITOR) 40 MG tablet Take 1 tablet by mouth nightly 12/31/21   CHRISTOPH Zuniga NP   clopidogrel (PLAVIX) 75 MG tablet Take 1 tablet by mouth daily 22   Elita Romberg, APRN - NP   metoprolol tartrate (LOPRESSOR) 25 MG tablet Take 1 tablet by mouth 2 times daily 21   Elita Romberg, APRN - NP   Multiple Vitamin (MULTIVITAMIN) TABS tablet Take 1 tablet by mouth daily 21   Elita Romberg, APRN - NP   pantoprazole (PROTONIX) 40 MG tablet Take 1 tablet by mouth every morning (before breakfast) 22   Elita Romberg, APRN - NP   tamsulosin [facility]) 0.4 MG capsule Take 1 capsule by mouth daily 19   Cassie White MD   albuterol sulfate  (90 Base) MCG/ACT inhaler Inhale 2 puffs into the lungs 4 times daily as needed for Wheezing or Shortness of Breath 19   Cassie White MD   tiotropium (SPIRIVA RESPIMAT) 2.5 MCG/ACT AERS inhaler Inhale 2 puffs into the lungs daily    Historical Provider, MD   budesonide-formoterol (SYMBICORT) 160-4.5 MCG/ACT AERO Inhale 2 puffs into the lungs 2 times daily    Historical Provider, MD       SOCIAL HISTORY     Tobacco: States she quit 15 years ago  Alcohol: Recovering alcoholic since 89  Illicits: None  Occupation:     FAMILY HISTORY     Family History   Problem Relation Age of Onset    Diabetes Mother     Heart Disease Mother     Heart Disease Father     Heart Disease Paternal Uncle        PHYSICAL EXAM     Vitals: /74   Pulse 92   Temp 97.8 °F (36.6 °C) (Oral)   Resp 18   SpO2 94%   Tmax: Temp (24hrs), Av °F (36.7 °C), Min:97.8 °F (36.6 °C), Max:98.1 °F (36.7 °C)    Last Body weight:   Wt Readings from Last 3 Encounters:   22 191 lb 1.6 oz (86.7 kg)   22 190 lb 3.2 oz (86.3 kg)   22 192 lb 3.2 oz (87.2 kg)     Body Mass Index : There is no height or weight on file to calculate BMI. PHYSICAL EXAMINATION:  Constitutional: This is a well developed, well nourished, 25-29.9 - Overweight 80y.o. year old male who is alert, oriented, cooperative and in no apparent distress. Head:normocephalic and atraumatic.     EENT: PERRLA. No conjunctival injections. Septum was midline, mucosa was without erythema, exudates or cobblestoning. No thrush was noted. Neck: Supple without thyromegaly. No elevated JVP. Trachea was midline. Respiratory: Crackles present bilaterally. Cardiovascular: Regular without murmur, clicks, gallops or rubs. Abdomen: Slightly rounded and soft without organomegaly. No rebound, rigidity or guarding was appreciated. Lymphatic: No lymphadenopathy. Musculoskeletal: Normal curvature of the spine. No gross muscle weakness. Extremities:  No lower extremity edema, ulcerations, tenderness, varicosities or erythema. Muscle size, tone and strength are normal.  No involuntary movements are noted. Skin:  Warm and dry. Good color, turgor and pigmentation. No lesions or scars.   No cyanosis or clubbing  Neurological/Psychiatric: The patient's general behavior, level of consciousness, thought content and emotional status is normal.          INVESTIGATIONS     Laboratory Testing:     Recent Results (from the past 24 hour(s))   CBC with Auto Differential    Collection Time: 04/27/22  1:16 PM   Result Value Ref Range    WBC 16.1 (H) 3.5 - 11.3 k/uL    RBC 4.63 4.21 - 5.77 m/uL    Hemoglobin 13.1 13.0 - 17.0 g/dL    Hematocrit 40.0 (L) 40.7 - 50.3 %    MCV 86.4 82.6 - 102.9 fL    MCH 28.3 25.2 - 33.5 pg    MCHC 32.8 28.4 - 34.8 g/dL    RDW 14.1 11.8 - 14.4 %    Platelets 651 191 - 503 k/uL    MPV 9.9 8.1 - 13.5 fL    NRBC Automated 0.0 0.0 per 100 WBC    Seg Neutrophils 84 (H) 36 - 65 %    Lymphocytes 11 (L) 24 - 43 %    Monocytes 4 3 - 12 %    Eosinophils % 0 (L) 1 - 4 %    Basophils 0 0 - 2 %    Immature Granulocytes 1 (H) 0 %    Segs Absolute 13.44 (H) 1.50 - 8.10 k/uL    Absolute Lymph # 1.70 1.10 - 3.70 k/uL    Absolute Mono # 0.71 0.10 - 1.20 k/uL    Absolute Eos # 0.03 0.00 - 0.44 k/uL    Basophils Absolute 0.07 0.00 - 0.20 k/uL    Absolute Immature Granulocyte 0.16 0.00 - 0.30 k/uL   Basic Metabolic Panel w/ Reflex to MG    Collection Time: 04/27/22  1:16 PM   Result Value Ref Range    Glucose 102 (H) 70 - 99 mg/dL    BUN 37 (H) 8 - 23 mg/dL    CREATININE 1.45 (H) 0.70 - 1.20 mg/dL    Calcium 9.2 8.6 - 10.4 mg/dL    Sodium 133 (L) 135 - 144 mmol/L    Potassium 3.8 3.7 - 5.3 mmol/L    Chloride 94 (L) 98 - 107 mmol/L    CO2 23 20 - 31 mmol/L    Anion Gap 16 9 - 17 mmol/L    GFR Non-African American 48 (L) >60 mL/min    GFR  58 (L) >60 mL/min    GFR Comment         Troponin    Collection Time: 04/27/22  1:16 PM   Result Value Ref Range    Troponin, High Sensitivity 107 (HH) 0 - 22 ng/L   Brain Natriuretic Peptide    Collection Time: 04/27/22  1:16 PM   Result Value Ref Range    Pro-BNP 2,804 (H) <300 pg/mL   Protime-INR    Collection Time: 04/27/22  1:16 PM   Result Value Ref Range    Protime 10.0 9.1 - 12.3 sec    INR 0.9    APTT    Collection Time: 04/27/22  1:16 PM   Result Value Ref Range    PTT 29.3 20.5 - 30.5 sec   Culture, Blood 1    Collection Time: 04/27/22  2:26 PM    Specimen: Blood   Result Value Ref Range    Specimen Description . BLOOD     Special Requests 2CC R HAND     Culture NO GROWTH 12 HOURS    Culture, Blood 1    Collection Time: 04/27/22  2:32 PM    Specimen: Blood   Result Value Ref Range    Specimen Description . BLOOD     Special Requests [id number] Sr 56 L AC     Culture NO GROWTH 12 HOURS    COVID-19, Rapid    Collection Time: 04/27/22  2:38 PM    Specimen: Nasopharyngeal Swab   Result Value Ref Range    Specimen Description . NASOPHARYNGEAL SWAB     SARS-CoV-2, Rapid Not Detected Not Detected   RAPID INFLUENZA A/B ANTIGENS    Collection Time: 04/27/22  2:38 PM    Specimen: Nasopharyngeal   Result Value Ref Range    Flu A Antigen NEGATIVE NEGATIVE    Flu B Antigen NEGATIVE NEGATIVE   Lactic Acid    Collection Time: 04/27/22  2:38 PM   Result Value Ref Range    Lactic Acid, Whole Blood 3.1 (H) 0.7 - 2.1 mmol/L   Urinalysis with Reflex to Culture    Collection Time: 04/27/22  2:46 PM Result Value Ref Range    Color, UA Yellow Yellow    Turbidity UA Clear Clear    Glucose, Ur NEGATIVE NEGATIVE    Bilirubin Urine NEGATIVE NEGATIVE    Ketones, Urine NEGATIVE NEGATIVE    Specific Gravity, UA 1.021 1.005 - 1.030    Urine Hgb NEGATIVE NEGATIVE    pH, UA 5.0 5.0 - 8.0    Protein, UA TRACE (A) NEGATIVE    Urobilinogen, Urine Normal Normal    Nitrite, Urine NEGATIVE NEGATIVE    Leukocyte Esterase, Urine NEGATIVE NEGATIVE   Microscopic Urinalysis    Collection Time: 04/27/22  2:46 PM   Result Value Ref Range    -          WBC, UA None 0 - 5 /HPF    RBC, UA None 0 - 2 /HPF    Casts UA COARSELY GRANULAR 0 - 2 /LPF    Casts UA 0 TO 2 0 - 2 /LPF    Epithelial Cells UA 0 TO 2 0 - 5 /HPF    Mucus, UA 1+ (A) None   SPECIMEN REJECTION    Collection Time: 04/27/22  3:07 PM   Result Value Ref Range    Specimen Source . BLOOD     Ordered Test TROPI     Reason for Rejection Unable to perform testing: Specimen hemolyzed.     Troponin    Collection Time: 04/27/22  7:08 PM   Result Value Ref Range    Troponin, High Sensitivity 74 (HH) 0 - 22 ng/L   Procalcitonin    Collection Time: 04/27/22  7:08 PM   Result Value Ref Range    Procalcitonin 0.89 (H) <0.09 ng/mL   Lactic Acid    Collection Time: 04/27/22  9:00 PM   Result Value Ref Range    Lactic Acid, Whole Blood 1.2 0.7 - 2.1 mmol/L   CBC with Auto Differential    Collection Time: 04/28/22  2:55 AM   Result Value Ref Range    WBC 13.7 (H) 3.5 - 11.3 k/uL    RBC 4.21 4.21 - 5.77 m/uL    Hemoglobin 11.8 (L) 13.0 - 17.0 g/dL    Hematocrit 35.7 (L) 40.7 - 50.3 %    MCV 84.8 82.6 - 102.9 fL    MCH 28.0 25.2 - 33.5 pg    MCHC 33.1 28.4 - 34.8 g/dL    RDW 14.2 11.8 - 14.4 %    Platelets 990 072 - 469 k/uL    MPV 9.7 8.1 - 13.5 fL    NRBC Automated 0.0 0.0 per 100 WBC    Immature Granulocytes 1 (H) 0 %    Seg Neutrophils 89 (H) 36 - 66 %    Lymphocytes 9 (L) 24 - 44 %    Monocytes 1 1 - 7 %    Eosinophils % 0 (L) 1 - 4 %    Basophils 0 0 - 2 %    Absolute Immature Granulocyte 0. 14 0.00 - 0.30 k/uL    Segs Absolute 12.19 (H) 1.8 - 7.7 k/uL    Absolute Lymph # 1.23 1.0 - 4.8 k/uL    Absolute Mono # 0.14 0.1 - 0.8 k/uL    Absolute Eos # 0.00 0.0 - 0.4 k/uL    Basophils Absolute 0.00 0.0 - 0.2 k/uL    Morphology Normal    BLOOD GAS, VENOUS    Collection Time: 04/28/22  2:55 AM   Result Value Ref Range    pH, Omi 7.436 (H) 7.320 - 7.420    pCO2, Omi 32.0 (L) 39 - 55    pO2, Omi 40.7 30 - 50    HCO3, Venous 21.2 (L) 24 - 30 mmol/L    Negative Base Excess, Omi 1.8 0.0 - 2.0 mmol/L    O2 Sat, Omi 76.4 60.0 - 85.0 %    Carboxyhemoglobin 0.8 0 - 5 %    Pt Temp 37.0     FIO2 INFORMATION NOT PROVIDED    Basic Metabolic Panel    Collection Time: 04/28/22  2:55 AM   Result Value Ref Range    Glucose 158 (H) 70 - 99 mg/dL    BUN 34 (H) 8 - 23 mg/dL    CREATININE 1.21 (H) 0.70 - 1.20 mg/dL    Calcium 8.7 8.6 - 10.4 mg/dL    Sodium 131 (L) 135 - 144 mmol/L    Potassium 4.4 3.7 - 5.3 mmol/L    Chloride 97 (L) 98 - 107 mmol/L    CO2 20 20 - 31 mmol/L    Anion Gap 14 9 - 17 mmol/L    GFR Non-African American 59 (L) >60 mL/min    GFR African American >60 >60 mL/min    GFR Comment             Imaging:   XR CHEST PORTABLE    Result Date: 4/27/2022  Left basilar opacity decreased since the previous exam suggesting scarring. Otherwise, no acute cardiopulmonary process       ASSESSMENT & PLAN     ASSESSMENT / PLAN:     IMPRESSION  This is a 80 y.o. male who presented with worsening dyspnea, productive cough with a history of COPD not on home oxygen and found to have concern for community-acquired pneumonia. Principal Problem:  Acute hypoxic respiratory failure likely secondary to community-acquired pneumonia with a history of COPD not on home oxygen  -Curb 65 score 2 requiring inpatient admission. He was given ceftriaxone and azithromycin in the ED. Changed to levofloxacin 750 mg IV daily. Follow-up respiratory viral panel with COVID-19.   Continue oxygen supplementation and wean as tolerated    Active Problems:    Hx of Legionella Pneumonia  Plan: Follow-up Legionella antigen urine. Hyponatremia  Plan: Follow-up urine sodium, urine and serum osmolality. Acute on chronic systolic congestive heart failure (HCC)  Plan: EF 30 to 40%. Continue Lasix. CAD (coronary artery disease) s/p CABG in 2 3  Plan: Continue aspirin, Lipitor, Plavix      Esophageal reflux  Plan: Continue Protonix      Essential hypertension, benign  Plan: Currently blood pressure under control. Will add antihypertensives if needed. ANY (acute kidney injury) ([address]  Plan: Likely prerenal, gentle hydration. Monitor BMP      DVT ppx: Lovenox 40 mg SC daily  GI ppx: Protonix 40 mg oral daily    PT/OT/SW: Consulted  Discharge Planning:  to assist with    Jeovany Moreira MD  Internal Medicine Resident, PGY-1  [address] [facility]  4/28/2022, 3:57 AM  Attending Physician Statement  I have discussed the care of Ori Jones and I have examined the patient myselft and taken ros and hpi , including pertinent history and exam findings,  with the resident. I have reviewed the key elements of all parts of the encounter with the resident. I agree with the assessment, plan and orders as documented by the resident.       Electronically signed by Dexter Braxton MD

## 2022-04-28 NOTE — PROGRESS NOTES
[facility]  Occupational Therapy Not Seen Note    DATE: 2022    NAME: Lavern Jean  MRN: [id number]   : 1950      Patient not seen this date for Occupational Therapy due to:    Spoke directly with patient, Patient independent with ADLs and functional tasks with no acute OT needs. Will defer OT evaluation at this time. Please reorder OT if future needs arise.      Electronically signed by Derik Mancia OT on 2022 at 9:14 AM

## 2022-04-28 NOTE — PROGRESS NOTES
[facility]  Internal Medicine Teaching Residency Program  Inpatient Daily Progress Note  ______________________________________________________________________________    Patient: Blaine Carrasco  YOB: 1950   HTZ:[id number]    Acct: [de-identified]     Room: [address] [place] [address]  Admit date: 2022  Today's date: 22  Number of days in the hospital: 1    SUBJECTIVE   Admitting Diagnosis: Pneumonia  CC: Respiratory distress  Pt examined at bedside. Chart & results reviewed. Patient continues to be on 5 L O2 through nasal cannula and is still short of breath with cough. ROS:  Constitutional:  negative for chills, fevers, sweats  Respiratory:  negative for hemoptysis   cardiovascular:  negative for chest pain, chest pressure/discomfort, lower extremity edema, palpitations  Gastrointestinal:  negative for abdominal pain, constipation, diarrhea, nausea, vomiting  Neurological:  negative for dizziness, headache  BRIEF HISTORY     80 y.o. male presents with shortness of breath, productive cough,  and fever for last week. Patient has been using oxygen more frequently at home. Patient required 3L oxygen. Patient was afebrile in the ED. Labs showed leukocytosis 16.1, procalc elevated . 89, elevated BUN/CN (ANY picture), elevated BNP, down trending troponins (107->74), and EKG showing right bundle branch block. Xray showed no acute cardiopulmonary processes. CURB-65 score      PMH: Acute on chronic Systolic HF GHO97-70%, emphysema, COPD, CAD w CABG (), GERD, HTN, Blood clots, hyperlipidemia.     OBJECTIVE     Vital Signs:  /79   Pulse 104   Temp 97.9 °F (36.6 °C) (Oral)   Resp 20   Ht 5' 11\" (1.803 m)   Wt 191 lb (86.6 kg)   SpO2 95%   BMI 26.64 kg/m²     Temp (24hrs), Av.9 °F (36.6 °C), Min:97.8 °F (36.6 °C), Max:98.1 °F (36.7 °C)    In: -   Out: 1300 [Urine:1300]    Physical Exam:  Constitutional: This is a well developed, well nourished, 25-29.9 - Overweight 80y.o. year old male who is alert, oriented, cooperative and in no apparent distress. Head:normocephalic and atraumatic. EENT:  PERRLA. No conjunctival injections. Septum was midline, mucosa was without erythema, exudates or cobblestoning. No thrush was noted. Neck: Supple without thyromegaly. No elevated JVP. Trachea was midline. Respiratory: Diffuse crackles heard bilaterally. Cardiovascular: Regular without murmur, clicks, gallops or rubs. Abdomen: Slightly rounded and soft without organomegaly. No rebound, rigidity or guarding was appreciated. Lymphatic: No lymphadenopathy. Musculoskeletal: Normal curvature of the spine. No gross muscle weakness. Extremities:  No lower extremity edema, ulcerations, tenderness, varicosities or erythema. Muscle size, tone and strength are normal.  No involuntary movements are noted. Skin:  Warm and dry. Good color, turgor and pigmentation. No lesions or scars.   No cyanosis or clubbing  Neurological/Psychiatric: The patient's general behavior, level of consciousness, thought content and emotional status is normal.        Medications:  Scheduled Medications:    levofloxacin  750 mg IntraVENous Q24H    albuterol  2.5 mg Nebulization TID    sodium chloride flush  5-40 mL IntraVENous 2 times per day    aspirin  81 mg Oral Daily    atorvastatin  40 mg Oral Nightly    clopidogrel  75 mg Oral Daily    budesonide-formoterol  2 puff Inhalation BID    [Held by provider] metoprolol tartrate  25 mg Oral BID    multivitamin  1 tablet Oral Daily    pantoprazole  40 mg Oral QAM AC    tamsulosin  0.4 mg Oral Daily    tiotropium  2 puff Inhalation Daily    sodium chloride flush  5-40 mL IntraVENous 2 times per day    enoxaparin  40 mg SubCUTAneous Daily     Continuous Infusions:    sodium chloride 100 mL/hr at 04/28/22 0242    sodium chloride       PRN Medicationsalbuterol, 2.5 mg, Q6H PRN  sodium chloride flush, 5-40 mL, PRN  sodium chloride, , PRN  acetaminophen, 650 mg, Q4H PRN  ondansetron, 4 mg, Q8H PRN   Or  ondansetron, 4 mg, Q6H PRN  albuterol sulfate HFA, 2 puff, 4x Daily PRN  sodium chloride flush, 5-40 mL, PRN  sodium chloride, , PRN  ondansetron, 4 mg, Q8H PRN   Or  ondansetron, 4 mg, Q6H PRN  polyethylene glycol, 17 g, Daily PRN  acetaminophen, 650 mg, Q6H PRN   Or  acetaminophen, 650 mg, Q6H PRN        Diagnostic Labs:  CBC:   Recent Labs     04/27/22  1316 04/28/22  0255   WBC 16.1* 13.7*   RBC 4.63 4.21   HGB 13.1 11.8*   HCT 40.0* 35.7*   MCV 86.4 84.8   RDW 14.1 14.2    212     BMP:   Recent Labs     04/27/22  1316 04/28/22  0255   * 131*   K 3.8 4.4   CL 94* 97*   CO2 23 20   BUN 37* 34*   CREATININE 1.45* 1.21*     BNP: No results for input(s): BNP in the last 72 hours. PT/INR:   Recent Labs     04/27/22  1316   PROTIME 10.0   INR 0.9     APTT:   Recent Labs     04/27/22  1316   APTT 29.3     CARDIAC ENZYMES: No results for input(s): CKMB, CKMBINDEX, TROPONINI in the last 72 hours. Invalid input(s): CKTOTAL;3  FASTING LIPID PANEL:  Lab Results   Component Value Date    CHOL 137 04/05/2022    HDL 53 04/05/2022    TRIG 52 04/05/2022     LIVER PROFILE: No results for input(s): AST, ALT, ALB, BILIDIR, BILITOT, ALKPHOS in the last 72 hours. MICROBIOLOGY:   Lab Results   Component Value Date/Time    CULTURE NO GROWTH 12 HOURS 04/27/2022 02:32 PM       Imaging:    XR CHEST PORTABLE    Result Date: 4/27/2022  Left basilar opacity decreased since the previous exam suggesting scarring. Otherwise, no acute cardiopulmonary process     US RETROPERITONEAL LIMITED    Result Date: 4/28/2022  1. Simple cyst at the inferior pole left kidney measuring 1.7 cm. 2. Otherwise, unremarkable renal ultrasound. No hydronephrosis.        ASSESSMENT & PLAN     ASSESSMENT / PLAN:     Principal Problem:    Pneumonia unspecified  Active Problems:    Hx of Legionella Pneumonia    Hyponatremia    Acute on chronic systolic congestive heart failure (HCC)    CAD (coronary artery disease)    Esophageal reflux    Essential hypertension, benign    ANY (acute kidney injury) ([facility] [address]    Urinary retention    S/P CABG x 3  Resolved Problems:    * No resolved hospital problems. *      IMPRESSION  This is a 80 y.o. male who presented with worsening dyspnea, productive cough with a history of COPD not on home oxygen and found to have concern for community-acquired pneumonia.     Principal Problem:  Acute hypoxic respiratory failure likely secondary to community-acquired pneumonia with a history of COPD not on home oxygen  -Curb 65 score 2 requiring inpatient admission. Continue O2 supplementation through nasal cannula and wean as tolerated. Monitor saturations and keep SPO2 between 88 to 92%. He was given ceftriaxone and azithromycin in the ED. Changed to levofloxacin 750 mg IV daily. Follow-up respiratory viral panel with COVID-19. Follow-up Legionella, mycoplasma and streptococcal pneumonia antigen. Continue oxygen supplementation and wean as tolerated.     Active Problems:    Hx of Legionella Pneumonia  Plan: Follow-up Legionella antigen urine.       Hyponatremia  Plan: Follow-up urine sodium, urine and serum osmolality. Restrict sodium less than 2 g.       Acute on chronic systolic congestive heart failure ([facility] [address]  Plan:  Echo from 12/23/2021 with global left ventricular systolic function reduced. EF of 45 to 50%. Continue IV Lasix. Telemetry monitoring. Oxygen via nasal cannula currently at 5 L. Wean as tolerated. Monitor K, magnesium, creatinine. Restrict sodium and also fluids to less than 1500 cc. Daily weights and strict I and O's.      CAD (coronary artery disease) s/p CABG in 2 3  Plan: Continue aspirin, Lipitor, Plavix       Esophageal reflux  Plan: Continue Protonix       Essential hypertension, benign  Plan: Currently blood pressure under control.   Will add antihypertensives if needed.       ANY (acute kidney injury) ([facility] [address]  Plan: Likely prerenal, gentle hydration. Monitor BMP        DVT ppx: Lovenox 40 mg SC daily  GI ppx: Protonix 40 mg oral daily     PT/OT/SW: Consulted  Discharge Planning:  to assist with      Giovanni Lindsay MD  Internal Medicine Resident, PGY-1  [facility]; [place], [place]  4/28/2022, 12:40 PM  Attending Physician Statement  I have discussed the care of Blue Sanabria and I have examined the patient myselft and taken ros and hpi , including pertinent history and exam findings,  with the resident. I have reviewed the key elements of all parts of the encounter with the resident. I agree with the assessment, plan and orders as documented by the resident.       Electronically signed by Lilo Arriaga MD

## 2022-04-29 LAB
ABSOLUTE EOS #: 0 K/UL (ref 0–0.4)
ABSOLUTE IMMATURE GRANULOCYTE: 0 K/UL (ref 0–0.3)
ABSOLUTE LYMPH #: 0.69 K/UL (ref 1–4.8)
ABSOLUTE MONO #: 0.17 K/UL (ref 0.1–0.8)
ANION GAP SERPL CALCULATED.3IONS-SCNC: 13 MMOL/L (ref 9–17)
ANION GAP SERPL CALCULATED.3IONS-SCNC: 13 MMOL/L (ref 9–17)
BASOPHILS # BLD: 0 % (ref 0–2)
BASOPHILS ABSOLUTE: 0 K/UL (ref 0–0.2)
BUN BLDV-MCNC: 40 MG/DL (ref 8–23)
CALCIUM SERPL-MCNC: 8.8 MG/DL (ref 8.6–10.4)
CHLORIDE BLD-SCNC: 100 MMOL/L (ref 98–107)
CHLORIDE BLD-SCNC: 101 MMOL/L (ref 98–107)
CHLORIDE, UR: <20 MMOL/L
CO2: 20 MMOL/L (ref 20–31)
CO2: 21 MMOL/L (ref 20–31)
CREAT SERPL-MCNC: 1.53 MG/DL (ref 0.7–1.2)
CREATININE URINE: 69.8 MG/DL (ref 39–259)
EKG ATRIAL RATE: 106 BPM
EKG P AXIS: 76 DEGREES
EKG P-R INTERVAL: 164 MS
EKG Q-T INTERVAL: 422 MS
EKG QRS DURATION: 144 MS
EKG QTC CALCULATION (BAZETT): 560 MS
EKG R AXIS: 1 DEGREES
EKG T AXIS: 10 DEGREES
EKG VENTRICULAR RATE: 106 BPM
EOSINOPHILS RELATIVE PERCENT: 0 % (ref 1–4)
GFR AFRICAN AMERICAN: 55 ML/MIN
GFR NON-AFRICAN AMERICAN: 45 ML/MIN
GFR SERPL CREATININE-BSD FRML MDRD: ABNORMAL ML/MIN/{1.73_M2}
GLUCOSE BLD-MCNC: 125 MG/DL (ref 70–99)
HCT VFR BLD CALC: 34.6 % (ref 40.7–50.3)
HEMOGLOBIN: 11 G/DL (ref 13–17)
IMMATURE GRANULOCYTES: 0 %
LEGIONELLA PNEUMOPHILIA AG, URINE: NEGATIVE
LYMPHOCYTES # BLD: 4 % (ref 24–44)
MCH RBC QN AUTO: 28.1 PG (ref 25.2–33.5)
MCHC RBC AUTO-ENTMCNC: 31.8 G/DL (ref 28.4–34.8)
MCV RBC AUTO: 88.5 FL (ref 82.6–102.9)
MONOCYTES # BLD: 1 % (ref 1–7)
MORPHOLOGY: ABNORMAL
MYCOPLASMA PNEUMONIAE IGM: 0.09
NRBC AUTOMATED: 0 PER 100 WBC
PDW BLD-RTO: 14.5 % (ref 11.8–14.4)
PLATELET # BLD: 262 K/UL (ref 138–453)
PMV BLD AUTO: 9.5 FL (ref 8.1–13.5)
POTASSIUM SERPL-SCNC: 4.6 MMOL/L (ref 3.7–5.3)
POTASSIUM SERPL-SCNC: 5.6 MMOL/L (ref 3.7–5.3)
RBC # BLD: 3.91 M/UL (ref 4.21–5.77)
SEG NEUTROPHILS: 95 % (ref 36–66)
SEGMENTED NEUTROPHILS ABSOLUTE COUNT: 16.44 K/UL (ref 1.8–7.7)
SODIUM BLD-SCNC: 133 MMOL/L (ref 135–144)
SODIUM BLD-SCNC: 135 MMOL/L (ref 135–144)
SODIUM,UR: 20 MMOL/L
SOURCE: NORMAL
STREP PNEUMONIAE ANTIGEN: NEGATIVE
TOTAL PROTEIN, URINE: 9 MG/DL
WBC # BLD: 17.3 K/UL (ref 3.5–11.3)

## 2022-04-29 PROCEDURE — 51798 US URINE CAPACITY MEASURE: CPT

## 2022-04-29 PROCEDURE — 94761 N-INVAS EAR/PLS OXIMETRY MLT: CPT

## 2022-04-29 PROCEDURE — 6360000002 HC RX W HCPCS

## 2022-04-29 PROCEDURE — 2580000003 HC RX 258: Performed by: STUDENT IN AN ORGANIZED HEALTH CARE EDUCATION/TRAINING PROGRAM

## 2022-04-29 PROCEDURE — 6370000000 HC RX 637 (ALT 250 FOR IP)

## 2022-04-29 PROCEDURE — 94640 AIRWAY INHALATION TREATMENT: CPT

## 2022-04-29 PROCEDURE — 99233 SBSQ HOSP IP/OBS HIGH 50: CPT | Performed by: INTERNAL MEDICINE

## 2022-04-29 PROCEDURE — 2700000000 HC OXYGEN THERAPY PER DAY

## 2022-04-29 PROCEDURE — 6360000002 HC RX W HCPCS: Performed by: INTERNAL MEDICINE

## 2022-04-29 PROCEDURE — 80048 BASIC METABOLIC PNL TOTAL CA: CPT

## 2022-04-29 PROCEDURE — 80051 ELECTROLYTE PANEL: CPT

## 2022-04-29 PROCEDURE — 36415 COLL VENOUS BLD VENIPUNCTURE: CPT

## 2022-04-29 PROCEDURE — 85025 COMPLETE CBC W/AUTO DIFF WBC: CPT

## 2022-04-29 PROCEDURE — 1200000000 HC SEMI PRIVATE

## 2022-04-29 PROCEDURE — 6370000000 HC RX 637 (ALT 250 FOR IP): Performed by: STUDENT IN AN ORGANIZED HEALTH CARE EDUCATION/TRAINING PROGRAM

## 2022-04-29 PROCEDURE — 93005 ELECTROCARDIOGRAM TRACING: CPT | Performed by: STUDENT IN AN ORGANIZED HEALTH CARE EDUCATION/TRAINING PROGRAM

## 2022-04-29 PROCEDURE — 94664 DEMO&/EVAL PT USE INHALER: CPT

## 2022-04-29 PROCEDURE — 51702 INSERT TEMP BLADDER CATH: CPT

## 2022-04-29 PROCEDURE — 2500000003 HC RX 250 WO HCPCS

## 2022-04-29 PROCEDURE — 93010 ELECTROCARDIOGRAM REPORT: CPT | Performed by: INTERNAL MEDICINE

## 2022-04-29 RX ORDER — FINASTERIDE 5 MG/1
5 TABLET, FILM COATED ORAL DAILY
Status: DISCONTINUED | OUTPATIENT
Start: 2022-04-29 | End: 2022-05-01 | Stop reason: HOSPADM

## 2022-04-29 RX ORDER — DOXYCYCLINE HYCLATE 100 MG
100 TABLET ORAL EVERY 12 HOURS SCHEDULED
Status: DISCONTINUED | OUTPATIENT
Start: 2022-04-29 | End: 2022-05-01 | Stop reason: HOSPADM

## 2022-04-29 RX ORDER — SODIUM CHLORIDE 9 MG/ML
INJECTION, SOLUTION INTRAVENOUS CONTINUOUS
Status: DISCONTINUED | OUTPATIENT
Start: 2022-04-29 | End: 2022-05-01

## 2022-04-29 RX ADMIN — ALBUTEROL SULFATE 2.5 MG: 2.5 SOLUTION RESPIRATORY (INHALATION) at 15:00

## 2022-04-29 RX ADMIN — BUDESONIDE AND FORMOTEROL FUMARATE DIHYDRATE 2 PUFF: 160; 4.5 AEROSOL RESPIRATORY (INHALATION) at 08:00

## 2022-04-29 RX ADMIN — BUDESONIDE AND FORMOTEROL FUMARATE DIHYDRATE 2 PUFF: 160; 4.5 AEROSOL RESPIRATORY (INHALATION) at 20:18

## 2022-04-29 RX ADMIN — METHYLPREDNISOLONE SODIUM SUCCINATE 40 MG: 40 INJECTION, POWDER, FOR SOLUTION INTRAMUSCULAR; INTRAVENOUS at 23:31

## 2022-04-29 RX ADMIN — FINASTERIDE 5 MG: 5 TABLET, FILM COATED ORAL at 15:20

## 2022-04-29 RX ADMIN — ALBUTEROL SULFATE 2.5 MG: 2.5 SOLUTION RESPIRATORY (INHALATION) at 07:59

## 2022-04-29 RX ADMIN — TAMSULOSIN HYDROCHLORIDE 0.4 MG: 0.4 CAPSULE ORAL at 09:04

## 2022-04-29 RX ADMIN — Medication 81 MG: at 09:04

## 2022-04-29 RX ADMIN — SODIUM CHLORIDE: 9 INJECTION, SOLUTION INTRAVENOUS at 08:53

## 2022-04-29 RX ADMIN — METHYLPREDNISOLONE SODIUM SUCCINATE 40 MG: 40 INJECTION, POWDER, FOR SOLUTION INTRAMUSCULAR; INTRAVENOUS at 00:06

## 2022-04-29 RX ADMIN — CEFTRIAXONE SODIUM 1000 MG: 1 INJECTION, POWDER, FOR SOLUTION INTRAMUSCULAR; INTRAVENOUS at 14:52

## 2022-04-29 RX ADMIN — TIOTROPIUM BROMIDE INHALATION SPRAY 2 PUFF: 3.12 SPRAY, METERED RESPIRATORY (INHALATION) at 08:00

## 2022-04-29 RX ADMIN — METHYLPREDNISOLONE SODIUM SUCCINATE 40 MG: 40 INJECTION, POWDER, FOR SOLUTION INTRAMUSCULAR; INTRAVENOUS at 12:33

## 2022-04-29 RX ADMIN — PANTOPRAZOLE SODIUM 40 MG: 40 TABLET, DELAYED RELEASE ORAL at 05:44

## 2022-04-29 RX ADMIN — DESMOPRESSIN ACETATE 40 MG: 0.2 TABLET ORAL at 21:59

## 2022-04-29 RX ADMIN — ALCOHOL 1 TABLET: 70.47 GEL TOPICAL at 09:04

## 2022-04-29 RX ADMIN — CLOPIDOGREL 75 MG: 75 TABLET, FILM COATED ORAL at 09:04

## 2022-04-29 RX ADMIN — ALBUTEROL SULFATE 2.5 MG: 2.5 SOLUTION RESPIRATORY (INHALATION) at 20:12

## 2022-04-29 RX ADMIN — ENOXAPARIN SODIUM 40 MG: 100 INJECTION SUBCUTANEOUS at 09:05

## 2022-04-29 RX ADMIN — DOXYCYCLINE HYCLATE 100 MG: 100 TABLET, COATED ORAL at 21:59

## 2022-04-29 RX ADMIN — SODIUM ZIRCONIUM CYCLOSILICATE 10 G: 10 POWDER, FOR SUSPENSION ORAL at 09:04

## 2022-04-29 ASSESSMENT — ENCOUNTER SYMPTOMS
BACK PAIN: 0
COUGH: 1
VOMITING: 0
SHORTNESS OF BREATH: 1
NAUSEA: 0
VOICE CHANGE: 0
ABDOMINAL PAIN: 0

## 2022-04-29 ASSESSMENT — VISUAL ACUITY: OU: 1

## 2022-04-29 NOTE — PROGRESS NOTES
Physician Progress Note      PATIENT:               Isaias Gonzales  CSN #:                  [id number]  :                       1950  ADMIT DATE:       2022 1:09 PM  [address] [place] DATE:  RESPONDING  PROVIDER #:        Maryruth Dakin TEXT:    Pt admitted with pneumonia. If possible, please provide further specificity   regarding he type of pneumonia:    Note: CAP and HCAP indicate where the pneumonia was acquired, not a specific   type. The medical record reflects the following:  Risk Factors: COPD, recent CABG  Clinical Indicators: per H&P Acute hypoxic respiratory failure likely   secondary to community-acquired pneumonia with a history of COPD not on home   oxygen, CXR showed Left basilar opacity, WBC 17.3, afebrile, tachy- meets   SIRS, acute resp failure  Treatment: given ceftriaxone and azithromycin in the ED-changed to IV   levofloxacin, CXR, pulm consult, cont O2 not on home O2    Thank you, Geovanna Belcher, CDI  email - [email or web address]  cell- [phone]  office hours M-F-7A-3P  Options provided:  -- Gram negative pneumonia  -- Gram positive pneumonia  -- Bacterial pneumonia  -- Viral pneumonia  -- MRSA pneumonia  -- MSSA pneumonia  -- Aspiration pneumonia  -- Other - I will add my own diagnosis  -- Disagree - Not applicable / Not valid  -- Disagree - Clinically unable to determine / Unknown  -- Refer to Clinical Documentation Reviewer    PROVIDER RESPONSE TEXT:    This patient has bacterial pneumonia.     Query created by: Carlos Patel on 2022 12:53 PM      Electronically signed by:  Saint  2022 1:16 PM

## 2022-04-29 NOTE — PROGRESS NOTES
[facility]  Internal Medicine Teaching Residency Program  Inpatient Daily Progress Note  ______________________________________________________________________________    Patient: Tree Pack  YOB: 1950   VLU:[id number]    Acct: [de-identified]     Room: [address] [place] [address]  Admit date: 2022  Today's date: 22  Number of days in the hospital: 2    SUBJECTIVE   Admitting Diagnosis: Pneumonia  CC: Respiratory distress  Pt examined at bedside. Chart & results reviewed. Patient continues to be on 5 L O2 through nasal cannula and is still short of breath with cough. ROS:  Constitutional:  negative for chills, fevers, sweats  Respiratory:  negative for hemoptysis   cardiovascular:  negative for chest pain, chest pressure/discomfort, lower extremity edema, palpitations  Gastrointestinal:  negative for abdominal pain, constipation, diarrhea, nausea, vomiting  Neurological:  negative for dizziness, headache  BRIEF HISTORY     80 y.o. male presents with shortness of breath, productive cough,  and fever for last week. Patient has been using oxygen more frequently at home. Patient required 3L oxygen. Patient was afebrile in the ED. Labs showed leukocytosis 16.1, procalc elevated . 89, elevated BUN/CN, proBNP at baseline, down trending troponins (107->74), and EKG showing right bundle branch block. Xray showed no acute cardiopulmonary processes. CURB-65 score      PMH: Congestive heart failure with ejection fraction of 50% emphysema COPD, CAD w CABG and PCI (2021), GERD, HTN, Blood clots, hyperlipidemia. Of note, patient has underwent CABG in 2021 followed by PCI to RCA and has been taking all his medicines regularly.     OBJECTIVE     Vital Signs:  /78   Pulse 100   Temp 97.9 °F (36.6 °C) (Oral)   Resp 16   Ht 5' 11\" (1.803 m)   Wt 191 lb (86.6 kg)   SpO2 97%   BMI 26.64 kg/m²     Temp (24hrs), Av.6 °F (36.4 °C), Min:97.3 °F (36.3 °C), Max:97.9 °F (36.6 °C)    In: -   Out: 2125 [Urine:2125]    Physical Exam:  Constitutional: This is a well developed, well nourished, 25-29.9 - Overweight 80y.o. year old male who is alert, oriented, cooperative and in no apparent distress. Head:normocephalic and atraumatic. EENT:  PERRLA. No conjunctival injections. Septum was midline, mucosa was without erythema, exudates or cobblestoning. No thrush was noted. Neck: Supple without thyromegaly. No elevated JVP. Trachea was midline. Respiratory: Diffuse crackles heard bilaterally with expiratory wheezes. Cardiovascular: Regular without murmur, clicks, gallops or rubs. Abdomen: Slightly rounded and soft without organomegaly. No rebound, rigidity or guarding was appreciated. Lymphatic: No lymphadenopathy. Musculoskeletal: Normal curvature of the spine. No gross muscle weakness. Extremities:  No lower extremity edema, ulcerations, tenderness, varicosities or erythema. Muscle size, tone and strength are normal.  No involuntary movements are noted. Skin:  Warm and dry. Good color, turgor and pigmentation. No lesions or scars.   No cyanosis or clubbing  Neurological/Psychiatric: The patient's general behavior, level of consciousness, thought content and emotional status is normal.        Medications:  Scheduled Medications:    levofloxacin  750 mg IntraVENous Q24H    albuterol  2.5 mg Nebulization TID    methylPREDNISolone  40 mg IntraVENous Q12H    sodium chloride flush  5-40 mL IntraVENous 2 times per day    aspirin  81 mg Oral Daily    atorvastatin  40 mg Oral Nightly    clopidogrel  75 mg Oral Daily    budesonide-formoterol  2 puff Inhalation BID    [Held by provider] metoprolol tartrate  25 mg Oral BID    multivitamin  1 tablet Oral Daily    pantoprazole  40 mg Oral QAM AC    tamsulosin  0.4 mg Oral Daily    tiotropium  2 puff Inhalation Daily    sodium chloride flush  5-40 mL IntraVENous 2 times per day    enoxaparin  40 mg SubCUTAneous Daily     Continuous Infusions:    sodium chloride 100 mL/hr at 04/29/22 0853    sodium chloride 100 mL/hr at 04/28/22 2202    sodium chloride       PRN Medicationsalbuterol, 2.5 mg, Q6H PRN  sodium chloride flush, 5-40 mL, PRN  sodium chloride, , PRN  acetaminophen, 650 mg, Q4H PRN  ondansetron, 4 mg, Q8H PRN   Or  ondansetron, 4 mg, Q6H PRN  albuterol sulfate HFA, 2 puff, 4x Daily PRN  sodium chloride flush, 5-40 mL, PRN  sodium chloride, , PRN  ondansetron, 4 mg, Q8H PRN   Or  ondansetron, 4 mg, Q6H PRN  polyethylene glycol, 17 g, Daily PRN  acetaminophen, 650 mg, Q6H PRN   Or  acetaminophen, 650 mg, Q6H PRN        Diagnostic Labs:  CBC:   Recent Labs     04/27/22  1316 04/28/22  0255 04/29/22  0516   WBC 16.1* 13.7* 17.3*   RBC 4.63 4.21 3.91*   HGB 13.1 11.8* 11.0*   HCT 40.0* 35.7* 34.6*   MCV 86.4 84.8 88.5   RDW 14.1 14.2 14.5*    212 262     BMP:   Recent Labs     04/27/22  1316 04/28/22  0255 04/29/22  0516   * 131* 133*   K 3.8 4.4 5.6*   CL 94* 97* 100   CO2 23 20 20   BUN 37* 34* 40*   CREATININE 1.45* 1.21* 1.53*     BNP: No results for input(s): BNP in the last 72 hours. PT/INR:   Recent Labs     04/27/22  1316   PROTIME 10.0   INR 0.9     APTT:   Recent Labs     04/27/22  1316   APTT 29.3     CARDIAC ENZYMES: No results for input(s): CKMB, CKMBINDEX, TROPONINI in the last 72 hours. Invalid input(s): CKTOTAL;3  FASTING LIPID PANEL:  Lab Results   Component Value Date    CHOL 137 04/05/2022    HDL 53 04/05/2022    TRIG 52 04/05/2022     LIVER PROFILE: No results for input(s): AST, ALT, ALB, BILIDIR, BILITOT, ALKPHOS in the last 72 hours. MICROBIOLOGY:   Lab Results   Component Value Date/Time    CULTURE NO GROWTH 1 DAY 04/27/2022 02:32 PM       Imaging:    XR CHEST PORTABLE    Result Date: 4/27/2022  Left basilar opacity decreased since the previous exam suggesting scarring.  Otherwise, no acute cardiopulmonary process     US RETROPERITONEAL LIMITED    Result Date: 4/28/2022  1. Simple cyst at the inferior pole left kidney measuring 1.7 cm. 2. Otherwise, unremarkable renal ultrasound. No hydronephrosis. ASSESSMENT & PLAN     ASSESSMENT / PLAN:     Principal Problem:    Pneumonia unspecified  Active Problems:    Hx of Legionella Pneumonia    Hyponatremia    Acute on chronic systolic congestive heart failure (HCC)    CAD (coronary artery disease)    Esophageal reflux    Essential hypertension, benign    ANY (acute kidney injury) ([facility] [address]    Urinary retention    S/P CABG x 3  Resolved Problems:    * No resolved hospital problems. *      IMPRESSION  This is a 80 y.o. male who presented with worsening dyspnea, productive cough with a history of COPD not on home oxygen and found to have concern for community-acquired pneumonia. Principal Problem:  Acute hypoxic respiratory failure likely secondary to community-acquired pneumonia with a history of COPD not on home oxygen  Continue O2 supplementation through nasal cannula and wean as tolerated. Currently saturating on 4 L nasal cannula. Monitor saturations and keep SPO2 between 88 to 92%. He was given ceftriaxone and azithromycin in the ED. Changed to levofloxacin 750 mg IV daily. But EKG showed prolonged QTc 560. Will change again to ceftriaxone and doxycycline instead of azithromycin. Started on steroid as per pulmonology IV Solu-Medrol 40 mg TDS, albuterol and Symbicort. Legionella and streptococcal antigen COVID-19 flu a and B antigen are negative. Worsening leukocytosis WBC 17.3-could be secondary to IV steroid or infection. Active Problems:  Acute kidney injury:  BUN 40 and creatinine 1.53<1.21. We will gently hydrate him with IV fluid 100 cc/h and repeat BMP in the evening. Patient received 20 mg of Lasix yesterday. Avoid nephrotoxic medicine. Strict I's and O's.     History of congestive heart failure with ejection fraction 50%:  Echocardiography on 12/24/2021  Summary  Left ventricle is normal in size. Global left ventricular systolic function  is mildly reduced. Estimated ejection fraction is 45-50 % . Calculated EF via Reinoso's method is 49 %. Mild left ventricular hypertrophy. No significant valvular regurgitation or stenosis seen. Currently patient is not in acute exacerbation. proBNP is at baseline. CAD (coronary artery disease) s/p CABG and subsequent PCI in December 2021:  Plan: Continue aspirin, Lipitor, Plavix     Hyponatremia: Improving  Sodium this a.m. is 133. Esophageal reflux  Plan: Continue Protonix     Essential hypertension, benign:  Plan: Currently blood pressure under control. Lopressor is held due to low blood pressure. DVT ppx: Lovenox 40 mg SC daily  GI ppx: Protonix 40 mg oral daily     PT/OT/SW: Consulted  Discharge Planning:  to assist with    Jovon Blair MD  Internal Medicine Resident, PGY-1  [address], [place]  4/29/2022, 11:17 AM  Attending Physician Statement  I have discussed the care of Tree Pack and I have examined the patient myselft and taken ros and hpi , including pertinent history and exam findings,  with the resident. I have reviewed the key elements of all parts of the encounter with the resident. I agree with the assessment, plan and orders as documented by the resident.         Electronically signed by Shereen Obrien MD

## 2022-04-29 NOTE — CONSULTS
PULMONARY & CRITICAL CARE MEDICINE progress note     Patient:  Arnaldo Mendoza  MRN: [id number]  [address] [facility] date: 4/27/2022  Primary Care Physician: Manjula Pascual MD  Consulting Physician: Tanja Chavez MD  CODE Status: Full Code  LOS: 2     SUBJECTIVE     CHIEF COMPLAINT/REASON FOR CONSULT: Pneumonia    HISTORY OF PRESENT ILLNESS:  The patient is a 80 y.o. male with past medical history of tuberculosis, empyema s/p decortication (2006) pulmonary nodules, COPD and recent CABG (December 2021). He is currently following with pulmonologist at the [place]. He is now admitted with worsening shortness of breath for about a week. He reports increased cough and sputum production with yellowish-green. He had fever. Denies any sick contacts or recent travel. Chest x-ray shows left basilar opacity which seems to have improved since his last x-ray in December. He has been started on ceftriaxone and azithromycin. Interval history:  4/29/2022  Has improvement in the shortness of breath  Cough is improving  Had wheezing in the morning but none during my visit  No increasing pedal edema  No chest pain or pressure or palpitations.     No hemoptysis      PAST MEDICAL HISTORY:        Diagnosis Date    Acute upper respiratory infections of unspecified site     CAD (coronary artery disease)     COPD (chronic obstructive pulmonary disease) (HCC)     Empyema without mention of fistula     Esophageal reflux     Essential hypertension, benign     History of blood transfusion     Hx of blood clots     RLE DVT,     Localized osteoarthrosis not specified whether primary or secondary, unspecified site     Other and unspecified hyperlipidemia     Surgical or other procedure not carried out because of patient's decision     colonoscopy refused     Unspecified pleural effusion      PAST SURGICAL HISTORY:        Procedure Laterality Date    CORONARY ARTERY BYPASS GRAFT N/A 12/27/2021    CORONARY ARTERY BYPASS X3; MINH PER ANESTHESIA performed by Sherryle Citizen, MD at [address] 2006    MRSA at that time. FAMILY HISTORY:       Problem Relation Age of Onset    Diabetes Mother     Heart Disease Mother     Heart Disease Father     Heart Disease Paternal Uncle      SOCIAL HISTORY:   TOBACCO:   reports that he quit smoking about 11 years ago. He has never used smokeless tobacco.  ETOH:  reports no history of alcohol use. DRUGS: reports no history of drug use. ALLERGIES:    No Known Allergies      HOME MEDICATIONS:  Prior to Admission medications    Medication Sig Start Date End Date Taking?  Authorizing Provider   fluticasone-salmeterol (ADVAIR) 250-50 MCG/DOSE AEPB Inhale 1 puff into the lungs every 12 hours     Historical Provider, MD   aspirin 81 MG EC tablet Take 1 tablet by mouth daily 1/1/22   Elizabeth Ventura APRN - NP   atorvastatin (LIPITOR) 40 MG tablet Take 1 tablet by mouth nightly 12/31/21   Elizabeth Ventura APRN - NP   clopidogrel (PLAVIX) 75 MG tablet Take 1 tablet by mouth daily 1/1/22   Elizabeth Ventura APRN - NP   metoprolol tartrate (LOPRESSOR) 25 MG tablet Take 1 tablet by mouth 2 times daily 12/31/21   Elizabeth Ventura APRN - NP   Multiple Vitamin (MULTIVITAMIN) TABS tablet Take 1 tablet by mouth daily 12/31/21   Elizabeth Ventura APRN - NP   pantoprazole (PROTONIX) 40 MG tablet Take 1 tablet by mouth every morning (before breakfast) 1/1/22   Elizabeth Ventura APRN - NP   tamsulosin [facility]) 0.4 MG capsule Take 1 capsule by mouth daily 8/20/19   Onesimo Scanlon MD   albuterol sulfate  (90 Base) MCG/ACT inhaler Inhale 2 puffs into the lungs 4 times daily as needed for Wheezing or Shortness of Breath 8/19/19   Onesimo Scanlon MD   tiotropium (SPIRIVA RESPIMAT) 2.5 MCG/ACT AERS inhaler Inhale 2 puffs into the lungs daily    Historical Provider, MD   budesonide-formoterol (SYMBICORT) 160-4.5 MCG/ACT AERO Inhale 2 puffs into the lungs 2 times daily    Historical Provider, MD IMMUNIZATIONS:    There is no immunization history on file for this patient. REVIEW OF SYSTEMS:  Review of Systems   Constitutional: Positive for fatigue. Negative for fever. HENT: Negative for congestion and voice change. Eyes: Negative for visual disturbance. Respiratory: Positive for cough and shortness of breath. Cardiovascular: Negative for chest pain and leg swelling. Gastrointestinal: Negative for abdominal pain, nausea and vomiting. Genitourinary: Negative for dysuria and urgency. Musculoskeletal: Negative for back pain and joint swelling. Skin: Negative for rash. Neurological: Positive for weakness. Hematological: Does not bruise/bleed easily. Psychiatric/Behavioral: Negative for agitation and confusion. OBJECTIVE     PaO2/FiO2 RATIO:  No results for input(s): POCPO2 in the last 72 hours. VITAL SIGNS:   LAST:  /78   Pulse 100   Temp 97.9 °F (36.6 °C) (Oral)   Resp 16   Ht 5' 11\" (1.803 m)   Wt 191 lb (86.6 kg)   SpO2 97%   BMI 26.64 kg/m²   8-24 HR RANGE:  TEMP Temp  Av.6 °F (36.4 °C)  Min: 97.3 °F (36.3 °C)  Max: 97.9 °F (44.1 °C)   BP Systolic (79NFK), TF , Min:107 , LNV:636      Diastolic (81YIB), GSK:49, Min:64, Max:78     PULSE Pulse  Av  Min: 98  Max: 100   RR Resp  Av  Min: 16  Max: 16   O2 SAT SpO2  Av.3 %  Min: 96 %  Max: 100 %   OXYGEN DELIVERY O2 Flow Rate (L/min)  Av L/min  Min: 4 L/min  Max: 4 L/min        Physical Exam  Constitutional:       General: He is not in acute distress. Appearance: He is not ill-appearing. HENT:      Head: Normocephalic and atraumatic. Mouth/Throat:      Mouth: Mucous membranes are moist.   Eyes:      General: Vision grossly intact. No scleral icterus. Pupils: Pupils are equal.   Neck:      Thyroid: No thyromegaly. Vascular: No JVD. Cardiovascular:      Rate and Rhythm: Normal rate and regular rhythm. Pulses: Normal pulses.       Heart sounds: S1 normal and S2 normal. No murmur heard. Pulmonary:      Effort: No accessory muscle usage, prolonged expiration or respiratory distress. Breath sounds: No decreased breath sounds. Abdominal:      General: Bowel sounds are normal. There is no distension. Palpations: Abdomen is soft. Tenderness: There is no abdominal tenderness. Musculoskeletal:      Cervical back: Neck supple. Right lower leg: No edema. Left lower leg: No edema. Lymphadenopathy:      Cervical: No cervical adenopathy. Skin:     General: Skin is warm. Coloration: Skin is not pale. Neurological:      General: No focal deficit present. Mental Status: He is alert and oriented to person, place, and time.        DATA REVIEW     Medications: Current Inpatient  Scheduled Meds:   levofloxacin  750 mg IntraVENous Q24H    albuterol  2.5 mg Nebulization TID    methylPREDNISolone  40 mg IntraVENous Q12H    sodium chloride flush  5-40 mL IntraVENous 2 times per day    aspirin  81 mg Oral Daily    atorvastatin  40 mg Oral Nightly    clopidogrel  75 mg Oral Daily    budesonide-formoterol  2 puff Inhalation BID    [Held by provider] metoprolol tartrate  25 mg Oral BID    multivitamin  1 tablet Oral Daily    pantoprazole  40 mg Oral QAM AC    tamsulosin  0.4 mg Oral Daily    tiotropium  2 puff Inhalation Daily    sodium chloride flush  5-40 mL IntraVENous 2 times per day    enoxaparin  40 mg SubCUTAneous Daily     Continuous Infusions:   sodium chloride 100 mL/hr at 04/29/22 0853    sodium chloride 100 mL/hr at 04/28/22 2202    sodium chloride         INPUT/OUTPUT:  In: -   Out: 2125 [Urine:2125]  Date 04/29/22 0000 - 04/29/22 2359   Shift 1712-4303 9157-9646 9325-6056 24 Hour Total   INTAKE   Shift Total(mL/kg)       OUTPUT   Urine(mL/kg/hr) 1100(1.6) 1025  2125   Shift Total(mL/kg) 1100(12.7) 1025(11.8)  2125(24.5)   Weight (kg) 86.6 86.6 86.6 86.6        LABS:  ABGs:   No results for input(s): POCPH, POCPCO2, POCPO2, POCHCO3, RJOB3YXP in the last 72 hours. CBC:   Recent Labs     04/27/22  1316 04/28/22  0255 04/29/22  0516   WBC 16.1* 13.7* 17.3*   HGB 13.1 11.8* 11.0*   HCT 40.0* 35.7* 34.6*   MCV 86.4 84.8 88.5    212 262   LYMPHOPCT 11* 9* 4*   RBC 4.63 4.21 3.91*   MCH 28.3 28.0 28.1   MCHC 32.8 33.1 31.8   RDW 14.1 14.2 14.5*     CRP:   No results for input(s): CRP in the last 72 hours. LDH:   No results for input(s): LDH in the last 72 hours. BMP:   Recent Labs     04/27/22  1316 04/28/22  0255 04/29/22  0516   * 131* 133*   K 3.8 4.4 5.6*   CL 94* 97* 100   CO2 23 20 20   BUN 37* 34* 40*   CREATININE 1.45* 1.21* 1.53*   GLUCOSE 102* 158* 125*     Liver Function Test:   No results for input(s): PROT, LABALBU, ALT, AST, GGT, ALKPHOS, BILITOT in the last 72 hours. Coagulation Profile:   Recent Labs     04/27/22  1316   INR 0.9   PROTIME 10.0   APTT 29.3     D-Dimer:  No results for input(s): DDIMER in the last 72 hours. Lactic Acid:  No results for input(s): LACTA in the last 72 hours. Cardiac Enzymes:  No results for input(s): CKTOTAL, CKMB, CKMBINDEX, TROPONINI in the last 72 hours. Invalid input(s): TROPONIN, HSTROP  BNP/ProBNP:   Recent Labs     04/27/22  1316   PROBNP 2,804*     Triglycerides:  No results for input(s): TRIG in the last 72 hours. Microbiology:  Urine Culture:  No components found for: CURINE  Blood Culture:  No components found for: CBLOOD, CFUNGUSBL  Sputum Culture:  No components found for: [place]     04/27/22  1432 04/27/22  1432 04/27/22  1438   SPECDESC . BLOOD   < > .NASOPHARYNGEAL SWAB   SPECIAL 18CC L AC  --   --    CULTURE NO GROWTH 1 DAY  --   --     < > = values in this interval not displayed. Recent Labs     04/27/22  1426 04/27/22  1432   SPECIAL 2CC R HAND 18CC L AC   CULTURE NO GROWTH 1 DAY NO GROWTH 1 DAY          Radiology Reports:  US RETROPERITONEAL LIMITED   Final Result   1.  Simple cyst at the inferior pole left kidney measuring 1.7 cm.   2. Otherwise, unremarkable renal ultrasound. No hydronephrosis. XR CHEST PORTABLE   Final Result   Left basilar opacity decreased since the previous exam suggesting scarring. Otherwise, no acute cardiopulmonary process              Echocardiogram:   Results for orders placed during the hospital encounter of 21    ECHO Complete 2D W Doppler W Color    Summary  Left ventricle is normal in size. Global left ventricular systolic function  is mildly reduced. Estimated ejection fraction is 45-50 % . Calculated EF via Reinoso's method is 49 %. Mild left ventricular hypertrophy. No significant valvular regurgitation or stenosis seen. ASSESSMENT AND PLAN     Assessment:    // Acute hypoxic respiratory failure  // COPD exacerbation  // Suspected left lower lobe pneumonia  // Leukocytosis  // Mild ANY  // Recent CABG (2021)  // Former smoker  // History of pulmonary nodules  // Remote history of empyema and decortication  //Hyperkalemia, better  /Anemia, stable  //Leukocytosis, slightly worse, corticosteroid related? Plan:    I personally interviewed/examined the patient; reviewed interval history, interpreted all available radiographic and laboratory data at the time of service. Patient is hemodynamically stable and is currently saturating well on O2 Flow Rate (L/min)  Av L/min  Min: 4 L/min  Max: 4 L/min  Continue supplemental oxygen to keep oxygen saturation >90%  Encourage incentive spirometry  Continue pulmonary toilet, aspiration precautions and bronchodilators  Monitor I/O, electrolytes with a goal of even/negative fluid balance  Tolerating oral diet  Stress ulcer/chemical DVT prophylaxis  Antimicrobials reviewed; continue Levaquin for 7 days  Continue intravenous corticosteroids plan today and if continues to do well tomorrow can switch to oral corticosteroids  Physical/occupational therapy    We will continue to follow.  I would like to thank you for allowing me to participate in the care of this patient. Please feel free to call with any further questions or concerns. Deion Braxton MD  Pulmonary and Critical Care Medicine           4/29/2022, 11:03 AM    Please note that this chart was generated using voice recognition Dragon dictation software. Although every effort was made to ensure the accuracy of this automated transcription, some errors in transcription may have occurred.

## 2022-04-29 NOTE — PROGRESS NOTES
04/29/22 1500   Treatment   Treatment Type HHN   Medications Albuterol     Aerosol Education     [x] Good return demonstration per patient   [x] Aerosolized Medications:   DuoNeb  Verbal education has been provided in the use, benefits and possible adverse reactions of aerosolized medications used in the treatment of this patient.     Jamil Watts, MARY JO

## 2022-04-30 LAB
-: ABNORMAL
ABSOLUTE EOS #: 0 K/UL (ref 0–0.4)
ABSOLUTE IMMATURE GRANULOCYTE: 0.17 K/UL (ref 0–0.3)
ABSOLUTE LYMPH #: 1.54 K/UL (ref 1–4.8)
ABSOLUTE MONO #: 0 K/UL (ref 0.1–0.8)
ANION GAP SERPL CALCULATED.3IONS-SCNC: 11 MMOL/L (ref 9–17)
BASOPHILS # BLD: 0 % (ref 0–2)
BASOPHILS ABSOLUTE: 0 K/UL (ref 0–0.2)
BILIRUBIN URINE: NEGATIVE
BUN BLDV-MCNC: 35 MG/DL (ref 8–23)
CALCIUM SERPL-MCNC: 8.7 MG/DL (ref 8.6–10.4)
CASTS UA: ABNORMAL /LPF (ref 0–8)
CHLORIDE BLD-SCNC: 106 MMOL/L (ref 98–107)
CO2: 20 MMOL/L (ref 20–31)
COLOR: YELLOW
CREAT SERPL-MCNC: 1.14 MG/DL (ref 0.7–1.2)
EKG ATRIAL RATE: 89 BPM
EKG P AXIS: 75 DEGREES
EKG P-R INTERVAL: 156 MS
EKG Q-T INTERVAL: 424 MS
EKG QRS DURATION: 144 MS
EKG QTC CALCULATION (BAZETT): 515 MS
EKG R AXIS: -10 DEGREES
EKG T AXIS: 20 DEGREES
EKG VENTRICULAR RATE: 89 BPM
EOSINOPHILS RELATIVE PERCENT: 0 % (ref 1–4)
EPITHELIAL CELLS UA: ABNORMAL /HPF (ref 0–5)
GFR AFRICAN AMERICAN: >60 ML/MIN
GFR NON-AFRICAN AMERICAN: >60 ML/MIN
GFR SERPL CREATININE-BSD FRML MDRD: ABNORMAL ML/MIN/{1.73_M2}
GLUCOSE BLD-MCNC: 124 MG/DL (ref 70–99)
GLUCOSE URINE: NEGATIVE
HCT VFR BLD CALC: 34 % (ref 40.7–50.3)
HEMOGLOBIN: 11.2 G/DL (ref 13–17)
IMMATURE GRANULOCYTES: 1 %
KETONES, URINE: NEGATIVE
LEUKOCYTE ESTERASE, URINE: NEGATIVE
LYMPHOCYTES # BLD: 9 % (ref 24–44)
MCH RBC QN AUTO: 28.6 PG (ref 25.2–33.5)
MCHC RBC AUTO-ENTMCNC: 32.9 G/DL (ref 28.4–34.8)
MCV RBC AUTO: 86.7 FL (ref 82.6–102.9)
MONOCYTES # BLD: 0 % (ref 1–7)
MORPHOLOGY: ABNORMAL
NITRITE, URINE: NEGATIVE
NRBC AUTOMATED: 0 PER 100 WBC
PDW BLD-RTO: 14.7 % (ref 11.8–14.4)
PH UA: 5 (ref 5–8)
PLATELET # BLD: 277 K/UL (ref 138–453)
PMV BLD AUTO: 9.2 FL (ref 8.1–13.5)
POTASSIUM SERPL-SCNC: 5 MMOL/L (ref 3.7–5.3)
PROTEIN UA: NEGATIVE
RBC # BLD: 3.92 M/UL (ref 4.21–5.77)
RBC # BLD: ABNORMAL 10*6/UL
RBC UA: ABNORMAL /HPF (ref 0–4)
SEG NEUTROPHILS: 90 % (ref 36–66)
SEGMENTED NEUTROPHILS ABSOLUTE COUNT: 15.39 K/UL (ref 1.8–7.7)
SODIUM BLD-SCNC: 137 MMOL/L (ref 135–144)
SPECIFIC GRAVITY UA: 1.01 (ref 1–1.03)
TURBIDITY: CLEAR
URINE HGB: ABNORMAL
UROBILINOGEN, URINE: NORMAL
WBC # BLD: 17.1 K/UL (ref 3.5–11.3)
WBC UA: ABNORMAL /HPF (ref 0–5)

## 2022-04-30 PROCEDURE — 6370000000 HC RX 637 (ALT 250 FOR IP): Performed by: STUDENT IN AN ORGANIZED HEALTH CARE EDUCATION/TRAINING PROGRAM

## 2022-04-30 PROCEDURE — 6370000000 HC RX 637 (ALT 250 FOR IP)

## 2022-04-30 PROCEDURE — 6360000002 HC RX W HCPCS

## 2022-04-30 PROCEDURE — 6360000002 HC RX W HCPCS: Performed by: INTERNAL MEDICINE

## 2022-04-30 PROCEDURE — 80048 BASIC METABOLIC PNL TOTAL CA: CPT

## 2022-04-30 PROCEDURE — 2500000003 HC RX 250 WO HCPCS

## 2022-04-30 PROCEDURE — 81001 URINALYSIS AUTO W/SCOPE: CPT

## 2022-04-30 PROCEDURE — 99233 SBSQ HOSP IP/OBS HIGH 50: CPT | Performed by: INTERNAL MEDICINE

## 2022-04-30 PROCEDURE — 99232 SBSQ HOSP IP/OBS MODERATE 35: CPT | Performed by: INTERNAL MEDICINE

## 2022-04-30 PROCEDURE — 2700000000 HC OXYGEN THERAPY PER DAY

## 2022-04-30 PROCEDURE — 1200000000 HC SEMI PRIVATE

## 2022-04-30 PROCEDURE — 94761 N-INVAS EAR/PLS OXIMETRY MLT: CPT

## 2022-04-30 PROCEDURE — 51702 INSERT TEMP BLADDER CATH: CPT

## 2022-04-30 PROCEDURE — 2580000003 HC RX 258: Performed by: STUDENT IN AN ORGANIZED HEALTH CARE EDUCATION/TRAINING PROGRAM

## 2022-04-30 PROCEDURE — 2580000003 HC RX 258

## 2022-04-30 PROCEDURE — 94640 AIRWAY INHALATION TREATMENT: CPT

## 2022-04-30 PROCEDURE — 36415 COLL VENOUS BLD VENIPUNCTURE: CPT

## 2022-04-30 PROCEDURE — 85025 COMPLETE CBC W/AUTO DIFF WBC: CPT

## 2022-04-30 RX ORDER — PREDNISONE 20 MG/1
20 TABLET ORAL 2 TIMES DAILY
Qty: 10 TABLET | Refills: 0 | Status: SHIPPED | OUTPATIENT
Start: 2022-04-30 | End: 2022-05-05

## 2022-04-30 RX ORDER — FINASTERIDE 5 MG/1
5 TABLET, FILM COATED ORAL DAILY
Qty: 30 TABLET | Refills: 3 | Status: SHIPPED | OUTPATIENT
Start: 2022-05-01

## 2022-04-30 RX ORDER — AMOXICILLIN AND CLAVULANATE POTASSIUM 875; 125 MG/1; MG/1
1 TABLET, FILM COATED ORAL 2 TIMES DAILY
Qty: 14 TABLET | Refills: 0 | Status: SHIPPED | OUTPATIENT
Start: 2022-04-30 | End: 2022-05-07

## 2022-04-30 RX ADMIN — BUDESONIDE AND FORMOTEROL FUMARATE DIHYDRATE 2 PUFF: 160; 4.5 AEROSOL RESPIRATORY (INHALATION) at 08:11

## 2022-04-30 RX ADMIN — ALBUTEROL SULFATE 2.5 MG: 2.5 SOLUTION RESPIRATORY (INHALATION) at 14:27

## 2022-04-30 RX ADMIN — ALBUTEROL SULFATE 2.5 MG: 2.5 SOLUTION RESPIRATORY (INHALATION) at 08:10

## 2022-04-30 RX ADMIN — SODIUM CHLORIDE: 9 INJECTION, SOLUTION INTRAVENOUS at 23:52

## 2022-04-30 RX ADMIN — METHYLPREDNISOLONE SODIUM SUCCINATE 40 MG: 40 INJECTION, POWDER, FOR SOLUTION INTRAMUSCULAR; INTRAVENOUS at 11:21

## 2022-04-30 RX ADMIN — ALCOHOL 1 TABLET: 70.47 GEL TOPICAL at 09:29

## 2022-04-30 RX ADMIN — PANTOPRAZOLE SODIUM 40 MG: 40 TABLET, DELAYED RELEASE ORAL at 05:53

## 2022-04-30 RX ADMIN — FINASTERIDE 5 MG: 5 TABLET, FILM COATED ORAL at 09:29

## 2022-04-30 RX ADMIN — DESMOPRESSIN ACETATE 40 MG: 0.2 TABLET ORAL at 20:42

## 2022-04-30 RX ADMIN — SODIUM CHLORIDE, PRESERVATIVE FREE 10 ML: 5 INJECTION INTRAVENOUS at 09:29

## 2022-04-30 RX ADMIN — SODIUM CHLORIDE, PRESERVATIVE FREE 10 ML: 5 INJECTION INTRAVENOUS at 20:43

## 2022-04-30 RX ADMIN — TIOTROPIUM BROMIDE INHALATION SPRAY 2 PUFF: 3.12 SPRAY, METERED RESPIRATORY (INHALATION) at 08:11

## 2022-04-30 RX ADMIN — SODIUM CHLORIDE: 9 INJECTION, SOLUTION INTRAVENOUS at 14:16

## 2022-04-30 RX ADMIN — TAMSULOSIN HYDROCHLORIDE 0.4 MG: 0.4 CAPSULE ORAL at 09:29

## 2022-04-30 RX ADMIN — METHYLPREDNISOLONE SODIUM SUCCINATE 40 MG: 40 INJECTION, POWDER, FOR SOLUTION INTRAMUSCULAR; INTRAVENOUS at 23:45

## 2022-04-30 RX ADMIN — CLOPIDOGREL 75 MG: 75 TABLET, FILM COATED ORAL at 09:29

## 2022-04-30 RX ADMIN — Medication 81 MG: at 09:29

## 2022-04-30 RX ADMIN — BUDESONIDE AND FORMOTEROL FUMARATE DIHYDRATE 2 PUFF: 160; 4.5 AEROSOL RESPIRATORY (INHALATION) at 20:05

## 2022-04-30 RX ADMIN — DOXYCYCLINE HYCLATE 100 MG: 100 TABLET, COATED ORAL at 20:42

## 2022-04-30 RX ADMIN — DOXYCYCLINE HYCLATE 100 MG: 100 TABLET, COATED ORAL at 09:29

## 2022-04-30 RX ADMIN — CEFTRIAXONE SODIUM 1000 MG: 1 INJECTION, POWDER, FOR SOLUTION INTRAMUSCULAR; INTRAVENOUS at 14:17

## 2022-04-30 RX ADMIN — ENOXAPARIN SODIUM 40 MG: 100 INJECTION SUBCUTANEOUS at 09:29

## 2022-04-30 ASSESSMENT — ENCOUNTER SYMPTOMS
SHORTNESS OF BREATH: 1
WHEEZING: 0
VOMITING: 0
PHOTOPHOBIA: 0
VOICE CHANGE: 0
COUGH: 1
ALLERGIC/IMMUNOLOGIC NEGATIVE: 1
BLOOD IN STOOL: 0
DIARRHEA: 0
EYE REDNESS: 0
ABDOMINAL PAIN: 0
SORE THROAT: 0
TROUBLE SWALLOWING: 0

## 2022-04-30 NOTE — PROGRESS NOTES
Isaac Vora, PPatient Assessment complete. Pneumonia [J18.9]  ANY (acute kidney injury) ([facility] [address] [N17.9]  Pneumonia of left lower lobe due to infectious organism [J18.9] . Vitals:    04/30/22 0810   BP:    Pulse:    Resp:    Temp:    SpO2: 93%   . Patients home meds are   Prior to Admission medications    Medication Sig Start Date End Date Taking?  Authorizing Provider   fluticasone-salmeterol (ADVAIR) 250-50 MCG/DOSE AEPB Inhale 1 puff into the lungs every 12 hours     Historical Provider, MD   aspirin 81 MG EC tablet Take 1 tablet by mouth daily 1/1/22   Elita Romberg, APRN - NP   atorvastatin (LIPITOR) 40 MG tablet Take 1 tablet by mouth nightly 12/31/21   Elita Romberg, APRN - NP   clopidogrel (PLAVIX) 75 MG tablet Take 1 tablet by mouth daily 1/1/22   Elita Romberg, APRN - NP   metoprolol tartrate (LOPRESSOR) 25 MG tablet Take 1 tablet by mouth 2 times daily 12/31/21   Elita Romberg, APRN - NP   Multiple Vitamin (MULTIVITAMIN) TABS tablet Take 1 tablet by mouth daily 12/31/21   Elita Romberg, APRN - NP   pantoprazole (PROTONIX) 40 MG tablet Take 1 tablet by mouth every morning (before breakfast) 1/1/22   Elita Romberg, APRN - NP   tamsulosin [facility]) 0.4 MG capsule Take 1 capsule by mouth daily 8/20/19   Cassie White MD   albuterol sulfate  (90 Base) MCG/ACT inhaler Inhale 2 puffs into the lungs 4 times daily as needed for Wheezing or Shortness of Breath 8/19/19   Cassie White MD   tiotropium (SPIRIVA RESPIMAT) 2.5 MCG/ACT AERS inhaler Inhale 2 puffs into the lungs daily    Historical Provider, MD   budesonide-formoterol (SYMBICORT) 160-4.5 MCG/ACT AERO Inhale 2 puffs into the lungs 2 times daily    Historical Provider, MD     Recent Surgical History: None = 0     Assessment    Pt takes Symbicort and Spiriva at home     RR 16  Breath Sounds: CLEAR / DIMINISHED       · Bronchodilator assessment at level  2      · [x]    Bronchodilator Assessment  BRONCHODILATOR ASSESSMENT SCORE  Score 0 1 2 3 4 5   Breath Sounds   []  Patient Baseline []  No Wheeze good aeration [x]  Faint, scattered wheezing, good aeration []  Expiratory Wheezing and or moderately diminished []  Insp/Exp wheeze and/or very diminished []  Insp/Exp and/ or marked distress   Respiratory Rate   []  Patient Baseline [x]  Less than 20 [x]  Less than 20 []  20-25 []  Greater than 25 []  Greater than 25   Peak flow % of Pred or PB [x]  NA   []  Greater than 90%  []  81-90% []  71-80% []  Less than or equal to 70%  or unable to perform []  Unable due to Respiratory Distress   Dyspnea re []  Patient Baseline [x]  No SOB [x]  No SOB []  SOB on exertion []  SOB min activity []  At rest/acute   e FEV% Predicted       []  NA []  Above 69%  []  Unable []  Above 60-69%  []  Unable []  Above 50-59%  []  Unable []  Above 35-49%  []  Unable []  Less than 35%  []  Unable

## 2022-04-30 NOTE — PROGRESS NOTES
[facility]  Internal Medicine Teaching Residency Program  Inpatient Daily Progress Note  ______________________________________________________________________________    Patient: Emile Gamble  YOB: 1950   AQY:[id number]    Acct: [de-identified]     Room: [address] [place] [address]  Admit date: 4/27/2022  Today's date: 04/30/22  Number of days in the hospital: 3    SUBJECTIVE   Admitting Diagnosis: Pneumonia  CC: Respiratory distress  Pt examined at bedside. Chart & results reviewed. Overnight there was urinary retention of 571 mL and straight cath was placed. Srinivasan was also placed. Oxygen requirement came down from 5 L O2 to 2 L through nasal cannula. Patient states he feels much better today. Shortness of breath and cough have decreased. Wheezes have decreased bilaterally. ROS:  Constitutional:  negative for chills, fevers, sweats  Respiratory:  negative for hemoptysis   cardiovascular:  negative for chest pain, chest pressure/discomfort, lower extremity edema, palpitations  Gastrointestinal:  negative for abdominal pain, constipation, diarrhea, nausea, vomiting  Neurological:  negative for dizziness, headache  BRIEF HISTORY     80 y.o. male presents with shortness of breath, productive cough,  and fever for last week. Patient has been using oxygen more frequently at home. Patient required 3L oxygen. Patient was afebrile in the ED. Labs showed leukocytosis 16.1, procalc elevated . 89, elevated BUN/CN, proBNP at baseline, down trending troponins (107->74), and EKG showing right bundle branch block. Xray showed no acute cardiopulmonary processes. CURB-65 score      PMH: Congestive heart failure with ejection fraction of 50% emphysema COPD, CAD w CABG and PCI (December 2021), GERD, HTN, Blood clots, hyperlipidemia. Of note, patient has underwent CABG in December 2021 followed by PCI to RCA and has been taking all his medicines regularly.     OBJECTIVE     Vital Signs: /61   Pulse 108   Temp 97.7 °F (36.5 °C) (Oral)   Resp 18   Ht 5' 11\" (1.803 m)   Wt 191 lb (86.6 kg)   SpO2 91%   BMI 26.64 kg/m²     Temp (24hrs), Av.7 °F (36.5 °C), Min:97.6 °F (36.4 °C), Max:97.7 °F (36.5 °C)    In: -   Out: 5150 [Urine:5150]    Physical Exam:  Constitutional: This is a well developed, well nourished, 25-29.9 - Overweight 80y.o. year old male who is alert, oriented, cooperative and in no apparent distress. Head:normocephalic and atraumatic. EENT:  PERRLA. No conjunctival injections. Septum was midline, mucosa was without erythema, exudates or cobblestoning. No thrush was noted. Neck: Supple without thyromegaly. No elevated JVP. Trachea was midline. Respiratory: Wheezes have decreased bilaterally and there are no crackles. Cardiovascular: Regular without murmur, clicks, gallops or rubs. Abdomen: Slightly rounded and soft without organomegaly. No rebound, rigidity or guarding was appreciated. Lymphatic: No lymphadenopathy. Musculoskeletal: Normal curvature of the spine. No gross muscle weakness. Extremities:  No lower extremity edema, ulcerations, tenderness, varicosities or erythema. Muscle size, tone and strength are normal.  No involuntary movements are noted. Skin:  Warm and dry. Good color, turgor and pigmentation. No lesions or scars.   No cyanosis or clubbing  Neurological/Psychiatric: The patient's general behavior, level of consciousness, thought content and emotional status is normal.        Medications:  Scheduled Medications:    finasteride  5 mg Oral Daily    cefTRIAXone (ROCEPHIN) IV  1,000 mg IntraVENous Q24H    doxycycline hyclate  100 mg Oral 2 times per day    albuterol  2.5 mg Nebulization TID    methylPREDNISolone  40 mg IntraVENous Q12H    sodium chloride flush  5-40 mL IntraVENous 2 times per day    aspirin  81 mg Oral Daily    atorvastatin  40 mg Oral Nightly    clopidogrel  75 mg Oral Daily    budesonide-formoterol  2 puff Inhalation BID    [Held by provider] metoprolol tartrate  25 mg Oral BID    multivitamin  1 tablet Oral Daily    pantoprazole  40 mg Oral QAM AC    tamsulosin  0.4 mg Oral Daily    tiotropium  2 puff Inhalation Daily    sodium chloride flush  5-40 mL IntraVENous 2 times per day    enoxaparin  40 mg SubCUTAneous Daily     Continuous Infusions:    sodium chloride 100 mL/hr at 04/29/22 0853    sodium chloride 100 mL/hr at 04/28/22 2202    sodium chloride       PRN Medicationsalbuterol, 2.5 mg, Q6H PRN  sodium chloride flush, 5-40 mL, PRN  sodium chloride, , PRN  acetaminophen, 650 mg, Q4H PRN  ondansetron, 4 mg, Q8H PRN   Or  ondansetron, 4 mg, Q6H PRN  albuterol sulfate HFA, 2 puff, 4x Daily PRN  sodium chloride flush, 5-40 mL, PRN  sodium chloride, , PRN  ondansetron, 4 mg, Q8H PRN   Or  ondansetron, 4 mg, Q6H PRN  polyethylene glycol, 17 g, Daily PRN  acetaminophen, 650 mg, Q6H PRN   Or  acetaminophen, 650 mg, Q6H PRN        Diagnostic Labs:  CBC:   Recent Labs     04/28/22  0255 04/29/22  0516 04/30/22  0638   WBC 13.7* 17.3* 17.1*   RBC 4.21 3.91* 3.92*   HGB 11.8* 11.0* 11.2*   HCT 35.7* 34.6* 34.0*   MCV 84.8 88.5 86.7   RDW 14.2 14.5* 14.7*    262 277     BMP:   Recent Labs     04/28/22  0255 04/28/22  0255 04/29/22  0516 04/29/22  1201 04/30/22  0638   *   < > 133* 135 137   K 4.4   < > 5.6* 4.6 5.0   CL 97*   < > 100 101 106   CO2 20   < > 20 21 20   BUN 34*  --  40*  --  35*   CREATININE 1.21*  --  1.53*  --  1.14    < > = values in this interval not displayed. BNP: No results for input(s): BNP in the last 72 hours. PT/INR:   Recent Labs     04/27/22  1316   PROTIME 10.0   INR 0.9     APTT:   Recent Labs     04/27/22  1316   APTT 29.3     CARDIAC ENZYMES: No results for input(s): CKMB, CKMBINDEX, TROPONINI in the last 72 hours.     Invalid input(s): CKTOTAL;3  FASTING LIPID PANEL:  Lab Results   Component Value Date    CHOL 137 04/05/2022    HDL 53 04/05/2022    TRIG 52 04/05/2022     LIVER PROFILE: No results for input(s): AST, ALT, ALB, BILIDIR, BILITOT, ALKPHOS in the last 72 hours. MICROBIOLOGY:   Lab Results   Component Value Date/Time    CULTURE NO GROWTH 2 DAYS 04/27/2022 02:32 PM       Imaging:    XR CHEST PORTABLE    Result Date: 4/27/2022  Left basilar opacity decreased since the previous exam suggesting scarring. Otherwise, no acute cardiopulmonary process     US RETROPERITONEAL LIMITED    Result Date: 4/28/2022  1. Simple cyst at the inferior pole left kidney measuring 1.7 cm. 2. Otherwise, unremarkable renal ultrasound. No hydronephrosis. ASSESSMENT & PLAN     ASSESSMENT / PLAN:     Principal Problem:    Pneumonia unspecified  Active Problems:    Hx of Legionella Pneumonia    Hyponatremia    Acute on chronic systolic congestive heart failure (HCC)    CAD (coronary artery disease)    Esophageal reflux    Essential hypertension, benign    ANY (acute kidney injury) ([facility] [address]    Urinary retention    S/P CABG x 3  Resolved Problems:    * No resolved hospital problems. *      IMPRESSION  This is a 80 y.o. male who presented with worsening dyspnea, productive cough with a history of COPD not on home oxygen and found to have concern for community-acquired pneumonia. Principal Problem:  Acute hypoxic respiratory failure likely secondary to community-acquired pneumonia with a history of COPD not on home oxygen  Continue O2 supplementation through nasal cannula and wean as tolerated. Oxygen requirement decreased from 4 L to 1 L O2 through nasal cannula. Plan is to wean down completely and sent home. Monitor saturations and keep SPO2 between 88 to 92%. He was given ceftriaxone and azithromycin in the ED. Changed to levofloxacin 750 mg IV daily. But EKG showed prolonged QTc 560. Will change again to ceftriaxone and doxycycline instead of azithromycin. Plan is to send home on oral antibiotic likely doxycycline and augmentin.   Started on steroid as per pulmonology IV Solu-Medrol 40 mg TDS, albuterol and Symbicort. Legionella and streptococcal antigen COVID-19 flu a and B antigen are negative. Worsening leukocytosis WBC 17.1-could be secondary to IV steroid or infection. Active Problems:  Acute kidney injury:  BUN 40 and creatinine 1.53<1.21<1.14. We will gently hydrate him with IV fluid 100 cc/h and repeat BMP in the evening. Patient received 20 mg of Lasix yesterday. Avoid nephrotoxic medicine. Strict I's and O's. Urinary retention  Patient had a urinary retention with bladder scan of 571 mL overnight, straight cath was was placed. Plan is to remove catheter and a void trial.  If patient fails urology will be consulted. History of congestive heart failure with ejection fraction 50%:  Echocardiography on 12/24/2021  Summary  Left ventricle is normal in size. Global left ventricular systolic function  is mildly reduced. Estimated ejection fraction is 45-50 % . Calculated EF via Reinoso's method is 49 %. Mild left ventricular hypertrophy. No significant valvular regurgitation or stenosis seen. Currently patient is not in acute exacerbation. proBNP is at baseline. CAD (coronary artery disease) s/p CABG and subsequent PCI in December 2021:  Plan: Continue aspirin, Lipitor, Plavix     Hyponatremia: Improving  Sodium this a.m. is 137. Esophageal reflux  Plan: Continue Protonix     Essential hypertension, benign:  Plan: Currently blood pressure under control. Lopressor is held due to low blood pressure. DVT ppx: Lovenox 40 mg SC daily  GI ppx: Protonix 40 mg oral daily     PT/OT/SW: Consulted  Discharge Planning:  Patient came from home and plan is to send home. Cherry Cotton MD  Internal Medicine Resident, PGY-1  [address], [place]  4/30/2022, 9:36 AM  Attending Physician Statement  I have discussed the care of Satya Stinson and I have examined the patient myselft and taken ros and hpi , including pertinent history and exam findings,  with the resident. I have reviewed the key elements of all parts of the encounter with the resident. I agree with the assessment, plan and orders as documented by the resident.   Case discussed with pulm attending dr Severiano Beaver o2 mya Coyle in am as pt dont have anyone to look after him today  Oral abx      Electronically signed by Ryley Devi MD

## 2022-04-30 NOTE — PLAN OF CARE
Problem: Discharge Planning  Goal: Discharge to home or other facility with appropriate resources  Outcome: Progressing     Problem: Safety - Adult  Goal: Free from fall injury  Outcome: Progressing     Problem: Chronic Conditions and Co-morbidities  Goal: Patient's chronic conditions and co-morbidity symptoms are monitored and maintained or improved  Outcome: Progressing

## 2022-04-30 NOTE — PLAN OF CARE
Consulted Urology regarding patients's urinary retention likely secondary to prostatomegaly. Srinivasan was placed last night and removed this morning. Void trial done and post void residual by bladder scan is 433 ml per RN report. Urology resident Dr. Nathan Virgen recommended Srinivasan to be placed back in, Flomax, urinalysis with urine culture and renal ultrasound. Orders are in place. We will appreciate further urology recommendations.      Alvin Villasenor MD  PGY-1, Internal Medicine Resident  [facility]         4/30/2022, 2:28 PM

## 2022-04-30 NOTE — PROGRESS NOTES
Patient was able to void 150 mL. Post void bladder scan is 433 mL. Notified Dr. Davey Garcia via [employer]. Will continue to monitor.

## 2022-04-30 NOTE — PROGRESS NOTES
PULMONARY & CRITICAL CARE MEDICINE PROGRESS  NOTE     Patient:  Lavern Jean  MRN: [id number]  [facility] date: 4/27/2022  Primary Care Physician: Vangie Hughes MD  Consulting Physician: Lorna Rodriguez MD  CODE Status: Full Code  LOS: 3    SUBJECTIVE     I personally interviewed/examined the patient, reviewed interval history and interpreted all available radiographic, laboratory data at the time of service. Chief Compliant/Reason for Initial Consult:   Pneumonia/acute hypoxic respiratory failure    Brief Hospital Course: The patient is a 80 y.o. male with past medical history of tuberculosis, empyema s/p decortication (2006) pulmonary nodules, COPD and recent CABG (December 2021). He is currently following with pulmonologist at the [place]. He is now admitted with worsening shortness of breath for about a week. He reports increased cough and sputum production with yellowish-green. He had fever. Denies any sick contacts or recent travel. Chest x-ray shows left basilar opacity which seems to have improved since his last x-ray in December. He has been started on ceftriaxone and azithromycin. Interval History:  04/30/22  Chart reviewed, labs seen, medications reviewed and imaging studies seen. He is afebrile with T-max of 97.9 hemodynamically stable heart rate is in 90s respiratory rate is in high teens. Overnight no acute hypoxic events were reported patient was weaned down to nasal cannula to 2 to 3 L this morning and he has been saturating above 92%. According to patient he is doing better he does have cough positive sputum production currently whitish in color denies hemoptysis and chest pain. Does complain of shortness of breath on exertion but doing much better denies fever. According to patient he is not having wheezing and it resolved  His ANY is better creatinine is improved he is currently getting IV fluids.   On ceftriaxone and doxycycline and on Solu-Medrol 40 every 12    Review of Systems:  Review of Systems   Constitutional: Negative for appetite change, fatigue and fever. HENT: Negative for nosebleeds, postnasal drip, sore throat, trouble swallowing and voice change. Eyes: Negative for photophobia, redness and visual disturbance. Respiratory: Positive for cough and shortness of breath. Negative for wheezing. Gastrointestinal: Negative for abdominal pain, blood in stool, diarrhea and vomiting. Endocrine: Negative for polydipsia, polyphagia and polyuria. Genitourinary: Positive for difficulty urinating. Negative for dysuria, frequency and hematuria. Musculoskeletal: Negative. Allergic/Immunologic: Negative. Neurological: Negative for dizziness, syncope, speech difficulty and headaches. Hematological: Negative for adenopathy. Does not bruise/bleed easily. Psychiatric/Behavioral: Negative. OBJECTIVE     VITAL SIGNS:   LAST-  /61   Pulse 108   Temp 97.7 °F (36.5 °C) (Oral)   Resp 18   Ht 5' 11\" (1.803 m)   Wt 191 lb (86.6 kg)   SpO2 91%   BMI 26.64 kg/m²   8-24 HR RANGE-  TEMP Temp  Av.7 °F (36.5 °C)  Min: 97.6 °F (36.4 °C)  Max: 97.7 °F (69.5 °C)   BP Systolic (75PYK), SHL:897 , Min:119 , MARY:541      Diastolic (92BCP), RRI:14, Min:61, Max:84     PULSE Pulse  Av  Min: 94  Max: 108   RR Resp  Av  Min: 18  Max: 18   O2 SAT SpO2  Av.7 %  Min: 91 %  Max: 100 %   OXYGEN DELIVERY O2 Flow Rate (L/min)  Av.5 L/min  Min: 1 L/min  Max: 2 L/min     Systemic Examination:   Physical Exam  General appearance - looks comfortable and in no acute distress  Mental status - alert, oriented to person, place, and time  Eyes - pupils equal and reactive, extraocular eye movements intact  Mouth - mucous membranes moist, pharynx normal without lesions  Neck - supple, no significant adenopathy  Chest - Chest was symmetrical without dullness to percussion.   Bilateral breath sounds are present but distant with prolonged expiration currently no expiratory wheezing and no rhonchi. He does have crackles present at the bases. There is no intercostal recession or use of accessory muscles  Heart - normal rate, regular rhythm, normal S1, S2, no murmurs, rubs, clicks or gallops  Abdomen - soft, nontender, nondistended, no masses or organomegaly  Neurological - alert, oriented, normal speech, no focal findings or movement disorder noted  Extremities - peripheral pulses normal, no pedal edema, no clubbing or cyanosis  Skin - normal coloration and turgor, no rashes, no suspicious skin lesions noted     DATA REVIEW     Medications:  Scheduled Meds:   finasteride  5 mg Oral Daily    cefTRIAXone (ROCEPHIN) IV  1,000 mg IntraVENous Q24H    doxycycline hyclate  100 mg Oral 2 times per day    albuterol  2.5 mg Nebulization TID    methylPREDNISolone  40 mg IntraVENous Q12H    sodium chloride flush  5-40 mL IntraVENous 2 times per day    aspirin  81 mg Oral Daily    atorvastatin  40 mg Oral Nightly    clopidogrel  75 mg Oral Daily    budesonide-formoterol  2 puff Inhalation BID    [Held by provider] metoprolol tartrate  25 mg Oral BID    multivitamin  1 tablet Oral Daily    pantoprazole  40 mg Oral QAM AC    tamsulosin  0.4 mg Oral Daily    tiotropium  2 puff Inhalation Daily    sodium chloride flush  5-40 mL IntraVENous 2 times per day    enoxaparin  40 mg SubCUTAneous Daily     Continuous Infusions:   sodium chloride 100 mL/hr at 04/30/22 1416    sodium chloride 100 mL/hr at 04/28/22 2202    sodium chloride       LABS:-  ABG:   No results for input(s): POCPH, POCPCO2, POCPO2, POCHCO3, NQCG1XWS in the last 72 hours.   CBC:   Recent Labs     04/28/22  0255 04/29/22  0516 04/30/22  0638   WBC 13.7* 17.3* 17.1*   HGB 11.8* 11.0* 11.2*   HCT 35.7* 34.6* 34.0*   MCV 84.8 88.5 86.7    262 277   LYMPHOPCT 9* 4* 9*   RBC 4.21 3.91* 3.92*   MCH 28.0 28.1 28.6   MCHC 33.1 31.8 32.9   RDW 14.2 14.5* 14.7* BMP:   Recent Labs     04/28/22  0255 04/28/22  0255 04/29/22  0516 04/29/22  1201 04/30/22  0638   *   < > 133* 135 137   K 4.4   < > 5.6* 4.6 5.0   CL 97*   < > 100 101 106   CO2 20   < > 20 21 20   BUN 34*  --  40*  --  35*   CREATININE 1.21*  --  1.53*  --  1.14   GLUCOSE 158*  --  125*  --  124*    < > = values in this interval not displayed. Liver Function Test:   No results for input(s): PROT, LABALBU, ALT, AST, GGT, ALKPHOS, BILITOT in the last 72 hours. Amylase/Lipase:  No results for input(s): AMYLASE, LIPASE in the last 72 hours. Coagulation Profile:   No results for input(s): INR, PROTIME, APTT in the last 72 hours. Cardiac Enzymes:  No results for input(s): CKTOTAL, CKMB, CKMBINDEX, TROPONINI in the last 72 hours. Lactic Acid:  Lab Results   Component Value Date    LACTA NOT REPORTED 08/16/2019     BNP:   No results found for: BNP  D-Dimer:  No results found for: DDIMER  Others:   Lab Results   Component Value Date    TSH 1.63 10/24/2013     No results found for: ERIC, RHEUMFACTOR, SEDRATE, CRP  No results found for: Xiomara Roulette  No results found for: IRON, TIBC, FERRITIN  No results found for: SPEP, UPEP  Lab Results   Component Value Date    PSA 0.85 10/24/2013       Input/Output:    Intake/Output Summary (Last 24 hours) at 4/30/2022 1426  Last data filed at 4/30/2022 1330  Gross per 24 hour   Intake --   Output 4275 ml   Net -4275 ml       Microbiology:  Recent Labs     04/27/22  1432 04/27/22  1432 04/27/22  1438 04/28/22  [address] . BLOOD   < > .NASOPHARYNGEAL SWAB  --    SPECIAL 18CC L AC  --   --   --    CULTURE NO GROWTH 2 DAYS  --   --   --    MPNEUM  --   --   --  0.09    < > = values in this interval not displayed. Pathology:    Radiology reports:  US RETROPERITONEAL LIMITED   Final Result   1. Simple cyst at the inferior pole left kidney measuring 1.7 cm.   2. Otherwise, unremarkable renal ultrasound. No hydronephrosis.          XR CHEST PORTABLE   Final Result   Left basilar opacity decreased since the previous exam suggesting scarring. Otherwise, no acute cardiopulmonary process         US RETROPERITONEAL COMPLETE    (Results Pending)       Echocardiogram:   Results for orders placed during the hospital encounter of 12/23/21    ECHO Complete 2D W Doppler W Color    Narrative  Transthoracic Echocardiography Report (TTE)    Patient Name Brandon Dixon     Date of Study               12/24/2021  Fahad Hansen    Date of      1950  Gender                      Male  Birth    Age          80 year(s)  Race                            Room Number  1002        Height:                     74 inch, 187.96 cm    Corporate ID [id number]    Weight:                     192 pounds, 87.1 kg  #    Patient Acct [de-identified]   BSA:          2.14 m^2      BMI:      24.65  #                                                              kg/m^2    MR #         [id number]     Sonographer                 Maria Dolores Teague    Accession #  [id number]  Interpreting Physician      Flako Kovacs    Fellow                   Referring Nurse  Practitioner    Interpreting             Referring Physician         Jae Curtis  Fellow    Type of Study    TTE procedure:2D Echocardiogram, M-Mode, Doppler, Color Doppler. Procedure Date  Date: 12/24/2021 Start: 10:29 AM    Study Location: [facility]  Technical Quality: Fair visualization    Indications:Non-STEMI and Chest pain. History / Tech. Comments:  Procedure explained to patient. Study done at the bedside. Technically difficult study. Patient Status: Inpatient    Height: 74 inches Weight: 192 pounds BSA: 2.14 m^2 BMI: 24.65 kg/m^2    Allergies  - *No Known Allergies. CONCLUSIONS    Summary  Left ventricle is normal in size. Global left ventricular systolic function  is mildly reduced. Estimated ejection fraction is 45-50 % . Calculated EF via Reinoso's method is 49 %. Mild left ventricular hypertrophy.   No significant valvular regurgitation or stenosis seen. Signature  ----------------------------------------------------------------------------  Electronically signed by Ana Wilhelm(Sonographer) on 2021  02:20 PM  ----------------------------------------------------------------------------    ----------------------------------------------------------------------------  Electronically signed by Flako Kovacs(Interpreting physician) on  2021 02:22 PM  ----------------------------------------------------------------------------  FINDINGS  Left Atrium  Left atrium is normal in size. Left Ventricle  Left ventricle is normal in size. Global left ventricular systolic function  is mildly reduced. Estimated ejection fraction is 45-50 % . Calculated EF via Reinoso's method is 49 %. Mild left ventricular hypertrophy. Right Atrium  Right atrium is mildly dilated . Right Ventricle  The right ventricle is at the upper limits of normal in size. Right ventricular function appears normal .  Mitral Valve  Normal mitral valve structure. Trivial mitral regurgitation. No mitral stenosis. Aortic Valve  Aortic valve is sclerotic but opens well. Aortic valve is trileaflet. No aortic insufficiency. No aortic stenosis. Tricuspid Valve  Normal tricuspid valve leaflets. No tricuspid regurgitation. No tricuspid stenosis. Insignificant tricuspid regurgitation, unable to estimate RVSP. Pulmonic Valve  Pulmonic valve is normal in structure and function. No pulmonic insufficiency. No evidence of pulmonic stenosis. Pericardial Effusion  Small pericardial effusion. Miscellaneous  Normal aortic root dimension. E/E' average = 16.1. IVC normal diameter & inspiratory collapse indicating normal RA filling  pressure .     M-mode / 2D Measurements & Calculations:    LVIDd:3.6 cm(3.7 - 5.6 cm)        Diastolic BADAEV:74.34 ml  LVIDs:2.5 cm(2.2 - 4.0 cm)        Systolic NEMOY.56 ml  IVSd:1.2 cm(0.6 - 1.1 cm)         Aortic Root:3 cm(2.0 - 3.7 cm)  LVPWd:1.2 cm(0.6 - 1.1 cm)        LA Dimension: 3 cm(1.9 - 4.0 cm)  Fractional Shortenin.56 %     LA volume/Index: 39.95 ml /19m^2  Calculated LVEF (%): 44.86 %      LVOT:1.9 cm  RVDd:4.1 cm    Mitral:                                 Aortic    Valve Area (P1/2-Time): 3.44 cm^2       Peak Velocity: 0.80 m/s  Peak E-Wave: 0.56 m/s                   Mean Velocity: 0.59 m/s  Peak A-Wave: 0.52 m/s                   Peak Gradient: 2.58 mmHg  E/A Ratio: 1.08                         Mean Gradient: 2 mmHg  Peak Gradient: 1.26 mmHg  Mean Gradient: 1 mmHg  Deceleration Time: 194 msec             Area (continuity): 2.57 cm^2  P1/2t: 64 msec                          AV VTI: 15.1 cm    Area (continuity): 2.37 cm^2  Mean Velocity: 0.48 m/s    Pulmonic:    Peak Velocity: 0.77 m/s  Peak Gradient: 2.4 mmHg    Diastology / Tissue Doppler  Septal Wall E' velocity:0.06 m/s  Septal Wall E/E':9.6  Lateral Wall E' velocity:0.02 m/s  Lateral Wall E/E':22.6      Cardiac Catheterization:   No results found for this or any previous visit. ASSESSMENT AND PLAN     Assessment:    // Acute hypoxic respiratory failure  // COPD exacerbation  // Suspected left lower lobe pneumonia  // Leukocytosis  // Mild ANY  // Recent CABG (2021)  // Former smoker  // History of pulmonary nodules  // Remote history of empyema and decortication    Plan:    He is clinically improving oxygenation is improving and wean down on oxygen to 2 to 3 L.   Patient remains hemodynamically stable and is currently saturating better on nasal cannula   Continue supplemental oxygen to keep oxygen saturation greater than 90% and wean O2  Continue with the Spiriva, Symbicort and on albuterol aerosol  Continue incentive spirometry, pulmonary toilet, aspiration precautions and bronchodilators  Continue to monitor I/O with a goal of even/negative fluid balance  Recommend to discontinue IV fluids  Antimicrobials reviewed; continue Rocephin and oral doxycycline  Continue  IV Solu-Medrol at current dose/likely prednisone taper dose from tomorrow/at the time of discharge  DVT prophylaxis with Lovenox  Physical/occupational/speech therapy; increase activity as tolerated    Discussed with medicine team attending Dr. Aline Tanner. I updated the patient regarding the current clinical condition, provisional diagnosis and management plan. I addressed concerns and answered all questions to the best of my abilities. It was my pleasure to evaluate Ishan Johnson today. We will continue to follow. I would like to thank you for allowing me to participate in the care of this patient. Please feel free to call with any further questions or concerns. Sena Marquez MD, M.D. Pulmonary and Critical Care Medicine           4/30/2022, 2:26 PM    Please note that this chart was generated using voice recognition Dragon dictation software. Although every effort was made to ensure the accuracy of this automated transcription, some errors in transcription may have occurred.

## 2022-05-01 VITALS
DIASTOLIC BLOOD PRESSURE: 67 MMHG | HEART RATE: 79 BPM | TEMPERATURE: 97.1 F | WEIGHT: 191 LBS | SYSTOLIC BLOOD PRESSURE: 139 MMHG | RESPIRATION RATE: 18 BRPM | BODY MASS INDEX: 26.74 KG/M2 | HEIGHT: 71 IN | OXYGEN SATURATION: 90 %

## 2022-05-01 LAB
ABSOLUTE EOS #: 0.19 K/UL (ref 0–0.4)
ABSOLUTE IMMATURE GRANULOCYTE: 0.37 K/UL (ref 0–0.3)
ABSOLUTE LYMPH #: 4.26 K/UL (ref 1–4.8)
ABSOLUTE MONO #: 1.11 K/UL (ref 0.1–0.8)
ANION GAP SERPL CALCULATED.3IONS-SCNC: 11 MMOL/L (ref 9–17)
BASOPHILS # BLD: 0 % (ref 0–2)
BASOPHILS ABSOLUTE: 0 K/UL (ref 0–0.2)
BUN BLDV-MCNC: 30 MG/DL (ref 8–23)
CALCIUM SERPL-MCNC: 8.8 MG/DL (ref 8.6–10.4)
CHLORIDE BLD-SCNC: 106 MMOL/L (ref 98–107)
CO2: 20 MMOL/L (ref 20–31)
CREAT SERPL-MCNC: 1.05 MG/DL (ref 0.7–1.2)
EOSINOPHILS RELATIVE PERCENT: 1 % (ref 1–4)
GFR AFRICAN AMERICAN: >60 ML/MIN
GFR NON-AFRICAN AMERICAN: >60 ML/MIN
GFR SERPL CREATININE-BSD FRML MDRD: ABNORMAL ML/MIN/{1.73_M2}
GLUCOSE BLD-MCNC: 81 MG/DL (ref 70–99)
HCT VFR BLD CALC: 38.3 % (ref 40.7–50.3)
HEMOGLOBIN: 12.3 G/DL (ref 13–17)
IMMATURE GRANULOCYTES: 2 %
LYMPHOCYTES # BLD: 23 % (ref 24–44)
MCH RBC QN AUTO: 28 PG (ref 25.2–33.5)
MCHC RBC AUTO-ENTMCNC: 32.1 G/DL (ref 28.4–34.8)
MCV RBC AUTO: 87.2 FL (ref 82.6–102.9)
MONOCYTES # BLD: 6 % (ref 1–7)
MORPHOLOGY: ABNORMAL
NRBC AUTOMATED: 0 PER 100 WBC
PDW BLD-RTO: 14.7 % (ref 11.8–14.4)
PLATELET # BLD: 314 K/UL (ref 138–453)
PMV BLD AUTO: 8.8 FL (ref 8.1–13.5)
POTASSIUM SERPL-SCNC: 4.5 MMOL/L (ref 3.7–5.3)
RBC # BLD: 4.39 M/UL (ref 4.21–5.77)
SEG NEUTROPHILS: 68 % (ref 36–66)
SEGMENTED NEUTROPHILS ABSOLUTE COUNT: 12.57 K/UL (ref 1.8–7.7)
SODIUM BLD-SCNC: 137 MMOL/L (ref 135–144)
WBC # BLD: 18.5 K/UL (ref 3.5–11.3)

## 2022-05-01 PROCEDURE — 6370000000 HC RX 637 (ALT 250 FOR IP)

## 2022-05-01 PROCEDURE — 6360000002 HC RX W HCPCS: Performed by: INTERNAL MEDICINE

## 2022-05-01 PROCEDURE — 36415 COLL VENOUS BLD VENIPUNCTURE: CPT

## 2022-05-01 PROCEDURE — 51702 INSERT TEMP BLADDER CATH: CPT

## 2022-05-01 PROCEDURE — 2700000000 HC OXYGEN THERAPY PER DAY

## 2022-05-01 PROCEDURE — 99239 HOSP IP/OBS DSCHRG MGMT >30: CPT | Performed by: INTERNAL MEDICINE

## 2022-05-01 PROCEDURE — 99232 SBSQ HOSP IP/OBS MODERATE 35: CPT | Performed by: INTERNAL MEDICINE

## 2022-05-01 PROCEDURE — 6370000000 HC RX 637 (ALT 250 FOR IP): Performed by: STUDENT IN AN ORGANIZED HEALTH CARE EDUCATION/TRAINING PROGRAM

## 2022-05-01 PROCEDURE — 6360000002 HC RX W HCPCS

## 2022-05-01 PROCEDURE — 2500000003 HC RX 250 WO HCPCS

## 2022-05-01 PROCEDURE — 94760 N-INVAS EAR/PLS OXIMETRY 1: CPT

## 2022-05-01 PROCEDURE — 85025 COMPLETE CBC W/AUTO DIFF WBC: CPT

## 2022-05-01 PROCEDURE — 80048 BASIC METABOLIC PNL TOTAL CA: CPT

## 2022-05-01 PROCEDURE — 94640 AIRWAY INHALATION TREATMENT: CPT

## 2022-05-01 RX ORDER — TAMSULOSIN HYDROCHLORIDE 0.4 MG/1
0.4 CAPSULE ORAL 2 TIMES DAILY
Status: DISCONTINUED | OUTPATIENT
Start: 2022-05-01 | End: 2022-05-01 | Stop reason: HOSPADM

## 2022-05-01 RX ORDER — TAMSULOSIN HYDROCHLORIDE 0.4 MG/1
0.4 CAPSULE ORAL 2 TIMES DAILY
Qty: 30 CAPSULE | Refills: 3 | Status: SHIPPED | OUTPATIENT
Start: 2022-05-01

## 2022-05-01 RX ORDER — PREDNISONE 20 MG/1
40 TABLET ORAL DAILY
Status: DISCONTINUED | OUTPATIENT
Start: 2022-05-02 | End: 2022-05-01 | Stop reason: HOSPADM

## 2022-05-01 RX ADMIN — TAMSULOSIN HYDROCHLORIDE 0.4 MG: 0.4 CAPSULE ORAL at 09:43

## 2022-05-01 RX ADMIN — CEFTRIAXONE SODIUM 1000 MG: 1 INJECTION, POWDER, FOR SOLUTION INTRAMUSCULAR; INTRAVENOUS at 14:38

## 2022-05-01 RX ADMIN — ALBUTEROL SULFATE 2.5 MG: 2.5 SOLUTION RESPIRATORY (INHALATION) at 09:26

## 2022-05-01 RX ADMIN — CLOPIDOGREL 75 MG: 75 TABLET, FILM COATED ORAL at 09:43

## 2022-05-01 RX ADMIN — DOXYCYCLINE HYCLATE 100 MG: 100 TABLET, COATED ORAL at 09:43

## 2022-05-01 RX ADMIN — METHYLPREDNISOLONE SODIUM SUCCINATE 40 MG: 40 INJECTION, POWDER, FOR SOLUTION INTRAMUSCULAR; INTRAVENOUS at 11:15

## 2022-05-01 RX ADMIN — ENOXAPARIN SODIUM 40 MG: 100 INJECTION SUBCUTANEOUS at 09:43

## 2022-05-01 RX ADMIN — BUDESONIDE AND FORMOTEROL FUMARATE DIHYDRATE 2 PUFF: 160; 4.5 AEROSOL RESPIRATORY (INHALATION) at 09:26

## 2022-05-01 RX ADMIN — ALCOHOL 1 TABLET: 70.47 GEL TOPICAL at 09:43

## 2022-05-01 RX ADMIN — Medication 81 MG: at 09:43

## 2022-05-01 RX ADMIN — TIOTROPIUM BROMIDE INHALATION SPRAY 2 PUFF: 3.12 SPRAY, METERED RESPIRATORY (INHALATION) at 09:26

## 2022-05-01 RX ADMIN — FINASTERIDE 5 MG: 5 TABLET, FILM COATED ORAL at 09:43

## 2022-05-01 RX ADMIN — PANTOPRAZOLE SODIUM 40 MG: 40 TABLET, DELAYED RELEASE ORAL at 06:29

## 2022-05-01 ASSESSMENT — ENCOUNTER SYMPTOMS
BLOOD IN STOOL: 0
EYE REDNESS: 0
VOMITING: 0
WHEEZING: 0
VOICE CHANGE: 0
ALLERGIC/IMMUNOLOGIC NEGATIVE: 1
DIARRHEA: 0
SHORTNESS OF BREATH: 1
ABDOMINAL PAIN: 0
PHOTOPHOBIA: 0
COUGH: 1
SORE THROAT: 0
TROUBLE SWALLOWING: 0

## 2022-05-01 NOTE — CARE COORDINATION
Discharge [facility] Case Management Department  Written by: Fede Mcneil RN    Patient Name: Yeny Mcneil  Attending Provider: Kelsey Lazo MD  Admit Date: 2022  1:09 PM  MRN: [id number]  Account: [de-identified]                     : 1950  Discharge Date:        Disposition: home independent with Home O2 ordered from [facility]; confirmed with  that concentrator will be delivered. Patient declined HC needs. Patient's sister to provide transportation. Provided patient education regarding O2 tank and HCS contact information located on tank and card. Fede Mcneil RN       22 0260   IMM Letter   IMM Letter given to Patient/Family/Significant other/Guardian/POA/by: Copy of 1st IMM serving as 2nd IMM notice provided to patient by Fede Mcneil RN Case Manager. All questions answered. Patient agreeable to leave before 4 hours. Copy of 2nd IMM notice placed in patient's chart.    IMM Letter date given: 22   IMM Letter time given: 5606

## 2022-05-01 NOTE — DISCHARGE INSTR - COC
Continuity of Care Form    Patient Name: Emile Gamble   :  1950  MRN:  [id number]    Admit date:  2022  Discharge date:  ***    Code Status Order: Full Code   Advance Directives:      Admitting Physician:  Connie Vora MD  PCP: Noé Kelly MD    Discharging Nurse: [facility] Unit/Room#: 6897/7795-84  Discharging Unit Phone Number: ***    Emergency Contact:   Extended Emergency Contact Information  Primary Emergency Contact: [address] [place] [address] Phone: [phone]  Mobile Phone: [phone]  Relation: Brother/Sister  Secondary Emergency Contact: Wilbur Homans Home Phone: [phone]  Mobile Phone: [phone]  Relation: Child    Past Surgical History:  Past Surgical History:   Procedure Laterality Date    CORONARY ARTERY BYPASS GRAFT N/A 2021    CORONARY ARTERY BYPASS X3; MINH PER ANESTHESIA performed by Elida Sinclair MD at [address] [place] [address] Right     MRSA at that time. Immunization History: There is no immunization history on file for this patient.     Active Problems:  Patient Active Problem List   Diagnosis Code    Esophageal reflux K21.9    Acute upper respiratory infection J06.9    Localized osteoarthrosis M19.90    Essential hypertension, benign I10    Pleural effusion J90    Empyema of pleura (HCC) J86.9    Procedure not carried out because of patient's decision Z53.20    Other and unspecified hyperlipidemia E78.5    Bronchitis, acute J20.9    Pneumonia due to infectious organism J18.9    ANY (acute kidney injury) ([facility] [address] N17.9    Urinary retention R33.9    NSTEMI (non-ST elevated myocardial infarction) ([facility] [address] I21.4    Chest pain due to myocardial ischemia I25.9    S/P CABG x 3 Z95.1    Pneumonia unspecified J18.9    Hx of Legionella Pneumonia A48.1    Hyponatremia E87.1    Acute on chronic systolic congestive heart failure (HCC) I50.23    CAD (coronary artery disease) I25.10       Isolation/Infection:   Isolation No Isolation          Patient Infection Status       Infection Onset Added Last Indicated Last Indicated By Review Planned Expiration Resolved Resolved By    COVID-19 (Rule Out) 04/28/22 04/28/22 04/28/22 Respiratory Panel, Molecular, with COVID-19 (Restricted: peds pts or suitable admitted adults) (Ordered) 05/05/22 05/12/22      Resolved    COVID-19 (Rule Out) 04/27/22 04/27/22 04/27/22 COVID-19, Rapid (Ordered)   04/27/22 Rule-Out Test Resulted    C-diff Rule Out 12/25/21 12/25/21 12/25/21 C DIFF TOXIN/ANTIGEN (Ordered)   12/28/21 Sia Putnam RN    Not indicated            Nurse Assessment:  Last Vital Signs: /67   Pulse 79   Temp 97.1 °F (36.2 °C) (Oral)   Resp 18   Ht 5' 11\" (1.803 m)   Wt 191 lb (86.6 kg)   SpO2 90%   BMI 26.64 kg/m²     Last documented pain score (0-10 scale):    Last Weight:   Wt Readings from Last 1 Encounters:   04/28/22 191 lb (86.6 kg)     Mental Status:  {IP PT MENTAL STATUS:20030}    IV Access:  { JOHANNA IV ACCESS:279415167}    Nursing Mobility/ADLs:  Walking   {CHP DME QMAT:006244692}  Transfer  {CHP DME CQGD:624368164}  Bathing  {CHP DME VEGQ:159923647}  Dressing  {CHP DME HJLW:333746322}  Toileting  {CHP DME OMWU:121403315}  Feeding  {CHP DME HXPW:773108821}  Med Admin  {CHP DME PGEF:335301391}  Med Delivery   { JOHANNA MED Delivery:031288122}    Wound Care Documentation and Therapy:        Elimination:  Continence: Bowel: {YES / IS:78781}  Bladder: {YES / OR:43435}  Urinary Catheter: {Urinary Catheter:254029693}   Colostomy/Ileostomy/Ileal Conduit: {YES / BY:47081}       Date of Last BM: ***    Intake/Output Summary (Last 24 hours) at 5/1/2022 1349  Last data filed at 5/1/2022 1227  Gross per 24 hour   Intake 4533.8 ml   Output 4110 ml   Net 423.8 ml     I/O last 3 completed shifts: In: 4533.8 [P.O.:240;  I.V.:4293.8]  Out: 5425 [Urine:5425]    Safety Concerns:     [address] Safety Concerns:118468245}    Impairments/Disabilities:      [address] Impairments/Disabilities:557094760}    Nutrition Therapy:  Current Nutrition Therapy:   [address] JOHANNA Diet List:355263617}    Routes of Feeding: {CHP DME Other Feedings:704642188}  Liquids: {Slp liquid thickness:19726}  Daily Fluid Restriction: {CHP DME Yes amt example:035637616}  Last Modified Barium Swallow with Video (Video Swallowing Test): {Done Not Done QHNT:436655684}    Treatments at the Time of Hospital Discharge:   Respiratory Treatments: ***  Oxygen Therapy:  {Therapy; copd oxygen:82045}  Ventilator:    {[facility] Vent XIQH:580232666}    Rehab Therapies: {THERAPEUTIC INTERVENTION:0948076378}  Weight Bearing Status/Restrictions: {[facility] Weight Bearin}  Other Medical Equipment (for information only, NOT a DME order):  {EQUIPMENT:169056862}  Other Treatments: ***    Patient's personal belongings (please select all that are sent with patient):  {[facility] DME Belongings:060703508}    RN SIGNATURE:  {Esignature:445659342}    CASE MANAGEMENT/SOCIAL WORK SECTION    Inpatient Status Date: ***    Readmission Risk Assessment Score:  Readmission Risk              Risk of Unplanned Readmission:  21           Discharging to Facility/ Agency   Name:   Address:  Phone:  Fax:    Dialysis Facility (if applicable)   Name:  Address:  Dialysis Schedule:  Phone:  Fax:    / signature: {Esignature:042821916}    PHYSICIAN SECTION    Prognosis: {Prognosis:5924137223}    Condition at Discharge: [address] Mercado Patient Condition:091978747}    Rehab Potential (if transferring to Rehab): {Prognosis:4659620582}    Recommended Labs or Other Treatments After Discharge: ***    Physician Certification: I certify the above information and transfer of Misti Ríos  is necessary for the continuing treatment of the diagnosis listed and that he requires {Admit to Appropriate Level of Care:17851} for {GREATER/LESS:552078653} 30 days.      Update Admission H&P: {CHP DME Changes in LLPHU:727654578}    PHYSICIAN SIGNATURE: Electronically signed by Connie Vora MD on 5/1/22 at 1:49 PM EDT

## 2022-05-01 NOTE — PROGRESS NOTES
PULMONARY & CRITICAL CARE MEDICINE PROGRESS  NOTE     Patient:  Yuri Salgado  MRN: [id number]  [facility] date: 4/27/2022  Primary Care Physician: Mark Burns MD  Consulting Physician: Lata Carrillo MD  CODE Status: Full Code  LOS: 4    SUBJECTIVE     I personally interviewed/examined the patient, reviewed interval history and interpreted all available radiographic, laboratory data at the time of service. Chief Compliant/Reason for Initial Consult:   Pneumonia/acute hypoxic respiratory failure    Brief Hospital Course: The patient is a 80 y.o. male with past medical history of tuberculosis, empyema s/p decortication (2006) pulmonary nodules, COPD and recent CABG (December 2021). He is currently following with pulmonologist at the [place]. He is now admitted with worsening shortness of breath for about a week. He reports increased cough and sputum production with yellowish-green. He had fever. Denies any sick contacts or recent travel. Chest x-ray shows left basilar opacity which seems to have improved since his last x-ray in December. He has been started on ceftriaxone and azithromycin. Interval History:  05/01/22  Chart reviewed, labs seen, medications reviewed and imaging studies seen. He is afebrile T-max of 97.9 in last 24-hour he is hemodynamically stable heart rate is in 70s respiratory rate is not mid to high teens. On room air his saturation is 87 to 88%. According to patient he did not use oxygen during the daytime yesterday later in the afternoon but at night apparently while sleeping he had episodes of desaturation. According to patient his cough is there but better he has mild sputum production which is whitish in color does not complain of wheezing denies chest pain or hemoptysis. He is able to ambulate inside the room and outside in hallway according to patient.   He is afebrile with T-max of 97.9 hemodynamically stable heart rate is in 90s respiratory rate is in high teens. On ceftriaxone and doxycycline and on Solu-Medrol 40 every 12  He has been seen by urology and Srinivasan's catheter was removed and need to watch for post residual urine and bladder scan. Review of Systems:  Review of Systems   Constitutional: Negative for appetite change, fatigue and fever. HENT: Negative for nosebleeds, postnasal drip, sore throat, trouble swallowing and voice change. Eyes: Negative for photophobia, redness and visual disturbance. Respiratory: Positive for cough and shortness of breath. Negative for wheezing. Gastrointestinal: Negative for abdominal pain, blood in stool, diarrhea and vomiting. Endocrine: Negative for polydipsia, polyphagia and polyuria. Genitourinary: Positive for difficulty urinating. Negative for dysuria, frequency and hematuria. Musculoskeletal: Negative. Allergic/Immunologic: Negative. Neurological: Negative for dizziness, syncope, speech difficulty and headaches. Hematological: Negative for adenopathy. Does not bruise/bleed easily. Psychiatric/Behavioral: Negative.         OBJECTIVE     VITAL SIGNS:   LAST-  /67   Pulse 79   Temp 97.1 °F (36.2 °C) (Oral)   Resp 18   Ht 5' 11\" (1.803 m)   Wt 191 lb (86.6 kg)   SpO2 90%   BMI 26.64 kg/m²   8-24 HR RANGE-  TEMP Temp  Av.5 °F (36.4 °C)  Min: 97.1 °F (36.2 °C)  Max: 97.9 °F (36.6 °C)   BP Systolic (23VLY), IPM:581 , Min:119 , ZUX:708      Diastolic (88BTX), SOZ:09, Min:67, Max:81     PULSE Pulse  Av.5  Min: 79  Max: 86   RR Resp  Av  Min: 18  Max: 18   O2 SAT SpO2  Av %  Min: 87 %  Max: 96 %   OXYGEN DELIVERY O2 Flow Rate (L/min)  Av L/min  Min: 2 L/min  Max: 2 L/min     Systemic Examination:   Physical Exam  General appearance - looks comfortable and in no acute distress  Mental status - alert, oriented to person, place, and time  Eyes - pupils equal and reactive, extraocular eye movements intact  Mouth - mucous membranes moist, pharynx normal without lesions  Neck - supple, no significant adenopathy  Chest - Chest was symmetrical without dullness to percussion. Bilateral breath sounds are present but distant with slightly prolonged expiration currently no expiratory wheezing and no rhonchi. He does have crackles present at the bases. There is no intercostal recession or use of accessory muscles  Heart - normal rate, regular rhythm, normal S1, S2, no murmurs, rubs, clicks or gallops  Abdomen - soft, nontender, nondistended, no masses or organomegaly  Neurological - alert, oriented, normal speech, no focal findings or movement disorder noted  Extremities - peripheral pulses normal, no pedal edema, no clubbing or cyanosis  Skin - normal coloration and turgor, no rashes, no suspicious skin lesions noted     DATA REVIEW     Medications:  Scheduled Meds:   tamsulosin  0.4 mg Oral BID    finasteride  5 mg Oral Daily    cefTRIAXone (ROCEPHIN) IV  1,000 mg IntraVENous Q24H    doxycycline hyclate  100 mg Oral 2 times per day    albuterol  2.5 mg Nebulization TID    methylPREDNISolone  40 mg IntraVENous Q12H    sodium chloride flush  5-40 mL IntraVENous 2 times per day    aspirin  81 mg Oral Daily    atorvastatin  40 mg Oral Nightly    clopidogrel  75 mg Oral Daily    budesonide-formoterol  2 puff Inhalation BID    [Held by provider] metoprolol tartrate  25 mg Oral BID    multivitamin  1 tablet Oral Daily    pantoprazole  40 mg Oral QAM AC    tiotropium  2 puff Inhalation Daily    sodium chloride flush  5-40 mL IntraVENous 2 times per day    enoxaparin  40 mg SubCUTAneous Daily     Continuous Infusions:   sodium chloride 100 mL/hr at 04/28/22 2202    sodium chloride       LABS:-  ABG:   No results for input(s): POCPH, POCPCO2, POCPO2, POCHCO3, MOKS6UIE in the last 72 hours.   CBC:   Recent Labs     04/29/22  0516 04/30/22  0638 05/01/22  0731   WBC 17.3* 17.1* 18.5*   HGB 11.0* 11.2* 12.3*   HCT 34.6* 34.0* 38.3*   MCV 88.5 86.7 87.2    277 314   LYMPHOPCT 4* 9* 23*   RBC 3.91* 3.92* 4.39   MCH 28.1 28.6 28.0   MCHC 31.8 32.9 32.1   RDW 14.5* 14.7* 14.7*     BMP:   Recent Labs     04/29/22  0516 04/29/22  0516 04/29/22  1201 04/30/22  0638 05/01/22  0731   *   < > 135 137 137   K 5.6*   < > 4.6 5.0 4.5      < > 101 106 106   CO2 20   < > 21 20 20   BUN 40*  --   --  35* 30*   CREATININE 1.53*  --   --  1.14 1.05   GLUCOSE 125*  --   --  124* 81    < > = values in this interval not displayed. Liver Function Test:   No results for input(s): PROT, LABALBU, ALT, AST, GGT, ALKPHOS, BILITOT in the last 72 hours. Amylase/Lipase:  No results for input(s): AMYLASE, LIPASE in the last 72 hours. Coagulation Profile:   No results for input(s): INR, PROTIME, APTT in the last 72 hours. Cardiac Enzymes:  No results for input(s): CKTOTAL, CKMB, CKMBINDEX, TROPONINI in the last 72 hours. Lactic Acid:  Lab Results   Component Value Date    LACTA NOT REPORTED 08/16/2019     BNP:   No results found for: BNP  D-Dimer:  No results found for: DDIMER  Others:   Lab Results   Component Value Date    TSH 1.63 10/24/2013     No results found for: ERIC, RHEUMFACTOR, SEDRATE, CRP  No results found for: Hayde Jubilee  No results found for: IRON, TIBC, FERRITIN  No results found for: SPEP, UPEP  Lab Results   Component Value Date    PSA 0.85 10/24/2013       Input/Output:    Intake/Output Summary (Last 24 hours) at 5/1/2022 1033  Last data filed at 5/1/2022 0920  Gross per 24 hour   Intake 4533.8 ml   Output 3500 ml   Net 1033.8 ml       Microbiology:  Recent Labs     04/28/22  1048   MPNEUM 0.09       Pathology:    Radiology reports:  US RETROPERITONEAL LIMITED   Final Result   1. Simple cyst at the inferior pole left kidney measuring 1.7 cm.   2. Otherwise, unremarkable renal ultrasound. No hydronephrosis.          XR CHEST PORTABLE   Final Result   Left basilar opacity decreased since the previous exam suggesting scarring. Otherwise, no acute cardiopulmonary process             Echocardiogram:   Results for orders placed during the hospital encounter of 12/23/21    ECHO Complete 2D W Doppler W Color    Narrative  Transthoracic Echocardiography Report (TTE)    Patient Name Mario Paez     Date of Study               12/24/2021  Jennifer Hidalgo    Date of      1950  Gender                      Male  Birth    Age          80 year(s)  Race                            Room Number  1002        Height:                     74 inch, 187.96 cm    Corporate ID [id number]    Weight:                     192 pounds, 87.1 kg  #    Patient Acct [de-identified]   BSA:          2.14 m^2      BMI:      24.65  #                                                              kg/m^2    MR #         [id number]     Sonographer                 Liz Lindsay    Accession #  [id number]  Interpreting Physician      Flako Kovacs    Fellow                   Referring Nurse  Practitioner    Interpreting             Referring Physician         Kristin Willingham  Fellow    Type of Study    TTE procedure:2D Echocardiogram, M-Mode, Doppler, Color Doppler. Procedure Date  Date: 12/24/2021 Start: 10:29 AM    Study Location: [facility]  Technical Quality: Fair visualization    Indications:Non-STEMI and Chest pain. History / Tech. Comments:  Procedure explained to patient. Study done at the bedside. Technically difficult study. Patient Status: Inpatient    Height: 74 inches Weight: 192 pounds BSA: 2.14 m^2 BMI: 24.65 kg/m^2    Allergies  - *No Known Allergies. CONCLUSIONS    Summary  Left ventricle is normal in size. Global left ventricular systolic function  is mildly reduced. Estimated ejection fraction is 45-50 % . Calculated EF via Reinoso's method is 49 %. Mild left ventricular hypertrophy.   No significant valvular regurgitation or stenosis seen.    Signature  ----------------------------------------------------------------------------  Electronically signed by Ana Morris(Sonographer) on 12/24/2021  02:20 PM  ----------------------------------------------------------------------------    ----------------------------------------------------------------------------  Electronically signed by Flako Kovacs(Interpreting physician) on  12/24/2021 02:22 PM  ----------------------------------------------------------------------------  FINDINGS  Left Atrium  Left atrium is normal in size. Left Ventricle  Left ventricle is normal in size. Global left ventricular systolic function  is mildly reduced. Estimated ejection fraction is 45-50 % . Calculated EF via Reinoso's method is 49 %. Mild left ventricular hypertrophy. Right Atrium  Right atrium is mildly dilated . Right Ventricle  The right ventricle is at the upper limits of normal in size. Right ventricular function appears normal .  Mitral Valve  Normal mitral valve structure. Trivial mitral regurgitation. No mitral stenosis. Aortic Valve  Aortic valve is sclerotic but opens well. Aortic valve is trileaflet. No aortic insufficiency. No aortic stenosis. Tricuspid Valve  Normal tricuspid valve leaflets. No tricuspid regurgitation. No tricuspid stenosis. Insignificant tricuspid regurgitation, unable to estimate RVSP. Pulmonic Valve  Pulmonic valve is normal in structure and function. No pulmonic insufficiency. No evidence of pulmonic stenosis. Pericardial Effusion  Small pericardial effusion. Miscellaneous  Normal aortic root dimension. E/E' average = 16.1. IVC normal diameter & inspiratory collapse indicating normal RA filling  pressure .     M-mode / 2D Measurements & Calculations:    LVIDd:3.6 cm(3.7 - 5.6 cm)        Diastolic HKMGPW:73.65 ml  LVIDs:2.5 cm(2.2 - 4.0 cm)        Systolic DFVUPB:86.36 ml  IVSd:1.2 cm(0.6 - 1.1 cm)         Aortic Root:3 cm(2.0 - 3.7 cm)  LVPWd:1.2 cm(0.6 - 1.1 cm)        LA Dimension: 3 cm(1.9 - 4.0 cm)  Fractional Shortenin.56 %     LA volume/Index: 39.95 ml /19m^2  Calculated LVEF (%): 44.86 %      LVOT:1.9 cm  RVDd:4.1 cm    Mitral:                                 Aortic    Valve Area (P1/2-Time): 3.44 cm^2       Peak Velocity: 0.80 m/s  Peak E-Wave: 0.56 m/s                   Mean Velocity: 0.59 m/s  Peak A-Wave: 0.52 m/s                   Peak Gradient: 2.58 mmHg  E/A Ratio: 1.08                         Mean Gradient: 2 mmHg  Peak Gradient: 1.26 mmHg  Mean Gradient: 1 mmHg  Deceleration Time: 194 msec             Area (continuity): 2.57 cm^2  P1/2t: 64 msec                          AV VTI: 15.1 cm    Area (continuity): 2.37 cm^2  Mean Velocity: 0.48 m/s    Pulmonic:    Peak Velocity: 0.77 m/s  Peak Gradient: 2.4 mmHg    Diastology / Tissue Doppler  Septal Wall E' velocity:0.06 m/s  Septal Wall E/E':9.6  Lateral Wall E' velocity:0.02 m/s  Lateral Wall E/E':22.6      Cardiac Catheterization:   No results found for this or any previous visit. ASSESSMENT AND PLAN     Assessment:    // Acute hypoxic respiratory failure  // COPD exacerbation  // Suspected left lower lobe pneumonia  // Leukocytosis  // Mild ANY  // Recent CABG (2021)  // Former smoker  // History of pulmonary nodules  // Remote history of empyema and decortication    Plan:    He is clinically improving oxygenation is improving and wean down on oxygen to 2 liters on room air he is 88% on last addressed. .  Continue supplemental oxygen to keep oxygen saturation greater than 90% and wean O2  Will need home O2 evaluation before discharge  Continue with the Spiriva, Symbicort and on albuterol aerosol  Continue incentive spirometry, pulmonary toilet, aspiration precautions and bronchodilators  Continue to monitor I/O with a goal of even/negative fluid balance  Recommend to discontinue IV fluids  Antimicrobials reviewed; continue Rocephin and oral doxycycline  Discontinue IV Solu-Medrol at current dose/likely prednisone taper dose from tomorrow/at the time of discharge  DVT prophylaxis with Lovenox  Physical/occupational/speech therapy; increase activity as tolerated    Discussed with nursing    I updated the patient regarding the current clinical condition, provisional diagnosis and management plan. I addressed concerns and answered all questions to the best of my abilities. It was my pleasure to evaluate Emile Gamble today. We will continue to follow. I would like to thank you for allowing me to participate in the care of this patient. Please feel free to call with any further questions or concerns. Allison Mark MD, M.D. Pulmonary and Critical Care Medicine           5/1/2022, 10:33 AM    Please note that this chart was generated using voice recognition Dragon dictation software. Although every effort was made to ensure the accuracy of this automated transcription, some errors in transcription may have occurred.

## 2022-05-01 NOTE — CONSULTS
Norma Dailey MD, Viry Sands MD, Griffin Ayala MD, Robert Moon MD, Chetna Neves MD, Anca Singh MD, & Tom Calderón MD    Urology Consultation      Patient:  Luis Angel Dewitt  MRN: [id number]  YOB: 1950    REASON FOR CONSULTATION: Urinary retention    HISTORY OF PRESENT ILLNESS:   Luis Angel Dewitt is a 80 y.o. male who urology was consulted to evaluate for above. Patient admitted with coughing and shortness of breath and was found to have COPD exacerbation complicated by pneumonia. Patient was requiring straight catheterization for a few days for PVRs greater than 500 cc prior to Srinivasan catheter placement 2 days ago and subsequent removal the following morning, however patient was found to have persistent retention with bladder scan showing 433 cc and Srinivasan catheter was replaced. Patient notes being on Flomax chronically since at least December 2021 where he reportedly underwent a cardiac procedure and went into retention postoperatively. Urology did not see him at this time, however he was seen in 2019 where he was found to be in acute renal failure with high-volume urinary retention of 800 cc microscopic hematuria. At that time he was scheduled for follow-up with cystoscopy and retrograde pyelogram, but reportedly did not follow-up. He has never seen a urologist and has no established urologic care. Some of his care is at the [place]. He does note some nocturia x2 as well as baseline frequency every hour or so. No significant issues with stream or incomplete emptying. No current gross hematuria or dysuria. No recent UTI. No history of kidney stones. He was told in the past he had enlarged prostate. Patient is ambulating well and had 2 good bowel movements over the last couple days. Patient does have an 80-pack-year smoking history. Patient's old records, notes and chart reviewed and summarized above.     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     Esophageal reflux     Essential hypertension, benign     History of blood transfusion     Hx of blood clots     RLE DVT,     Localized osteoarthrosis not specified whether primary or secondary, unspecified site     Other and unspecified hyperlipidemia     Surgical or other procedure not carried out because of patient's decision     colonoscopy refused     Unspecified pleural effusion        Past Surgical History:    Past Surgical History:   Procedure Laterality Date    CORONARY ARTERY BYPASS GRAFT N/A 12/27/2021    CORONARY ARTERY BYPASS X3; MINH PER ANESTHESIA performed by Lele Angulo MD at [address] 2006    MRSA at that time.        Medications:      Current Facility-Administered Medications:     finasteride (PROSCAR) tablet 5 mg, 5 mg, Oral, Daily, Sherlyn Minor MD, 5 mg at 04/30/22 0929    cefTRIAXone (ROCEPHIN) 1,000 mg in sterile water 10 mL IV syringe, 1,000 mg, IntraVENous, Q24H, Wenda Alpers, MD, 1,000 mg at 04/30/22 1417    doxycycline hyclate (VIBRA-TABS) tablet 100 mg, 100 mg, Oral, 2 times per day, Wenda Alpers, MD, 100 mg at 04/30/22 2042    albuterol (PROVENTIL) nebulizer solution 2.5 mg, 2.5 mg, Nebulization, Q6H PRN, Damon Bynum MD    albuterol (PROVENTIL) nebulizer solution 2.5 mg, 2.5 mg, Nebulization, TID, Damon Bynum MD, 2.5 mg at 04/30/22 1427    methylPREDNISolone sodium (SOLU-MEDROL) injection 40 mg, 40 mg, IntraVENous, Q12H, Juan Greer MD, 40 mg at 04/30/22 2345    sodium chloride flush 0.9 % injection 5-40 mL, 5-40 mL, IntraVENous, 2 times per day, Deepa Mattson DO, 10 mL at 04/30/22 2043    sodium chloride flush 0.9 % injection 5-40 mL, 5-40 mL, IntraVENous, PRN, Deepa Mattson DO    0.9 % sodium chloride infusion, , IntraVENous, PRN, Luana Sommer, DO, Last Rate: 100 mL/hr at 04/28/22 2202, New Bag at 04/28/22 2202    acetaminophen (TYLENOL) tablet 650 mg, 650 mg, Oral, Q4H PRN, Luana Melendezd, DO    ondansetron (ZOFRAN-ODT) disintegrating tablet 4 mg, 4 mg, Oral, Q8H PRN **OR** ondansetron (ZOFRAN) injection 4 mg, 4 mg, IntraVENous, Q6H PRN, Luana Sommer, DO    albuterol sulfate  (90 Base) MCG/ACT inhaler 2 puff, 2 puff, Inhalation, 4x Daily PRN, Kim Mendieta MD    aspirin EC tablet 81 mg, 81 mg, Oral, Daily, Kim Mendieta MD, 81 mg at 04/30/22 0929    atorvastatin (LIPITOR) tablet 40 mg, 40 mg, Oral, Nightly, Kim Mendieta MD, 40 mg at 04/30/22 2042    clopidogrel (PLAVIX) tablet 75 mg, 75 mg, Oral, Daily, Kim Mendieta MD, 75 mg at 04/30/22 0929    budesonide-formoterol (SYMBICORT) 160-4.5 MCG/ACT inhaler 2 puff, 2 puff, Inhalation, BID, Kim Mendieta MD, 2 puff at 04/30/22 2005    [Held by provider] metoprolol tartrate (LOPRESSOR) tablet 25 mg, 25 mg, Oral, BID, Kim Mendieta MD    multivitamin 1 tablet, 1 tablet, Oral, Daily, Kim Mendieta MD, 1 tablet at 04/30/22 0929    pantoprazole (PROTONIX) tablet 40 mg, 40 mg, Oral, QAM AC, Kim Mendieta MD, 40 mg at 05/01/22 0629    tamsulosin (FLOMAX) capsule 0.4 mg, 0.4 mg, Oral, Daily, Kim Mendieta MD, 0.4 mg at 04/30/22 0929    tiotropium (SPIRIVA RESPIMAT) 2.5 MCG/ACT inhaler 2 puff, 2 puff, Inhalation, Daily, Kim Mendieta MD, 2 puff at 04/30/22 0811    sodium chloride flush 0.9 % injection 5-40 mL, 5-40 mL, IntraVENous, 2 times per day, Kim Mendieta MD, 10 mL at 04/30/22 0929    sodium chloride flush 0.9 % injection 5-40 mL, 5-40 mL, IntraVENous, PRN, Adamaris Blair MD    0.9 % sodium chloride infusion, , IntraVENous, PRN, Adamaris Blair MD    enoxaparin (LOVENOX) injection 40 mg, 40 mg, SubCUTAneous, Daily, Kim Mendieta MD, 40 mg at 04/30/22 0929    ondansetron (ZOFRAN-ODT) disintegrating tablet 4 mg, 4 mg, Oral, Q8H PRN **OR** ondansetron (ZOFRAN) injection 4 mg, 4 mg, IntraVENous, Q6H PRN, Adamaris Blair MD    polyethylene glycol (GLYCOLAX) packet 17 g, 17 g, Oral, Daily PRN, Dorina Crews MD    acetaminophen (TYLENOL) tablet 650 mg, 650 mg, Oral, Q6H PRN **OR** acetaminophen (TYLENOL) suppository 650 mg, 650 mg, Rectal, Q6H PRN, Dorina Crews MD    Allergies:  No Known Allergies    Social History:   Social History     Socioeconomic History    Marital status:      Spouse name: Not on file    Number of children: Not on file    Years of education: Not on file    Highest education level: Not on file   Occupational History    Not on file   Tobacco Use    Smoking status: Former Smoker     Quit date: 10/22/2010     Years since quittin.5    Smokeless tobacco: Never Used   Substance and Sexual Activity    Alcohol use: No     Comment: Sober since 1989    Drug use: No    Sexual activity: Not on file   Other Topics Concern    Not on file   Social History Narrative    Not on file     Social Determinants of Health     Financial Resource Strain:     Difficulty of Paying Living Expenses: Not on file   Food Insecurity:     Worried About Running Out of Food in the Last Year: Not on file    Shari of Food in the Last Year: Not on file   Transportation Needs:     Lack of Transportation (Medical): Not on file    Lack of Transportation (Non-Medical):  Not on file   Physical Activity:     Days of Exercise per Week: Not on file    Minutes of Exercise per Session: Not on file   Stress:     Feeling of Stress : Not on file   Social Connections:     Frequency of Communication with Friends and Family: Not on file    Frequency of Social Gatherings with Friends and Family: Not on file    Attends Congregational Services: Not on file    Active Member of Clubs or Organizations: Not on file    Attends Club or Organization Meetings: Not on file    Marital Status: Not on file   Intimate Partner Violence:     Fear of Current or Ex-Partner: Not on file    Emotionally Abused: Not on file    Physically Abused: Not on file    Sexually Abused: Not on file   Housing Stability:     Unable to Pay for Housing in the Last Year: Not on file    Number of Places Lived in the Last Year: Not on file    Unstable Housing in the Last Year: Not on file       Family History:    Family History   Problem Relation Age of Onset    Diabetes Mother     Heart Disease Mother     Heart Disease Father     Heart Disease Paternal Uncle        REVIEW OF SYSTEMS:  A comprehensive 14 point review of systems was obtained. Constitutional: No fatigue  Eyes: No blurry vision  Ears, nose, mouth, throat, face: No ringing in the ears; no facial droop. Respiratory: No cough or cold. Cardiovascular: No palpitations  Gastrointestinal: No diarrhea or constipation. Genitourinary: No burning with urination  Integument/Skin: No rashes  Hematologic/Lymphatic: No easy bruising  Musculoskeletal: No muscle pains  Neurologic: No weakness in the extremities. Psychiatric: No depression or suicidal thoughts. Endocrine: No heat or cold intolerances. Allergic/Immunologic: No current seasonal allergies; no skin hives. Physical Exam:      Vitals:    05/01/22 0445   BP:    Pulse:    Resp:    Temp:    SpO2: 90%     Constitutional: Patient in no acute distress. Neuro: Alert and oriented to person place and time. Head: Atraumatic and normocephalic. Neck: Trachea midline   Ext: 2+ radial pulses bilaterally. Psych: Mood and affect normal.  Skin: No rashes or bruising present. Lungs: Respiratory effort normal.  Cardiovascular:  Regular rhythm. Abdomen: Soft, non-tender, non-distended. Neither side has CVA tenderness on exam.  Bladder non-tender and not distended. Srinivasan catheter draining clear yellow urine. Lymphatics: no palpable lymphadenopathy.   Penis normal and circumcised  Urethral meatus normal  Scrotal exam normal  Testicles normal bilaterally      Labs:  Recent Labs     04/29/22  0516 04/30/22  0638 05/01/22  0731   WBC 17.3* 17.1* 18.5*   HGB 11.0* 11.2* 12.3*   HCT 34.6* 34.0* 38.3*   MCV 88.5 86.7 87.2    277 314     Recent Labs     04/29/22  0516 04/29/22  0516 04/29/22  1201 04/30/22  0638 05/01/22  0731   *   < > 135 137 137   K 5.6*   < > 4.6 5.0 4.5      < > 101 106 106   CO2 20   < > 21 20 20   BUN 40*  --   --  35* 30*   CREATININE 1.53*  --   --  1.14 1.05    < > = values in this interval not displayed. Recent Labs     04/30/22  1544   COLORU Yellow   PHUR 5.0   WBCUA None   RBCUA 5 TO 10   SPECGRAV 1.013   LEUKOCYTESUR NEGATIVE   UROBILINOGEN Normal   BILIRUBINUR NEGATIVE       Culture Results:        -----------------------------------------------------------------  Imaging Results:  XR CHEST PORTABLE    Result Date: 4/27/2022  EXAMINATION: ONE XRAY VIEW OF THE CHEST 4/27/2022 1:21 pm COMPARISON: 12/31/2021 HISTORY: ORDERING SYSTEM PROVIDED HISTORY: Shortness of breath, basilar rales TECHNOLOGIST PROVIDED HISTORY: Shortness of breath, basilar rales FINDINGS: Status post median sternotomy. Left basilar opacity. There is no effusion or pneumothorax. The cardiomediastinal silhouette is stable. The osseous structures are stable. Left basilar opacity decreased since the previous exam suggesting scarring. Otherwise, no acute cardiopulmonary process     US RETROPERITONEAL LIMITED    Result Date: 4/28/2022  EXAMINATION: ULTRASOUND OF THE KIDNEYS 4/28/2022 9:31 am COMPARISON: CT abdomen pelvis from 08/18/2019 HISTORY: ORDERING SYSTEM PROVIDED HISTORY: ANY , h/o of urinary retention TECHNOLOGIST PROVIDED HISTORY: Bilateral Renal Ultrasound ANY , h/o of urinary retention 73-year-old male with acute kidney injury; history of urinary retention FINDINGS: Right kidney measures 11.1 x 6.0 x 5.2 cm. Right renal cortical thickness measures 1.3 cm. Left kidney measures 10.2 x 5.1 x 5.8 cm.   Left renal cortical thickness measures 1.5 cm. No hydronephrosis or perinephric fluid. No echogenic foci with posterior acoustic shadowing to suggest renal calculi. Gross preservation of the bilateral corticomedullary differentiation. Color flow projects over the bilateral renal parenchyma. Simple cyst at the inferior pole left kidney measuring 1.7 x 1.3 x 1.7 cm.     1. Simple cyst at the inferior pole left kidney measuring 1.7 cm. 2. Otherwise, unremarkable renal ultrasound. No hydronephrosis.        Assessment and Plan   Assessment:  Rebecca Granados is a 80 y.o. male who presents with:  Recurrent urinary retention  BPH with lower urinary tract symptoms on Flomax  Microscopic hematuria  History of tobacco abuse    Plan:   Patient is ambulating well, has recovered from acute illness with plans for discharge today, and is having regular bowel movements; will plan for repeat voiding trial today  Continue Flomax  Renal ultrasound negative for hydronephrosis or other acute abnormalities, simple cyst requires no follow-up  UA negative for infection, 5-10 RBCs noted consistent with microscopic hematuria, though this could be from instrumentation  Recommend outpatient follow-up with cystoscopy to evaluate prostate as well as bladder and possible dedicated upper tract imaging to be determined on an outpatient basis, will schedule patient to follow-up in our staff clinic here at [facility]  No objection to discharge later today pending void trial    Baldev Freed MD  PGY-5, [address] Department of Urology   8:28 AM 5/1/2022

## 2022-05-01 NOTE — PROGRESS NOTES
Patient was evaluated today for the diagnosis of COPD. I entered a DME order for home oxygen because the diagnosis and testing requires the patient to have supplemental oxygen. Condition will improve or be benefited by oxygen use. The patient is not able to perform good mobility in a home setting and therefore does require the use of a portable oxygen system. The need for this equipment was discussed with the patient and he understands and is in agreement.     Vanda Chung  PGY-1  Internal Medicine  [facility]   43:49 [place] 5/1/2022

## 2022-05-01 NOTE — CARE COORDINATION
Transitional Planning     Left VM for [facility] O[facility] provider at [phone]    1300 reviewed patient's media chart. Patient has Medicare, which patient confirmed. Per patient's request, O2 order faxed to [facility]. 26 Spoke with [facility] answering service to confirm with , answering service left VM for  to call CM. Patient declined HC needs for river. Patient states that he has had a river previously and is able to care for it. 1450 call from [facility] ; is now aware of O2 order. Provided him with patient information. Will be waiting for patient's call.

## 2022-05-01 NOTE — PROGRESS NOTES
CLINICAL PHARMACY NOTE: MEDS TO BEDS    Total # of Prescriptions Filled: 3   The following medications were delivered to the patient:  · Prednisone  · Finasteride  · Augmentin    Additional Documentation:

## 2022-05-01 NOTE — PROGRESS NOTES
[facility]  Internal Medicine Teaching Residency Program  Inpatient Daily Progress Note  ______________________________________________________________________________    Patient: Luna Rogers  YOB: 1950   MZD:[id number]    Acct: [de-identified]     Room: /6580  Admit date: 4/27/2022  Today's date: 05/01/22  Number of days in the hospital: 4    SUBJECTIVE   Admitting Diagnosis: Pneumonia  CC: shortness of breath and cough    No acute issues overnight  Pt examined at bedside. Sitting in a chair  Alert and oriented  On room air  Denies any new complaints    Chart & results reviewed. Has been afebrile  HR and blood pressure has been stable  On room air    Labs -   Sodium - 137  Potassium 3.5  Creatinine 1.05    Wbc 18.5, on steroids  Hb 12.3    Urine output 3.4 L  On fluids 100 ml per hr, will discontinue today    On rocephin and doxycycline for pneumonia  On solumedrol 40 mg IV BID for COPD  Negative pneumonia work up  Follow up on home O2 eval    Srinivasan catheter in place as per urology for urinary retention, void trail today      ROS:  Constitutional:  negative for chills, fevers, sweats  Respiratory:  negative for cough, dyspnea on exertion, hemoptysis, shortness of breath, wheezing  Cardiovascular:  negative for chest pain, chest pressure/discomfort, lower extremity edema, palpitations  Gastrointestinal:  negative for abdominal pain, constipation, diarrhea, nausea, vomiting  Neurological:  negative for dizziness, headache    BRIEF HISTORY     80 y.o. male presents with shortness of breath, productive cough,  and fever for last week. Patient has been using oxygen more frequently at home. Patient required 3L oxygen. Patient was afebrile in the ED. Labs showed leukocytosis 16.1, procalc elevated . 89, elevated BUN/CN, proBNP at baseline, down trending troponins (107->74), and EKG showing right bundle branch block.  Xray showed no acute cardiopulmonary processes. CURB-65 score       PMH: Congestive heart failure with ejection fraction of 50% emphysema COPD, CAD w CABG and PCI (2021), GERD, HTN, Blood clots, hyperlipidemia. Of note, patient has underwent CABG in 2021 followed by PCI to RCA and has been taking all his medicines regularly. OBJECTIVE     Vital Signs:  /81   Pulse 79   Temp 97.9 °F (36.6 °C) (Oral)   Resp 18   Ht 5' 11\" (1.803 m)   Wt 191 lb (86.6 kg)   SpO2 90%   BMI 26.64 kg/m²     Temp (24hrs), Av.8 °F (36.6 °C), Min:97.7 °F (36.5 °C), Max:97.9 °F (36.6 °C)    In: 4533.8   Out: 3400 [Urine:3400]    Physical Exam:  Physical Exam  Constitutional:       Appearance: Normal appearance. HENT:      Head: Normocephalic and atraumatic. Mouth/Throat:      Mouth: Mucous membranes are moist.   Eyes:      Extraocular Movements: Extraocular movements intact. Cardiovascular:      Rate and Rhythm: Normal rate and regular rhythm. Pulses: Normal pulses. Heart sounds: Normal heart sounds. No murmur heard. Pulmonary:      Effort: Pulmonary effort is normal. No respiratory distress. Breath sounds: Wheezing and rales present. Comments: Mild inspiratory wheezing  Crackles B/L bases  Abdominal:      General: There is no distension. Palpations: Abdomen is soft. Tenderness: There is no abdominal tenderness. Musculoskeletal:         General: No swelling. Normal range of motion. Cervical back: Normal range of motion. No rigidity. Right lower leg: No edema. Left lower leg: No edema. Skin:     General: Skin is warm. Coloration: Skin is not jaundiced. Findings: No bruising or rash. Neurological:      General: No focal deficit present. Mental Status: He is alert and oriented to person, place, and time.    Psychiatric:         Mood and Affect: Mood normal.         Behavior: Behavior normal.           Medications:  Scheduled Medications:    finasteride  5 mg Oral Daily    cefTRIAXone (ROCEPHIN) IV  1,000 mg IntraVENous Q24H    doxycycline hyclate  100 mg Oral 2 times per day    albuterol  2.5 mg Nebulization TID    methylPREDNISolone  40 mg IntraVENous Q12H    sodium chloride flush  5-40 mL IntraVENous 2 times per day    aspirin  81 mg Oral Daily    atorvastatin  40 mg Oral Nightly    clopidogrel  75 mg Oral Daily    budesonide-formoterol  2 puff Inhalation BID    [Held by provider] metoprolol tartrate  25 mg Oral BID    multivitamin  1 tablet Oral Daily    pantoprazole  40 mg Oral QAM AC    tamsulosin  0.4 mg Oral Daily    tiotropium  2 puff Inhalation Daily    sodium chloride flush  5-40 mL IntraVENous 2 times per day    enoxaparin  40 mg SubCUTAneous Daily     Continuous Infusions:    sodium chloride 100 mL/hr at 05/01/22 0410    sodium chloride 100 mL/hr at 04/28/22 2202    sodium chloride       PRN Medicationsalbuterol, 2.5 mg, Q6H PRN  sodium chloride flush, 5-40 mL, PRN  sodium chloride, , PRN  acetaminophen, 650 mg, Q4H PRN  ondansetron, 4 mg, Q8H PRN   Or  ondansetron, 4 mg, Q6H PRN  albuterol sulfate HFA, 2 puff, 4x Daily PRN  sodium chloride flush, 5-40 mL, PRN  sodium chloride, , PRN  ondansetron, 4 mg, Q8H PRN   Or  ondansetron, 4 mg, Q6H PRN  polyethylene glycol, 17 g, Daily PRN  acetaminophen, 650 mg, Q6H PRN   Or  acetaminophen, 650 mg, Q6H PRN        Diagnostic Labs:  CBC:   Recent Labs     04/29/22  0516 04/30/22  0638   WBC 17.3* 17.1*   RBC 3.91* 3.92*   HGB 11.0* 11.2*   HCT 34.6* 34.0*   MCV 88.5 86.7   RDW 14.5* 14.7*    277     BMP:   Recent Labs     04/29/22  0516 04/29/22  1201 04/30/22  0638   * 135 137   K 5.6* 4.6 5.0    101 106   CO2 20 21 20   BUN 40*  --  35*   CREATININE 1.53*  --  1.14     BNP: No results for input(s): BNP in the last 72 hours. PT/INR: No results for input(s): PROTIME, INR in the last 72 hours. APTT: No results for input(s): APTT in the last 72 hours.   CARDIAC ENZYMES: No results for input(s): CKMB, CKMBINDEX, TROPONINI in the last 72 hours. Invalid input(s): CKTOTAL;3  FASTING LIPID PANEL:  Lab Results   Component Value Date    CHOL 137 04/05/2022    HDL 53 04/05/2022    TRIG 52 04/05/2022     LIVER PROFILE: No results for input(s): AST, ALT, ALB, BILIDIR, BILITOT, ALKPHOS in the last 72 hours. MICROBIOLOGY:   Lab Results   Component Value Date/Time    CULTURE NO GROWTH 3 DAYS 04/27/2022 02:32 PM       Imaging:    XR CHEST PORTABLE    Result Date: 4/27/2022  Left basilar opacity decreased since the previous exam suggesting scarring. Otherwise, no acute cardiopulmonary process     US RETROPERITONEAL LIMITED    Result Date: 4/28/2022  1. Simple cyst at the inferior pole left kidney measuring 1.7 cm. 2. Otherwise, unremarkable renal ultrasound. No hydronephrosis. ASSESSMENT & PLAN     Assessment and Plan:    Principal Problem:    Pneumonia unspecified  Active Problems:    Hx of Legionella Pneumonia    Hyponatremia    Acute on chronic systolic congestive heart failure (HCC)    CAD (coronary artery disease)    Esophageal reflux    Essential hypertension, benign    ANY (acute kidney injury) ([facility] [address]    Urinary retention    S/P CABG x 3  Resolved Problems:    * No resolved hospital problems. *      1. Acute hypoxic respiratory failure secondary to pneumonia (community acquired) and COPD -   - improving, on room air currently  - keep saturations between 88% - 92%  - continue rocephin and doxycyline  - negative legionella, strep pneumo, respiratory panel  - continue bronchodilators, albuterol, Symbicort and spiriva  - continue solumedrol IV 40 mg BID, will send on oral steroids  - RT aerosol protocol  - pulmonology onboard, recommends bronchodilators and incentive spirometry   - will discharge on oral antibiotics    2. ANY -   - creatinine improved, 1.05 today  - on fluids  ml/hr  - will discontinue today    3.  Urinary retention -   - river catheter in place  - continue Flomax and finasteride  - urology onboard  - void trail today and outpatient follow up    4. CHFrEF -   - LVEF 49%    5. CAD -   - last cath dec 2021  - PTCA to RCA  - continue aspirin, Plavix and Lipitor    6.  Essential hypertension -    - lopressor held due to borderline low blood pressures      Diet: regular diet  DVT ppx : lovenox  GI ppx: protonix    PT/OT: onboard, patient independent with functional mobility  Discharge Planning / SW: patient going home    Vanda Chung  PGY-1  Internal Medicine  [facility]   9:35 [place] 5/1/2022

## 2022-05-01 NOTE — PROGRESS NOTES
Assessment: Patient was awake and oriented when  visited. Family was present and open to spiritual care. When asked how he was feeling, patient responded; \"I am feeling great and ready to go home. \" Patient was raised [employer]. Patient said he was waiting for his discharge papers. Intervention:  provided presence, offered support and prayed with patient and family. Outcome: Patient and family expressed appreciation for the spiritual support they received. Plan: Chaplains would continue to remain available for more prayers and support.

## 2022-05-02 ENCOUNTER — CARE COORDINATION (OUTPATIENT)
Dept: CASE MANAGEMENT | Age: 72
End: 2022-05-02

## 2022-05-02 ENCOUNTER — HOSPITAL ENCOUNTER (OUTPATIENT)
Dept: CARDIAC REHAB | Age: 72
Setting detail: THERAPIES SERIES
Discharge: HOME | End: 2022-05-02
Payer: MEDICARE

## 2022-05-02 LAB
CULTURE: NORMAL
CULTURE: NORMAL
Lab: NORMAL
Lab: NORMAL
SPECIMEN DESCRIPTION: NORMAL
SPECIMEN DESCRIPTION: NORMAL

## 2022-05-02 NOTE — PROGRESS NOTES
Placing patient's visits on hold due to recent hospitalization. Pt states is on O2 at home as well as has river catheter and would like visits on hold for now.
1.78

## 2022-05-02 NOTE — CARE COORDINATION
[facility] Transitions Initial Follow Up Call- Deangelo Gerardo MD- one time call     Call within 2 business days of discharge: Yes    Patient: Nathan Riojas Patient : 1950   MRN: [id number]  Reason for Admission: LLL pneumonia, urinary retention and high post-void residual  Discharge Date: 22 RARS: Readmission Risk Score: 12.3 ( )      Last Discharge [facility]       Complaint Diagnosis Description Type Department Provider    22 Respiratory Distress Pneumonia of left lower lobe due to infectious organism . .. ED to Hosp-Admission (Discharged) (ADMITTED) Carmelita Polanco MD; Car Call. .. Spoke with: Rajiv Vas     Call to pt who states he is doing good. States breathing has improved. He is wearing the oxygen off and on. He has a pulse oximeter but hasn't used it yet- writer educated on parameters to monitor for (>90%)  He states frustration at scheduling appointments (can't get in with uro for 6 weeks despite having a river catheter)- urology and PCP. He is agreeable to help from Micah Mendez, [address] [place] [address] [address]  to help with getting these appointments  Confirms appt with pulm   States urine in bag is clear and no blood   He confirms new and changed meds. He confirms he is on the 3 daily inhalers as well as the rescue inhaler (proair/ albuterol) which he seldom uses   He denies fever/ chills, nausea, or problems with bowels- writer encouraged him to reach out to PCP if he gets diarrhea from abx- v/u  He denies needs  Writer informs this is final (CTC) phone call- v/u. Encouraged call writer/ CTC or providers if questions or concerns- v/u. Episode closed      Transitions of Care Initial Call    Was this an external facility discharge?  No Discharge Facility: [facility]     Challenges to be reviewed by the provider   Additional needs identified to be addressed with provider: No  none             Method of communication with provider : none      Advance Care Planning:   Does patient have an Advance Directive: reviewed and current. Was this a readmission? No  Patient stated reason for admission: pneumonia  Patients top risk factors for readmission: lack of knowledge about disease    Care Transition Nurse (CTN) contacted the patient by telephone to perform post hospital discharge assessment. Verified name and  with patient as identifiers. Provided introduction to self, and explanation of the CTN role. CTN reviewed discharge instructions, medical action plan and red flags with patient who verbalized understanding. Patient given an opportunity to ask questions and does not have any further questions or concerns at this time. Were discharge instructions available to patient? Yes. Reviewed appropriate site of care based on symptoms and resources available to patient including: PCP  Specialist  When to call 911  ctn. The patient agrees to contact the PCP office for questions related to their healthcare. Medication reconciliation was performed with patient, who verbalizes understanding of administration of home medications. Advised obtaining a 90-day supply of all daily and as-needed medications. Covid Risk Education     Educated patient about risk for severe COVID-19 due to risk factors according to CDC guidelines. CTN reviewed discharge instructions, medical action plan and red flag symptoms with the patient who verbalized understanding. Discussed COVID vaccination status: Yes. Education provided on COVID-19 vaccination as appropriate. Discussed exposure protocols and quarantine with CDC Guidelines. Patient was given an opportunity to verbalize any questions and concerns and agrees to contact CTN or health care provider for questions related to their healthcare. Reviewed and educated patient on any new and changed medications related to discharge diagnosis. Was patient discharged with a pulse oximeter?  No Discussed and confirmed pulse oximeter discharge instructions and when to notify provider or seek emergency care. CTN provided contact information. No further follow-up call indicated based on severity of symptoms and risk factors.   Plan for next call: n/a          Non-face-to-face services provided:  Scheduled appointment with PCP-SARTHAK Newman CT  helping to schedule this  Scheduled appointment with Specialist-confirms pulm appt 6/1, Rene Barr MA CT  helping to schedule with uro  Obtained and reviewed discharge summary and/or continuity of care documents  Assessment and support for treatment adherence and medication management-confirms new and changed meds    Care Transitions 24 Hour Call    Do you have a copy of your discharge instructions?: Yes  Do you have all of your prescriptions and are they filled?: Yes  Have you scheduled your follow up appointment?: Yes  How are you going to get to your appointment?: Car - family or friend to transport  Post Acute Services: [address] (Comment: [place])  Do you feel like you have everything you need to keep you well at home?: Yes  Care Transitions Interventions         Follow Up  Future Appointments   Date Time Provider Ed Parker   5/16/2022  8:00 AM STC CARD [facility] RM 05 [address]   5/18/2022  8:00 AM STC CARD REHAB RM 05 [address]   5/23/2022  8:00 AM STC CARD REHAB RM [facility]   5/25/2022  8:00 AM STC CARD REHAB RM 05 [address]   6/1/2022  8:00 AM STC CARD REHAB RM 58[facility]   6/1/2022  9:30 AM Erling Heimlich, MD Resp Spec [id number]   6/6/2022  8:00 AM STC CARD REHAB RM 05 [facility]   6/8/2022  8:00 AM STC CARD REHAB RM 05 [facility]   6/13/2022  8:00 AM STC CARD REHAB RM 05 [facility]   6/15/2022  8:00 AM STC CARD REHAB RM 05 [address]   6/20/2022  8:00 AM STC CARD REHAB RM 05 [address]   6/22/2022  8:00 AM STC CARD REHAB RM 05 [facility]   6/27/2022  8:00 AM STC CARD REHAB RM 05 [facility]   6/29/2022  8:00 AM STC CARD REHAB RM 11 [facility]   7/6/2022  8:00 AM STC CARD REHAB RM 07 [facility]   7/11/2022  8:00 AM STC CARD REHAB RM 04 [facility]   7/13/2022  8:00 AM STC CARD REHAB RM 04 [facility]   7/18/2022  8:00 AM STC CARD REHAB RM 02 [facility]   7/20/2022  8:00 AM STC CARD REHAB RM 03 [facility]   7/25/2022  8:00 AM STC CARD REHAB RM 03 [facility]   7/27/2022  8:00 AM STC CARD REHAB RM 03 [facility]   8/1/2022  8:00 AM STC CARD REHAB  [facility]

## 2022-05-03 ENCOUNTER — CARE COORDINATION (OUTPATIENT)
Dept: CASE MANAGEMENT | Age: 72
End: 2022-05-03

## 2022-05-03 NOTE — CARE COORDINATION
Request from [address] Damir Tobias RN.,  Please schedule patient for hospital f/u  With urology (patient has river)  And with PCP at [facility] to call patient with schedule date and time    Urology- left message for NP .   Did leave detailed message patient has river and needs to be seen soon    Arsalan Phillips, [facility] Coordination Transition

## 2022-05-03 NOTE — CARE COORDINATION
Per Jesús Gannon will call pt to schedule with PCP. She left detailed VM with uro office about need for appt d/t river. Final call.  Episode closed

## 2022-05-04 ENCOUNTER — TELEPHONE (OUTPATIENT)
Dept: UROLOGY | Age: 72
End: 2022-05-04

## 2022-05-04 ENCOUNTER — APPOINTMENT (OUTPATIENT)
Dept: CARDIAC REHAB | Age: 72
End: 2022-05-04
Payer: MEDICARE

## 2022-05-04 NOTE — TELEPHONE ENCOUNTER
Patient has been seen in our clinic but at last office visit stated that he follows up at the [address] [place] [address]

## 2022-05-04 NOTE — TELEPHONE ENCOUNTER
----- Message from Rolf Nieto MD sent at 5/1/2022 11:16 AM EDT -----  Regarding: Hospital Follow-up  Patient seen for urinary retention and microhematuria. Please have him follow-up in [facility] staff clinic in 2 weeks - needs to be scheduled for cysto at that visit. Thanks.

## 2022-05-05 ENCOUNTER — CARE COORDINATION (OUTPATIENT)
Dept: CASE MANAGEMENT | Age: 72
End: 2022-05-05

## 2022-05-05 NOTE — DISCHARGE SUMMARY
[address]     Department of Internal Medicine - Staff Internal Medicine Teaching Service    INPATIENT DISCHARGE SUMMARY      Patient Identification:  Re Lopez is a 80 y.o. male. :  1950  MRN: [id number]     Acct: [de-identified]   PCP: Javid Tran MD  Admit Date:  2022  Discharge date and time: 2022  Attending Provider: Dr Laly Dunn Problem Lists:  Principal Problem:    Pneumonia unspecified  Active Problems:    Hx of Legionella Pneumonia    Hyponatremia    Acute on chronic systolic congestive heart failure (HCC)    CAD (coronary artery disease)    Esophageal reflux    Essential hypertension, benign    ANY (acute kidney injury) ([facility] [address]    Urinary retention    S/P CABG x 3  Resolved Problems:    * No resolved hospital problems. *      HOSPITAL STAY     Brief Inpatient course:   Re Lopez is a 80 y.o. male who was admitted for the management of Pneumonia, presented to the emergency department with cough, fever and shortness of breath. Has been using oxygen more frequently. Has medical history of CHFrEF, COPD, CAD s/p CABG, PCI, GERD, HTN  Lab work in the ED showed leucocytosis, elevated pro luis miguel, requiring 3 L NC  Patient admitted due to concerns of acute hypoxic respiratory failure secondary to community acquired pneumonia and COPD. Patient was was given rocephin and azithromycin IV in the ED, EKG showed prolonged QTc, antibiotics were changed to azithromycin and doxycycline. Pneumonia work up negative. Started on bronchodilators, steroids  Came in with ANY, improved with fluids, patient developed urinary retention, was continued on home medication Flomax, but river catheter placed as per urology. Started on finasteride by urology. Failed void trail on the day of discharge, discharged with river catheter in place. Continued on aspirin, Plavix and Lipitor for CAD.   Blood pressure has been on the softer side, home medication lopressor held. Was discharged on oral Augmentin, oral prednisone. Procedures/ Significant Interventions:      US RETROPERITONEAL LIMITED    Result Date: 4/28/2022  1. Simple cyst at the inferior pole left kidney measuring 1.7 cm. 2. Otherwise, unremarkable renal ultrasound. No hydronephrosis.      Consults:     Consults:     Final Specialist Recommendations/Findings:   IP CONSULT TO INTERNAL MEDICINE  IP CONSULT TO PULMONOLOGY  IP CONSULT TO UROLOGY Follow up outpatient     Any Hospital Acquired Infections: none    Discharge Functional Status:  stable    DISCHARGE PLAN     Disposition: home    Patient Instructions:   Discharge Medication List as of 5/1/2022  2:47 PM        START taking these medications    Details   finasteride (PROSCAR) 5 MG tablet Take 1 tablet by mouth daily, Disp-30 tablet, R-3Normal      predniSONE (DELTASONE) 20 MG tablet Take 1 tablet by mouth 2 times daily for 5 days, Disp-10 tablet, R-0Normal      amoxicillin-clavulanate (AUGMENTIN) 875-125 MG per tablet Take 1 tablet by mouth 2 times daily for 7 days, Disp-14 tablet, R-0Normal           CONTINUE these medications which have CHANGED    Details   tamsulosin (FLOMAX) 0.4 MG capsule Take 1 capsule by mouth 2 times daily, Disp-30 capsule, R-3Normal           CONTINUE these medications which have NOT CHANGED    Details   fluticasone-salmeterol (ADVAIR) 250-50 MCG/DOSE AEPB Inhale 1 puff into the lungs every 12 hours Historical Med      aspirin 81 MG EC tablet Take 1 tablet by mouth daily, Disp-30 tablet, R-3Normal      atorvastatin (LIPITOR) 40 MG tablet Take 1 tablet by mouth nightly, Disp-30 tablet, R-3Normal      clopidogrel (PLAVIX) 75 MG tablet Take 1 tablet by mouth daily, Disp-30 tablet, R-3Normal      metoprolol tartrate (LOPRESSOR) 25 MG tablet Take 1 tablet by mouth 2 times daily, Disp-60 tablet, R-3Normal      Multiple Vitamin (MULTIVITAMIN) TABS tablet Take 1 tablet by mouth daily, Disp-30 tablet, R-0Normal      pantoprazole (PROTONIX) 40 MG tablet Take 1 tablet by mouth every morning (before breakfast), Disp-30 tablet, R-3Normal      albuterol sulfate  (90 Base) MCG/ACT inhaler Inhale 2 puffs into the lungs 4 times daily as needed for Wheezing or Shortness of Breath, Disp-3 Inhaler, R-1Normal      tiotropium (SPIRIVA RESPIMAT) 2.5 MCG/ACT AERS inhaler Inhale 2 puffs into the lungs dailyHistorical Med      budesonide-formoterol (SYMBICORT) 160-4.5 MCG/ACT AERO Inhale 2 puffs into the lungs 2 times dailyHistorical Med             Activity: activity as tolerated    Diet: cardiac diet    Follow-up:    Silvana Gonzales MD  [facility] [address] [place] [address], [address] [place] [address]  [address] [facility]  [phone]    Schedule an appointment as soon as possible for a visit  History of [address] Tobias MD  [address] [facility], [address]  [phone]    Schedule an appointment as soon as possible for a visit in 2 weeks  Call office next week to confirm appt. Patient Instructions: Follow up with your PCP  Continue taking antibiotic and steroid as prescribed  Srinivasan catheter care    UROLOGY: Please call office next week to schedule/confirm follow-up for office visit in approximately 2 weeks to discuss cystoscopy to evaluate prostate and bladder. If you leave with a catheter, please call office as well to schedule follow-up. Note that over 30 minutes was spent in preparing discharge papers, discussing discharge with patient, medication review, etc.      Vanda Chung  PGY-1  Internal Medicine  [facility]   7:48 AM 5/5/2022    Attending Physician Statement  I have discussed the care of Lee Fitzpatrick and I have examined the patient myselft and taken ros and hpi , including pertinent history and exam findings,  with the resident.  I have reviewed the key elements of all parts of the encounter with the resident. I agree with the assessment, plan and orders as documented by the resident. I spent over 35 mins in direct patient care as above and reviewing medications and counseling for discharge .         Electronically signed by Brooks Huertas MD

## 2022-05-05 NOTE — CARE COORDINATION
Request from Brandon Arguelles Dr[address] ,    Please schedule patient for hospital f/u with [address]   Patient also needs to see urology due to river cath. Patient states his daughter said she can not get him in for 6 weeks. See telephone note in chart between Dr. Denise Pineda and Cinthia Love. 5/4/22    Patient will be scheduled for cysto. Left msg for Alvarado Pedraza to call this writer back.     Also FYI,  [facility] and has a conference call on 5/6 @ 12:30PM    Angelica Gonzalez, [address] [place] [address] Coordination Transition

## 2022-05-09 ENCOUNTER — APPOINTMENT (OUTPATIENT)
Dept: CARDIAC REHAB | Age: 72
End: 2022-05-09
Payer: MEDICARE

## 2022-05-11 ENCOUNTER — APPOINTMENT (OUTPATIENT)
Dept: CARDIAC REHAB | Age: 72
End: 2022-05-11
Payer: MEDICARE

## 2022-05-13 ENCOUNTER — NURSE ONLY (OUTPATIENT)
Dept: UROLOGY | Age: 72
End: 2022-05-13

## 2022-05-13 VITALS
WEIGHT: 191 LBS | DIASTOLIC BLOOD PRESSURE: 75 MMHG | BODY MASS INDEX: 26.74 KG/M2 | HEIGHT: 71 IN | HEART RATE: 91 BPM | SYSTOLIC BLOOD PRESSURE: 128 MMHG

## 2022-05-13 DIAGNOSIS — N40.1 BENIGN PROSTATIC HYPERPLASIA WITH URINARY RETENTION: Primary | ICD-10-CM

## 2022-05-13 DIAGNOSIS — R33.8 BENIGN PROSTATIC HYPERPLASIA WITH URINARY RETENTION: Primary | ICD-10-CM

## 2022-05-13 RX ORDER — CEPHALEXIN 500 MG/1
500 CAPSULE ORAL
COMMUNITY
Start: 2022-05-11

## 2022-05-13 RX ORDER — FUROSEMIDE 20 MG/1
20 TABLET ORAL
COMMUNITY
Start: 2022-04-27

## 2022-05-13 NOTE — PROGRESS NOTES
MD MD [address] Urology Clinic Consultation / New Patient Visit    Patient:  Ori Jones  YOB: 1950  Date: 5/13/2022  Consult requested from Kinga Guerrero MD     HISTORY OF PRESENT ILLNESS:   The patient is a 80 y.o. male who presents today for follow-up for the following problem(s): BPH with urinary retention  Overall the problem(s) : are worsening. Associated Symptoms: No dysuria, gross hematuria. Pain Severity: Pain Score:   4    Today visit:   5/13/22   Has history of BPH with urinary retention. He has catheter in place, and would like to have removed today. We discuss risks. Void trial today. [facility]. Meds: flomax and finasteride. Summary of old records:   (Patient's old records, notes and chart reviewed and summarized above.)    Last several PSA's:  Lab Results   Component Value Date    PSA 0.85 10/24/2013    PSA 1.09 08/23/2012       Last total testosterone:  No results found for: TESTOSTERONE    Urinalysis today:  No results found for this visit on 05/13/22.       Last BUN and creatinine:  Lab Results   Component Value Date    BUN 30 (H) 05/01/2022     Lab Results   Component Value Date    CREATININE 1.05 05/01/2022       Imaging Reviewed during this Office Visit:   (results were independently reviewed by physician and radiology report verified)    PAST MEDICAL, FAMILY AND SOCIAL HISTORY:  Past Medical History:   Diagnosis Date    Acute upper respiratory infections of unspecified site     CAD (coronary artery disease)     COPD [facility] obstructive pulmonary disease) ([facility] [address]     Empyema without mention of fistula     Esophageal reflux     Essential hypertension, benign     History of blood transfusion     Hx of blood clots     RLE DVT,     Localized osteoarthrosis not specified whether primary or secondary, unspecified site     Other and unspecified hyperlipidemia     Surgical or other procedure not carried out because of patient's decision     colonoscopy refused     Unspecified pleural effusion      Past Surgical History:   Procedure Laterality Date    CORONARY ARTERY BYPASS GRAFT N/A 12/27/2021    CORONARY ARTERY BYPASS X3; MINH PER ANESTHESIA performed by Gris Luque MD at [address] Right 2006    MRSA at that time. Family History   Problem Relation Age of Onset    Diabetes Mother     Heart Disease Mother     Heart Disease Father     Heart Disease Paternal Uncle      Outpatient Medications Marked as Taking for the 5/13/22 encounter (Nurse Only) with Yvonne Mackey MD   Medication Sig Dispense Refill    cephALEXin (KEFLEX) 500 MG capsule Take 500 mg by mouth      furosemide (LASIX) 20 MG tablet Take 20 mg by mouth      tamsulosin (FLOMAX) 0.4 MG capsule Take 1 capsule by mouth 2 times daily 30 capsule 3    finasteride (PROSCAR) 5 MG tablet Take 1 tablet by mouth daily 30 tablet 3    fluticasone-salmeterol (ADVAIR) 250-50 MCG/DOSE AEPB Inhale 1 puff into the lungs every 12 hours       aspirin 81 MG EC tablet Take 1 tablet by mouth daily 30 tablet 3    atorvastatin (LIPITOR) 40 MG tablet Take 1 tablet by mouth nightly 30 tablet 3    clopidogrel (PLAVIX) 75 MG tablet Take 1 tablet by mouth daily 30 tablet 3    metoprolol tartrate (LOPRESSOR) 25 MG tablet Take 1 tablet by mouth 2 times daily 60 tablet 3    Multiple Vitamin (MULTIVITAMIN) TABS tablet Take 1 tablet by mouth daily 30 tablet 0    pantoprazole (PROTONIX) 40 MG tablet Take 1 tablet by mouth every morning (before breakfast) 30 tablet 3    albuterol sulfate  (90 Base) MCG/ACT inhaler Inhale 2 puffs into the lungs 4 times daily as needed for Wheezing or Shortness of Breath 3 Inhaler 1    tiotropium (SPIRIVA RESPIMAT) 2.5 MCG/ACT AERS inhaler Inhale 2 puffs into the lungs daily         Patient has no known allergies.   Social History     Tobacco Use   Smoking Status Former Smoker    Quit date: 10/22/2010    Years since quittin.5   Smokeless Tobacco Never Used       Social History     Substance and Sexual Activity   Alcohol Use No    Comment: Sober since 1989       REVIEW OF SYSTEMS:  Constitutional: negative  Eyes: negative  Respiratory: negative  Cardiovascular: negative  Gastrointestinal: negative  Musculoskeletal: negative  Genitourinary: negative  Skin: negative   Neurological: negative  Hematological/Lymphatic: negative  Psychological: negative    Physical Exam:    This a 80 y.o. male   Vitals:    22 0813   BP: 128/75   Pulse: 91     Constitutional: Patient in no acute distress   Neuro: alert and oriented to person place and time. Psych: Mood and affect normal.  Head: atraumatic normocephalic  Eyes: EOMi  HEENT: neck supple, trachea midline  Lungs: Respiratory effort normal  Cardiovascular:  Normal peripheral pulses  Abdomen: Soft, non-tender, non-distended, No CVA  Bladder: non-tender and not distended. FROMx4, no cyanosis clubbing edema  Skin: warm and dry      Assessment and Plan      1. Benign prostatic hyperplasia with urinary retention           Plan:      No follow-ups on file.   Void trial  Continue medications for BPH - flomax and finasteride  Offered cystoscopy but he would like to have done at [place]

## 2022-05-16 ENCOUNTER — HOSPITAL ENCOUNTER (OUTPATIENT)
Dept: CARDIAC REHAB | Age: 72
Setting detail: THERAPIES SERIES
Discharge: HOME | End: 2022-05-16
Payer: MEDICARE

## 2022-05-16 VITALS — WEIGHT: 182.9 LBS | BODY MASS INDEX: 25.51 KG/M2

## 2022-05-16 PROCEDURE — 93798 PHYS/QHP OP CAR RHAB W/ECG: CPT

## 2022-05-16 NOTE — PLAN OF CARE
[address] [place] [address] Cardiac Rehabilitation  Individual Treatment Plan    Patient Name:  Emile Gamble  :  1950  Age:  80 y.o.   Diagnosis: CABG X3  Date of Event: 2021   Date Entered Program: 2022  Physician:  Cherylene Lips   History:   Past Medical History:   Diagnosis Date    Acute upper respiratory infections of unspecified site     CAD (coronary artery disease)     COPD (chronic obstructive pulmonary disease) ([address]     Empyema without mention of fistula     Esophageal reflux     Essential hypertension, benign     History of blood transfusion     Hx of blood clots     RLE DVT,     Localized osteoarthrosis not specified whether primary or secondary, unspecified site     Other and unspecified hyperlipidemia     Surgical or other procedure not carried out because of patient's decision     colonoscopy refused     Unspecified pleural effusion      Allergies: No Known Allergies  Patient Goal: TO COMPLETE A GOLF GAME- MET  GOLF WEEKLY- PROGRESSING  GET OFF PRN O2-PROGRESSING    Amount of Minutes per piece  Week  Warm Up  Leg Arm  Leg  Arm  Leg  Arm  Duration   1 4 7 3 7 3 7 - 31   2 4 7 3 7 3 7 3 34   3 4 8 3 8 3 8 3 37   4 4 8 4 8 4 8 4 40   5 4 9 4 9 4 9 4 43   6 4 9 5 9 5 9 5 46   Max 7 4 10 5 10 5 10 5 49        Exercise Prescription:    Type of exercise:  Legs- Treadmill, Airdyne, Nustep  Arms- Free weights, Airdyne, Ergometer, Nustep                               Frequency:  2-3 times per week         Plan of Progresssion:  Amount and duration will increase every       2-3 visits          Phase 2 Cardiac Rehabilitation  Individualized Cardiac Treatment Plan    Individual Cardiac Treatment Plan - EXERCISE  EXERCISE  REASSESSMENT   Re-Assessment   Stages of Change    []Contemplation   [x]Action   []Relapse   EXERCISE ASSESSMENT   Home Exercise   []Yes    []No  Type:  Aerobics   []  Bicycling  []  Cardiovascular  []  Gardening  []  Hiking  []  House Cleaning   []  Run/Jog []  Swim  []  Walk  [x]  Yard Work  []  Other GOLFING  [x]     Frequency:  Daily  []  Q2 days  []  Q3 days  []  Biweekly  []  Irregularly  [x]  Other  []     Duration:   Seconds []  30 Minutes [x]  Hours []  Other []   EXERCISE PLAN   Interventions*  Education:   [x]Self Pulse   [x]Medication HR/BP   [x]Exercise Safety   [x]S/S to report   [x]RPE scale   [x]Equip. [place]. [x]Warmup/cool down   [x]Hand    [x]Home exercise encouraged   Target Goal:  Follow individual exercise Rx, aerobic exercise, 30+minutes 5x/week   Exercise Prescription  (per physician & CR staff)  Met Level:2.9       Individual Cardiac Treatment Plan - EDUCATION  Reassessment  Learning Barriers   Speech  []  Hearing  [x]  Vision  []  Cognitive  []  Ready to Learn []     Tobacco Use  []Y  [x]N  []Quit    HTN []Y  [x]N      Diabetic []Y  [x]N     Pre-cardiac test:100__     Intervention/Education   []Referal to smoking cessation  []Encouraged smoking cessation   [x]CAD   [x]Risk factors   [x]Med Compliance   [x]Cardiac A/P   [x]Angina S/S   [x]NTG use   [x]Sexual activity    Target Goal:  Increase knowledge of risk factors     Individual Cardiac Treatment Plan - NUTRITION  NUTRITION  REASSESSMENT      Stages of Change    []Contemplation   [x]Action   []Relapse   NUTRITION ASSESSMENT   Weight Management  Weight:  182.8     Height:  Ht Readings from Last 1 Encounters:   05/13/22 5' 11\" (1.803 m)          BMI:  Body mass index is 25.51 kg/m². Date:  Lipids:  Cholesterol (mg/dL)   Date Value   04/05/2022 137     HDL (mg/dL)   Date Value   04/05/2022 53     LDL Cholesterol (mg/dL)   Date Value   04/05/2022 74     Chol/HDL Ratio (no units)   Date Value   04/05/2022 2.6     Triglycerides (mg/dL)   Date Value   04/05/2022 52     VLDL (mg/dL)   Date Value   12/24/2021 NOT REPORTED      Cholesterol Drug Therapy:   [x]Y  []N  Why:   Diabetic:   []Y  [x]N  Education Needed    []Y  [x]N   Goal: Maintain Heart Healthy Weight.    Professional Referral  Please check if needed. []Dietician Consult    [x]Nurse/Patient Ed   []Wt. Management Referral   []Other:   Goal: Understand Lipid Results; Target:  LDL below 70, HDL above 40          Individual Cardiac Treatment Plan - PYSCHOSOCIAL  PSYCHOSOCIAL  REASSESSMENT      Psychosocial test:  PHQ 9 score:__0__   Intervention/  Education  If score 5 or above:   []Psych consult   []Physician notified  If pt declined, Why?    [x]Relaxation technique   [x]S/S of depression   [x]Coping techniques   Target goal:  Decreased stress levels        PT HAD VISITS ON HOLD X 1 MONTH.

## 2022-05-18 ENCOUNTER — HOSPITAL ENCOUNTER (OUTPATIENT)
Dept: CARDIAC REHAB | Age: 72
Setting detail: THERAPIES SERIES
Discharge: HOME | End: 2022-05-18
Payer: MEDICARE

## 2022-05-18 VITALS — BODY MASS INDEX: 25.44 KG/M2 | WEIGHT: 182.4 LBS

## 2022-05-18 PROCEDURE — 93798 PHYS/QHP OP CAR RHAB W/ECG: CPT

## 2022-05-23 ENCOUNTER — HOSPITAL ENCOUNTER (OUTPATIENT)
Dept: CARDIAC REHAB | Age: 72
Setting detail: THERAPIES SERIES
Discharge: HOME | End: 2022-05-23
Payer: MEDICARE

## 2022-05-23 VITALS — BODY MASS INDEX: 25.68 KG/M2 | WEIGHT: 184.1 LBS

## 2022-05-23 PROCEDURE — 93798 PHYS/QHP OP CAR RHAB W/ECG: CPT

## 2022-05-25 ENCOUNTER — HOSPITAL ENCOUNTER (OUTPATIENT)
Dept: CARDIAC REHAB | Age: 72
Setting detail: THERAPIES SERIES
Discharge: HOME | End: 2022-05-25
Payer: MEDICARE

## 2022-05-25 VITALS — WEIGHT: 183 LBS | BODY MASS INDEX: 25.52 KG/M2

## 2022-05-25 PROCEDURE — 93798 PHYS/QHP OP CAR RHAB W/ECG: CPT

## 2022-06-01 ENCOUNTER — APPOINTMENT (OUTPATIENT)
Dept: CARDIAC REHAB | Age: 72
End: 2022-06-01
Payer: MEDICARE

## 2022-06-01 ENCOUNTER — OFFICE VISIT (OUTPATIENT)
Dept: PULMONOLOGY | Age: 72
End: 2022-06-01
Payer: MEDICARE

## 2022-06-01 VITALS
BODY MASS INDEX: 25.7 KG/M2 | SYSTOLIC BLOOD PRESSURE: 126 MMHG | OXYGEN SATURATION: 96 % | HEIGHT: 71 IN | WEIGHT: 183.6 LBS | TEMPERATURE: 97.2 F | DIASTOLIC BLOOD PRESSURE: 68 MMHG | HEART RATE: 74 BPM

## 2022-06-01 DIAGNOSIS — J44.9 CHRONIC OBSTRUCTIVE PULMONARY DISEASE, UNSPECIFIED COPD TYPE (HCC): Primary | ICD-10-CM

## 2022-06-01 DIAGNOSIS — I10 ESSENTIAL HYPERTENSION: ICD-10-CM

## 2022-06-01 DIAGNOSIS — I50.22 CHRONIC SYSTOLIC CONGESTIVE HEART FAILURE (HCC): ICD-10-CM

## 2022-06-01 DIAGNOSIS — J18.9 PNEUMONIA OF LEFT LOWER LOBE DUE TO INFECTIOUS ORGANISM: ICD-10-CM

## 2022-06-01 DIAGNOSIS — Z95.1 S/P CABG X 3: ICD-10-CM

## 2022-06-01 DIAGNOSIS — J86.9 EMPYEMA OF PLEURA (HCC): ICD-10-CM

## 2022-06-01 PROCEDURE — 99214 OFFICE O/P EST MOD 30 MIN: CPT | Performed by: INTERNAL MEDICINE

## 2022-06-01 PROCEDURE — G8427 DOCREV CUR MEDS BY ELIG CLIN: HCPCS | Performed by: INTERNAL MEDICINE

## 2022-06-01 PROCEDURE — 1036F TOBACCO NON-USER: CPT | Performed by: INTERNAL MEDICINE

## 2022-06-01 PROCEDURE — 1123F ACP DISCUSS/DSCN MKR DOCD: CPT | Performed by: INTERNAL MEDICINE

## 2022-06-01 PROCEDURE — G8417 CALC BMI ABV UP PARAM F/U: HCPCS | Performed by: INTERNAL MEDICINE

## 2022-06-01 PROCEDURE — 3023F SPIROM DOC REV: CPT | Performed by: INTERNAL MEDICINE

## 2022-06-01 PROCEDURE — 3017F COLORECTAL CA SCREEN DOC REV: CPT | Performed by: INTERNAL MEDICINE

## 2022-06-01 NOTE — PATIENT INSTRUCTIONS
PT to call the office when he gets home to give the name of DME for order to be faxed for oxygen to be picked up -DEBBY

## 2022-06-01 NOTE — PROGRESS NOTES
PULMONARY OP  PROGRESS NOTE      Patient:  Luis Angel Dewitt  YOB: 1950    MRN: [id number]     Acct:        Pt seen and Chart reviewed. Luis Angel Dewitt is here in followup for   1. Chronic obstructive pulmonary disease, unspecified COPD type ([facility]    2. Empyema of pleura ([facility]    3. Chronic systolic congestive heart failure ([facility]    4. Essential hypertension    5. S/P CABG x 3          Pt has been hospitalized for pneumonia  Was discharged on supplemental oxygen but his oxygen saturation is 96 to 97% on room air  Has been using meds as recommended. Patient has a good appetite  Has lost weight on purpose  Denied any shortness of breath or wheezing  No cough or sputum production  No orthopnea, PND or increasing pedal edema  No chest pain or pressure  No palpitations    Subjective:  Review of Systems    Review of Systems -   General ROS: Completed and except as mentioned above were negative   Psychological ROS:  Completed and except as mentioned above were negative  Ophthalmic ROS:  Completed and except as mentioned above were negative  ENT ROS:  Completed and except as mentioned above were negative  Allergy and Immunology ROS:  Completed and except as mentioned above werenegative  Hematological and Lymphatic ROS:  Completed and except as mentioned above were negative  Endocrine ROS: Completed and except as mentioned above were negative  Respiratory ROS:  Completed and except asmentioned above were negative  Cardiovascular ROS:  Completed and except as mentioned above were negative  Gastrointestinal ROS: Completed and except as mentioned above were negative  Genito-Urinary ROS:  Completedand except as mentioned above were negative  Musculoskeletal ROS:  Completed and except as mentioned above were negative  Neurological ROS:  Completed and except as mentioned above were negative  Dermatological ROS:Completed and except as mentioned above were negative    SLEEP  No epistaxis or sore throat.  does not have fatigue, tiredness or sleepiness in am.  No MVA. No edema. Allergies:  No Known Allergies    Medications:    Current Outpatient Medications:     cephALEXin (KEFLEX) 500 MG capsule, Take 500 mg by mouth, Disp: , Rfl:     furosemide (LASIX) 20 MG tablet, Take 20 mg by mouth, Disp: , Rfl:     tamsulosin (FLOMAX) 0.4 MG capsule, Take 1 capsule by mouth 2 times daily, Disp: 30 capsule, Rfl: 3    finasteride (PROSCAR) 5 MG tablet, Take 1 tablet by mouth daily, Disp: 30 tablet, Rfl: 3    fluticasone-salmeterol (ADVAIR) 250-50 MCG/DOSE AEPB, Inhale 1 puff into the lungs every 12 hours , Disp: , Rfl:     aspirin 81 MG EC tablet, Take 1 tablet by mouth daily, Disp: 30 tablet, Rfl: 3    atorvastatin (LIPITOR) 40 MG tablet, Take 1 tablet by mouth nightly, Disp: 30 tablet, Rfl: 3    clopidogrel (PLAVIX) 75 MG tablet, Take 1 tablet by mouth daily, Disp: 30 tablet, Rfl: 3    metoprolol tartrate (LOPRESSOR) 25 MG tablet, Take 1 tablet by mouth 2 times daily, Disp: 60 tablet, Rfl: 3    Multiple Vitamin (MULTIVITAMIN) TABS tablet, Take 1 tablet by mouth daily, Disp: 30 tablet, Rfl: 0    pantoprazole (PROTONIX) 40 MG tablet, Take 1 tablet by mouth every morning (before breakfast), Disp: 30 tablet, Rfl: 3    albuterol sulfate  (90 Base) MCG/ACT inhaler, Inhale 2 puffs into the lungs 4 times daily as needed for Wheezing or Shortness of Breath, Disp: 3 Inhaler, Rfl: 1    tiotropium (SPIRIVA RESPIMAT) 2.5 MCG/ACT AERS inhaler, Inhale 2 puffs into the lungs daily, Disp: , Rfl:     budesonide-formoterol (SYMBICORT) 160-4.5 MCG/ACT AERO, Inhale 2 puffs into the lungs 2 times daily (Patient not taking: Reported on 5/13/2022), Disp: , Rfl:       Objective:    Physical Exam:  Vitals: /68 (Site: Right Upper Arm, Position: Sitting, Cuff Size: Medium Adult)   Pulse 74   Temp 97.2 °F (36.2 °C)   Ht 5' 11\" (1.803 m)   Wt 183 lb 9.6 oz (83.3 kg)   SpO2 96%   BMI 25.61 kg/m²   Last 3 weights:   Wt Readings from Last 3 Encounters:   06/01/22 183 lb 9.6 oz (83.3 kg)   05/25/22 183 lb (83 kg)   05/23/22 184 lb 1.6 oz (83.5 kg)     Body mass index is 25.61 kg/m². Physical Examination:   PHYSICAL EXAMINATION:    Vitals:    06/01/22 0919   BP: 126/68   Site: Right Upper Arm   Position: Sitting   Cuff Size: Medium Adult   Pulse: 74   Temp: 97.2 °F (36.2 °C)   SpO2: 96%   Weight: 183 lb 9.6 oz (83.3 kg)   Height: 5' 11\" (1.803 m)       Constitutional: This is a well developed, well nourished, 25-29.9 - Overweight 80y.o. year old male who is alert, oriented, cooperative and in no apparent distress. Head:normocephalic and atraumatic. EENT:  SANDI. No conjunctival injections. Septum was midline, mucosa was without erythema, exudates or cobblestoning. No thrush was noted. MallampatiII (soft palate, uvula, fauces visible)  Neck: Supple without thyromegaly. No elevated JVP. Trachea was midline. Respiratory: Chest was symmetrical without dullness to percussion. Breath sounds bilaterally were clear to auscultation but slightly decreased in the left lower lobe distribution. There were no wheezes, rhonchi but has rales in the left lower lobe distribution. There is no intercostal retraction or use of accessory muscles. No egophony noted. Cardiovascular: Regular without murmur, clicks, gallops or rubs. Abdomen: Slightly rounded and soft without organomegaly. No rebound, rigidity or guarding was appreciated. Lymphatic: No lymphadenopathy. Musculoskeletal: Normal curvature of the spine. No gross muscle weakness. Extremities:  does not have lower extremity edema,  no ulcerations, tenderness, varicosities or erythema. Muscle size, tone and strength are normal.  No involuntary movements are noted. Skin:  Warm and dry. Good color, turgor and pigmentation. No lesions or scars.   No cyanosis or clubbing  Neurological/Psychiatric: The patient's general behavior, level of consciousness, thought content and emotional status is normal.          DATA:     Pulmonary function tests: none   No results displayed because visit has over 200 results. CT scan of the chest for pulmonary embolism was done on 12/23/2021 and it showed-  1.  No evidence of pulmonary embolism.       2.  Findings compatible with scarring in the left upper lobe and lung bases. No convincing evidence for acute airspace disease.       3.  Mild emphysematous disease. CXR: REVIEWED: On April 27, 2022 it showed-  Left basilar opacity decreased since the previous exam suggesting scarring. Otherwise, no acute cardiopulmonary process           IMPRESSION:   1. Chronic obstructive pulmonary disease, unspecified COPD type ([address]    2. Empyema of pleura ([address]    3. Chronic systolic congestive heart failure ([address]    4. Essential hypertension    5. S/P CABG x 3                   PLAN:       Reviewed chest x-ray and CT scan of the chest  Ordered a chest x-ray to be done in 4 weeks  Patient does not need a CT scan of the chest to screen for lung cancer anymore as he quit smoking in 2007  Refills were provided -discontinued home oxygen  Patient was recommended to have prednisone and an antibiotic available for use during an exacerbation  Educated and clarified the medication use. Discussed use, benefit, and side effects of prescribed medications. Barriers to medication compliance addressed. Toñito Chapman received counseling on the following healthy behaviors: nutrition, exercise and medication adherence  Recommend flu vaccination in the fall annually. Recommendations given regarding pneumococcal vaccinations. Patient had the COVID-19 vaccination  Patient is uptodate with vaccinations from pulmonary perspective. Maintain an active lifestyle. continue smoking cessation. Patient was educated on how to use the respiratory medications. All the questions that the patient has had were answered to   her satisfaction.   Home O2 evaluation was done.  Supplemental oxygen was not indicated  Discontinued home oxygen  After reviewing the patient's smoking history and his age patient does not meet the criteria for lung cancer screening as the patient quit smoking in the year 2007. We'll see the patient back as needed based on the findings on the chest x-ray to be done in 4 weeks. Patient will call us if he is sick, so he can be seen sooner. Thank you for having us involved in the care of your patient. Please call us if you have any questions or concerns.           Armida Boeck, MD, MD             6/1/2022, 9:25 Ponce Burkitt

## 2022-06-06 ENCOUNTER — HOSPITAL ENCOUNTER (OUTPATIENT)
Dept: CARDIAC REHAB | Age: 72
Setting detail: THERAPIES SERIES
Discharge: HOME | End: 2022-06-06
Payer: MEDICARE

## 2022-06-06 VITALS — BODY MASS INDEX: 25.55 KG/M2 | WEIGHT: 183.2 LBS

## 2022-06-06 PROCEDURE — 93798 PHYS/QHP OP CAR RHAB W/ECG: CPT

## 2022-06-06 ASSESSMENT — EXERCISE STRESS TEST
PEAK_RPE: 13
PEAK_HR: 98
PEAK_BP: 126/66
PEAK_METS: 3.4

## 2022-06-08 ENCOUNTER — HOSPITAL ENCOUNTER (OUTPATIENT)
Dept: CARDIAC REHAB | Age: 72
Setting detail: THERAPIES SERIES
Discharge: HOME | End: 2022-06-08
Payer: MEDICARE

## 2022-06-08 VITALS — BODY MASS INDEX: 25.84 KG/M2 | WEIGHT: 185.3 LBS

## 2022-06-08 PROCEDURE — 93798 PHYS/QHP OP CAR RHAB W/ECG: CPT

## 2022-06-08 ASSESSMENT — EXERCISE STRESS TEST
PEAK_METS: 4.3
PEAK_RPE: 12
PEAK_BP: 126/70
PEAK_HR: 94

## 2022-06-13 ENCOUNTER — HOSPITAL ENCOUNTER (OUTPATIENT)
Dept: CARDIAC REHAB | Age: 72
Setting detail: THERAPIES SERIES
Discharge: HOME | End: 2022-06-13
Payer: MEDICARE

## 2022-06-13 VITALS — BODY MASS INDEX: 25.96 KG/M2 | WEIGHT: 186.1 LBS

## 2022-06-13 PROCEDURE — 93798 PHYS/QHP OP CAR RHAB W/ECG: CPT

## 2022-06-13 ASSESSMENT — EXERCISE STRESS TEST
PEAK_HR: 96
PEAK_BP: 132/74
PEAK_METS: 3.8
PEAK_RPE: 14

## 2022-06-13 NOTE — PLAN OF CARE
[facility] Based Cardiac Rehabilitation  Individual Treatment Plan    Patient Name:  Luna Rogers  :  1950  Age:  80 y.o. Diagnosis: CABG X 3  Date of Event: 21  Date Entered Program: 22  Physician:  Brittney Grider   History:   Past Medical History:   Diagnosis Date    Acute upper respiratory infections of unspecified site     CAD (coronary artery disease)     [facility]) [facility]     Empyema without mention of fistula     Esophageal reflux     Essential hypertension, benign     History of blood transfusion     Hx of blood clots     RLE DVT,     Localized osteoarthrosis not specified whether primary or secondary, unspecified site     Other and unspecified hyperlipidemia     Surgical or other procedure not carried out because of patient's decision     colonoscopy refused     Unspecified pleural effusion      Allergies: No Known Allergies  Patient Goal: GOLD WEEKLY AND GET OFF O2  PROGRESSING: PT STATES DR WILL CLEAR HIM FOR GOLFING IN September.    COMPLETED: PT OFF O2    Amount of Minutes per piece  Week  Warm Up  Leg Arm  Leg  Arm  Leg  Arm  Duration   1 4 7 3 7 3 7 - 31   2 4 7 3 7 3 7 3 34   3 4 8 3 8 3 8 3 37   4 4 8 4 8 4 8 4 40   5 4 9 4 9 4 9 4 43   6 4 9 5 9 5 9 5 46   Max 7 4 10 5 10 5 10 5 49        Exercise Prescription:    Type of exercise:  Legs- Treadmill, Airdyne, Nustep  Arms- Free weights, Airdyne, Ergometer, Nustep                               Frequency:  2-3 times per week         Plan of Progresssion:  Amount and duration will increase every       2-3 visits          Phase 2 Cardiac Rehabilitation  Individualized Cardiac Treatment Plan    Individual Cardiac Treatment Plan - EXERCISE  EXERCISE  REASSESSMENT   Re-Assessment   Stages of Change    []Contemplation   [x]Action   []Relapse   EXERCISE ASSESSMENT   Home Exercise   [x]Yes    []No  Type:  Aerobics   []  Bicycling  [x]  Cardiovascular  []  Gardening  []  Hiking []  House Cleaning   [x]  Run/Jog  []  Swim  []  Walk  [x]  Yard Work  [x]  Other  []     Frequency:  Daily  [x]  Q2 days  []  Q3 days  []  Biweekly  []  Irregularly  []  Other  []     Duration:   Seconds []  Minutes [x] 30+   Hours []  Other []   EXERCISE PLAN   Interventions*  Education:   [x]Self Pulse   [x]Medication HR/BP   [x]Exercise Safety   [x]S/S to report   [x]RPE scale   [x]Equip. [place]. [x]Warmup/cool down   [x]Hand    [x]Home exercise encouraged   Target Goal:  Follow individual exercise Rx, aerobic exercise, 30+minutes 5x/week   Exercise Prescription  (per physician & CR staff  Met Level: 3.8       Individual Cardiac Treatment Plan - EDUCATION  Reassessment  Learning Barriers   Speech  []  Hearing  []  Vision  []  Cognitive  []  Ready to Learn [x]     Tobacco Use  []Y  [x]N  []Quit    HTN []Y  [x]N      Diabetic []Y  [x]N     Pre-cardiac test:___100__     Intervention/Education   []Referal to smoking cessation  []Encouraged smoking cessation   [x]CAD   [x]Risk factors   [x]Med Compliance   [x]Cardiac A/P   [x]Angina S/S   [x]NTG use   [x]Sexual activity    Target Goal:  Increase knowledge of risk factors     Individual Cardiac Treatment Plan - NUTRITION  NUTRITION  REASSESSMENT      Stages of Change    []Contemplation   [x]Action   []Relapse   NUTRITION ASSESSMENT   Weight Management  Weight: 186.1 LB     Height:  Ht Readings from Last 1 Encounters:   06/01/22 5' 11\" (1.803 m)          BMI:  Body mass index is 25.96 kg/m².    Date:  Lipids:  Cholesterol (mg/dL)   Date Value   04/05/2022 137     HDL (mg/dL)   Date Value   04/05/2022 53     LDL Cholesterol (mg/dL)   Date Value   04/05/2022 74     Chol/HDL Ratio (no units)   Date Value   04/05/2022 2.6     Triglycerides (mg/dL)   Date Value   04/05/2022 52     VLDL (mg/dL)   Date Value   12/24/2021 NOT REPORTED      Cholesterol Drug Therapy:   [x]Y  []N  Why:   Diabetic:   []Y  [x]N  Education Needed    []Y  [x]N   Goal: Maintain Heart Healthy Weight. Professional Referral  Please check if needed. []Dietician Consult    [x]Nurse/Patient Ed   []Wt. Management Referral   []Other:   Goal: Understand Lipid Results; Target:  LDL below 70, HDL above 40          Individual Cardiac Treatment Plan - PYSCHOSOCIAL  PSYCHOSOCIAL  REASSESSMENT      Psychosocial test:  PHQ 9 score:___0__   Intervention/  Education  If score 5 or above:   []Psych consult   []Physician notified  If pt declined, Why?    [x]Relaxation technique   [x]S/S of depression   []Coping techniques   Target goal:  Decreased stress levels

## 2022-06-15 ENCOUNTER — HOSPITAL ENCOUNTER (OUTPATIENT)
Dept: CARDIAC REHAB | Age: 72
Setting detail: THERAPIES SERIES
Discharge: HOME | End: 2022-06-15
Payer: MEDICARE

## 2022-06-20 ENCOUNTER — HOSPITAL ENCOUNTER (OUTPATIENT)
Dept: CARDIAC REHAB | Age: 72
Setting detail: THERAPIES SERIES
Discharge: HOME | End: 2022-06-20
Payer: MEDICARE

## 2022-06-20 VITALS — WEIGHT: 186 LBS | BODY MASS INDEX: 25.94 KG/M2

## 2022-06-20 PROCEDURE — 93798 PHYS/QHP OP CAR RHAB W/ECG: CPT

## 2022-06-20 RX ORDER — SPIRONOLACTONE 25 MG/1
25 TABLET ORAL DAILY
COMMUNITY

## 2022-06-20 RX ORDER — LOSARTAN POTASSIUM 25 MG/1
25 TABLET ORAL DAILY
COMMUNITY

## 2022-06-20 RX ORDER — METOPROLOL SUCCINATE 50 MG/1
50 TABLET, EXTENDED RELEASE ORAL DAILY
COMMUNITY

## 2022-06-20 ASSESSMENT — EXERCISE STRESS TEST
PEAK_RPE: 13
PEAK_HR: 106
PEAK_BP: 120/70
PEAK_METS: 4.2

## 2022-06-22 ENCOUNTER — HOSPITAL ENCOUNTER (OUTPATIENT)
Dept: CARDIAC REHAB | Age: 72
Setting detail: THERAPIES SERIES
Discharge: HOME | End: 2022-06-22
Payer: MEDICARE

## 2022-06-22 VITALS — BODY MASS INDEX: 25.65 KG/M2 | WEIGHT: 183.9 LBS

## 2022-06-22 PROCEDURE — 93798 PHYS/QHP OP CAR RHAB W/ECG: CPT

## 2022-06-22 ASSESSMENT — EXERCISE STRESS TEST
PEAK_BP: 120/64
PEAK_METS: 5.3
PEAK_RPE: 15
PEAK_HR: 109

## 2022-06-27 ENCOUNTER — HOSPITAL ENCOUNTER (OUTPATIENT)
Dept: CARDIAC REHAB | Age: 72
Setting detail: THERAPIES SERIES
Discharge: HOME | End: 2022-06-27
Payer: MEDICARE

## 2022-06-27 VITALS — WEIGHT: 185.2 LBS | BODY MASS INDEX: 25.83 KG/M2

## 2022-06-27 PROCEDURE — 93798 PHYS/QHP OP CAR RHAB W/ECG: CPT

## 2022-06-27 ASSESSMENT — EXERCISE STRESS TEST
PEAK_RPE: 13
PEAK_BP: 100/68
PEAK_METS: 4.9
PEAK_HR: 91

## 2022-06-28 ENCOUNTER — HOSPITAL ENCOUNTER (OUTPATIENT)
Dept: GENERAL RADIOLOGY | Facility: CLINIC | Age: 72
Discharge: HOME | End: 2022-06-30
Payer: MEDICARE

## 2022-06-28 ENCOUNTER — HOSPITAL ENCOUNTER (OUTPATIENT)
Facility: CLINIC | Age: 72
Discharge: HOME | End: 2022-06-30
Payer: MEDICARE

## 2022-06-28 DIAGNOSIS — J18.9 PNEUMONIA OF LEFT LOWER LOBE DUE TO INFECTIOUS ORGANISM: ICD-10-CM

## 2022-06-28 PROCEDURE — 71046 X-RAY EXAM CHEST 2 VIEWS: CPT

## 2022-06-29 ENCOUNTER — HOSPITAL ENCOUNTER (OUTPATIENT)
Dept: CARDIAC REHAB | Age: 72
Setting detail: THERAPIES SERIES
Discharge: HOME | End: 2022-06-29
Payer: MEDICARE

## 2022-06-29 VITALS — BODY MASS INDEX: 26.03 KG/M2 | WEIGHT: 186.6 LBS

## 2022-06-29 PROCEDURE — 93798 PHYS/QHP OP CAR RHAB W/ECG: CPT

## 2022-06-29 ASSESSMENT — EXERCISE STRESS TEST
PEAK_BP: 116/68
PEAK_HR: 99
PEAK_RPE: 15
PEAK_METS: 4

## 2022-07-04 ENCOUNTER — HOSPITAL ENCOUNTER (OUTPATIENT)
Dept: CARDIAC REHAB | Age: 72
Setting detail: THERAPIES SERIES
Discharge: HOME | End: 2022-07-04
Payer: MEDICARE

## 2022-07-06 ENCOUNTER — HOSPITAL ENCOUNTER (OUTPATIENT)
Dept: CARDIAC REHAB | Age: 72
Setting detail: THERAPIES SERIES
Discharge: HOME | End: 2022-07-06
Payer: MEDICARE

## 2022-07-06 VITALS — BODY MASS INDEX: 26.04 KG/M2 | WEIGHT: 186.7 LBS

## 2022-07-06 PROCEDURE — 93798 PHYS/QHP OP CAR RHAB W/ECG: CPT

## 2022-07-06 ASSESSMENT — EXERCISE STRESS TEST
PEAK_BP: 122/70
PEAK_METS: 4.9
PEAK_HR: 93
PEAK_RPE: 14

## 2022-07-11 ENCOUNTER — HOSPITAL ENCOUNTER (OUTPATIENT)
Dept: CARDIAC REHAB | Age: 72
Setting detail: THERAPIES SERIES
Discharge: HOME | End: 2022-07-11
Payer: MEDICARE

## 2022-07-11 VITALS — WEIGHT: 186.9 LBS | BODY MASS INDEX: 26.07 KG/M2

## 2022-07-11 PROCEDURE — 93798 PHYS/QHP OP CAR RHAB W/ECG: CPT

## 2022-07-11 ASSESSMENT — EXERCISE STRESS TEST
PEAK_METS: 4.8
PEAK_BP: 120/60
PEAK_RPE: 15
PEAK_HR: 113

## 2022-07-11 NOTE — PLAN OF CARE
[facility] Based Cardiac Rehabilitation  Individual Treatment Plan    Patient Name:  Avila Murdock  :  1950  Age:  80 y.o. Diagnosis: CABG X 3  Date of Event: 2021  Date Entered Program: 2022  Physician:  DR. MEDRANO  History:   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     Esophageal reflux     Essential hypertension, benign     History of blood transfusion     Hx of blood clots     RLE DVT,     Localized osteoarthrosis not specified whether primary or secondary, unspecified site     Other and unspecified hyperlipidemia     Surgical or other procedure not carried out because of patient's decision     colonoscopy refused     Unspecified pleural effusion      Allergies: No Known Allergies  Patient Goal: TO GET AROUND WITHOUT USING HIS INHALER    Amount of Minutes per piece  Week  Warm Up  Leg Arm  Leg  Arm  Leg  Arm  Duration   1 4 7 3 7 3 7 - 31   2 4 7 3 7 3 7 3 34   3 4 8 3 8 3 8 3 37   4 4 8 4 8 4 8 4 40   5 4 9 4 9 4 9 4 43   6 4 9 5 9 5 9 5 46   Max 7 4 10 5 10 5 10 5 49        Exercise Prescription:    Type of exercise:  Legs- Treadmill, Airdyne, Nustep  Arms- Free weights, Airdyne, Ergometer, Nustep                               Frequency:  2-3 times per week         Plan of Progresssion:  Amount and duration will increase every       2-3 visits          Phase 2 Cardiac Rehabilitation  Individualized Cardiac Treatment Plan    Individual Cardiac Treatment Plan - EXERCISE  EXERCISE  REASSESSMENT   Re-Assessment   Stages of Change    []Contemplation   [x]Action   []Relapse   EXERCISE ASSESSMENT   Home Exercise   [x]Yes    []No  Type:  Aerobics   []  Bicycling  []  Cardiovascular  []  Gardening  []  Hiking  []  House Cleaning   [x]  Run/Jog  []  Swim  []  Walk  [x]  [employer] Work [x]  Other  []     Frequency:  Daily  []  Q2 days  []  Q3 days  []  Biweekly  []  Irregularly  []  Other- WALKING ON NON CARDIAC REHAB DAYS, GOLFING WEEKLY, WORKS  [address]  [x]     Duration:   Seconds []  15-20 Minutes [x]  Hours []  Other []   EXERCISE PLAN   Interventions*  Education:   [x]Self Pulse   [x]Medication HR/BP   [x]Exercise Safety   [x]S/S to report   [x]RPE scale   [x]Equip. [place]. [x]Warmup/cool down   [x]Hand    [x]Home exercise encouraged   Target Goal:  Follow individual exercise Rx, aerobic exercise, 30+minutes 5x/week   Exercise Prescription  (per physician & CR staff)  Met Level: 4.8       Individual Cardiac Treatment Plan - EDUCATION  Reassessment  Learning Barriers   Speech  []  Hearing  []  Vision  []  Cognitive  []  Ready to Learn [x]     Tobacco Use  []Y  [x]N  []Quit    HTN []Y  [x]N      Diabetic []Y  [x]N     Pre-cardiac test:100%     Intervention/Education   []Referal to smoking cessation  []Encouraged smoking cessation   [x]CAD   [x]Risk factors   [x]Med Compliance   [x]Cardiac A/P   [x]Angina S/S   [x]NTG use   [x]Sexual activity    Target Goal:  Increase knowledge of risk factors     Individual Cardiac Treatment Plan - NUTRITION  NUTRITION  REASSESSMENT      Stages of Change    []Contemplation   [x]Action States trying to follow heart healthy diet but admits that he \"eats out a lot. It's just me so it's easier. \" Encouraged to watch sodium intake, pt v/u []Relapse   NUTRITION ASSESSMENT   Weight Management  Weight: 186LB 14.4oz     Height:  Ht Readings from Last 1 Encounters:   06/01/22 5' 11\" (1.803 m)          BMI:  Body mass index is 26.07 kg/m².    Date:  Lipids:  Cholesterol (mg/dL)   Date Value   04/05/2022 137     HDL (mg/dL)   Date Value   04/05/2022 53     LDL Cholesterol (mg/dL)   Date Value   04/05/2022 74     Chol/HDL Ratio (no units)   Date Value   04/05/2022 2.6     Triglycerides (mg/dL)   Date Value   04/05/2022 52     VLDL (mg/dL) Date Value   2021 NOT REPORTED      Cholesterol Drug Therapy:   [x]Y  []N  Why:   Diabetic:   []Y  [x]N  Education Needed    []Y  [x]N   Goal: Maintain Heart Healthy Weight. Professional Referral  Please check if needed. []Dietician Consult    []Nurse/Patient Ed   []Wt. Management Referral   []Other:   Goal: Understand Lipid Results; Target:  LDL below 70, HDL above 40          Individual Cardiac Treatment Plan - PYSCHOSOCIAL  PSYCHOSOCIAL  REASSESSMENT      Psychosocial test:  PHQ 9 score: 0   Intervention/  Education  If score 5 or above:   []Psych consult   []Physician notified  If pt declined, Why?    [x]Relaxation technique   [x]S/S of depression   [x]Coping techniques   Target goal:  Decreased stress levels     2022 ITP Completed today. Pt has been walking on non cardiac rehab days 15-20 minutes, pt encouraged to increase to 30 minutes, pt v/u. Pt also walks around  home when working. Pt also works at [employer] and has been golfing weekly. Pt has been having episodes of dizziness recently with position changes, reviewed with pt importance of changing positions slowly, pt v/u.

## 2022-07-13 ENCOUNTER — HOSPITAL ENCOUNTER (OUTPATIENT)
Dept: CARDIAC REHAB | Age: 72
Setting detail: THERAPIES SERIES
Discharge: HOME | End: 2022-07-13
Payer: MEDICARE

## 2022-07-13 VITALS — BODY MASS INDEX: 26.1 KG/M2 | WEIGHT: 187.1 LBS

## 2022-07-13 PROCEDURE — 93798 PHYS/QHP OP CAR RHAB W/ECG: CPT

## 2022-07-13 ASSESSMENT — EXERCISE STRESS TEST
PEAK_RPE: 14
PEAK_METS: 5.7
PEAK_BP: 130/64
PEAK_HR: 102

## 2022-07-18 ENCOUNTER — HOSPITAL ENCOUNTER (OUTPATIENT)
Dept: CARDIAC REHAB | Age: 72
Setting detail: THERAPIES SERIES
Discharge: HOME | End: 2022-07-18
Payer: MEDICARE

## 2022-07-18 VITALS — WEIGHT: 186.4 LBS | BODY MASS INDEX: 26 KG/M2

## 2022-07-18 PROCEDURE — 93798 PHYS/QHP OP CAR RHAB W/ECG: CPT

## 2022-07-18 ASSESSMENT — EXERCISE STRESS TEST
PEAK_METS: 4.9
PEAK_HR: 114
PEAK_BP: 120/64
PEAK_RPE: 15

## 2022-07-20 ENCOUNTER — HOSPITAL ENCOUNTER (OUTPATIENT)
Dept: CARDIAC REHAB | Age: 72
Setting detail: THERAPIES SERIES
Discharge: HOME | End: 2022-07-20
Payer: MEDICARE

## 2022-07-20 VITALS — BODY MASS INDEX: 26.1 KG/M2 | WEIGHT: 187.1 LBS

## 2022-07-20 PROCEDURE — 93798 PHYS/QHP OP CAR RHAB W/ECG: CPT

## 2022-07-20 ASSESSMENT — EXERCISE STRESS TEST
PEAK_RPE: 14
PEAK_BP: 128/62
PEAK_HR: 100
PEAK_METS: 5.2

## 2022-07-25 ENCOUNTER — HOSPITAL ENCOUNTER (OUTPATIENT)
Dept: CARDIAC REHAB | Age: 72
Setting detail: THERAPIES SERIES
Discharge: HOME | End: 2022-07-25
Payer: MEDICARE

## 2022-07-25 VITALS — BODY MASS INDEX: 25.84 KG/M2 | WEIGHT: 185.3 LBS

## 2022-07-25 PROCEDURE — 93798 PHYS/QHP OP CAR RHAB W/ECG: CPT

## 2022-07-25 ASSESSMENT — EXERCISE STRESS TEST
PEAK_BP: 102/62
PEAK_RPE: 15
PEAK_HR: 95
PEAK_METS: 4.8

## 2022-07-27 ENCOUNTER — HOSPITAL ENCOUNTER (OUTPATIENT)
Dept: CARDIAC REHAB | Age: 72
Setting detail: THERAPIES SERIES
Discharge: HOME | End: 2022-07-27
Payer: MEDICARE

## 2022-07-27 VITALS — WEIGHT: 184.2 LBS | BODY MASS INDEX: 25.69 KG/M2

## 2022-07-27 PROCEDURE — 93798 PHYS/QHP OP CAR RHAB W/ECG: CPT

## 2022-07-27 ASSESSMENT — EXERCISE STRESS TEST
PEAK_RPE: 15
PEAK_BP: 118/60
PEAK_METS: 4.6
PEAK_HR: 103

## 2022-08-01 ENCOUNTER — HOSPITAL ENCOUNTER (OUTPATIENT)
Dept: CARDIAC REHAB | Age: 72
Setting detail: THERAPIES SERIES
Discharge: HOME | End: 2022-08-01
Payer: MEDICARE

## 2022-08-01 VITALS — BODY MASS INDEX: 25.87 KG/M2 | WEIGHT: 185.5 LBS

## 2022-08-01 PROCEDURE — 93798 PHYS/QHP OP CAR RHAB W/ECG: CPT

## 2022-08-01 ASSESSMENT — EXERCISE STRESS TEST
PEAK_METS: 4.6
PEAK_BP: 92/50
PEAK_HR: 91
PEAK_RPE: 15

## 2022-08-03 ENCOUNTER — HOSPITAL ENCOUNTER (OUTPATIENT)
Dept: CARDIAC REHAB | Age: 72
Setting detail: THERAPIES SERIES
Discharge: HOME | End: 2022-08-03
Payer: MEDICARE

## 2022-08-03 NOTE — PROGRESS NOTES
Pt no call/no show for cardiac rehab today. Phoned pt and he apologized that he over slept. Will reschedule.

## 2022-08-08 ENCOUNTER — HOSPITAL ENCOUNTER (OUTPATIENT)
Dept: CARDIAC REHAB | Age: 72
Setting detail: THERAPIES SERIES
Discharge: HOME | End: 2022-08-08
Payer: MEDICARE

## 2022-08-08 VITALS — WEIGHT: 185 LBS | BODY MASS INDEX: 25.8 KG/M2

## 2022-08-08 PROCEDURE — 93798 PHYS/QHP OP CAR RHAB W/ECG: CPT

## 2022-08-08 ASSESSMENT — EXERCISE STRESS TEST
PEAK_BP: 108/60
PEAK_HR: 98
PEAK_METS: 4.1
PEAK_RPE: 15

## 2022-08-10 ENCOUNTER — HOSPITAL ENCOUNTER (OUTPATIENT)
Dept: CARDIAC REHAB | Age: 72
Setting detail: THERAPIES SERIES
Discharge: HOME | End: 2022-08-10
Payer: MEDICARE

## 2022-08-10 VITALS — BODY MASS INDEX: 25.98 KG/M2 | WEIGHT: 186.3 LBS

## 2022-08-10 PROCEDURE — 93798 PHYS/QHP OP CAR RHAB W/ECG: CPT

## 2022-08-10 ASSESSMENT — PATIENT HEALTH QUESTIONNAIRE - PHQ9
SUM OF ALL RESPONSES TO PHQ QUESTIONS 1-9: 0
SUM OF ALL RESPONSES TO PHQ9 QUESTIONS 1 & 2: 0
SUM OF ALL RESPONSES TO PHQ QUESTIONS 1-9: 0
1. LITTLE INTEREST OR PLEASURE IN DOING THINGS: 0
SUM OF ALL RESPONSES TO PHQ QUESTIONS 1-9: 0
SUM OF ALL RESPONSES TO PHQ QUESTIONS 1-9: 0
2. FEELING DOWN, DEPRESSED OR HOPELESS: 0

## 2022-08-10 ASSESSMENT — EXERCISE STRESS TEST
PEAK_RPE: 14
PEAK_BP: 118/60
PEAK_METS: 4.9
PEAK_HR: 93

## 2022-08-10 NOTE — PLAN OF CARE
Pt completed 36/36 visits. ITP updated, PHQ completed, medications reviewed and updated as needed. Pt  DOES NOT plan to participate in Phase 3 cardiac rehab. [facility]  Individual Treatment Plan    Patient Name:  Jurgen Sawyer  :  1950  Age:  80 y.o.   Diagnosis: CABG X 3 Date of Event 21  Date Entered Program 22  Physician:DR MEDRANO  History:   Past Medical History:   Diagnosis Date    Acute upper respiratory infections of unspecified site     CAD (coronary artery disease)     COPD (chronic obstructive pulmonary disease) (HCC)     Empyema without mention of fistula     Esophageal reflux     Essential hypertension, benign     History of blood transfusion     Hx of blood clots     RLE DVT,     Localized osteoarthrosis not specified whether primary or secondary, unspecified site     Other and unspecified hyperlipidemia     Surgical or other procedure not carried out because of patient's decision     colonoscopy refused     Unspecified pleural effusion      Allergies: No Known Allergies  Patient Goal: GOLF WEEKLY    Amount of Minutes per piece  Week  Warm Up  Leg Arm  Leg  Arm  Leg  Arm  Duration   1 4 7 3 7 3 7 - 31   2 4 7 3 7 3 7 3 34   3 4 8 3 8 3 8 3 37   4 4 8 4 8 4 8 4 40   5 4 9 4 9 4 9 4 43   6 4 9 5 9 5 9 5 46   Max 7 4 10 5 10 5 10 5 49        Exercise Prescription:    Type of exercise:  Legs- Treadmill, Airdyne, Nustep  Arms- Free weights, Airdyne, Ergometer, Nustep                               Frequency:  3 times per week         Plan of Progresssion:  Amount and duration will increase every       2-3 visits          Phase 2 Cardiac Rehabilitation  Individualized Cardiac Treatment Plan    Individual Cardiac Treatment Plan - EXERCISE  EXERCISE  DISCHARGE      Stages of Change    []Decline   [x]Maintain   []Relapse   EXERCISE ASSESSMENT   Home Exercise   [x]Yes   [] No  Type:   Aerobics   []  Bicycling  [x]  Cardiovascular []  Gardening  []  Hiking  []  House Cleaning   []  Run/Jog  []  Swim  []  Walk  [x]  Yard Work  [x]  Other  []    Frequency:  Daily  [x]  Q2 days  []  Q3 days  []  Biweekly  []  Irregularly  []  Other  []    Duration:   Seconds []  Minutes [x] 30+  Hours []  Other []   EXERCISE PLAN    [x]Phase 3 offered   [x]Home exercise format given    Met Level 4.9     Education goals met:   [x]Y  []N   Target Goal:  Follow individual exercise Rx, aerobic exercise, 30+minutes 5x/week   Exercise Prescription  (per physician & CR staff)             Individual Cardiac Treatment Plan - EDUCATION    Follow-up  Discharge   Post- cardiac score:_100______    Tobacco use  []Y  [x]N  []Quit  If yes, why:      Education Goals Met   [x]Y  []N    Target Goal;  Increase knowledge of risk factors  [x]Y  []N         Individual Cardiac Treatment Plan - NUTRITION    NUTRITION  DISCHARGE/FOLLOW-UP       Stages of Change    []Contemplation   [x]Action   []Relapse   NUTRITION ASSESSMENT   Weight Management  Weight: 186.3 LB     Height:  Ht Readings from Last 1 Encounters:   06/01/22 5' 11\" (1.803 m)          BMI:  Body mass index is 25.98 kg/m². Date:  Lipids:  Cholesterol (mg/dL)   Date Value   04/05/2022 137     HDL (mg/dL)   Date Value   04/05/2022 53     LDL Cholesterol (mg/dL)   Date Value   04/05/2022 74     Chol/HDL Ratio (no units)   Date Value   04/05/2022 2.6     Triglycerides (mg/dL)   Date Value   04/05/2022 52     VLDL (mg/dL)   Date Value   12/24/2021 NOT REPORTED             Goal: Understand lipid results and maitain a heart healthy weight. Education goals met   [x]Y  []N     Individual Cardiac Treatment Plan - PYSCHOSOCIAL      PSYCHOSOCIAL  DISCHARGE/FOLLOW-UP   Psychosocial   PHQ 9 score___0____   Goal: Decrease stress level. Increase healthy lifestyle.     Goals met: [x]Y  []N

## 2022-12-14 ENCOUNTER — OFFICE VISIT (OUTPATIENT)
Dept: PULMONOLOGY | Age: 72
End: 2022-12-14
Payer: MEDICARE

## 2022-12-14 VITALS
HEIGHT: 71 IN | HEART RATE: 85 BPM | BODY MASS INDEX: 26.46 KG/M2 | RESPIRATION RATE: 18 BRPM | OXYGEN SATURATION: 97 % | SYSTOLIC BLOOD PRESSURE: 98 MMHG | DIASTOLIC BLOOD PRESSURE: 61 MMHG | WEIGHT: 189 LBS

## 2022-12-14 DIAGNOSIS — I50.22 CHRONIC SYSTOLIC CONGESTIVE HEART FAILURE (HCC): ICD-10-CM

## 2022-12-14 DIAGNOSIS — I10 ESSENTIAL HYPERTENSION: ICD-10-CM

## 2022-12-14 DIAGNOSIS — J44.9 CHRONIC OBSTRUCTIVE PULMONARY DISEASE, UNSPECIFIED COPD TYPE (HCC): Primary | ICD-10-CM

## 2022-12-14 DIAGNOSIS — J47.9 BRONCHIECTASIS WITHOUT COMPLICATION (HCC): ICD-10-CM

## 2022-12-14 DIAGNOSIS — Z95.1 S/P CABG X 3: ICD-10-CM

## 2022-12-14 DIAGNOSIS — J86.9 EMPYEMA OF PLEURA (HCC): ICD-10-CM

## 2022-12-14 PROCEDURE — 3074F SYST BP LT 130 MM HG: CPT | Performed by: INTERNAL MEDICINE

## 2022-12-14 PROCEDURE — 3023F SPIROM DOC REV: CPT | Performed by: INTERNAL MEDICINE

## 2022-12-14 PROCEDURE — 3017F COLORECTAL CA SCREEN DOC REV: CPT | Performed by: INTERNAL MEDICINE

## 2022-12-14 PROCEDURE — 99214 OFFICE O/P EST MOD 30 MIN: CPT | Performed by: INTERNAL MEDICINE

## 2022-12-14 PROCEDURE — G8484 FLU IMMUNIZE NO ADMIN: HCPCS | Performed by: INTERNAL MEDICINE

## 2022-12-14 PROCEDURE — 3078F DIAST BP <80 MM HG: CPT | Performed by: INTERNAL MEDICINE

## 2022-12-14 PROCEDURE — 1123F ACP DISCUSS/DSCN MKR DOCD: CPT | Performed by: INTERNAL MEDICINE

## 2022-12-14 PROCEDURE — G8417 CALC BMI ABV UP PARAM F/U: HCPCS | Performed by: INTERNAL MEDICINE

## 2022-12-14 PROCEDURE — G8427 DOCREV CUR MEDS BY ELIG CLIN: HCPCS | Performed by: INTERNAL MEDICINE

## 2022-12-14 PROCEDURE — 1036F TOBACCO NON-USER: CPT | Performed by: INTERNAL MEDICINE

## 2022-12-14 RX ORDER — FLUTICASONE PROPIONATE AND SALMETEROL 100; 50 UG/1; UG/1
1 POWDER RESPIRATORY (INHALATION) EVERY 12 HOURS
COMMUNITY
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
## 2022-12-16 ENCOUNTER — HOSPITAL ENCOUNTER (OUTPATIENT)
Dept: CT IMAGING | Age: 72
End: 2022-12-16
Payer: MEDICARE

## 2022-12-16 DIAGNOSIS — J47.9 BRONCHIECTASIS WITHOUT COMPLICATION (HCC): ICD-10-CM

## 2022-12-16 PROCEDURE — 71250 CT THORAX DX C-: CPT

## 2023-01-13 NOTE — OP NOTE
[facility]    CARDIAC CATHETERIZATION    Date:   12/23/2021  Patient name:  David Almonte  Date of admission:  12/23/2021  6:01 AM  MRN:   [id number]  YOB: 1950    Operators:  Primary:   Guillermo Martinez MD (Attending Physician)    Procedure performed:     [x] Left Heart Catheterization. [] Graft Angiography. [x] Left Ventriculography. [] Right Heart Catheterization. [x] Coronary Angiography. [] Aortic Valve Studies. [x] PCI       [] Other:       Pre Procedure Conscious Sedation Data:  ASA Class:    [] I [x] II [] III [] IV    Mallampati Class:  [] I [x] II [] III [] IV      Indication:  [] STEMI      [] + Stress test  [x] ACS      [] + EKG Changes  [] Non Q MI       [] Significant Risk Factors  [] Recurrent Angina             [] Diabetes Mellitus    [] New LBBB      [] Other.  [] CHF / Low EF changes     [] Abnormal CTA / Ca Score      Procedure:  Access:  [] Femoral  [x] Radial  artery       [x] Right  [] Left    Procedure: After informed consent was obtained with explanation of the risks and benefits, patient was brought to the cath lab. The access area was prepped and draped in sterile fashion. 1% lidocaine was used for local block. The artery was cannulated with 6  Fr sheath with brisk arterial blood return. The side port was frequently flushed and aspirated with normal saline. Estimated Blood Loss:  [x] Minimal < 25 cc [] Moderate 25-50 cc  [] >50 cc    Findings:  LMCA: Normal 0% stenosis. LAD: Single stenosis.       Lesion on Prox LAD: 80% stenosis.       LCx: Multiple stenosis.       Lesion on 1st Ob Melodie: Proximal subsection. 90% stenosis.       Lesion on Mid CX: 80% stenosis.       Lesion on 2nd Ob Melodie: Ostial.80% stenosis.       RCA: Acute occlusion.         Lesion on Mid RCA: 100% stenosis 20 mm length reduced to 20%. Pre     procedure MARIFER 0 flow was noted. Post Procedure MARIFER III flow was     present. Poor runoff was present.  The lesion was [FreeTextEntry1] : Here for follow-up BP check. [de-identified] : Brings in home cuff and log; numbers had been trending lower; \par Stopped taking HCTZ-stopped taking this Monday.\par Was out playing golf. Felt dizzy. \par Was staying hydrated.  Felt fine tuesday. \par Has not happened any other time except when playing golf; was not hot but notes it was very kin. \par \par Has been staggering BP meds in the morning; splits them up by 20 minutes. \par Otherwise has been feeling well.  diagnosed as High Risk     (C).     Coronary Tree Dominance: Right       LV Analysis LV function assessed as:Normal.   The LV gram was performed in the CHOUDHARY 30 position. LVEF: 50%. Conclusions:  Acute occlusion of mid RCA underwent PTCA with restoration of MARIFER III    flow. Multi-vessel Coronary Artery Disease. Normal LV systolic function.       Recommendations   Routine Post PCI orders. Medical therapy as needed. Risk factor modification. CTS consult for CABG       Electronically signed on 12/23/2021 at 10:06 AM by:    Keyana Guerra MD  Fellow, [address] [place] [address]    Physician Statement  I have discussed the case of Carey Bucio including pertinent history and exam findings with the resident. I have seen and examined the patient and the key elements of the encounter have been performed by me. I agree with the assessment, plan and orders as documented by the resident With changes made to the note. Procedure performed by me.     Electronically signed by oJsefina Marcelino MD on 1/1/2022 at 3:17 PM.    [facility]      [phone]

## 2023-03-29 ENCOUNTER — OFFICE VISIT (OUTPATIENT)
Dept: PULMONOLOGY | Age: 73
End: 2023-03-29

## 2023-03-29 VITALS
RESPIRATION RATE: 16 BRPM | DIASTOLIC BLOOD PRESSURE: 59 MMHG | OXYGEN SATURATION: 97 % | BODY MASS INDEX: 26.41 KG/M2 | WEIGHT: 195 LBS | HEART RATE: 76 BPM | HEIGHT: 72 IN | SYSTOLIC BLOOD PRESSURE: 98 MMHG | TEMPERATURE: 97.9 F

## 2023-03-29 DIAGNOSIS — J47.9 BRONCHIECTASIS WITHOUT COMPLICATION (HCC): ICD-10-CM

## 2023-03-29 DIAGNOSIS — J86.9 EMPYEMA OF PLEURA (HCC): ICD-10-CM

## 2023-03-29 DIAGNOSIS — J44.9 CHRONIC OBSTRUCTIVE PULMONARY DISEASE, UNSPECIFIED COPD TYPE (HCC): Primary | ICD-10-CM

## 2023-03-29 DIAGNOSIS — Z95.1 S/P CABG X 3: ICD-10-CM

## 2023-03-29 DIAGNOSIS — I10 ESSENTIAL HYPERTENSION: ICD-10-CM

## 2023-03-29 DIAGNOSIS — I50.22 CHRONIC SYSTOLIC CONGESTIVE HEART FAILURE (HCC): ICD-10-CM

## 2023-03-29 NOTE — PROGRESS NOTES
Disp: , Rfl:     cephALEXin (KEFLEX) 500 MG capsule, Take 500 mg by mouth (Patient not taking: Reported on 12/14/2022), Disp: , Rfl:     metoprolol tartrate (LOPRESSOR) 25 MG tablet, Take 1 tablet by mouth 2 times daily (Patient not taking: No sig reported), Disp: 60 tablet, Rfl: 3    budesonide-formoterol (SYMBICORT) 160-4.5 MCG/ACT AERO, Inhale 2 puffs into the lungs 2 times daily (Patient not taking: Reported on 3/29/2023), Disp: , Rfl:       Objective:    Physical Exam:  Vitals: BP (!) 98/59 (Site: Right Upper Arm, Position: Sitting) Comment: Pt staed this is a normal reading for him. Not symptomatic. Pulse 76   Temp 97.9 °F (36.6 °C)   Resp 16   Ht 6' (1.829 m)   Wt 195 lb (88.5 kg)   SpO2 97% Comment: room air at rest  BMI 26.45 kg/m²   Last 3 weights: Wt Readings from Last 3 Encounters:   03/29/23 195 lb (88.5 kg)   12/14/22 189 lb (85.7 kg)   08/10/22 186 lb 4.8 oz (84.5 kg)     Body mass index is 26.45 kg/m². Physical Examination:   PHYSICAL EXAMINATION:    Vitals:    03/29/23 1053   BP: (!) 98/59   Site: Right Upper Arm   Position: Sitting   Pulse: 76   Resp: 16   Temp: 97.9 °F (36.6 °C)   SpO2: 97%   Weight: 195 lb (88.5 kg)   Height: 6' (1.829 m)       Constitutional: This is a well developed, well nourished, 25-29.9 - Overweight 71y.o. year old male who is alert, oriented, cooperative and in no apparent distress. Head:normocephalic and atraumatic. EENT:  SANDI. No conjunctival injections. Septum was midline, mucosa was without erythema, exudates or cobblestoning. No thrush was noted. MallampatiII (soft palate, uvula, fauces visible)  Neck: Supple without thyromegaly. No elevated JVP. Trachea was midline. Respiratory: Chest was symmetrical without dullness to percussion. Breath sounds bilaterally were clear to auscultation but slightly decreased in the left lower lobe distribution. There were no wheezes, rhonchi but has rales in the left lower lobe distribution.  There is no

## 2023-09-18 ENCOUNTER — TELEPHONE (OUTPATIENT)
Dept: PULMONOLOGY | Age: 73
End: 2023-09-18

## 2023-09-18 NOTE — TELEPHONE ENCOUNTER
According to your dictation form 3/29/23 \"We'll see the patient back in 6 months or earlier if needed based on the findings on the chest x-ray to be done soon\"  but I dont see where a chest Xray was ever ordered. Last chest Xray was 6/28/22. Should the patient have another?  Please advise

## 2023-09-20 ENCOUNTER — OFFICE VISIT (OUTPATIENT)
Dept: PULMONOLOGY | Age: 73
End: 2023-09-20
Payer: MEDICARE

## 2023-09-20 VITALS
DIASTOLIC BLOOD PRESSURE: 70 MMHG | RESPIRATION RATE: 18 BRPM | SYSTOLIC BLOOD PRESSURE: 108 MMHG | HEART RATE: 91 BPM | WEIGHT: 191 LBS | HEIGHT: 72 IN | OXYGEN SATURATION: 97 % | BODY MASS INDEX: 25.87 KG/M2

## 2023-09-20 DIAGNOSIS — J86.9 EMPYEMA OF PLEURA (HCC): ICD-10-CM

## 2023-09-20 DIAGNOSIS — Z95.1 S/P CABG X 3: ICD-10-CM

## 2023-09-20 DIAGNOSIS — J47.9 BRONCHIECTASIS WITHOUT COMPLICATION (HCC): ICD-10-CM

## 2023-09-20 DIAGNOSIS — I10 ESSENTIAL HYPERTENSION: ICD-10-CM

## 2023-09-20 DIAGNOSIS — J44.9 CHRONIC OBSTRUCTIVE PULMONARY DISEASE, UNSPECIFIED COPD TYPE (HCC): Primary | ICD-10-CM

## 2023-09-20 DIAGNOSIS — I50.22 CHRONIC SYSTOLIC CONGESTIVE HEART FAILURE (HCC): ICD-10-CM

## 2023-09-20 PROCEDURE — 1036F TOBACCO NON-USER: CPT | Performed by: INTERNAL MEDICINE

## 2023-09-20 PROCEDURE — G8427 DOCREV CUR MEDS BY ELIG CLIN: HCPCS | Performed by: INTERNAL MEDICINE

## 2023-09-20 PROCEDURE — 3074F SYST BP LT 130 MM HG: CPT | Performed by: INTERNAL MEDICINE

## 2023-09-20 PROCEDURE — 3017F COLORECTAL CA SCREEN DOC REV: CPT | Performed by: INTERNAL MEDICINE

## 2023-09-20 PROCEDURE — G8417 CALC BMI ABV UP PARAM F/U: HCPCS | Performed by: INTERNAL MEDICINE

## 2023-09-20 PROCEDURE — 3023F SPIROM DOC REV: CPT | Performed by: INTERNAL MEDICINE

## 2023-09-20 PROCEDURE — 3078F DIAST BP <80 MM HG: CPT | Performed by: INTERNAL MEDICINE

## 2023-09-20 PROCEDURE — 1123F ACP DISCUSS/DSCN MKR DOCD: CPT | Performed by: INTERNAL MEDICINE

## 2023-09-20 PROCEDURE — 99213 OFFICE O/P EST LOW 20 MIN: CPT | Performed by: INTERNAL MEDICINE

## 2023-09-20 RX ORDER — FLUTICASONE PROPIONATE AND SALMETEROL 250; 50 UG/1; UG/1
1 POWDER RESPIRATORY (INHALATION) EVERY 12 HOURS
COMMUNITY

## 2023-09-20 NOTE — PROGRESS NOTES
PULMONARY OP  PROGRESS NOTE      Patient:  Kris Christie  YOB: 1950    MRN: [id number]     Acct:        Pt seen and Chart reviewed. Kris Christie is here in followup for   1. Chronic obstructive pulmonary disease, unspecified COPD type ([address])    2. Empyema of pleura ([address])    3. Chronic systolic congestive heart failure ([address])    4. Essential hypertension    5. Bronchiectasis without complication ([address])    6. S/P CABG x 3          Pt has not been hospitalized nor been to the emergency room for any breathing problems  Patient was seen in urgent care once and received prednisone with azithromycin  Has been using meds as recommended.   Patient has a good appetite  Patient gained a few pounds since last visit  Denied any shortness of breath or wheezing on regular exertion but on heavy exertion he gets short winded  Coughs up some mucoid sputum in the morning  No orthopnea, PND or increasing pedal edema  No chest pain or pressure  No palpitations  Blood pressure was on lower side but patient denied any symptoms of low blood pressure      Subjective:  Review of Systems    Review of Systems -   General ROS: Completed and except as mentioned above were negative   Psychological ROS:  Completed and except as mentioned above were negative  Ophthalmic ROS:  Completed and except as mentioned above were negative  ENT ROS:  Completed and except as mentioned above were negative  Allergy and Immunology ROS:  Completed and except as mentioned above werenegative  Hematological and Lymphatic ROS:  Completed and except as mentioned above were negative  Endocrine ROS: Completed and except as mentioned above were negative  Respiratory ROS:  Completed and except asmentioned above were negative  Cardiovascular ROS:  Completed and except as mentioned above were negative  Gastrointestinal ROS: Completed and except as mentioned above were negative  Genito-Urinary ROS:  Completedand except as mentioned above were

## 2023-09-22 ENCOUNTER — APPOINTMENT (OUTPATIENT)
Dept: CT IMAGING | Age: 73
End: 2023-09-22
Payer: OTHER MISCELLANEOUS

## 2023-09-22 ENCOUNTER — HOSPITAL ENCOUNTER (EMERGENCY)
Age: 73
Discharge: HOME | End: 2023-09-22
Attending: EMERGENCY MEDICINE
Payer: OTHER MISCELLANEOUS

## 2023-09-22 VITALS
HEART RATE: 80 BPM | OXYGEN SATURATION: 98 % | HEIGHT: 72 IN | RESPIRATION RATE: 15 BRPM | RESPIRATION RATE: 15 BRPM | HEIGHT: 72 IN | SYSTOLIC BLOOD PRESSURE: 145 MMHG | WEIGHT: 195 LBS | DIASTOLIC BLOOD PRESSURE: 100 MMHG | SYSTOLIC BLOOD PRESSURE: 145 MMHG | BODY MASS INDEX: 26.41 KG/M2 | WEIGHT: 195 LBS | BODY MASS INDEX: 26.41 KG/M2 | TEMPERATURE: 98.2 F | HEART RATE: 80 BPM | OXYGEN SATURATION: 98 % | DIASTOLIC BLOOD PRESSURE: 100 MMHG | TEMPERATURE: 98.2 F
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
## 2023-09-22 DIAGNOSIS — S20.219A CONTUSION OF CHEST WALL, UNSPECIFIED LATERALITY, INITIAL ENCOUNTER: ICD-10-CM

## 2023-09-22 DIAGNOSIS — E27.8 ADRENAL NODULE (HCC): ICD-10-CM
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
## 2023-09-22 LAB
25(OH)D3 SERPL-MCNC: 39.4 NG/ML (ref 30–100)
25(OH)D3 SERPL-MCNC: 39.4 NG/ML (ref 30–100)
ABO + RH BLD: NORMAL
ABO + RH BLD: NORMAL
ALBUMIN SERPL-MCNC: 4 G/DL (ref 3.5–5.2)
ALBUMIN SERPL-MCNC: 4 G/DL (ref 3.5–5.2)
AMPHET UR QL SCN: NEGATIVE
AMPHET UR QL SCN: NEGATIVE
ANION GAP SERPL CALCULATED.3IONS-SCNC: 13 MMOL/L (ref 9–16)
ANION GAP SERPL CALCULATED.3IONS-SCNC: 13 MMOL/L (ref 9–16)
ARM BAND NUMBER: NORMAL
ARM BAND NUMBER: NORMAL
BARBITURATES UR QL SCN: NEGATIVE
BARBITURATES UR QL SCN: NEGATIVE
BENZODIAZ UR QL: NEGATIVE
BENZODIAZ UR QL: NEGATIVE
BILIRUB UR QL STRIP: NEGATIVE
BILIRUB UR QL STRIP: NEGATIVE
BLOOD BANK SAMPLE EXPIRATION: NORMAL
BLOOD BANK SAMPLE EXPIRATION: NORMAL
BLOOD BANK SPECIMEN: ABNORMAL
BLOOD BANK SPECIMEN: ABNORMAL
BLOOD GROUP ANTIBODIES SERPL: NEGATIVE
BLOOD GROUP ANTIBODIES SERPL: NEGATIVE
BODY TEMPERATURE: 37
BODY TEMPERATURE: 37
BUN SERPL-MCNC: 23 MG/DL (ref 8–23)
BUN SERPL-MCNC: 23 MG/DL (ref 8–23)
CANNABINOIDS UR QL SCN: NEGATIVE
CANNABINOIDS UR QL SCN: NEGATIVE
CHLORIDE SERPL-SCNC: 105 MMOL/L (ref 98–107)
CHLORIDE SERPL-SCNC: 105 MMOL/L (ref 98–107)
CLARITY UR: CLEAR
CLARITY UR: CLEAR
CO2 SERPL-SCNC: 20 MMOL/L (ref 20–31)
CO2 SERPL-SCNC: 20 MMOL/L (ref 20–31)
COCAINE UR QL SCN: NEGATIVE
COCAINE UR QL SCN: NEGATIVE
COHGB MFR BLD: 0.7 % (ref 0–5)
COHGB MFR BLD: 0.7 % (ref 0–5)
COLOR UR: YELLOW
COLOR UR: YELLOW
COMMENT: ABNORMAL
COMMENT: ABNORMAL
CREAT SERPL-MCNC: 1.2 MG/DL (ref 0.7–1.2)
CREAT SERPL-MCNC: 1.2 MG/DL (ref 0.7–1.2)
ERYTHROCYTE [DISTWIDTH] IN BLOOD BY AUTOMATED COUNT: 12.6 % (ref 11.8–14.4)
ERYTHROCYTE [DISTWIDTH] IN BLOOD BY AUTOMATED COUNT: 12.6 % (ref 11.8–14.4)
ETHANOL PERCENT: 0 %
ETHANOL PERCENT: 0 %
ETHANOLAMINE SERPL-MCNC: <10 MG/DL
ETHANOLAMINE SERPL-MCNC: <10 MG/DL
FENTANYL UR QL: NEGATIVE
FENTANYL UR QL: NEGATIVE
FIO2 ON VENT: ABNORMAL %
FIO2 ON VENT: ABNORMAL %
GFR SERPL CREATININE-BSD FRML MDRD: 43 ML/MIN/1.73M2
GFR SERPL CREATININE-BSD FRML MDRD: 43 ML/MIN/1.73M2
GLUCOSE SERPL-MCNC: 92 MG/DL (ref 74–99)
GLUCOSE SERPL-MCNC: 92 MG/DL (ref 74–99)
GLUCOSE UR STRIP-MCNC: ABNORMAL MG/DL
GLUCOSE UR STRIP-MCNC: ABNORMAL MG/DL
HCG SERPL QL: ABNORMAL
HCG SERPL QL: ABNORMAL
HCO3 VENOUS: 21.1 MMOL/L (ref 24–30)
HCO3 VENOUS: 21.1 MMOL/L (ref 24–30)
HCT VFR BLD AUTO: 42.9 % (ref 40.7–50.3)
HCT VFR BLD AUTO: 42.9 % (ref 40.7–50.3)
HGB BLD-MCNC: 14.3 G/DL (ref 13–17)
HGB BLD-MCNC: 14.3 G/DL (ref 13–17)
HGB UR QL STRIP.AUTO: NEGATIVE
HGB UR QL STRIP.AUTO: NEGATIVE
INR PPP: 1
INR PPP: 1
KETONES UR STRIP-MCNC: NEGATIVE MG/DL
KETONES UR STRIP-MCNC: NEGATIVE MG/DL
LEUKOCYTE ESTERASE UR QL STRIP: NEGATIVE
LEUKOCYTE ESTERASE UR QL STRIP: NEGATIVE
MCH RBC QN AUTO: 30.4 PG (ref 25.2–33.5)
MCH RBC QN AUTO: 30.4 PG (ref 25.2–33.5)
MCHC RBC AUTO-ENTMCNC: 33.3 G/DL (ref 28.4–34.8)
MCHC RBC AUTO-ENTMCNC: 33.3 G/DL (ref 28.4–34.8)
MCV RBC AUTO: 91.1 FL (ref 82.6–102.9)
MCV RBC AUTO: 91.1 FL (ref 82.6–102.9)
METHADONE UR QL: NEGATIVE
METHADONE UR QL: NEGATIVE
MYOGLOBIN SERPL-MCNC: 330 NG/ML (ref 28–72)
MYOGLOBIN SERPL-MCNC: 330 NG/ML (ref 28–72)
NEGATIVE BASE EXCESS, VEN: 2 MMOL/L (ref 0–2)
NEGATIVE BASE EXCESS, VEN: 2 MMOL/L (ref 0–2)
NITRITE UR QL STRIP: NEGATIVE
NITRITE UR QL STRIP: NEGATIVE
NRBC BLD-RTO: 0 PER 100 WBC
NRBC BLD-RTO: 0 PER 100 WBC
O2 SAT, VEN: 91.3 % (ref 60–85)
O2 SAT, VEN: 91.3 % (ref 60–85)
OPIATES UR QL SCN: NEGATIVE
OPIATES UR QL SCN: NEGATIVE
OXYCODONE UR QL SCN: NEGATIVE
OXYCODONE UR QL SCN: NEGATIVE
PARTIAL THROMBOPLASTIN TIME: 26.6 SEC (ref 23–36.5)
PARTIAL THROMBOPLASTIN TIME: 26.6 SEC (ref 23–36.5)
PCO2, VEN: 33.1 MM HG (ref 39–55)
PCO2, VEN: 33.1 MM HG (ref 39–55)
PCP UR QL SCN: NEGATIVE
PCP UR QL SCN: NEGATIVE
PH UR STRIP: 5.5 [PH] (ref 5–8)
PH UR STRIP: 5.5 [PH] (ref 5–8)
PH VENOUS: 7.42 (ref 7.32–7.42)
PH VENOUS: 7.42 (ref 7.32–7.42)
PLATELET # BLD AUTO: 167 K/UL (ref 138–453)
PLATELET # BLD AUTO: 167 K/UL (ref 138–453)
PMV BLD AUTO: 10.1 FL (ref 8.1–13.5)
PMV BLD AUTO: 10.1 FL (ref 8.1–13.5)
PO2, VEN: 59.1 MM HG (ref 30–50)
PO2, VEN: 59.1 MM HG (ref 30–50)
POTASSIUM SERPL-SCNC: 4.2 MMOL/L (ref 3.7–5.3)
POTASSIUM SERPL-SCNC: 4.2 MMOL/L (ref 3.7–5.3)
PROT UR STRIP-MCNC: NEGATIVE MG/DL
PROT UR STRIP-MCNC: NEGATIVE MG/DL
PROTHROMBIN TIME: 13.4 SEC (ref 11.7–14.9)
PROTHROMBIN TIME: 13.4 SEC (ref 11.7–14.9)
RBC # BLD AUTO: 4.71 M/UL (ref 4.21–5.77)
RBC # BLD AUTO: 4.71 M/UL (ref 4.21–5.77)
SODIUM SERPL-SCNC: 138 MMOL/L (ref 136–145)
SODIUM SERPL-SCNC: 138 MMOL/L (ref 136–145)
SP GR UR STRIP: 1.02 (ref 1–1.03)
SP GR UR STRIP: 1.02 (ref 1–1.03)
T4 FREE SERPL-MCNC: 1.2 NG/DL (ref 0.93–1.7)
T4 FREE SERPL-MCNC: 1.2 NG/DL (ref 0.93–1.7)
TEST INFORMATION: NORMAL
TEST INFORMATION: NORMAL
TROPONIN I SERPL HS-MCNC: 12 NG/L (ref 0–22)
TROPONIN I SERPL HS-MCNC: 12 NG/L (ref 0–22)
TROPONIN I SERPL HS-MCNC: 13 NG/L (ref 0–22)
TROPONIN I SERPL HS-MCNC: 13 NG/L (ref 0–22)
TSH SERPL DL<=0.05 MIU/L-ACNC: 2.93 UIU/ML (ref 0.27–4.2)
TSH SERPL DL<=0.05 MIU/L-ACNC: 2.93 UIU/ML (ref 0.27–4.2)
UROBILINOGEN UR STRIP-ACNC: NORMAL EU/DL (ref 0–1)
UROBILINOGEN UR STRIP-ACNC: NORMAL EU/DL (ref 0–1)
VIT B12 SERPL-MCNC: 727 PG/ML (ref 232–1245)
VIT B12 SERPL-MCNC: 727 PG/ML (ref 232–1245)
WBC OTHER # BLD: 10.6 K/UL (ref 3.5–11.3)
WBC OTHER # BLD: 10.6 K/UL (ref 3.5–11.3)

## 2023-09-22 PROCEDURE — 2580000003 HC RX 258: Performed by: STUDENT IN AN ORGANIZED HEALTH CARE EDUCATION/TRAINING PROGRAM

## 2023-09-22 PROCEDURE — 6360000004 HC RX CONTRAST MEDICATION

## 2023-09-22 PROCEDURE — 81003 URINALYSIS AUTO W/O SCOPE: CPT

## 2023-09-22 PROCEDURE — 70450 CT HEAD/BRAIN W/O DYE: CPT

## 2023-09-22 PROCEDURE — 83874 ASSAY OF MYOGLOBIN: CPT

## 2023-09-22 PROCEDURE — 85730 THROMBOPLASTIN TIME PARTIAL: CPT

## 2023-09-22 PROCEDURE — 84443 ASSAY THYROID STIM HORMONE: CPT

## 2023-09-22 PROCEDURE — 99285 EMERGENCY DEPT VISIT HI MDM: CPT | Performed by: EMERGENCY MEDICINE

## 2023-09-22 PROCEDURE — 82040 ASSAY OF SERUM ALBUMIN: CPT

## 2023-09-22 PROCEDURE — 71260 CT THORAX DX C+: CPT

## 2023-09-22 PROCEDURE — 82306 VITAMIN D 25 HYDROXY: CPT

## 2023-09-22 PROCEDURE — 82947 ASSAY GLUCOSE BLOOD QUANT: CPT

## 2023-09-22 PROCEDURE — 86900 BLOOD TYPING SEROLOGIC ABO: CPT

## 2023-09-22 PROCEDURE — 86901 BLOOD TYPING SEROLOGIC RH(D): CPT

## 2023-09-22 PROCEDURE — 82565 ASSAY OF CREATININE: CPT

## 2023-09-22 PROCEDURE — 6810039001 HC L1 TRAUMA PRIORITY

## 2023-09-22 PROCEDURE — 72125 CT NECK SPINE W/O DYE: CPT

## 2023-09-22 PROCEDURE — 82607 VITAMIN B-12: CPT

## 2023-09-22 PROCEDURE — 6360000002 HC RX W HCPCS: Performed by: STUDENT IN AN ORGANIZED HEALTH CARE EDUCATION/TRAINING PROGRAM

## 2023-09-22 PROCEDURE — 96374 THER/PROPH/DIAG INJ IV PUSH: CPT | Performed by: EMERGENCY MEDICINE

## 2023-09-22 PROCEDURE — 84520 ASSAY OF UREA NITROGEN: CPT

## 2023-09-22 PROCEDURE — G0480 DRUG TEST DEF 1-7 CLASSES: HCPCS

## 2023-09-22 PROCEDURE — 84484 ASSAY OF TROPONIN QUANT: CPT

## 2023-09-22 PROCEDURE — 83036 HEMOGLOBIN GLYCOSYLATED A1C: CPT

## 2023-09-22 PROCEDURE — 99221 1ST HOSP IP/OBS SF/LOW 40: CPT | Performed by: SURGERY

## 2023-09-22 PROCEDURE — 85610 PROTHROMBIN TIME: CPT

## 2023-09-22 PROCEDURE — 84439 ASSAY OF FREE THYROXINE: CPT

## 2023-09-22 PROCEDURE — 84703 CHORIONIC GONADOTROPIN ASSAY: CPT

## 2023-09-22 PROCEDURE — 80307 DRUG TEST PRSMV CHEM ANLYZR: CPT

## 2023-09-22 PROCEDURE — 86850 RBC ANTIBODY SCREEN: CPT

## 2023-09-22 PROCEDURE — 85027 COMPLETE CBC AUTOMATED: CPT

## 2023-09-22 PROCEDURE — 80051 ELECTROLYTE PANEL: CPT

## 2023-09-22 PROCEDURE — 82805 BLOOD GASES W/O2 SATURATION: CPT

## 2023-09-22 RX ORDER — ACETAMINOPHEN 500 MG
1000 TABLET ORAL EVERY 6 HOURS PRN
Qty: 56 TABLET | Refills: 0 | Status: SHIPPED | OUTPATIENT
Start: 2023-09-22 | End: 2023-09-22 | Stop reason: SDUPTHER

## 2023-09-22 RX ORDER — ACETAMINOPHEN 500 MG
1000 TABLET ORAL EVERY 6 HOURS PRN
Qty: 56 TABLET | Refills: 0 | Status: SHIPPED | OUTPATIENT
Start: 2023-09-22 | End: 2023-09-29

## 2023-09-22 RX ORDER — METHOCARBAMOL 750 MG/1
750 TABLET, FILM COATED ORAL 3 TIMES DAILY
Qty: 21 TABLET | Refills: 0 | Status: SHIPPED | OUTPATIENT
Start: 2023-09-22 | End: 2023-09-29

## 2023-09-22 RX ORDER — SODIUM CHLORIDE, SODIUM LACTATE, POTASSIUM CHLORIDE, AND CALCIUM CHLORIDE .6; .31; .03; .02 G/100ML; G/100ML; G/100ML; G/100ML
1000 INJECTION, SOLUTION INTRAVENOUS ONCE
Status: COMPLETED | OUTPATIENT
Start: 2023-09-22 | End: 2023-09-22

## 2023-09-22 RX ORDER — FENTANYL CITRATE 50 UG/ML
50 INJECTION, SOLUTION INTRAMUSCULAR; INTRAVENOUS ONCE
Status: COMPLETED | OUTPATIENT
Start: 2023-09-22 | End: 2023-09-22

## 2023-09-22 RX ORDER — IBUPROFEN 800 MG/1
800 TABLET ORAL EVERY 8 HOURS PRN
Qty: 21 TABLET | Refills: 0 | Status: SHIPPED | OUTPATIENT
Start: 2023-09-22 | End: 2023-09-22 | Stop reason: SDUPTHER

## 2023-09-22 RX ORDER — METHOCARBAMOL 750 MG/1
750 TABLET, FILM COATED ORAL 3 TIMES DAILY
Qty: 21 TABLET | Refills: 0 | Status: SHIPPED | OUTPATIENT
Start: 2023-09-22 | End: 2023-09-22 | Stop reason: SDUPTHER

## 2023-09-22 RX ORDER — IBUPROFEN 800 MG/1
800 TABLET ORAL EVERY 8 HOURS PRN
Qty: 21 TABLET | Refills: 0 | Status: SHIPPED | OUTPATIENT
Start: 2023-09-22 | End: 2023-09-29

## 2023-09-22 RX ADMIN — IOPAMIDOL 130 ML: 755 INJECTION, SOLUTION INTRAVENOUS at 16:48

## 2023-09-22 RX ADMIN — FENTANYL CITRATE 50 MCG: 50 INJECTION, SOLUTION INTRAMUSCULAR; INTRAVENOUS at 18:49

## 2023-09-22 RX ADMIN — SODIUM CHLORIDE, POTASSIUM CHLORIDE, SODIUM LACTATE AND CALCIUM CHLORIDE 1000 ML: 600; 310; 30; 20 INJECTION, SOLUTION INTRAVENOUS at 18:48

## 2023-09-22 ASSESSMENT — PAIN SCALES - GENERAL
PAINLEVEL_OUTOF10: 8
PAINLEVEL_OUTOF10: 9

## 2023-09-22 ASSESSMENT — LIFESTYLE VARIABLES
HOW OFTEN DO YOU HAVE A DRINK CONTAINING ALCOHOL: NEVER
HOW OFTEN DO YOU HAVE A DRINK CONTAINING ALCOHOL: NEVER

## 2023-09-22 ASSESSMENT — PAIN DESCRIPTION - LOCATION: LOCATION: BACK
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
## 2023-09-22 NOTE — ED NOTES
Patient transported to [facility] on stretcher     Lili Avitia RN  09/22/23 [id number]
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
## 2023-09-24 LAB
EST. AVERAGE GLUCOSE BLD GHB EST-MCNC: 103 MG/DL
EST. AVERAGE GLUCOSE BLD GHB EST-MCNC: 103 MG/DL
HBA1C MFR BLD: 5.2 % (ref 4–6)
HBA1C MFR BLD: 5.2 % (ref 4–6)

## 2023-12-27 ENCOUNTER — APPOINTMENT (OUTPATIENT)
Dept: GENERAL RADIOLOGY | Age: 73
DRG: 193 | End: 2023-12-27
Payer: OTHER GOVERNMENT

## 2023-12-27 ENCOUNTER — HOSPITAL ENCOUNTER (INPATIENT)
Age: 73
LOS: 7 days | Discharge: HOME | DRG: 193 | End: 2024-01-03
Attending: EMERGENCY MEDICINE | Admitting: STUDENT IN AN ORGANIZED HEALTH CARE EDUCATION/TRAINING PROGRAM
Payer: OTHER GOVERNMENT

## 2023-12-27 DIAGNOSIS — J44.9 CHRONIC OBSTRUCTIVE PULMONARY DISEASE, UNSPECIFIED COPD TYPE (HCC): ICD-10-CM

## 2023-12-27 DIAGNOSIS — I50.9 ACUTE ON CHRONIC CONGESTIVE HEART FAILURE, UNSPECIFIED HEART FAILURE TYPE (HCC): Primary | ICD-10-CM

## 2023-12-27 LAB
ALBUMIN SERPL-MCNC: 3.2 G/DL (ref 3.5–5.2)
ALBUMIN/GLOB SERPL: 0.9 {RATIO} (ref 1–2.5)
ALP SERPL-CCNC: 119 U/L (ref 40–129)
ALT SERPL-CCNC: 17 U/L (ref 5–41)
ANION GAP SERPL CALCULATED.3IONS-SCNC: 18 MMOL/L (ref 9–17)
AST SERPL-CCNC: 21 U/L
BASOPHILS # BLD: 0.06 K/UL (ref 0–0.2)
BASOPHILS NFR BLD: 0 % (ref 0–2)
BILIRUB SERPL-MCNC: 1.3 MG/DL (ref 0.3–1.2)
BNP SERPL-MCNC: 3679 PG/ML
BUN BLD-MCNC: 29 MG/DL (ref 8–26)
BUN SERPL-MCNC: 32 MG/DL (ref 8–23)
CA-I BLD-SCNC: 0.96 MMOL/L (ref 1.15–1.33)
CALCIUM SERPL-MCNC: 8.8 MG/DL (ref 8.6–10.4)
CHLORIDE BLD-SCNC: 99 MMOL/L (ref 98–107)
CHLORIDE SERPL-SCNC: 95 MMOL/L (ref 98–107)
CHP ED QC CHECK: YES
CO2 BLD CALC-SCNC: 23 MMOL/L (ref 22–30)
CO2 SERPL-SCNC: 23 MMOL/L (ref 20–31)
CREAT SERPL-MCNC: 1.6 MG/DL (ref 0.7–1.2)
EGFR, POC: 53 ML/MIN/1.73M2
EKG ATRIAL RATE: 115 BPM
EKG ATRIAL RATE: 133 BPM
EKG P AXIS: 60 DEGREES
EKG P-R INTERVAL: 148 MS
EKG P-R INTERVAL: 176 MS
EKG Q-T INTERVAL: 364 MS
EKG Q-T INTERVAL: 384 MS
EKG QRS DURATION: 128 MS
EKG QRS DURATION: 130 MS
EKG QTC CALCULATION (BAZETT): 503 MS
EKG QTC CALCULATION (BAZETT): 549 MS
EKG R AXIS: 17 DEGREES
EKG R AXIS: 27 DEGREES
EKG T AXIS: 12 DEGREES
EKG T AXIS: 8 DEGREES
EKG VENTRICULAR RATE: 115 BPM
EKG VENTRICULAR RATE: 123 BPM
EOSINOPHIL # BLD: <0.03 K/UL (ref 0–0.44)
EOSINOPHILS RELATIVE PERCENT: 0 % (ref 1–4)
ERYTHROCYTE [DISTWIDTH] IN BLOOD BY AUTOMATED COUNT: 12.9 % (ref 11.8–14.4)
FLUAV AG SPEC QL: NEGATIVE
FLUBV AG SPEC QL: NEGATIVE
GFR SERPL CREATININE-BSD FRML MDRD: 45 ML/MIN/1.73M2
GLUCOSE BLD-MCNC: 112 MG/DL (ref 74–100)
GLUCOSE BLD-MCNC: 116 MG/DL (ref 75–110)
GLUCOSE BLD-MCNC: 96 MG/DL
GLUCOSE BLD-MCNC: 96 MG/DL (ref 75–110)
GLUCOSE SERPL-MCNC: 118 MG/DL (ref 70–99)
HCO3 VENOUS: 23.5 MMOL/L (ref 22–29)
HCT VFR BLD AUTO: 46.8 % (ref 40.7–50.3)
HCT VFR BLD AUTO: 51 % (ref 41–53)
HGB BLD-MCNC: 15.1 G/DL (ref 13–17)
IMM GRANULOCYTES # BLD AUTO: 0.21 K/UL (ref 0–0.3)
IMM GRANULOCYTES NFR BLD: 1 %
LYMPHOCYTES NFR BLD: 0.7 K/UL (ref 1.1–3.7)
LYMPHOCYTES RELATIVE PERCENT: 4 % (ref 24–43)
MCH RBC QN AUTO: 29.3 PG (ref 25.2–33.5)
MCHC RBC AUTO-ENTMCNC: 32.3 G/DL (ref 28.4–34.8)
MCV RBC AUTO: 90.9 FL (ref 82.6–102.9)
MONOCYTES NFR BLD: 0.64 K/UL (ref 0.1–1.2)
MONOCYTES NFR BLD: 4 % (ref 3–12)
NEUTROPHILS NFR BLD: 91 % (ref 36–65)
NEUTS SEG NFR BLD: 14.5 K/UL (ref 1.5–8.1)
NRBC BLD-RTO: 0 PER 100 WBC
O2 SAT, VEN: 43.9 % (ref 60–85)
PCO2, VEN: 32.7 MM HG (ref 41–51)
PH VENOUS: 7.46 (ref 7.32–7.43)
PLATELET # BLD AUTO: 183 K/UL (ref 138–453)
PMV BLD AUTO: 10.1 FL (ref 8.1–13.5)
PO2, VEN: 22.7 MM HG (ref 30–50)
POC ANION GAP: 15 MMOL/L (ref 7–16)
POC CREATININE: 1.4 MG/DL (ref 0.51–1.19)
POC HEMOGLOBIN (CALC): 17.5 G/DL (ref 13.5–17.5)
POC LACTIC ACID: 4.4 MMOL/L (ref 0.56–1.39)
POSITIVE BASE EXCESS, VEN: 0.6 MMOL/L (ref 0–3)
POTASSIUM BLD-SCNC: 3.8 MMOL/L (ref 3.5–4.5)
POTASSIUM SERPL-SCNC: 3.9 MMOL/L (ref 3.7–5.3)
PROCALCITONIN SERPL-MCNC: 13.31 NG/ML
PROT SERPL-MCNC: 6.8 G/DL (ref 6.4–8.3)
RBC # BLD AUTO: 5.15 M/UL (ref 4.21–5.77)
SARS-COV-2 RDRP RESP QL NAA+PROBE: NOT DETECTED
SODIUM BLD-SCNC: 136 MMOL/L (ref 138–146)
SODIUM SERPL-SCNC: 136 MMOL/L (ref 135–144)
SPECIMEN DESCRIPTION: NORMAL
TROPONIN I SERPL HS-MCNC: 21 NG/L (ref 0–22)
TROPONIN I SERPL HS-MCNC: 23 NG/L (ref 0–22)
TROPONIN I SERPL HS-MCNC: 24 NG/L (ref 0–22)
TSH SERPL DL<=0.05 MIU/L-ACNC: 2.36 UIU/ML (ref 0.3–5)
WBC OTHER # BLD: 16.1 K/UL (ref 3.5–11.3)

## 2023-12-27 PROCEDURE — 84145 PROCALCITONIN (PCT): CPT

## 2023-12-27 PROCEDURE — 2060000000 HC ICU INTERMEDIATE R&B

## 2023-12-27 PROCEDURE — 6370000000 HC RX 637 (ALT 250 FOR IP): Performed by: STUDENT IN AN ORGANIZED HEALTH CARE EDUCATION/TRAINING PROGRAM

## 2023-12-27 PROCEDURE — 2700000000 HC OXYGEN THERAPY PER DAY

## 2023-12-27 PROCEDURE — 99285 EMERGENCY DEPT VISIT HI MDM: CPT

## 2023-12-27 PROCEDURE — 83880 ASSAY OF NATRIURETIC PEPTIDE: CPT

## 2023-12-27 PROCEDURE — 87040 BLOOD CULTURE FOR BACTERIA: CPT

## 2023-12-27 PROCEDURE — 82803 BLOOD GASES ANY COMBINATION: CPT

## 2023-12-27 PROCEDURE — 84484 ASSAY OF TROPONIN QUANT: CPT

## 2023-12-27 PROCEDURE — 85014 HEMATOCRIT: CPT

## 2023-12-27 PROCEDURE — 87804 INFLUENZA ASSAY W/OPTIC: CPT

## 2023-12-27 PROCEDURE — 94660 CPAP INITIATION&MGMT: CPT

## 2023-12-27 PROCEDURE — 71045 X-RAY EXAM CHEST 1 VIEW: CPT

## 2023-12-27 PROCEDURE — 99223 1ST HOSP IP/OBS HIGH 75: CPT | Performed by: STUDENT IN AN ORGANIZED HEALTH CARE EDUCATION/TRAINING PROGRAM

## 2023-12-27 PROCEDURE — 83605 ASSAY OF LACTIC ACID: CPT

## 2023-12-27 PROCEDURE — 6370000000 HC RX 637 (ALT 250 FOR IP)

## 2023-12-27 PROCEDURE — 85025 COMPLETE CBC W/AUTO DIFF WBC: CPT

## 2023-12-27 PROCEDURE — 94761 N-INVAS EAR/PLS OXIMETRY MLT: CPT

## 2023-12-27 PROCEDURE — 80053 COMPREHEN METABOLIC PANEL: CPT

## 2023-12-27 PROCEDURE — 80051 ELECTROLYTE PANEL: CPT

## 2023-12-27 PROCEDURE — 5A09357 ASSISTANCE WITH RESPIRATORY VENTILATION, LESS THAN 24 CONSECUTIVE HOURS, CONTINUOUS POSITIVE AIRWAY PRESSURE: ICD-10-PCS | Performed by: EMERGENCY MEDICINE

## 2023-12-27 PROCEDURE — 82330 ASSAY OF CALCIUM: CPT

## 2023-12-27 PROCEDURE — 2580000003 HC RX 258: Performed by: STUDENT IN AN ORGANIZED HEALTH CARE EDUCATION/TRAINING PROGRAM

## 2023-12-27 PROCEDURE — 82947 ASSAY GLUCOSE BLOOD QUANT: CPT

## 2023-12-27 PROCEDURE — 2580000003 HC RX 258: Performed by: PHYSICIAN ASSISTANT

## 2023-12-27 PROCEDURE — 2580000003 HC RX 258

## 2023-12-27 PROCEDURE — 87635 SARS-COV-2 COVID-19 AMP PRB: CPT

## 2023-12-27 PROCEDURE — 84520 ASSAY OF UREA NITROGEN: CPT

## 2023-12-27 PROCEDURE — 93005 ELECTROCARDIOGRAM TRACING: CPT | Performed by: STUDENT IN AN ORGANIZED HEALTH CARE EDUCATION/TRAINING PROGRAM

## 2023-12-27 PROCEDURE — 82565 ASSAY OF CREATININE: CPT

## 2023-12-27 PROCEDURE — 84443 ASSAY THYROID STIM HORMONE: CPT

## 2023-12-27 PROCEDURE — 6360000002 HC RX W HCPCS: Performed by: STUDENT IN AN ORGANIZED HEALTH CARE EDUCATION/TRAINING PROGRAM

## 2023-12-27 RX ORDER — INSULIN LISPRO 100 [IU]/ML
0-4 INJECTION, SOLUTION INTRAVENOUS; SUBCUTANEOUS NIGHTLY
Status: DISCONTINUED | OUTPATIENT
Start: 2023-12-27 | End: 2024-01-03 | Stop reason: HOSPADM

## 2023-12-27 RX ORDER — POTASSIUM CHLORIDE 20 MEQ/1
40 TABLET, EXTENDED RELEASE ORAL PRN
Status: DISCONTINUED | OUTPATIENT
Start: 2023-12-27 | End: 2024-01-03 | Stop reason: HOSPADM

## 2023-12-27 RX ORDER — ACETAMINOPHEN 650 MG/1
650 SUPPOSITORY RECTAL EVERY 6 HOURS PRN
Status: DISCONTINUED | OUTPATIENT
Start: 2023-12-27 | End: 2024-01-03 | Stop reason: HOSPADM

## 2023-12-27 RX ORDER — SODIUM CHLORIDE 9 MG/ML
INJECTION, SOLUTION INTRAVENOUS PRN
Status: DISCONTINUED | OUTPATIENT
Start: 2023-12-27 | End: 2024-01-03 | Stop reason: HOSPADM

## 2023-12-27 RX ORDER — FUROSEMIDE 10 MG/ML
40 INJECTION INTRAMUSCULAR; INTRAVENOUS DAILY
Status: DISCONTINUED | OUTPATIENT
Start: 2023-12-27 | End: 2024-01-02

## 2023-12-27 RX ORDER — ONDANSETRON 4 MG/1
4 TABLET, ORALLY DISINTEGRATING ORAL EVERY 8 HOURS PRN
Status: DISCONTINUED | OUTPATIENT
Start: 2023-12-27 | End: 2024-01-03 | Stop reason: HOSPADM

## 2023-12-27 RX ORDER — HEPARIN SODIUM 5000 [USP'U]/ML
5000 INJECTION, SOLUTION INTRAVENOUS; SUBCUTANEOUS EVERY 8 HOURS SCHEDULED
Status: DISCONTINUED | OUTPATIENT
Start: 2023-12-27 | End: 2023-12-27

## 2023-12-27 RX ORDER — SODIUM CHLORIDE 0.9 % (FLUSH) 0.9 %
5-40 SYRINGE (ML) INJECTION EVERY 12 HOURS SCHEDULED
Status: DISCONTINUED | OUTPATIENT
Start: 2023-12-27 | End: 2024-01-03 | Stop reason: HOSPADM

## 2023-12-27 RX ORDER — METOPROLOL TARTRATE 1 MG/ML
5 INJECTION, SOLUTION INTRAVENOUS EVERY 6 HOURS PRN
Status: DISCONTINUED | OUTPATIENT
Start: 2023-12-27 | End: 2024-01-03 | Stop reason: HOSPADM

## 2023-12-27 RX ORDER — 0.9 % SODIUM CHLORIDE 0.9 %
500 INTRAVENOUS SOLUTION INTRAVENOUS ONCE
Status: COMPLETED | OUTPATIENT
Start: 2023-12-27 | End: 2023-12-27

## 2023-12-27 RX ORDER — ACETAMINOPHEN 325 MG/1
650 TABLET ORAL EVERY 6 HOURS PRN
Status: DISCONTINUED | OUTPATIENT
Start: 2023-12-27 | End: 2024-01-03 | Stop reason: HOSPADM

## 2023-12-27 RX ORDER — NITROGLYCERIN 0.4 MG/1
TABLET SUBLINGUAL
Status: COMPLETED
Start: 2023-12-27 | End: 2023-12-27

## 2023-12-27 RX ORDER — NITROGLYCERIN 0.4 MG/1
0.4 TABLET SUBLINGUAL EVERY 5 MIN PRN
Status: DISCONTINUED | OUTPATIENT
Start: 2023-12-27 | End: 2024-01-03 | Stop reason: HOSPADM

## 2023-12-27 RX ORDER — ONDANSETRON 2 MG/ML
4 INJECTION INTRAMUSCULAR; INTRAVENOUS EVERY 6 HOURS PRN
Status: DISCONTINUED | OUTPATIENT
Start: 2023-12-27 | End: 2024-01-03 | Stop reason: HOSPADM

## 2023-12-27 RX ORDER — INSULIN LISPRO 100 [IU]/ML
0-8 INJECTION, SOLUTION INTRAVENOUS; SUBCUTANEOUS
Status: DISCONTINUED | OUTPATIENT
Start: 2023-12-27 | End: 2024-01-03 | Stop reason: HOSPADM

## 2023-12-27 RX ORDER — POLYETHYLENE GLYCOL 3350 17 G/17G
17 POWDER, FOR SOLUTION ORAL DAILY PRN
Status: DISCONTINUED | OUTPATIENT
Start: 2023-12-27 | End: 2024-01-03 | Stop reason: HOSPADM

## 2023-12-27 RX ORDER — MAGNESIUM SULFATE IN WATER 40 MG/ML
2000 INJECTION, SOLUTION INTRAVENOUS PRN
Status: DISCONTINUED | OUTPATIENT
Start: 2023-12-27 | End: 2024-01-03 | Stop reason: HOSPADM

## 2023-12-27 RX ORDER — SODIUM CHLORIDE 0.9 % (FLUSH) 0.9 %
5-40 SYRINGE (ML) INJECTION PRN
Status: DISCONTINUED | OUTPATIENT
Start: 2023-12-27 | End: 2024-01-03 | Stop reason: HOSPADM

## 2023-12-27 RX ORDER — DEXTROSE MONOHYDRATE 100 MG/ML
INJECTION, SOLUTION INTRAVENOUS CONTINUOUS PRN
Status: DISCONTINUED | OUTPATIENT
Start: 2023-12-27 | End: 2024-01-03 | Stop reason: HOSPADM

## 2023-12-27 RX ORDER — POTASSIUM CHLORIDE 7.45 MG/ML
10 INJECTION INTRAVENOUS PRN
Status: DISCONTINUED | OUTPATIENT
Start: 2023-12-27 | End: 2024-01-03 | Stop reason: HOSPADM

## 2023-12-27 RX ADMIN — SODIUM CHLORIDE 500 ML: 0.9 INJECTION, SOLUTION INTRAVENOUS at 14:02

## 2023-12-27 RX ADMIN — SODIUM CHLORIDE, PRESERVATIVE FREE 10 ML: 5 INJECTION INTRAVENOUS at 20:39

## 2023-12-27 RX ADMIN — APIXABAN 5 MG: 5 TABLET, FILM COATED ORAL at 22:14

## 2023-12-27 RX ADMIN — FUROSEMIDE 40 MG: 10 INJECTION, SOLUTION INTRAMUSCULAR; INTRAVENOUS at 16:50

## 2023-12-27 RX ADMIN — Medication 1000 MG: at 18:00

## 2023-12-27 RX ADMIN — SODIUM CHLORIDE 100 ML: 9 INJECTION, SOLUTION INTRAVENOUS at 16:54

## 2023-12-27 RX ADMIN — METOPROLOL TARTRATE 25 MG: 25 TABLET, FILM COATED ORAL at 22:15

## 2023-12-27 RX ADMIN — NITROGLYCERIN 0.4 MG: 0.4 TABLET SUBLINGUAL at 15:54

## 2023-12-27 RX ADMIN — NITROGLYCERIN 0.4 MG: 0.4 TABLET SUBLINGUAL at 13:23

## 2023-12-27 RX ADMIN — AZITHROMYCIN MONOHYDRATE 500 MG: 500 INJECTION, POWDER, LYOPHILIZED, FOR SOLUTION INTRAVENOUS at 16:56

## 2023-12-27 ASSESSMENT — PAIN - FUNCTIONAL ASSESSMENT: PAIN_FUNCTIONAL_ASSESSMENT: NONE - DENIES PAIN

## 2023-12-27 NOTE — ED TRIAGE NOTES
Pt arriving to ed 14 via triage   of Sob since yesterday  Denies chest pain  Presenting in Afib rvr  Pt placed on continuous cardiac monitoring, BP, Pulse ox. EKG obtained. IV established and labs drawn.

## 2023-12-27 NOTE — ED PROVIDER NOTES
[facility] ED  Emergency Department Encounter  Emergency Medicine Resident     Pt Name:Marcus Vaca  MRN: [id number]  Birthdate 1950  Date of evaluation: 12/27/23  PCP:  Jesenia Alexander MD  Note Started: 1:21 PM EST      CHIEF COMPLAINT       No chief complaint on file.      HISTORY OF PRESENT ILLNESS  (Location/Symptom, Timing/Onset, Context/Setting, Quality, Duration, Modifying Factors, Severity.)      Marcus Vaca is a 73 y.o. male who presents with shortness of breath and chest pain.  Patient reports that this started on Sunday with worsening over the last 24 hours.  Patient does have a extensive history of coronary artery disease, hypertension, CABG, COPD.  Patient reports taking all of his prescribed medications regularly.  Denies any recent sick contacts, denies any sinus congestion, rhinorrhea, cold like symptoms.    Patient states that this shortness of breath is worse with exertion.  Denies any fever at home, endorses increased cough and yellow sputum production.  Denies any headache, vision changes, abdominal pain, nausea, vomiting, urinary or bowel complaints.    PAST MEDICAL / SURGICAL / SOCIAL / FAMILY HISTORY      has no past medical history on file.       has no past surgical history on file.      Social History     Socioeconomic History    Marital status: Single     Spouse name: Not on file    Number of children: Not on file    Years of education: Not on file    Highest education level: Not on file   Occupational History    Not on file   Tobacco Use    Smoking status: Not on file    Smokeless tobacco: Not on file   Substance and Sexual Activity    Alcohol use: Not on file    Drug use: Not on file    Sexual activity: Not on file   Other Topics Concern    Not on file   Social History Narrative    Not on file     Social Determinants of Health     Financial Resource Strain: Not on file   Food Insecurity: Not on file   Transportation Needs: Not on file   Physical Activity:

## 2023-12-27 NOTE — H&P
PM   Result Value Ref Range    Troponin, High Sensitivity 23 (H) 0 - 22 ng/L   EKG 12 Lead    Collection Time: 12/27/23  3:02 PM   Result Value Ref Range    Ventricular Rate 123 BPM    Atrial Rate 133 BPM    P-R Interval 148 ms    QRS Duration 128 ms    Q-T Interval 384 ms    QTc Calculation (Bazett) 549 ms    P Axis 60 degrees    R Axis 17 degrees    T Axis 12 degrees   EKG 12 Lead    Collection Time: 12/27/23  3:51 PM   Result Value Ref Range    Ventricular Rate 115 BPM    Atrial Rate 115 BPM    P-R Interval 176 ms    QRS Duration 130 ms    Q-T Interval 364 ms    QTc Calculation (Bazett) 503 ms    R Axis 27 degrees    T Axis 8 degrees   TSH with Reflex    Collection Time: 12/27/23  4:31 PM   Result Value Ref Range    TSH 2.36 0.30 - 5.00 uIU/mL   Troponin    Collection Time: 12/27/23  4:31 PM   Result Value Ref Range    Troponin, High Sensitivity 24 (H) 0 - 22 ng/L   Procalcitonin    Collection Time: 12/27/23  4:31 PM   Result Value Ref Range    Procalcitonin 13.31 (H) <0.09 ng/mL       Imaging/Diagnostics:  XR CHEST PORTABLE    Result Date: 12/27/2023  Left greater than right scattered confluent airspace opacities suspicious for pneumonia.  Radiographic follow-up is recommended.       Assessment :      Hospital Problems             Last Modified POA    * (Principal) Acute on chronic heart failure, unspecified heart failure type (HCC) 12/27/2023 Yes       Plan:     Patient status inpatient in the Progressive Unit/Step down    Acute on chronic respiratory failure 2/2 HFrEF decompensation VS. CAP  CXR shows airspace opacities suspicious for pneumonia  WBC 16.1  Procalcitonin ordered   Started on Azithromycin and Rocephin for CAP  CBC daily   F/U lactic acid   Patient is on BiPAP, will continue for now, wean as tolerated  Pro BNP 3679  Lasix IV 40 mg Daily started   Monitor Cr and I&O closely  Cardiology consulted due to patient heart history    Afib with RVR  New onset, not listed in patient PMHx  Cardiology

## 2023-12-27 NOTE — ED NOTES
ED to inpatient nurses report    No chief complaint on file.     Present to ED from Home  LOC: alert and orientated to name, place, date  Vital signs   Vitals:    12/27/23 1448 12/27/23 1503 12/27/23 1518 12/27/23 1533   BP: (!) 124/55 113/61 120/69 118/60   Pulse: (!) 125 (!) 125 (!) 120 (!) 115   Resp: 27 27 (!) 38 (!) 34   Temp:       SpO2: 100% 100% 100% 100%   Weight:          Oxygen Baseline RA 82%     Current needs required Bipap 100%   LDAs:   Peripheral IV 12/27/23 Right Antecubital (Active)   Site Assessment Clean, dry & intact 12/27/23 1318       Peripheral IV 12/27/23 Left Antecubital (Active)     Mobility: Requires assistance * 1  Pending ED orders: none  Present condition: tachycardia, stable on bipap  Code Status: [unfilled]   Consults:  []  Hospitalist  Completed  [] yes [] no  [x]  Medicine  Completed  [] yes [] No  []  Cardiology  Completed  [] yes [] No  []  GI   Completed  [] yes [] No  []  Neurology  Completed  [] yes [] No  []  Nephrology Completed  [] yes [] No  []  Vascular  Completed  [] yes [] No   []  Surgery  Completed  [] yes [] No   []  Urology  Completed  [] yes [] No   []  Plastics  Completed  [] yes [] No   []  ENT  Completed  [] yes [] No   []  Other   Completed  [] yes [] No  Pertinent event(s) pt SOB on 82% ra with tachycardia and Afib. Pt placed on bipap and on 100% now. Pt given nitro sublingual for chest pain, line, labs, 500 ml fluid bolus, septic work up. Chest xray

## 2023-12-27 NOTE — ED PROVIDER NOTES
[facility] ED     Emergency Department     Faculty Attestation    I performed a history and physical examination of the patient and discussed management with the resident. I reviewed the resident’s note and agree with the documented findings and plan of care. Any areas of disagreement are noted on the chart. I was personally present for the key portions of any procedures. I have documented in the chart those procedures where I was not present during the key portions. I have reviewed the emergency nurses triage note. I agree with the chief complaint, past medical history, past surgical history, allergies, medications, social and family history as documented unless otherwise noted below. For Physician Assistant/ Nurse Practitioner cases/documentation I have personally evaluated this patient and have completed at least one if not all key elements of the E/M (history, physical exam, and MDM). Additional findings are as noted.    Note Started: 1:31 PM EST    Patient arriving through triage with family who provides independent history for respiratory distress.  Visibly dyspneic speaking in broken sentences.  Daughter states patient complained of some shortness of breath last night but refused to come to the hospital patient does admit to not eating and drinking his normal amount does appear dry on exam.  Extensive past medical history.  Denies any chest pain.  On exam visibly dyspneic hypoxic speaking in broken sentences.  Diffuse rales throughout.  Heart is tachycardic irregularly irregular but equal radial pulses.  Bilateral 1-2+ edema but no asymmetry no focal calf pain.  Concern for decompensated heart failure will get labs EKG start BiPAP and nitro.    EKG interpretation: Sinus tachycardia 137 with frequent PACs.  There is a wide QRS at 126 with a nonspecific intraventricular conduction block.  No acute ST or T changes.  No old for comparison abnormal EKG    Critical Care     CRITICAL CARE: There

## 2023-12-28 ENCOUNTER — APPOINTMENT (OUTPATIENT)
Dept: GENERAL RADIOLOGY | Age: 73
DRG: 193 | End: 2023-12-28
Payer: OTHER GOVERNMENT

## 2023-12-28 LAB
ANION GAP SERPL CALCULATED.3IONS-SCNC: 14 MMOL/L (ref 9–17)
BASOPHILS # BLD: 0.04 K/UL (ref 0–0.2)
BASOPHILS NFR BLD: 0 % (ref 0–2)
BUN SERPL-MCNC: 32 MG/DL (ref 8–23)
CALCIUM SERPL-MCNC: 8.4 MG/DL (ref 8.6–10.4)
CHLORIDE SERPL-SCNC: 95 MMOL/L (ref 98–107)
CO2 SERPL-SCNC: 23 MMOL/L (ref 20–31)
CREAT SERPL-MCNC: 1.5 MG/DL (ref 0.7–1.2)
EKG ATRIAL RATE: 128 BPM
EKG P AXIS: 99 DEGREES
EKG P-R INTERVAL: 190 MS
EKG Q-T INTERVAL: 384 MS
EKG QRS DURATION: 136 MS
EKG QTC CALCULATION (BAZETT): 560 MS
EKG R AXIS: -3 DEGREES
EKG T AXIS: 21 DEGREES
EKG VENTRICULAR RATE: 128 BPM
EOSINOPHIL # BLD: 0.05 K/UL (ref 0–0.44)
EOSINOPHILS RELATIVE PERCENT: 0 % (ref 1–4)
ERYTHROCYTE [DISTWIDTH] IN BLOOD BY AUTOMATED COUNT: 13.2 % (ref 11.8–14.4)
GFR SERPL CREATININE-BSD FRML MDRD: 49 ML/MIN/1.73M2
GLUCOSE BLD-MCNC: 100 MG/DL (ref 75–110)
GLUCOSE BLD-MCNC: 104 MG/DL (ref 75–110)
GLUCOSE BLD-MCNC: 105 MG/DL (ref 75–110)
GLUCOSE BLD-MCNC: 94 MG/DL (ref 75–110)
GLUCOSE SERPL-MCNC: 99 MG/DL (ref 70–99)
HCT VFR BLD AUTO: 40.9 % (ref 40.7–50.3)
HGB BLD-MCNC: 13.6 G/DL (ref 13–17)
IMM GRANULOCYTES # BLD AUTO: 0.07 K/UL (ref 0–0.3)
IMM GRANULOCYTES NFR BLD: 1 %
INR PPP: 1.4
LACTIC ACID, WHOLE BLOOD: 1.2 MMOL/L (ref 0.7–2.1)
LYMPHOCYTES NFR BLD: 0.86 K/UL (ref 1.1–3.7)
LYMPHOCYTES RELATIVE PERCENT: 6 % (ref 24–43)
MCH RBC QN AUTO: 29.5 PG (ref 25.2–33.5)
MCHC RBC AUTO-ENTMCNC: 33.3 G/DL (ref 28.4–34.8)
MCV RBC AUTO: 88.7 FL (ref 82.6–102.9)
MONOCYTES NFR BLD: 0.69 K/UL (ref 0.1–1.2)
MONOCYTES NFR BLD: 5 % (ref 3–12)
NEUTROPHILS NFR BLD: 88 % (ref 36–65)
NEUTS SEG NFR BLD: 13.47 K/UL (ref 1.5–8.1)
NRBC BLD-RTO: 0 PER 100 WBC
PLATELET # BLD AUTO: 166 K/UL (ref 138–453)
PMV BLD AUTO: 10.2 FL (ref 8.1–13.5)
POTASSIUM SERPL-SCNC: 3.8 MMOL/L (ref 3.7–5.3)
PROTHROMBIN TIME: 17 SEC (ref 11.7–14.9)
RBC # BLD AUTO: 4.61 M/UL (ref 4.21–5.77)
SODIUM SERPL-SCNC: 132 MMOL/L (ref 135–144)
TROPONIN I SERPL HS-MCNC: 20 NG/L (ref 0–22)
WBC OTHER # BLD: 15.2 K/UL (ref 3.5–11.3)

## 2023-12-28 PROCEDURE — 82947 ASSAY GLUCOSE BLOOD QUANT: CPT

## 2023-12-28 PROCEDURE — 85025 COMPLETE CBC W/AUTO DIFF WBC: CPT

## 2023-12-28 PROCEDURE — 2580000003 HC RX 258: Performed by: PHYSICIAN ASSISTANT

## 2023-12-28 PROCEDURE — 2700000000 HC OXYGEN THERAPY PER DAY

## 2023-12-28 PROCEDURE — 6370000000 HC RX 637 (ALT 250 FOR IP): Performed by: PHYSICIAN ASSISTANT

## 2023-12-28 PROCEDURE — 94640 AIRWAY INHALATION TREATMENT: CPT

## 2023-12-28 PROCEDURE — 6370000000 HC RX 637 (ALT 250 FOR IP): Performed by: STUDENT IN AN ORGANIZED HEALTH CARE EDUCATION/TRAINING PROGRAM

## 2023-12-28 PROCEDURE — 83605 ASSAY OF LACTIC ACID: CPT

## 2023-12-28 PROCEDURE — 99232 SBSQ HOSP IP/OBS MODERATE 35: CPT | Performed by: STUDENT IN AN ORGANIZED HEALTH CARE EDUCATION/TRAINING PROGRAM

## 2023-12-28 PROCEDURE — 94660 CPAP INITIATION&MGMT: CPT

## 2023-12-28 PROCEDURE — 94761 N-INVAS EAR/PLS OXIMETRY MLT: CPT

## 2023-12-28 PROCEDURE — 2060000000 HC ICU INTERMEDIATE R&B

## 2023-12-28 PROCEDURE — 6360000002 HC RX W HCPCS: Performed by: STUDENT IN AN ORGANIZED HEALTH CARE EDUCATION/TRAINING PROGRAM

## 2023-12-28 PROCEDURE — 84484 ASSAY OF TROPONIN QUANT: CPT

## 2023-12-28 PROCEDURE — 85610 PROTHROMBIN TIME: CPT

## 2023-12-28 PROCEDURE — 71045 X-RAY EXAM CHEST 1 VIEW: CPT

## 2023-12-28 PROCEDURE — 2580000003 HC RX 258: Performed by: STUDENT IN AN ORGANIZED HEALTH CARE EDUCATION/TRAINING PROGRAM

## 2023-12-28 PROCEDURE — 80048 BASIC METABOLIC PNL TOTAL CA: CPT

## 2023-12-28 PROCEDURE — 36415 COLL VENOUS BLD VENIPUNCTURE: CPT

## 2023-12-28 RX ORDER — FINASTERIDE 5 MG/1
5 TABLET, FILM COATED ORAL DAILY
COMMUNITY

## 2023-12-28 RX ORDER — ALBUTEROL SULFATE 90 UG/1
2 AEROSOL, METERED RESPIRATORY (INHALATION)
Status: DISCONTINUED | OUTPATIENT
Start: 2023-12-28 | End: 2024-01-03 | Stop reason: HOSPADM

## 2023-12-28 RX ORDER — ATORVASTATIN CALCIUM 80 MG/1
40 TABLET, FILM COATED ORAL DAILY
COMMUNITY

## 2023-12-28 RX ORDER — FLUTICASONE PROPIONATE AND SALMETEROL 100; 50 UG/1; UG/1
1 POWDER RESPIRATORY (INHALATION) 2 TIMES DAILY
COMMUNITY

## 2023-12-28 RX ORDER — CLOPIDOGREL BISULFATE 75 MG/1
75 TABLET ORAL DAILY
COMMUNITY

## 2023-12-28 RX ORDER — BUDESONIDE AND FORMOTEROL FUMARATE DIHYDRATE 160; 4.5 UG/1; UG/1
2 AEROSOL RESPIRATORY (INHALATION)
Status: DISCONTINUED | OUTPATIENT
Start: 2023-12-28 | End: 2024-01-03 | Stop reason: HOSPADM

## 2023-12-28 RX ORDER — ASPIRIN 81 MG/1
81 TABLET, CHEWABLE ORAL DAILY
COMMUNITY

## 2023-12-28 RX ORDER — FUROSEMIDE 20 MG/1
10 TABLET ORAL DAILY
Status: ON HOLD | COMMUNITY
End: 2024-01-03 | Stop reason: HOSPADM

## 2023-12-28 RX ORDER — PANTOPRAZOLE SODIUM 40 MG/1
40 TABLET, DELAYED RELEASE ORAL DAILY
COMMUNITY

## 2023-12-28 RX ORDER — GUAIFENESIN 600 MG/1
600 TABLET, EXTENDED RELEASE ORAL 2 TIMES DAILY
Status: DISCONTINUED | OUTPATIENT
Start: 2023-12-28 | End: 2024-01-03 | Stop reason: HOSPADM

## 2023-12-28 RX ORDER — TIOTROPIUM BROMIDE 18 UG/1
18 CAPSULE ORAL; RESPIRATORY (INHALATION) DAILY
COMMUNITY

## 2023-12-28 RX ORDER — TAMSULOSIN HYDROCHLORIDE 0.4 MG/1
0.4 CAPSULE ORAL 2 TIMES DAILY
COMMUNITY

## 2023-12-28 RX ADMIN — SODIUM CHLORIDE, PRESERVATIVE FREE 10 ML: 5 INJECTION INTRAVENOUS at 08:18

## 2023-12-28 RX ADMIN — SODIUM CHLORIDE, PRESERVATIVE FREE 10 ML: 5 INJECTION INTRAVENOUS at 20:56

## 2023-12-28 RX ADMIN — AZITHROMYCIN MONOHYDRATE 250 MG: 500 INJECTION, POWDER, LYOPHILIZED, FOR SOLUTION INTRAVENOUS at 16:42

## 2023-12-28 RX ADMIN — APIXABAN 5 MG: 5 TABLET, FILM COATED ORAL at 20:57

## 2023-12-28 RX ADMIN — BUDESONIDE AND FORMOTEROL FUMARATE DIHYDRATE 2 PUFF: 160; 4.5 AEROSOL RESPIRATORY (INHALATION) at 20:55

## 2023-12-28 RX ADMIN — ACETAMINOPHEN 650 MG: 325 TABLET ORAL at 08:16

## 2023-12-28 RX ADMIN — TIOTROPIUM BROMIDE INHALATION SPRAY 2 PUFF: 3.12 SPRAY, METERED RESPIRATORY (INHALATION) at 15:47

## 2023-12-28 RX ADMIN — GUAIFENESIN 600 MG: 600 TABLET, EXTENDED RELEASE ORAL at 16:32

## 2023-12-28 RX ADMIN — Medication 1000 MG: at 16:34

## 2023-12-28 RX ADMIN — APIXABAN 5 MG: 5 TABLET, FILM COATED ORAL at 08:19

## 2023-12-28 RX ADMIN — FUROSEMIDE 40 MG: 10 INJECTION, SOLUTION INTRAMUSCULAR; INTRAVENOUS at 08:19

## 2023-12-28 RX ADMIN — GUAIFENESIN 600 MG: 600 TABLET, EXTENDED RELEASE ORAL at 20:57

## 2023-12-28 ASSESSMENT — PAIN DESCRIPTION - LOCATION: LOCATION: HEAD

## 2023-12-28 ASSESSMENT — PAIN DESCRIPTION - ORIENTATION: ORIENTATION: LEFT;RIGHT

## 2023-12-28 ASSESSMENT — PAIN SCALES - GENERAL: PAINLEVEL_OUTOF10: 4

## 2023-12-28 ASSESSMENT — PAIN DESCRIPTION - DESCRIPTORS: DESCRIPTORS: DULL

## 2023-12-28 NOTE — PLAN OF CARE
Problem: Respiratory - Adult  Goal: Achieves optimal ventilation and oxygenation  Outcome: Progressing   PROVIDE ADEQUATE OXYGENATION WITH ACCEPTABLE SP02/ABG'S    [x]  IDENTIFY APPROPRIATE OXYGEN THERAPY  [x]   MONITOR SP02/ABG'S AS NEEDED   [x]   PATIENT EDUCATION AS NEEDED  NONINVASIVE VENTILATION    PROVIDE OPTIMAL VENTILATION/ACCEPTABLE SPO2   IMPLEMENT NONINVASIVE VENTILATION PROTOCOL   MAINTAIN ACCEPTABLE SPO2   ASSESS SKIN INTEGRITY/BREAKDOWN SCORE   PATIENT EDUCATION AS NEEDED   BIPAP AS NEEDED

## 2023-12-28 NOTE — PLAN OF CARE
Problem: Discharge Planning  Goal: Discharge to home or other facility with appropriate resources  12/28/2023 1020 by Salma Mitchell, RN  Outcome: Progressing     Problem: Pain  Goal: Verbalizes/displays adequate comfort level or baseline comfort level  12/28/2023 1020 by Salma Mitchell, RN  Outcome: Progressing     Problem: Respiratory - Adult  Goal: Achieves optimal ventilation and oxygenation  12/28/2023 1020 by Salma Mitchell RN  Outcome: Progressing     Problem: Safety - Adult  Goal: Free from fall injury  Outcome: Progressing

## 2023-12-29 ENCOUNTER — APPOINTMENT (OUTPATIENT)
Age: 73
DRG: 193 | End: 2023-12-29
Payer: OTHER GOVERNMENT

## 2023-12-29 PROBLEM — N17.9 AKI (ACUTE KIDNEY INJURY) (HCC): Status: ACTIVE | Noted: 2023-12-29

## 2023-12-29 PROBLEM — I50.9 ACUTE ON CHRONIC HEART FAILURE (HCC): Status: ACTIVE | Noted: 2023-12-29

## 2023-12-29 PROBLEM — J18.9 COMMUNITY ACQUIRED PNEUMONIA: Status: ACTIVE | Noted: 2023-12-29

## 2023-12-29 LAB
B PARAP IS1001 DNA NPH QL NAA+NON-PROBE: NOT DETECTED
B PERT DNA SPEC QL NAA+PROBE: NOT DETECTED
C PNEUM DNA NPH QL NAA+NON-PROBE: NOT DETECTED
ECHO AO ROOT DIAM: 3.4 CM
ECHO AO ROOT INDEX: 1.63 CM/M2
ECHO AV AREA PEAK VELOCITY: 5 CM2
ECHO AV AREA VTI: 5.2 CM2
ECHO AV AREA/BSA PEAK VELOCITY: 2.4 CM2/M2
ECHO AV AREA/BSA VTI: 2.5 CM2/M2
ECHO AV MEAN GRADIENT: 3 MMHG
ECHO AV MEAN VELOCITY: 0.9 M/S
ECHO AV PEAK GRADIENT: 6 MMHG
ECHO AV PEAK VELOCITY: 1.2 M/S
ECHO AV VELOCITY RATIO: 1
ECHO AV VTI: 21.2 CM
ECHO LA DIAMETER INDEX: 2.45 CM/M2
ECHO LA DIAMETER: 5.1 CM
ECHO LA TO AORTIC ROOT RATIO: 1.5
ECHO LV EF PHYSICIAN: 50 %
ECHO LV FRACTIONAL SHORTENING: 15 % (ref 28–44)
ECHO LV INTERNAL DIMENSION DIASTOLE INDEX: 1.92 CM/M2
ECHO LV INTERNAL DIMENSION DIASTOLIC: 4 CM (ref 4.2–5.9)
ECHO LV INTERNAL DIMENSION SYSTOLIC INDEX: 1.63 CM/M2
ECHO LV INTERNAL DIMENSION SYSTOLIC: 3.4 CM
ECHO LV IVSD: 1.2 CM (ref 0.6–1)
ECHO LV MASS 2D: 186.5 G (ref 88–224)
ECHO LV MASS INDEX 2D: 89.7 G/M2 (ref 49–115)
ECHO LV POSTERIOR WALL DIASTOLIC: 1.4 CM (ref 0.6–1)
ECHO LV RELATIVE WALL THICKNESS RATIO: 0.7
ECHO LVOT AREA: 4.9 CM2
ECHO LVOT AV VTI INDEX: 1.05
ECHO LVOT DIAM: 2.5 CM
ECHO LVOT MEAN GRADIENT: 3 MMHG
ECHO LVOT PEAK GRADIENT: 6 MMHG
ECHO LVOT PEAK VELOCITY: 1.2 M/S
ECHO LVOT STROKE VOLUME INDEX: 52.6 ML/M2
ECHO LVOT SV: 109.4 ML
ECHO LVOT VTI: 22.3 CM
ECHO MV A VELOCITY: 0.83 M/S
ECHO MV E DECELERATION TIME (DT): 164 MS
ECHO MV E VELOCITY: 0.86 M/S
ECHO MV E/A RATIO: 1.04
ECHO RV FREE WALL PEAK S': 12 CM/S
ECHO RV TAPSE: 2 CM (ref 1.7–?)
EKG ATRIAL RATE: 137 BPM
EKG P AXIS: 64 DEGREES
EKG P-R INTERVAL: 136 MS
EKG Q-T INTERVAL: 292 MS
EKG QRS DURATION: 126 MS
EKG QTC CALCULATION (BAZETT): 440 MS
EKG R AXIS: 30 DEGREES
EKG T AXIS: 2 DEGREES
EKG VENTRICULAR RATE: 137 BPM
FLUAV RNA NPH QL NAA+NON-PROBE: NOT DETECTED
FLUBV RNA NPH QL NAA+NON-PROBE: NOT DETECTED
GLUCOSE BLD-MCNC: 110 MG/DL (ref 75–110)
GLUCOSE BLD-MCNC: 123 MG/DL (ref 75–110)
GLUCOSE BLD-MCNC: 89 MG/DL (ref 75–110)
GLUCOSE BLD-MCNC: 93 MG/DL (ref 75–110)
HADV DNA NPH QL NAA+NON-PROBE: NOT DETECTED
HCOV 229E RNA NPH QL NAA+NON-PROBE: NOT DETECTED
HCOV HKU1 RNA NPH QL NAA+NON-PROBE: NOT DETECTED
HCOV NL63 RNA NPH QL NAA+NON-PROBE: NOT DETECTED
HCOV OC43 RNA NPH QL NAA+NON-PROBE: NOT DETECTED
HMPV RNA NPH QL NAA+NON-PROBE: NOT DETECTED
HPIV1 RNA NPH QL NAA+NON-PROBE: NOT DETECTED
HPIV2 RNA NPH QL NAA+NON-PROBE: NOT DETECTED
HPIV3 RNA NPH QL NAA+NON-PROBE: NOT DETECTED
HPIV4 RNA NPH QL NAA+NON-PROBE: NOT DETECTED
L PNEUMO1 AG UR QL IA.RAPID: NEGATIVE
M PNEUMO DNA NPH QL NAA+NON-PROBE: NOT DETECTED
RSV RNA NPH QL NAA+NON-PROBE: NOT DETECTED
RV+EV RNA NPH QL NAA+NON-PROBE: DETECTED
S PNEUM AG SPEC QL: NEGATIVE
SARS-COV-2 RNA NPH QL NAA+NON-PROBE: NOT DETECTED
SPECIMEN DESCRIPTION: ABNORMAL
SPECIMEN SOURCE: NORMAL

## 2023-12-29 PROCEDURE — 2700000000 HC OXYGEN THERAPY PER DAY

## 2023-12-29 PROCEDURE — 94640 AIRWAY INHALATION TREATMENT: CPT

## 2023-12-29 PROCEDURE — 6370000000 HC RX 637 (ALT 250 FOR IP)

## 2023-12-29 PROCEDURE — 6360000002 HC RX W HCPCS: Performed by: STUDENT IN AN ORGANIZED HEALTH CARE EDUCATION/TRAINING PROGRAM

## 2023-12-29 PROCEDURE — 93306 TTE W/DOPPLER COMPLETE: CPT

## 2023-12-29 PROCEDURE — 99255 IP/OBS CONSLTJ NEW/EST HI 80: CPT | Performed by: INTERNAL MEDICINE

## 2023-12-29 PROCEDURE — 99232 SBSQ HOSP IP/OBS MODERATE 35: CPT | Performed by: STUDENT IN AN ORGANIZED HEALTH CARE EDUCATION/TRAINING PROGRAM

## 2023-12-29 PROCEDURE — 87205 SMEAR GRAM STAIN: CPT

## 2023-12-29 PROCEDURE — 2060000000 HC ICU INTERMEDIATE R&B

## 2023-12-29 PROCEDURE — 6370000000 HC RX 637 (ALT 250 FOR IP): Performed by: STUDENT IN AN ORGANIZED HEALTH CARE EDUCATION/TRAINING PROGRAM

## 2023-12-29 PROCEDURE — 6360000002 HC RX W HCPCS

## 2023-12-29 PROCEDURE — 0202U NFCT DS 22 TRGT SARS-COV-2: CPT

## 2023-12-29 PROCEDURE — 82947 ASSAY GLUCOSE BLOOD QUANT: CPT

## 2023-12-29 PROCEDURE — 2580000003 HC RX 258

## 2023-12-29 PROCEDURE — 87641 MR-STAPH DNA AMP PROBE: CPT

## 2023-12-29 PROCEDURE — 87070 CULTURE OTHR SPECIMN AEROBIC: CPT

## 2023-12-29 PROCEDURE — 87449 NOS EACH ORGANISM AG IA: CPT

## 2023-12-29 PROCEDURE — 2580000003 HC RX 258: Performed by: PHYSICIAN ASSISTANT

## 2023-12-29 PROCEDURE — 36415 COLL VENOUS BLD VENIPUNCTURE: CPT

## 2023-12-29 PROCEDURE — 87899 AGENT NOS ASSAY W/OPTIC: CPT

## 2023-12-29 PROCEDURE — 86738 MYCOPLASMA ANTIBODY: CPT

## 2023-12-29 PROCEDURE — 2580000003 HC RX 258: Performed by: STUDENT IN AN ORGANIZED HEALTH CARE EDUCATION/TRAINING PROGRAM

## 2023-12-29 PROCEDURE — 94761 N-INVAS EAR/PLS OXIMETRY MLT: CPT

## 2023-12-29 RX ORDER — IPRATROPIUM BROMIDE AND ALBUTEROL SULFATE 2.5; .5 MG/3ML; MG/3ML
1 SOLUTION RESPIRATORY (INHALATION)
Status: DISCONTINUED | OUTPATIENT
Start: 2023-12-29 | End: 2024-01-03 | Stop reason: HOSPADM

## 2023-12-29 RX ORDER — ASPIRIN 81 MG/1
81 TABLET, CHEWABLE ORAL DAILY
Status: DISCONTINUED | OUTPATIENT
Start: 2023-12-29 | End: 2024-01-03 | Stop reason: HOSPADM

## 2023-12-29 RX ORDER — ATORVASTATIN CALCIUM 40 MG/1
40 TABLET, FILM COATED ORAL DAILY
Status: DISCONTINUED | OUTPATIENT
Start: 2023-12-29 | End: 2024-01-03 | Stop reason: HOSPADM

## 2023-12-29 RX ORDER — TAMSULOSIN HYDROCHLORIDE 0.4 MG/1
0.4 CAPSULE ORAL 2 TIMES DAILY
Status: DISCONTINUED | OUTPATIENT
Start: 2023-12-29 | End: 2024-01-03 | Stop reason: HOSPADM

## 2023-12-29 RX ORDER — HEPARIN SODIUM 5000 [USP'U]/ML
5000 INJECTION, SOLUTION INTRAVENOUS; SUBCUTANEOUS EVERY 8 HOURS SCHEDULED
Status: DISCONTINUED | OUTPATIENT
Start: 2023-12-29 | End: 2024-01-03 | Stop reason: HOSPADM

## 2023-12-29 RX ORDER — PREDNISONE 20 MG/1
40 TABLET ORAL DAILY
Status: DISCONTINUED | OUTPATIENT
Start: 2023-12-29 | End: 2024-01-01

## 2023-12-29 RX ORDER — CLOPIDOGREL BISULFATE 75 MG/1
75 TABLET ORAL DAILY
Status: DISCONTINUED | OUTPATIENT
Start: 2023-12-29 | End: 2024-01-03 | Stop reason: HOSPADM

## 2023-12-29 RX ORDER — FINASTERIDE 5 MG/1
5 TABLET, FILM COATED ORAL DAILY
Status: DISCONTINUED | OUTPATIENT
Start: 2023-12-29 | End: 2024-01-03 | Stop reason: HOSPADM

## 2023-12-29 RX ADMIN — BUDESONIDE AND FORMOTEROL FUMARATE DIHYDRATE 2 PUFF: 160; 4.5 AEROSOL RESPIRATORY (INHALATION) at 19:33

## 2023-12-29 RX ADMIN — SODIUM CHLORIDE, PRESERVATIVE FREE 10 ML: 5 INJECTION INTRAVENOUS at 20:06

## 2023-12-29 RX ADMIN — SODIUM CHLORIDE, PRESERVATIVE FREE 10 ML: 5 INJECTION INTRAVENOUS at 08:31

## 2023-12-29 RX ADMIN — BUDESONIDE AND FORMOTEROL FUMARATE DIHYDRATE 1 PUFF: 160; 4.5 AEROSOL RESPIRATORY (INHALATION) at 09:22

## 2023-12-29 RX ADMIN — HEPARIN SODIUM 5000 UNITS: 5000 INJECTION INTRAVENOUS; SUBCUTANEOUS at 21:03

## 2023-12-29 RX ADMIN — CEFEPIME 2000 MG: 2 INJECTION, POWDER, FOR SOLUTION INTRAVENOUS at 21:03

## 2023-12-29 RX ADMIN — APIXABAN 5 MG: 5 TABLET, FILM COATED ORAL at 08:31

## 2023-12-29 RX ADMIN — ASPIRIN 81 MG 81 MG: 81 TABLET ORAL at 16:11

## 2023-12-29 RX ADMIN — ATORVASTATIN CALCIUM 40 MG: 40 TABLET, FILM COATED ORAL at 16:11

## 2023-12-29 RX ADMIN — CLOPIDOGREL BISULFATE 75 MG: 75 TABLET ORAL at 16:11

## 2023-12-29 RX ADMIN — FUROSEMIDE 40 MG: 10 INJECTION, SOLUTION INTRAMUSCULAR; INTRAVENOUS at 08:30

## 2023-12-29 RX ADMIN — TIOTROPIUM BROMIDE INHALATION SPRAY 2 PUFF: 3.12 SPRAY, METERED RESPIRATORY (INHALATION) at 09:22

## 2023-12-29 RX ADMIN — TAMSULOSIN HYDROCHLORIDE 0.4 MG: 0.4 CAPSULE ORAL at 20:06

## 2023-12-29 RX ADMIN — IPRATROPIUM BROMIDE AND ALBUTEROL SULFATE 1 DOSE: 2.5; .5 SOLUTION RESPIRATORY (INHALATION) at 20:31

## 2023-12-29 RX ADMIN — GUAIFENESIN 600 MG: 600 TABLET, EXTENDED RELEASE ORAL at 20:06

## 2023-12-29 RX ADMIN — GUAIFENESIN 600 MG: 600 TABLET, EXTENDED RELEASE ORAL at 08:31

## 2023-12-29 RX ADMIN — VANCOMYCIN HYDROCHLORIDE 1250 MG: 1.25 INJECTION, POWDER, LYOPHILIZED, FOR SOLUTION INTRAVENOUS at 17:34

## 2023-12-29 RX ADMIN — PREDNISONE 40 MG: 20 TABLET ORAL at 20:08

## 2023-12-29 RX ADMIN — AZITHROMYCIN MONOHYDRATE 250 MG: 500 INJECTION, POWDER, LYOPHILIZED, FOR SOLUTION INTRAVENOUS at 17:39

## 2023-12-29 RX ADMIN — Medication 1000 MG: at 16:12

## 2023-12-29 RX ADMIN — TAMSULOSIN HYDROCHLORIDE 0.4 MG: 0.4 CAPSULE ORAL at 16:11

## 2023-12-29 RX ADMIN — FINASTERIDE 5 MG: 5 TABLET, FILM COATED ORAL at 16:11

## 2023-12-29 RX ADMIN — HEPARIN SODIUM 5000 UNITS: 5000 INJECTION INTRAVENOUS; SUBCUTANEOUS at 16:11

## 2023-12-29 NOTE — PLAN OF CARE
Problem: Discharge Planning  Goal: Discharge to home or other facility with appropriate resources  Outcome: Progressing     Problem: Pain  Goal: Verbalizes/displays adequate comfort level or baseline comfort level  Outcome: Progressing     Problem: Respiratory - Adult  Goal: Achieves optimal ventilation and oxygenation  Outcome: Progressing     Problem: Safety - Adult  Goal: Free from fall injury  Outcome: Progressing

## 2023-12-29 NOTE — PLAN OF CARE
Problem: Discharge Planning  Goal: Discharge to home or other facility with appropriate resources  12/29/2023 1752 by Petros Cheung RN  Outcome: Progressing  Flowsheets (Taken 12/29/2023 1752)  Discharge to home or other facility with appropriate resources:   Identify barriers to discharge with patient and caregiver   Identify discharge learning needs (meds, wound care, etc)     Problem: Pain  Goal: Verbalizes/displays adequate comfort level or baseline comfort level  12/29/2023 1752 by Petros Cheung RN  Outcome: Progressing  Flowsheets (Taken 12/29/2023 1752)  Verbalizes/displays adequate comfort level or baseline comfort level:   Encourage patient to monitor pain and request assistance   Assess pain using appropriate pain scale   Consider cultural and social influences on pain and pain management     Problem: Respiratory - Adult  Goal: Achieves optimal ventilation and oxygenation  12/29/2023 1752 by Petros Cheung RN  Outcome: Progressing  Flowsheets (Taken 12/29/2023 1752)  Achieves optimal ventilation and oxygenation:   Assess for changes in respiratory status   Assess for changes in mentation and behavior   Position to facilitate oxygenation and minimize respiratory effort   Oxygen supplementation based on oxygen saturation or arterial blood gases   Encourage broncho-pulmonary hygiene including cough, deep breathe, incentive spirometry   Assess the need for suctioning and aspirate as needed   Assess and instruct to report shortness of breath or any respiratory difficulty   Respiratory therapy support as indicated     Problem: Safety - Adult  Goal: Free from fall injury  12/29/2023 1752 by Petros Cheung RN  Outcome: Progressing  Flowsheets (Taken 12/29/2023 1752)  Free From Fall Injury:   Instruct family/caregiver on patient safety   Based on caregiver fall risk screen, instruct family/caregiver to ask for assistance with transferring infant if caregiver noted to have fall risk factors

## 2023-12-29 NOTE — CONSULTS
[facility]    Consult / H&P               Today's Date: 12/29/2023  Patient Name: Marcus Dobbs  Date of admission: 12/27/2023  1:06 PM  Patient's age: 73 y.o., 1950  Admission Dx: Acute on chronic heart failure, unspecified heart failure type (HCC) [I50.9]  Acute on chronic congestive heart failure, unspecified heart failure type (HCC) [I50.9]    Requesting Physician: Dede Jacobson MD    Cardiac Evaluation Reason:  got consulted to evaluate for concern of A-fib    History Obtained From: patient and chart review     History of Present Illness:    This patient 73 y.o. years old with past medical history given below.     Patient presents with a 2-3 days hx of progressively worsening SOB  with exertion and admitted for acute CHF exacerbation along with concern of pneumonia.  Cardiology has been consulted to evaluate for CHF.  Patient has history of ischemic cardiomyopathy, previous CABG.  He also endorses exertional chest pain.  Troponins are 24 and 20.  proBNP elevated to 3600.    Past Medical History:   has no past medical history on file.    Past Surgical History:   has no past surgical history on file.     Home Medications:    Prior to Admission medications    Medication Sig Start Date End Date Taking? Authorizing Provider   aspirin 81 MG chewable tablet Take 1 tablet by mouth daily   Yes Flash Alex MD   finasteride (PROSCAR) 5 MG tablet Take 1 tablet by mouth daily   Yes ProviderFlash MD   tamsulosin (FLOMAX) 0.4 MG capsule Take 1 capsule by mouth 2 times daily   Yes Flash Alex MD   pantoprazole (PROTONIX) 40 MG tablet Take 1 tablet by mouth daily   Yes ProviderFlash MD   clopidogrel (PLAVIX) 75 MG tablet Take 1 tablet by mouth daily   Yes ProviderFlash MD   furosemide (LASIX) 20 MG tablet Take 0.5 tablets by mouth daily   Yes ProviderFlash MD   atorvastatin (LIPITOR) 80 MG tablet Take 0.5 tablets by mouth daily   Yes Provider 
\"CFUNGUSBL\"  Sputum Culture:  No components found for: \"CSPUTUM\"  Recent Labs     12/27/23  1440   SPECDESC .BLOOD  .BLOOD   SPECIAL R AC 10ML  L AC 10ML   CULTURE NO GROWTH 2 DAYS  NO GROWTH 2 DAYS     Recent Labs     12/27/23  1327 12/27/23  1440   SPECDESC .NASOPHARYNGEAL SWAB .BLOOD  .BLOOD   SPECIAL  --  R AC 10ML  L AC 10ML   CULTURE  --  NO GROWTH 2 DAYS  NO GROWTH 2 DAYS          Radiology Reports:  XR CHEST (SINGLE VIEW FRONTAL)   Final Result   1. Persistent patchy bilateral airspace opacities suspicious for pneumonia   which a progressed in the left upper lobe and improved at the left lung base   and right perihilar region.   2. New small left pleural effusion.         XR CHEST PORTABLE   Final Result   Left greater than right scattered confluent airspace opacities suspicious for   pneumonia.  Radiographic follow-up is recommended.              Echocardiogram:   No results found for this or any previous visit.       ASSESSMENT AND PLAN     Assessment:    // Acute hypoxic respiratory failure  //Community-acquired pneumonia  // Chronic obstructive pulmonary disease  // Bronchiectasis  // History of empyema s/p decortication  // Acute on chronic systolic heart failure  // S/P CABG x 3       Plan:  -Community-acquired pneumonia in a patient with bronchiectasis.  -Will obtain sputum cultures.  Follow-up on results.  -Will change antibiotics to vancomycin and cefepime to cover for Staphylococcus aureus and Pseudomonas.  -Will add prednisone 40 Mg p.o. for 5 days  -Continue supplemental oxygen to maintain saturation more than 92%.  Wean down as tolerated.  -Continue bronchodilators.  -Encourage deep breathing, use of Acapella and incentive spirometry.  -If no improvement, will start on chest physical therapy.  - EKG today showed sinus tachycardia with frequent PACs and PVCs.  Possible multifocal atrial tachycardia.  He is already on 25 Mg twice a day Lopressor.  Will reevaluate.  -He Is also on 40 Mg IV

## 2023-12-29 NOTE — PLAN OF CARE
Problem: Respiratory - Adult  Goal: Achieves optimal ventilation and oxygenation  12/29/2023 0928 by Salma Guy, MARY JO  Outcome: Progressing      Routine  care and anticipatory guidance

## 2023-12-30 LAB
ANION GAP SERPL CALCULATED.3IONS-SCNC: 15 MMOL/L (ref 9–17)
BASOPHILS # BLD: 0.05 K/UL (ref 0–0.2)
BASOPHILS NFR BLD: 0 % (ref 0–2)
BNP SERPL-MCNC: 2265 PG/ML
BUN SERPL-MCNC: 34 MG/DL (ref 8–23)
CALCIUM SERPL-MCNC: 8.6 MG/DL (ref 8.6–10.4)
CHLORIDE SERPL-SCNC: 94 MMOL/L (ref 98–107)
CO2 SERPL-SCNC: 21 MMOL/L (ref 20–31)
CREAT SERPL-MCNC: 1 MG/DL (ref 0.7–1.2)
EOSINOPHIL # BLD: <0.03 K/UL (ref 0–0.44)
EOSINOPHILS RELATIVE PERCENT: 0 % (ref 1–4)
ERYTHROCYTE [DISTWIDTH] IN BLOOD BY AUTOMATED COUNT: 13 % (ref 11.8–14.4)
GFR SERPL CREATININE-BSD FRML MDRD: >60 ML/MIN/1.73M2
GLUCOSE BLD-MCNC: 188 MG/DL (ref 75–110)
GLUCOSE SERPL-MCNC: 135 MG/DL (ref 70–99)
HCT VFR BLD AUTO: 44.4 % (ref 40.7–50.3)
HGB BLD-MCNC: 14.3 G/DL (ref 13–17)
IMM GRANULOCYTES # BLD AUTO: 0.33 K/UL (ref 0–0.3)
IMM GRANULOCYTES NFR BLD: 3 %
LYMPHOCYTES NFR BLD: 1.17 K/UL (ref 1.1–3.7)
LYMPHOCYTES RELATIVE PERCENT: 10 % (ref 24–43)
MCH RBC QN AUTO: 29.7 PG (ref 25.2–33.5)
MCHC RBC AUTO-ENTMCNC: 32.2 G/DL (ref 28.4–34.8)
MCV RBC AUTO: 92.1 FL (ref 82.6–102.9)
MONOCYTES NFR BLD: 0.56 K/UL (ref 0.1–1.2)
MONOCYTES NFR BLD: 5 % (ref 3–12)
MRSA, DNA, NASAL: NEGATIVE
NEUTROPHILS NFR BLD: 82 % (ref 36–65)
NEUTS SEG NFR BLD: 9.95 K/UL (ref 1.5–8.1)
NRBC BLD-RTO: 0 PER 100 WBC
PLATELET # BLD AUTO: 186 K/UL (ref 138–453)
PMV BLD AUTO: 9.7 FL (ref 8.1–13.5)
POTASSIUM SERPL-SCNC: 3.9 MMOL/L (ref 3.7–5.3)
RBC # BLD AUTO: 4.82 M/UL (ref 4.21–5.77)
SODIUM SERPL-SCNC: 130 MMOL/L (ref 135–144)
SPECIMEN DESCRIPTION: NORMAL
WBC OTHER # BLD: 12.1 K/UL (ref 3.5–11.3)

## 2023-12-30 PROCEDURE — 6360000002 HC RX W HCPCS: Performed by: STUDENT IN AN ORGANIZED HEALTH CARE EDUCATION/TRAINING PROGRAM

## 2023-12-30 PROCEDURE — 2580000003 HC RX 258

## 2023-12-30 PROCEDURE — 6360000002 HC RX W HCPCS

## 2023-12-30 PROCEDURE — 2580000003 HC RX 258: Performed by: PHYSICIAN ASSISTANT

## 2023-12-30 PROCEDURE — 6370000000 HC RX 637 (ALT 250 FOR IP)

## 2023-12-30 PROCEDURE — 82947 ASSAY GLUCOSE BLOOD QUANT: CPT

## 2023-12-30 PROCEDURE — 83880 ASSAY OF NATRIURETIC PEPTIDE: CPT

## 2023-12-30 PROCEDURE — 2580000003 HC RX 258: Performed by: STUDENT IN AN ORGANIZED HEALTH CARE EDUCATION/TRAINING PROGRAM

## 2023-12-30 PROCEDURE — 36415 COLL VENOUS BLD VENIPUNCTURE: CPT

## 2023-12-30 PROCEDURE — 85025 COMPLETE CBC W/AUTO DIFF WBC: CPT

## 2023-12-30 PROCEDURE — 99233 SBSQ HOSP IP/OBS HIGH 50: CPT | Performed by: INTERNAL MEDICINE

## 2023-12-30 PROCEDURE — 6370000000 HC RX 637 (ALT 250 FOR IP): Performed by: STUDENT IN AN ORGANIZED HEALTH CARE EDUCATION/TRAINING PROGRAM

## 2023-12-30 PROCEDURE — 99232 SBSQ HOSP IP/OBS MODERATE 35: CPT | Performed by: STUDENT IN AN ORGANIZED HEALTH CARE EDUCATION/TRAINING PROGRAM

## 2023-12-30 PROCEDURE — 80048 BASIC METABOLIC PNL TOTAL CA: CPT

## 2023-12-30 PROCEDURE — 2700000000 HC OXYGEN THERAPY PER DAY

## 2023-12-30 PROCEDURE — 2060000000 HC ICU INTERMEDIATE R&B

## 2023-12-30 PROCEDURE — 94640 AIRWAY INHALATION TREATMENT: CPT

## 2023-12-30 RX ADMIN — VANCOMYCIN HYDROCHLORIDE 1250 MG: 1.25 INJECTION, POWDER, LYOPHILIZED, FOR SOLUTION INTRAVENOUS at 16:16

## 2023-12-30 RX ADMIN — GUAIFENESIN 600 MG: 600 TABLET, EXTENDED RELEASE ORAL at 09:26

## 2023-12-30 RX ADMIN — HEPARIN SODIUM 5000 UNITS: 5000 INJECTION INTRAVENOUS; SUBCUTANEOUS at 14:42

## 2023-12-30 RX ADMIN — TAMSULOSIN HYDROCHLORIDE 0.4 MG: 0.4 CAPSULE ORAL at 10:21

## 2023-12-30 RX ADMIN — FINASTERIDE 5 MG: 5 TABLET, FILM COATED ORAL at 12:58

## 2023-12-30 RX ADMIN — BUDESONIDE AND FORMOTEROL FUMARATE DIHYDRATE 2 PUFF: 160; 4.5 AEROSOL RESPIRATORY (INHALATION) at 21:33

## 2023-12-30 RX ADMIN — SODIUM CHLORIDE, PRESERVATIVE FREE 10 ML: 5 INJECTION INTRAVENOUS at 09:27

## 2023-12-30 RX ADMIN — CEFEPIME 2000 MG: 2 INJECTION, POWDER, FOR SOLUTION INTRAVENOUS at 09:26

## 2023-12-30 RX ADMIN — CLOPIDOGREL BISULFATE 75 MG: 75 TABLET ORAL at 09:26

## 2023-12-30 RX ADMIN — HEPARIN SODIUM 5000 UNITS: 5000 INJECTION INTRAVENOUS; SUBCUTANEOUS at 20:57

## 2023-12-30 RX ADMIN — SODIUM CHLORIDE, PRESERVATIVE FREE 10 ML: 5 INJECTION INTRAVENOUS at 20:59

## 2023-12-30 RX ADMIN — IPRATROPIUM BROMIDE AND ALBUTEROL SULFATE 1 DOSE: 2.5; .5 SOLUTION RESPIRATORY (INHALATION) at 15:41

## 2023-12-30 RX ADMIN — METOPROLOL TARTRATE 25 MG: 25 TABLET, FILM COATED ORAL at 09:27

## 2023-12-30 RX ADMIN — SODIUM CHLORIDE: 9 INJECTION, SOLUTION INTRAVENOUS at 16:13

## 2023-12-30 RX ADMIN — ASPIRIN 81 MG 81 MG: 81 TABLET ORAL at 09:26

## 2023-12-30 RX ADMIN — CEFEPIME 2000 MG: 2 INJECTION, POWDER, FOR SOLUTION INTRAVENOUS at 21:05

## 2023-12-30 RX ADMIN — IPRATROPIUM BROMIDE AND ALBUTEROL SULFATE 1 DOSE: 2.5; .5 SOLUTION RESPIRATORY (INHALATION) at 21:33

## 2023-12-30 RX ADMIN — IPRATROPIUM BROMIDE AND ALBUTEROL SULFATE 1 DOSE: 2.5; .5 SOLUTION RESPIRATORY (INHALATION) at 11:27

## 2023-12-30 RX ADMIN — GUAIFENESIN 600 MG: 600 TABLET, EXTENDED RELEASE ORAL at 20:57

## 2023-12-30 RX ADMIN — SODIUM CHLORIDE, PRESERVATIVE FREE 10 ML: 5 INJECTION INTRAVENOUS at 10:22

## 2023-12-30 RX ADMIN — HEPARIN SODIUM 5000 UNITS: 5000 INJECTION INTRAVENOUS; SUBCUTANEOUS at 05:54

## 2023-12-30 RX ADMIN — FUROSEMIDE 40 MG: 10 INJECTION, SOLUTION INTRAMUSCULAR; INTRAVENOUS at 09:27

## 2023-12-30 RX ADMIN — TAMSULOSIN HYDROCHLORIDE 0.4 MG: 0.4 CAPSULE ORAL at 20:57

## 2023-12-30 RX ADMIN — PREDNISONE 40 MG: 20 TABLET ORAL at 10:21

## 2023-12-30 RX ADMIN — IPRATROPIUM BROMIDE AND ALBUTEROL SULFATE 1 DOSE: 2.5; .5 SOLUTION RESPIRATORY (INHALATION) at 07:54

## 2023-12-30 RX ADMIN — BUDESONIDE AND FORMOTEROL FUMARATE DIHYDRATE 2 PUFF: 160; 4.5 AEROSOL RESPIRATORY (INHALATION) at 07:54

## 2023-12-30 RX ADMIN — ATORVASTATIN CALCIUM 40 MG: 40 TABLET, FILM COATED ORAL at 09:27

## 2023-12-30 ASSESSMENT — PAIN SCALES - GENERAL: PAINLEVEL_OUTOF10: 0

## 2023-12-30 NOTE — PLAN OF CARE
Problem: Discharge Planning  Goal: Discharge to home or other facility with appropriate resources  12/30/2023 0204 by Valerie Santillan RN  Outcome: Progressing  12/29/2023 1752 by Petros Cheung RN  Outcome: Progressing  Flowsheets (Taken 12/29/2023 1752)  Discharge to home or other facility with appropriate resources:   Identify barriers to discharge with patient and caregiver   Identify discharge learning needs (meds, wound care, etc)     Problem: Pain  Goal: Verbalizes/displays adequate comfort level or baseline comfort level  12/30/2023 0204 by Valerie Santillan RN  Outcome: Progressing  12/29/2023 1752 by Petros Cheung RN  Outcome: Progressing  Flowsheets (Taken 12/29/2023 1752)  Verbalizes/displays adequate comfort level or baseline comfort level:   Encourage patient to monitor pain and request assistance   Assess pain using appropriate pain scale   Consider cultural and social influences on pain and pain management     Problem: Respiratory - Adult  Goal: Achieves optimal ventilation and oxygenation  12/30/2023 0204 by Valerie Santillan RN  Outcome: Progressing  12/29/2023 1752 by Petros Cheung RN  Outcome: Progressing  Flowsheets (Taken 12/29/2023 1752)  Achieves optimal ventilation and oxygenation:   Assess for changes in respiratory status   Assess for changes in mentation and behavior   Position to facilitate oxygenation and minimize respiratory effort   Oxygen supplementation based on oxygen saturation or arterial blood gases   Encourage broncho-pulmonary hygiene including cough, deep breathe, incentive spirometry   Assess the need for suctioning and aspirate as needed   Assess and instruct to report shortness of breath or any respiratory difficulty   Respiratory therapy support as indicated     Problem: Safety - Adult  Goal: Free from fall injury  12/30/2023 0204 by Valerie Santillan RN  Outcome: Progressing  12/29/2023 1752 by Petros Cheung RN  Outcome: Progressing  Flowsheets (Taken  Patient tolerated procedure well.

## 2023-12-30 NOTE — PLAN OF CARE
BRONCHOSPASM/BRONCHOCONSTRICTION     [x]         IMPROVE AERATION/BREATH SOUNDS  [x]   ADMINISTER BRONCHODILATOR THERAPY AS APPROPRIATE  [x]   ASSESS BREATH SOUNDS  []   IMPLEMENT AEROSOL/MDI PROTOCOL  PROVIDE ADEQUATE OXYGENATION WITH ACCEPTABLE SP02/ABG'S    [x]  IDENTIFY APPROPRIATE OXYGEN THERAPY  [x]   MONITOR SP02/ABG'S AS NEEDED   [x]   PATIENT EDUCATION AS NEEDED     PATIENT EDUCATION AS NEEDED

## 2023-12-31 ENCOUNTER — APPOINTMENT (OUTPATIENT)
Dept: GENERAL RADIOLOGY | Age: 73
DRG: 193 | End: 2023-12-31
Payer: OTHER GOVERNMENT

## 2023-12-31 LAB
ANION GAP SERPL CALCULATED.3IONS-SCNC: 14 MMOL/L (ref 9–17)
BASOPHILS # BLD: 0 K/UL (ref 0–0.2)
BASOPHILS NFR BLD: 0 % (ref 0–2)
BUN SERPL-MCNC: 29 MG/DL (ref 8–23)
CALCIUM SERPL-MCNC: 8.6 MG/DL (ref 8.6–10.4)
CHLORIDE SERPL-SCNC: 100 MMOL/L (ref 98–107)
CO2 SERPL-SCNC: 24 MMOL/L (ref 20–31)
CREAT SERPL-MCNC: 1 MG/DL (ref 0.7–1.2)
EOSINOPHIL # BLD: 0 K/UL (ref 0–0.4)
EOSINOPHILS RELATIVE PERCENT: 0 % (ref 1–4)
ERYTHROCYTE [DISTWIDTH] IN BLOOD BY AUTOMATED COUNT: 13.1 % (ref 11.8–14.4)
GFR SERPL CREATININE-BSD FRML MDRD: >60 ML/MIN/1.73M2
GLUCOSE BLD-MCNC: 113 MG/DL (ref 75–110)
GLUCOSE BLD-MCNC: 131 MG/DL (ref 75–110)
GLUCOSE BLD-MCNC: 188 MG/DL (ref 75–110)
GLUCOSE BLD-MCNC: 97 MG/DL (ref 75–110)
GLUCOSE SERPL-MCNC: 108 MG/DL (ref 70–99)
HCT VFR BLD AUTO: 43.8 % (ref 40.7–50.3)
HGB BLD-MCNC: 13.8 G/DL (ref 13–17)
IMM GRANULOCYTES # BLD AUTO: 0.57 K/UL (ref 0–0.3)
IMM GRANULOCYTES NFR BLD: 4 %
LYMPHOCYTES NFR BLD: 2.27 K/UL (ref 1–4.8)
LYMPHOCYTES RELATIVE PERCENT: 16 % (ref 24–44)
MAGNESIUM SERPL-MCNC: 2.4 MG/DL (ref 1.6–2.6)
MCH RBC QN AUTO: 29.7 PG (ref 25.2–33.5)
MCHC RBC AUTO-ENTMCNC: 31.5 G/DL (ref 28.4–34.8)
MCV RBC AUTO: 94.2 FL (ref 82.6–102.9)
MICROORGANISM SPEC CULT: ABNORMAL
MICROORGANISM/AGENT SPEC: ABNORMAL
MONOCYTES NFR BLD: 0.28 K/UL (ref 0.1–0.8)
MONOCYTES NFR BLD: 2 % (ref 1–7)
MORPHOLOGY: NORMAL
NEUTROPHILS NFR BLD: 78 % (ref 36–66)
NEUTS SEG NFR BLD: 11.08 K/UL (ref 1.8–7.7)
NRBC BLD-RTO: 0 PER 100 WBC
PLATELET # BLD AUTO: 225 K/UL (ref 138–453)
PMV BLD AUTO: 9.6 FL (ref 8.1–13.5)
POTASSIUM SERPL-SCNC: 3.2 MMOL/L (ref 3.7–5.3)
RBC # BLD AUTO: 4.65 M/UL (ref 4.21–5.77)
SODIUM SERPL-SCNC: 138 MMOL/L (ref 135–144)
SPECIMEN DESCRIPTION: ABNORMAL
WBC OTHER # BLD: 14.2 K/UL (ref 3.5–11.3)

## 2023-12-31 PROCEDURE — 80048 BASIC METABOLIC PNL TOTAL CA: CPT

## 2023-12-31 PROCEDURE — 6370000000 HC RX 637 (ALT 250 FOR IP): Performed by: STUDENT IN AN ORGANIZED HEALTH CARE EDUCATION/TRAINING PROGRAM

## 2023-12-31 PROCEDURE — 82947 ASSAY GLUCOSE BLOOD QUANT: CPT

## 2023-12-31 PROCEDURE — 6360000002 HC RX W HCPCS

## 2023-12-31 PROCEDURE — 2580000003 HC RX 258: Performed by: STUDENT IN AN ORGANIZED HEALTH CARE EDUCATION/TRAINING PROGRAM

## 2023-12-31 PROCEDURE — 94640 AIRWAY INHALATION TREATMENT: CPT

## 2023-12-31 PROCEDURE — 6360000002 HC RX W HCPCS: Performed by: STUDENT IN AN ORGANIZED HEALTH CARE EDUCATION/TRAINING PROGRAM

## 2023-12-31 PROCEDURE — 36415 COLL VENOUS BLD VENIPUNCTURE: CPT

## 2023-12-31 PROCEDURE — 2700000000 HC OXYGEN THERAPY PER DAY

## 2023-12-31 PROCEDURE — 2580000003 HC RX 258

## 2023-12-31 PROCEDURE — 6370000000 HC RX 637 (ALT 250 FOR IP)

## 2023-12-31 PROCEDURE — 99233 SBSQ HOSP IP/OBS HIGH 50: CPT | Performed by: INTERNAL MEDICINE

## 2023-12-31 PROCEDURE — 2060000000 HC ICU INTERMEDIATE R&B

## 2023-12-31 PROCEDURE — 85025 COMPLETE CBC W/AUTO DIFF WBC: CPT

## 2023-12-31 PROCEDURE — 6370000000 HC RX 637 (ALT 250 FOR IP): Performed by: PHYSICIAN ASSISTANT

## 2023-12-31 PROCEDURE — 2580000003 HC RX 258: Performed by: PHYSICIAN ASSISTANT

## 2023-12-31 PROCEDURE — 71045 X-RAY EXAM CHEST 1 VIEW: CPT

## 2023-12-31 PROCEDURE — 83735 ASSAY OF MAGNESIUM: CPT

## 2023-12-31 PROCEDURE — 99232 SBSQ HOSP IP/OBS MODERATE 35: CPT | Performed by: STUDENT IN AN ORGANIZED HEALTH CARE EDUCATION/TRAINING PROGRAM

## 2023-12-31 PROCEDURE — 94761 N-INVAS EAR/PLS OXIMETRY MLT: CPT

## 2023-12-31 RX ADMIN — ASPIRIN 81 MG 81 MG: 81 TABLET ORAL at 08:15

## 2023-12-31 RX ADMIN — IPRATROPIUM BROMIDE AND ALBUTEROL SULFATE 1 DOSE: 2.5; .5 SOLUTION RESPIRATORY (INHALATION) at 20:26

## 2023-12-31 RX ADMIN — BUDESONIDE AND FORMOTEROL FUMARATE DIHYDRATE 2 PUFF: 160; 4.5 AEROSOL RESPIRATORY (INHALATION) at 08:10

## 2023-12-31 RX ADMIN — TAMSULOSIN HYDROCHLORIDE 0.4 MG: 0.4 CAPSULE ORAL at 08:14

## 2023-12-31 RX ADMIN — PREDNISONE 40 MG: 20 TABLET ORAL at 08:14

## 2023-12-31 RX ADMIN — TAMSULOSIN HYDROCHLORIDE 0.4 MG: 0.4 CAPSULE ORAL at 19:44

## 2023-12-31 RX ADMIN — GUAIFENESIN 600 MG: 600 TABLET, EXTENDED RELEASE ORAL at 19:44

## 2023-12-31 RX ADMIN — CEFEPIME 2000 MG: 2 INJECTION, POWDER, FOR SOLUTION INTRAVENOUS at 21:02

## 2023-12-31 RX ADMIN — POTASSIUM CHLORIDE 40 MEQ: 1500 TABLET, EXTENDED RELEASE ORAL at 11:41

## 2023-12-31 RX ADMIN — HEPARIN SODIUM 5000 UNITS: 5000 INJECTION INTRAVENOUS; SUBCUTANEOUS at 20:14

## 2023-12-31 RX ADMIN — SODIUM CHLORIDE, PRESERVATIVE FREE 10 ML: 5 INJECTION INTRAVENOUS at 19:45

## 2023-12-31 RX ADMIN — IPRATROPIUM BROMIDE AND ALBUTEROL SULFATE 1 DOSE: 2.5; .5 SOLUTION RESPIRATORY (INHALATION) at 15:11

## 2023-12-31 RX ADMIN — CEFEPIME 2000 MG: 2 INJECTION, POWDER, FOR SOLUTION INTRAVENOUS at 08:10

## 2023-12-31 RX ADMIN — CLOPIDOGREL BISULFATE 75 MG: 75 TABLET ORAL at 08:15

## 2023-12-31 RX ADMIN — GUAIFENESIN 600 MG: 600 TABLET, EXTENDED RELEASE ORAL at 08:15

## 2023-12-31 RX ADMIN — ATORVASTATIN CALCIUM 40 MG: 40 TABLET, FILM COATED ORAL at 08:15

## 2023-12-31 RX ADMIN — TIOTROPIUM BROMIDE INHALATION SPRAY 2 PUFF: 3.12 SPRAY, METERED RESPIRATORY (INHALATION) at 08:10

## 2023-12-31 RX ADMIN — FUROSEMIDE 40 MG: 10 INJECTION, SOLUTION INTRAMUSCULAR; INTRAVENOUS at 08:15

## 2023-12-31 RX ADMIN — HEPARIN SODIUM 5000 UNITS: 5000 INJECTION INTRAVENOUS; SUBCUTANEOUS at 06:24

## 2023-12-31 RX ADMIN — FINASTERIDE 5 MG: 5 TABLET, FILM COATED ORAL at 08:15

## 2023-12-31 RX ADMIN — METOPROLOL TARTRATE 25 MG: 25 TABLET, FILM COATED ORAL at 08:14

## 2023-12-31 RX ADMIN — IPRATROPIUM BROMIDE AND ALBUTEROL SULFATE 1 DOSE: 2.5; .5 SOLUTION RESPIRATORY (INHALATION) at 11:15

## 2023-12-31 RX ADMIN — HEPARIN SODIUM 5000 UNITS: 5000 INJECTION INTRAVENOUS; SUBCUTANEOUS at 16:57

## 2023-12-31 RX ADMIN — IPRATROPIUM BROMIDE AND ALBUTEROL SULFATE 1 DOSE: 2.5; .5 SOLUTION RESPIRATORY (INHALATION) at 08:10

## 2023-12-31 RX ADMIN — BUDESONIDE AND FORMOTEROL FUMARATE DIHYDRATE 2 PUFF: 160; 4.5 AEROSOL RESPIRATORY (INHALATION) at 20:29

## 2023-12-31 RX ADMIN — SODIUM CHLORIDE, PRESERVATIVE FREE 10 ML: 5 INJECTION INTRAVENOUS at 08:15

## 2023-12-31 RX ADMIN — SODIUM CHLORIDE, PRESERVATIVE FREE 10 ML: 5 INJECTION INTRAVENOUS at 19:44

## 2023-12-31 ASSESSMENT — PAIN SCALES - GENERAL
PAINLEVEL_OUTOF10: 0
PAINLEVEL_OUTOF10: 0

## 2023-12-31 NOTE — PLAN OF CARE
Problem: Discharge Planning  Goal: Discharge to home or other facility with appropriate resources  12/31/2023 0830 by Ilia Villeda RN  Outcome: Progressing  12/30/2023 2312 by Nika Gutierres RN  Outcome: Progressing  Flowsheets (Taken 12/30/2023 2015)  Discharge to home or other facility with appropriate resources: Identify barriers to discharge with patient and caregiver     Problem: Pain  Goal: Verbalizes/displays adequate comfort level or baseline comfort level  12/31/2023 0830 by Ilia Villeda RN  Outcome: Progressing  12/30/2023 2312 by Nika Gutierres RN  Outcome: Progressing     Problem: Respiratory - Adult  Goal: Achieves optimal ventilation and oxygenation  12/31/2023 0830 by Ilia Villeda RN  Outcome: Progressing  12/31/2023 0813 by Maye Velasquez RCP  Outcome: Progressing  12/30/2023 2115 by Demetria Yanez RCP  Outcome: Progressing     Problem: Safety - Adult  Goal: Free from fall injury  Outcome: Progressing

## 2023-12-31 NOTE — PLAN OF CARE
Problem: Respiratory - Adult  Goal: Achieves optimal ventilation and oxygenation  12/31/2023 0813 by Maye Velasquez, MARY JO  Outcome: Progressing   BRONCHOSPASM/BRONCHOCONSTRICTION     [x]         IMPROVE AERATION/BREATH SOUNDS  [x]   ADMINISTER BRONCHODILATOR THERAPY AS APPROPRIATE  [x]   ASSESS BREATH SOUNDS  []   IMPLEMENT AEROSOL/MDI PROTOCOL  [x]   PATIENT EDUCATION AS NEEDED

## 2023-12-31 NOTE — PLAN OF CARE
Problem: Discharge Planning  Goal: Discharge to home or other facility with appropriate resources  Outcome: Progressing  Flowsheets (Taken 12/30/2023 2015)  Discharge to home or other facility with appropriate resources: Identify barriers to discharge with patient and caregiver     Problem: Pain  Goal: Verbalizes/displays adequate comfort level or baseline comfort level  Outcome: Progressing     Problem: Respiratory - Adult  Goal: Achieves optimal ventilation and oxygenation  12/30/2023 2115 by Demetria Yanez RCP  Outcome: Progressing

## 2024-01-01 LAB
ANION GAP SERPL CALCULATED.3IONS-SCNC: 9 MMOL/L (ref 9–17)
BASOPHILS # BLD: 0 K/UL (ref 0–0.2)
BASOPHILS NFR BLD: 0 % (ref 0–2)
BUN SERPL-MCNC: 26 MG/DL (ref 8–23)
CALCIUM SERPL-MCNC: 8.2 MG/DL (ref 8.6–10.4)
CHLORIDE SERPL-SCNC: 104 MMOL/L (ref 98–107)
CO2 SERPL-SCNC: 24 MMOL/L (ref 20–31)
CREAT SERPL-MCNC: 0.9 MG/DL (ref 0.7–1.2)
EOSINOPHIL # BLD: 0.12 K/UL (ref 0–0.4)
EOSINOPHILS RELATIVE PERCENT: 1 % (ref 1–4)
ERYTHROCYTE [DISTWIDTH] IN BLOOD BY AUTOMATED COUNT: 13.2 % (ref 11.8–14.4)
GFR SERPL CREATININE-BSD FRML MDRD: >60 ML/MIN/1.73M2
GLUCOSE BLD-MCNC: 110 MG/DL (ref 75–110)
GLUCOSE BLD-MCNC: 119 MG/DL (ref 75–110)
GLUCOSE BLD-MCNC: 154 MG/DL (ref 75–110)
GLUCOSE BLD-MCNC: 86 MG/DL (ref 75–110)
GLUCOSE SERPL-MCNC: 113 MG/DL (ref 70–99)
HCT VFR BLD AUTO: 38.4 % (ref 40.7–50.3)
HGB BLD-MCNC: 12.3 G/DL (ref 13–17)
IMM GRANULOCYTES # BLD AUTO: 0.61 K/UL (ref 0–0.3)
IMM GRANULOCYTES NFR BLD: 5 %
LYMPHOCYTES NFR BLD: 1.94 K/UL (ref 1–4.8)
LYMPHOCYTES RELATIVE PERCENT: 16 % (ref 24–44)
M PNEUMO IGM SER QL IA: 0.08
MAGNESIUM SERPL-MCNC: 2.3 MG/DL (ref 1.6–2.6)
MCH RBC QN AUTO: 29.4 PG (ref 25.2–33.5)
MCHC RBC AUTO-ENTMCNC: 32 G/DL (ref 28.4–34.8)
MCV RBC AUTO: 91.9 FL (ref 82.6–102.9)
MICROORGANISM SPEC CULT: NORMAL
MICROORGANISM SPEC CULT: NORMAL
MONOCYTES NFR BLD: 0.73 K/UL (ref 0.1–0.8)
MONOCYTES NFR BLD: 6 % (ref 1–7)
MORPHOLOGY: NORMAL
NEUTROPHILS NFR BLD: 72 % (ref 36–66)
NEUTS SEG NFR BLD: 8.7 K/UL (ref 1.8–7.7)
NRBC BLD-RTO: 0 PER 100 WBC
PLATELET # BLD AUTO: 199 K/UL (ref 138–453)
PMV BLD AUTO: 9.6 FL (ref 8.1–13.5)
POTASSIUM SERPL-SCNC: 3.5 MMOL/L (ref 3.7–5.3)
RBC # BLD AUTO: 4.18 M/UL (ref 4.21–5.77)
SERVICE CMNT-IMP: NORMAL
SERVICE CMNT-IMP: NORMAL
SODIUM SERPL-SCNC: 137 MMOL/L (ref 135–144)
SPECIMEN DESCRIPTION: NORMAL
SPECIMEN DESCRIPTION: NORMAL
WBC OTHER # BLD: 12.1 K/UL (ref 3.5–11.3)

## 2024-01-01 PROCEDURE — 94640 AIRWAY INHALATION TREATMENT: CPT

## 2024-01-01 PROCEDURE — 2060000000 HC ICU INTERMEDIATE R&B

## 2024-01-01 PROCEDURE — 6360000002 HC RX W HCPCS: Performed by: STUDENT IN AN ORGANIZED HEALTH CARE EDUCATION/TRAINING PROGRAM

## 2024-01-01 PROCEDURE — 80048 BASIC METABOLIC PNL TOTAL CA: CPT

## 2024-01-01 PROCEDURE — 36415 COLL VENOUS BLD VENIPUNCTURE: CPT

## 2024-01-01 PROCEDURE — 2700000000 HC OXYGEN THERAPY PER DAY

## 2024-01-01 PROCEDURE — 94761 N-INVAS EAR/PLS OXIMETRY MLT: CPT

## 2024-01-01 PROCEDURE — 6370000000 HC RX 637 (ALT 250 FOR IP)

## 2024-01-01 PROCEDURE — 2580000003 HC RX 258

## 2024-01-01 PROCEDURE — 6370000000 HC RX 637 (ALT 250 FOR IP): Performed by: PHYSICIAN ASSISTANT

## 2024-01-01 PROCEDURE — 99232 SBSQ HOSP IP/OBS MODERATE 35: CPT | Performed by: STUDENT IN AN ORGANIZED HEALTH CARE EDUCATION/TRAINING PROGRAM

## 2024-01-01 PROCEDURE — 6370000000 HC RX 637 (ALT 250 FOR IP): Performed by: STUDENT IN AN ORGANIZED HEALTH CARE EDUCATION/TRAINING PROGRAM

## 2024-01-01 PROCEDURE — 99233 SBSQ HOSP IP/OBS HIGH 50: CPT | Performed by: INTERNAL MEDICINE

## 2024-01-01 PROCEDURE — 85025 COMPLETE CBC W/AUTO DIFF WBC: CPT

## 2024-01-01 PROCEDURE — 2580000003 HC RX 258: Performed by: STUDENT IN AN ORGANIZED HEALTH CARE EDUCATION/TRAINING PROGRAM

## 2024-01-01 PROCEDURE — 6360000002 HC RX W HCPCS

## 2024-01-01 PROCEDURE — 2580000003 HC RX 258: Performed by: PHYSICIAN ASSISTANT

## 2024-01-01 PROCEDURE — 83735 ASSAY OF MAGNESIUM: CPT

## 2024-01-01 PROCEDURE — 82947 ASSAY GLUCOSE BLOOD QUANT: CPT

## 2024-01-01 RX ORDER — PREDNISONE 20 MG/1
40 TABLET ORAL DAILY
Status: DISCONTINUED | OUTPATIENT
Start: 2024-01-02 | End: 2024-01-03 | Stop reason: HOSPADM

## 2024-01-01 RX ORDER — PREDNISONE 20 MG/1
20 TABLET ORAL DAILY
Status: DISCONTINUED | OUTPATIENT
Start: 2024-01-08 | End: 2024-01-03 | Stop reason: HOSPADM

## 2024-01-01 RX ORDER — PREDNISONE 10 MG/1
10 TABLET ORAL DAILY
Status: DISCONTINUED | OUTPATIENT
Start: 2024-01-11 | End: 2024-01-03 | Stop reason: HOSPADM

## 2024-01-01 RX ADMIN — IPRATROPIUM BROMIDE AND ALBUTEROL SULFATE 1 DOSE: 2.5; .5 SOLUTION RESPIRATORY (INHALATION) at 20:14

## 2024-01-01 RX ADMIN — IPRATROPIUM BROMIDE AND ALBUTEROL SULFATE 1 DOSE: 2.5; .5 SOLUTION RESPIRATORY (INHALATION) at 07:43

## 2024-01-01 RX ADMIN — IPRATROPIUM BROMIDE AND ALBUTEROL SULFATE 1 DOSE: 2.5; .5 SOLUTION RESPIRATORY (INHALATION) at 11:22

## 2024-01-01 RX ADMIN — CLOPIDOGREL BISULFATE 75 MG: 75 TABLET ORAL at 08:19

## 2024-01-01 RX ADMIN — POTASSIUM CHLORIDE 40 MEQ: 1500 TABLET, EXTENDED RELEASE ORAL at 06:25

## 2024-01-01 RX ADMIN — FUROSEMIDE 40 MG: 10 INJECTION, SOLUTION INTRAMUSCULAR; INTRAVENOUS at 08:19

## 2024-01-01 RX ADMIN — ASPIRIN 81 MG 81 MG: 81 TABLET ORAL at 08:19

## 2024-01-01 RX ADMIN — TIOTROPIUM BROMIDE INHALATION SPRAY 2 PUFF: 3.12 SPRAY, METERED RESPIRATORY (INHALATION) at 07:43

## 2024-01-01 RX ADMIN — METOPROLOL TARTRATE 25 MG: 25 TABLET, FILM COATED ORAL at 19:59

## 2024-01-01 RX ADMIN — FINASTERIDE 5 MG: 5 TABLET, FILM COATED ORAL at 08:21

## 2024-01-01 RX ADMIN — TAMSULOSIN HYDROCHLORIDE 0.4 MG: 0.4 CAPSULE ORAL at 08:24

## 2024-01-01 RX ADMIN — HEPARIN SODIUM 5000 UNITS: 5000 INJECTION INTRAVENOUS; SUBCUTANEOUS at 14:45

## 2024-01-01 RX ADMIN — BUDESONIDE AND FORMOTEROL FUMARATE DIHYDRATE 2 PUFF: 160; 4.5 AEROSOL RESPIRATORY (INHALATION) at 07:43

## 2024-01-01 RX ADMIN — METOPROLOL TARTRATE 25 MG: 25 TABLET, FILM COATED ORAL at 08:19

## 2024-01-01 RX ADMIN — SODIUM CHLORIDE, PRESERVATIVE FREE 10 ML: 5 INJECTION INTRAVENOUS at 19:59

## 2024-01-01 RX ADMIN — GUAIFENESIN 600 MG: 600 TABLET, EXTENDED RELEASE ORAL at 19:59

## 2024-01-01 RX ADMIN — PREDNISONE 40 MG: 20 TABLET ORAL at 08:24

## 2024-01-01 RX ADMIN — TAMSULOSIN HYDROCHLORIDE 0.4 MG: 0.4 CAPSULE ORAL at 19:59

## 2024-01-01 RX ADMIN — SODIUM CHLORIDE, PRESERVATIVE FREE 10 ML: 5 INJECTION INTRAVENOUS at 11:10

## 2024-01-01 RX ADMIN — HEPARIN SODIUM 5000 UNITS: 5000 INJECTION INTRAVENOUS; SUBCUTANEOUS at 22:27

## 2024-01-01 RX ADMIN — IPRATROPIUM BROMIDE AND ALBUTEROL SULFATE 1 DOSE: 2.5; .5 SOLUTION RESPIRATORY (INHALATION) at 14:54

## 2024-01-01 RX ADMIN — HEPARIN SODIUM 5000 UNITS: 5000 INJECTION INTRAVENOUS; SUBCUTANEOUS at 05:18

## 2024-01-01 RX ADMIN — CEFEPIME 2000 MG: 2 INJECTION, POWDER, FOR SOLUTION INTRAVENOUS at 10:23

## 2024-01-01 RX ADMIN — GUAIFENESIN 600 MG: 600 TABLET, EXTENDED RELEASE ORAL at 08:19

## 2024-01-01 RX ADMIN — ATORVASTATIN CALCIUM 40 MG: 40 TABLET, FILM COATED ORAL at 08:19

## 2024-01-01 RX ADMIN — CEFEPIME 2000 MG: 2 INJECTION, POWDER, FOR SOLUTION INTRAVENOUS at 19:58

## 2024-01-01 RX ADMIN — BUDESONIDE AND FORMOTEROL FUMARATE DIHYDRATE 2 PUFF: 160; 4.5 AEROSOL RESPIRATORY (INHALATION) at 20:14

## 2024-01-01 ASSESSMENT — ENCOUNTER SYMPTOMS
COUGH: 1
SHORTNESS OF BREATH: 1
WHEEZING: 1

## 2024-01-01 NOTE — PLAN OF CARE
PROVIDE ADEQUATE OXYGENATION WITH ACCEPTABLE SP02/ABG'S    [x]  IDENTIFY APPROPRIATE OXYGEN THERAPY  [x]   MONITOR SP02/ABG'S AS NEEDED   [x]   PATIENT EDUCATION AS NEEDED  BRONCHOSPASM/BRONCHOCONSTRICTION     [x]         IMPROVE AERATION/BREATH SOUNDS  [x]   ADMINISTER BRONCHODILATOR THERAPY AS APPROPRIATE  [x]   ASSESS BREATH SOUNDS  []   IMPLEMENT AEROSOL/MDI PROTOCOL  [x]   PATIENT EDUCATION AS NEEDED

## 2024-01-01 NOTE — PLAN OF CARE
Problem: Respiratory - Adult  Goal: Achieves optimal ventilation and oxygenation  12/31/2023 2016 by Demetria Yanez RCP  Outcome: Progressing

## 2024-01-01 NOTE — PLAN OF CARE
Problem: Discharge Planning  Goal: Discharge to home or other facility with appropriate resources  12/31/2023 2151 by Trini Meneses RN  Outcome: Progressing  12/31/2023 0830 by Ilia Villeda RN  Outcome: Progressing     Problem: Pain  Goal: Verbalizes/displays adequate comfort level or baseline comfort level  12/31/2023 2151 by Trini Meneses RN  Outcome: Progressing  Flowsheets (Taken 12/31/2023 1628 by Julissa Messina RN)  Verbalizes/displays adequate comfort level or baseline comfort level:   Encourage patient to monitor pain and request assistance   Assess pain using appropriate pain scale   Administer analgesics based on type and severity of pain and evaluate response  12/31/2023 0830 by Ilia Villeda RN  Outcome: Progressing     Problem: Respiratory - Adult  Goal: Achieves optimal ventilation and oxygenation  12/31/2023 2151 by Trini Meneses RN  Outcome: Progressing  12/31/2023 2016 by Demetria Yanez RCP  Outcome: Progressing  Flowsheets (Taken 12/31/2023 1520 by Julissa Messina RN)  Achieves optimal ventilation and oxygenation:   Assess for changes in respiratory status   Assess for changes in mentation and behavior   Position to facilitate oxygenation and minimize respiratory effort   Initiate smoking cessation protocol as indicated   Encourage broncho-pulmonary hygiene including cough, deep breathe, incentive spirometry   Assess and instruct to report shortness of breath or any respiratory difficulty   Assess the need for suctioning and aspirate as needed   Respiratory therapy support as indicated   Oxygen supplementation based on oxygen saturation or arterial blood gases  12/31/2023 0830 by Ilia Villeda RN  Outcome: Progressing  12/31/2023 0813 by Maye Velasquez RCP  Outcome: Progressing     Problem: Safety - Adult  Goal: Free from fall injury  12/31/2023 2151 by Trini Meneses RN  Outcome: Progressing  12/31/2023 0830 by Ilia Villeda RN  Outcome: Progressing

## 2024-01-02 PROBLEM — R00.0 SINUS TACHYCARDIA: Status: ACTIVE | Noted: 2024-01-02

## 2024-01-02 PROBLEM — J96.01 ACUTE RESPIRATORY FAILURE WITH HYPOXIA (HCC): Status: ACTIVE | Noted: 2024-01-02

## 2024-01-02 LAB
ANION GAP SERPL CALCULATED.3IONS-SCNC: 11 MMOL/L (ref 9–17)
BASOPHILS # BLD: 0 K/UL (ref 0–0.2)
BASOPHILS NFR BLD: 0 % (ref 0–2)
BUN SERPL-MCNC: 23 MG/DL (ref 8–23)
CALCIUM SERPL-MCNC: 8.5 MG/DL (ref 8.6–10.4)
CHLORIDE SERPL-SCNC: 107 MMOL/L (ref 98–107)
CO2 SERPL-SCNC: 22 MMOL/L (ref 20–31)
CREAT SERPL-MCNC: 0.8 MG/DL (ref 0.7–1.2)
EOSINOPHIL # BLD: 0.12 K/UL (ref 0–0.4)
EOSINOPHILS RELATIVE PERCENT: 1 % (ref 1–4)
ERYTHROCYTE [DISTWIDTH] IN BLOOD BY AUTOMATED COUNT: 13.3 % (ref 11.8–14.4)
GFR SERPL CREATININE-BSD FRML MDRD: >60 ML/MIN/1.73M2
GLUCOSE BLD-MCNC: 127 MG/DL (ref 75–110)
GLUCOSE BLD-MCNC: 75 MG/DL (ref 75–110)
GLUCOSE BLD-MCNC: 89 MG/DL (ref 75–110)
GLUCOSE SERPL-MCNC: 81 MG/DL (ref 70–99)
HCT VFR BLD AUTO: 40.9 % (ref 40.7–50.3)
HGB BLD-MCNC: 12.6 G/DL (ref 13–17)
IMM GRANULOCYTES # BLD AUTO: 0.5 K/UL (ref 0–0.3)
IMM GRANULOCYTES NFR BLD: 4 %
LYMPHOCYTES NFR BLD: 2.98 K/UL (ref 1–4.8)
LYMPHOCYTES RELATIVE PERCENT: 24 % (ref 24–44)
MCH RBC QN AUTO: 30 PG (ref 25.2–33.5)
MCHC RBC AUTO-ENTMCNC: 30.8 G/DL (ref 28.4–34.8)
MCV RBC AUTO: 97.4 FL (ref 82.6–102.9)
MONOCYTES NFR BLD: 0.62 K/UL (ref 0.1–0.8)
MONOCYTES NFR BLD: 5 % (ref 1–7)
MORPHOLOGY: NORMAL
NEUTROPHILS NFR BLD: 66 % (ref 36–66)
NEUTS SEG NFR BLD: 8.18 K/UL (ref 1.8–7.7)
NRBC BLD-RTO: 0 PER 100 WBC
PLATELET # BLD AUTO: 199 K/UL (ref 138–453)
PMV BLD AUTO: 9.4 FL (ref 8.1–13.5)
POTASSIUM SERPL-SCNC: 4.2 MMOL/L (ref 3.7–5.3)
RBC # BLD AUTO: 4.2 M/UL (ref 4.21–5.77)
SODIUM SERPL-SCNC: 140 MMOL/L (ref 135–144)
WBC OTHER # BLD: 12.4 K/UL (ref 3.5–11.3)

## 2024-01-02 PROCEDURE — 99232 SBSQ HOSP IP/OBS MODERATE 35: CPT | Performed by: INTERNAL MEDICINE

## 2024-01-02 PROCEDURE — 94761 N-INVAS EAR/PLS OXIMETRY MLT: CPT

## 2024-01-02 PROCEDURE — 6370000000 HC RX 637 (ALT 250 FOR IP)

## 2024-01-02 PROCEDURE — 80048 BASIC METABOLIC PNL TOTAL CA: CPT

## 2024-01-02 PROCEDURE — 6370000000 HC RX 637 (ALT 250 FOR IP): Performed by: INTERNAL MEDICINE

## 2024-01-02 PROCEDURE — 6360000002 HC RX W HCPCS: Performed by: STUDENT IN AN ORGANIZED HEALTH CARE EDUCATION/TRAINING PROGRAM

## 2024-01-02 PROCEDURE — 6370000000 HC RX 637 (ALT 250 FOR IP): Performed by: STUDENT IN AN ORGANIZED HEALTH CARE EDUCATION/TRAINING PROGRAM

## 2024-01-02 PROCEDURE — 2060000000 HC ICU INTERMEDIATE R&B

## 2024-01-02 PROCEDURE — 94640 AIRWAY INHALATION TREATMENT: CPT

## 2024-01-02 PROCEDURE — 99232 SBSQ HOSP IP/OBS MODERATE 35: CPT | Performed by: STUDENT IN AN ORGANIZED HEALTH CARE EDUCATION/TRAINING PROGRAM

## 2024-01-02 PROCEDURE — 85025 COMPLETE CBC W/AUTO DIFF WBC: CPT

## 2024-01-02 PROCEDURE — 2580000003 HC RX 258: Performed by: STUDENT IN AN ORGANIZED HEALTH CARE EDUCATION/TRAINING PROGRAM

## 2024-01-02 PROCEDURE — 82947 ASSAY GLUCOSE BLOOD QUANT: CPT

## 2024-01-02 PROCEDURE — 6360000002 HC RX W HCPCS

## 2024-01-02 PROCEDURE — 36415 COLL VENOUS BLD VENIPUNCTURE: CPT

## 2024-01-02 PROCEDURE — 2580000003 HC RX 258: Performed by: PHYSICIAN ASSISTANT

## 2024-01-02 PROCEDURE — 2580000003 HC RX 258

## 2024-01-02 RX ORDER — LEVOFLOXACIN 500 MG/1
500 TABLET, FILM COATED ORAL DAILY
Status: DISCONTINUED | OUTPATIENT
Start: 2024-01-02 | End: 2024-01-03 | Stop reason: HOSPADM

## 2024-01-02 RX ORDER — FUROSEMIDE 40 MG/1
40 TABLET ORAL DAILY
Status: DISCONTINUED | OUTPATIENT
Start: 2024-01-02 | End: 2024-01-03

## 2024-01-02 RX ADMIN — ASPIRIN 81 MG 81 MG: 81 TABLET ORAL at 08:07

## 2024-01-02 RX ADMIN — HEPARIN SODIUM 5000 UNITS: 5000 INJECTION INTRAVENOUS; SUBCUTANEOUS at 06:01

## 2024-01-02 RX ADMIN — PREDNISONE 40 MG: 20 TABLET ORAL at 10:19

## 2024-01-02 RX ADMIN — IPRATROPIUM BROMIDE AND ALBUTEROL SULFATE 1 DOSE: 2.5; .5 SOLUTION RESPIRATORY (INHALATION) at 19:45

## 2024-01-02 RX ADMIN — BUDESONIDE AND FORMOTEROL FUMARATE DIHYDRATE 2 PUFF: 160; 4.5 AEROSOL RESPIRATORY (INHALATION) at 08:45

## 2024-01-02 RX ADMIN — FUROSEMIDE 40 MG: 10 INJECTION, SOLUTION INTRAMUSCULAR; INTRAVENOUS at 08:08

## 2024-01-02 RX ADMIN — BUDESONIDE AND FORMOTEROL FUMARATE DIHYDRATE 2 PUFF: 160; 4.5 AEROSOL RESPIRATORY (INHALATION) at 19:45

## 2024-01-02 RX ADMIN — CLOPIDOGREL BISULFATE 75 MG: 75 TABLET ORAL at 08:07

## 2024-01-02 RX ADMIN — TAMSULOSIN HYDROCHLORIDE 0.4 MG: 0.4 CAPSULE ORAL at 21:09

## 2024-01-02 RX ADMIN — HEPARIN SODIUM 5000 UNITS: 5000 INJECTION INTRAVENOUS; SUBCUTANEOUS at 16:01

## 2024-01-02 RX ADMIN — FINASTERIDE 5 MG: 5 TABLET, FILM COATED ORAL at 08:07

## 2024-01-02 RX ADMIN — SODIUM CHLORIDE, PRESERVATIVE FREE 10 ML: 5 INJECTION INTRAVENOUS at 21:09

## 2024-01-02 RX ADMIN — GUAIFENESIN 600 MG: 600 TABLET, EXTENDED RELEASE ORAL at 21:09

## 2024-01-02 RX ADMIN — IPRATROPIUM BROMIDE AND ALBUTEROL SULFATE 1 DOSE: 2.5; .5 SOLUTION RESPIRATORY (INHALATION) at 08:45

## 2024-01-02 RX ADMIN — CEFEPIME 2000 MG: 2 INJECTION, POWDER, FOR SOLUTION INTRAVENOUS at 08:21

## 2024-01-02 RX ADMIN — IPRATROPIUM BROMIDE AND ALBUTEROL SULFATE 1 DOSE: 2.5; .5 SOLUTION RESPIRATORY (INHALATION) at 14:39

## 2024-01-02 RX ADMIN — HEPARIN SODIUM 5000 UNITS: 5000 INJECTION INTRAVENOUS; SUBCUTANEOUS at 22:18

## 2024-01-02 RX ADMIN — LEVOFLOXACIN 500 MG: 500 TABLET, FILM COATED ORAL at 13:25

## 2024-01-02 RX ADMIN — ATORVASTATIN CALCIUM 40 MG: 40 TABLET, FILM COATED ORAL at 08:06

## 2024-01-02 RX ADMIN — GUAIFENESIN 600 MG: 600 TABLET, EXTENDED RELEASE ORAL at 08:07

## 2024-01-02 RX ADMIN — IPRATROPIUM BROMIDE AND ALBUTEROL SULFATE 1 DOSE: 2.5; .5 SOLUTION RESPIRATORY (INHALATION) at 11:46

## 2024-01-02 RX ADMIN — METOPROLOL TARTRATE 25 MG: 25 TABLET, FILM COATED ORAL at 21:09

## 2024-01-02 RX ADMIN — METOPROLOL TARTRATE 25 MG: 25 TABLET, FILM COATED ORAL at 08:07

## 2024-01-02 RX ADMIN — TAMSULOSIN HYDROCHLORIDE 0.4 MG: 0.4 CAPSULE ORAL at 08:07

## 2024-01-02 RX ADMIN — SODIUM CHLORIDE, PRESERVATIVE FREE 10 ML: 5 INJECTION INTRAVENOUS at 08:13

## 2024-01-02 NOTE — PLAN OF CARE
Problem: Discharge Planning  Goal: Discharge to home or other facility with appropriate resources  Outcome: Progressing  Flowsheets (Taken 1/2/2024 0800)  Discharge to home or other facility with appropriate resources:   Identify barriers to discharge with patient and caregiver   Arrange for needed discharge resources and transportation as appropriate   Identify discharge learning needs (meds, wound care, etc)     Problem: Pain  Goal: Verbalizes/displays adequate comfort level or baseline comfort level  Outcome: Progressing     Problem: Respiratory - Adult  Goal: Achieves optimal ventilation and oxygenation  1/2/2024 1858 by Dallas Torres, RN  Outcome: Progressing  1/2/2024 1452 by Santiago Wood, RCP  Outcome: Progressing     Problem: Safety - Adult  Goal: Free from fall injury  Outcome: Progressing  Flowsheets (Taken 1/2/2024 0800)  Free From Fall Injury: Instruct family/caregiver on patient safety

## 2024-01-02 NOTE — PLAN OF CARE
Problem: Respiratory - Adult  Goal: Achieves optimal ventilation and oxygenation  Outcome: Progressing   BRONCHOSPASM/BRONCHOCONSTRICTION     [x]         IMPROVE AERATION/BREATH SOUNDS  [x]   ADMINISTER BRONCHODILATOR THERAPY AS APPROPRIATE  [x]   ASSESS BREATH SOUNDS  []   IMPLEMENT AEROSOL/MDI PROTOCOL  [x]   PATIENT EDUCATION AS NEEDED  PROVIDE ADEQUATE OXYGENATION WITH ACCEPTABLE SP02/ABG'S    [x]  IDENTIFY APPROPRIATE OXYGEN THERAPY  [x]   MONITOR SP02/ABG'S AS NEEDED   [x]   PATIENT EDUCATION AS NEEDED  NONINVASIVE VENTILATION    PROVIDE OPTIMAL VENTILATION/ACCEPTABLE SPO2   IMPLEMENT NONINVASIVE VENTILATION PROTOCOL   MAINTAIN ACCEPTABLE SPO2   ASSESS SKIN INTEGRITY/BREAKDOWN SCORE   PATIENT EDUCATION AS NEEDED   BIPAP AS NEEDED

## 2024-01-03 VITALS
TEMPERATURE: 98.3 F | HEART RATE: 64 BPM | HEIGHT: 72 IN | OXYGEN SATURATION: 98 % | BODY MASS INDEX: 25.73 KG/M2 | SYSTOLIC BLOOD PRESSURE: 114 MMHG | RESPIRATION RATE: 20 BRPM | DIASTOLIC BLOOD PRESSURE: 58 MMHG | WEIGHT: 190 LBS

## 2024-01-03 LAB
ANION GAP SERPL CALCULATED.3IONS-SCNC: 11 MMOL/L (ref 9–17)
BASOPHILS # BLD: 0 K/UL (ref 0–0.2)
BASOPHILS NFR BLD: 0 % (ref 0–2)
BUN SERPL-MCNC: 22 MG/DL (ref 8–23)
CALCIUM SERPL-MCNC: 8.7 MG/DL (ref 8.6–10.4)
CHLORIDE SERPL-SCNC: 103 MMOL/L (ref 98–107)
CO2 SERPL-SCNC: 24 MMOL/L (ref 20–31)
CREAT SERPL-MCNC: 0.9 MG/DL (ref 0.7–1.2)
EOSINOPHIL # BLD: 0 K/UL (ref 0–0.4)
EOSINOPHILS RELATIVE PERCENT: 0 % (ref 1–4)
ERYTHROCYTE [DISTWIDTH] IN BLOOD BY AUTOMATED COUNT: 13.3 % (ref 11.8–14.4)
GFR SERPL CREATININE-BSD FRML MDRD: >60 ML/MIN/1.73M2
GLUCOSE BLD-MCNC: 90 MG/DL (ref 75–110)
GLUCOSE BLD-MCNC: 93 MG/DL (ref 75–110)
GLUCOSE SERPL-MCNC: 94 MG/DL (ref 70–99)
HCT VFR BLD AUTO: 42.5 % (ref 40.7–50.3)
HGB BLD-MCNC: 13.4 G/DL (ref 13–17)
IMM GRANULOCYTES # BLD AUTO: 0.68 K/UL (ref 0–0.3)
IMM GRANULOCYTES NFR BLD: 5 %
LYMPHOCYTES NFR BLD: 2.57 K/UL (ref 1–4.8)
LYMPHOCYTES RELATIVE PERCENT: 19 % (ref 24–44)
MCH RBC QN AUTO: 29.6 PG (ref 25.2–33.5)
MCHC RBC AUTO-ENTMCNC: 31.5 G/DL (ref 28.4–34.8)
MCV RBC AUTO: 94 FL (ref 82.6–102.9)
MONOCYTES NFR BLD: 0.27 K/UL (ref 0.1–0.8)
MONOCYTES NFR BLD: 2 % (ref 1–7)
MORPHOLOGY: NORMAL
NEUTROPHILS NFR BLD: 74 % (ref 36–66)
NEUTS SEG NFR BLD: 9.98 K/UL (ref 1.8–7.7)
NRBC BLD-RTO: 0 PER 100 WBC
PLATELET # BLD AUTO: 251 K/UL (ref 138–453)
PMV BLD AUTO: 9.6 FL (ref 8.1–13.5)
POTASSIUM SERPL-SCNC: 3.9 MMOL/L (ref 3.7–5.3)
RBC # BLD AUTO: 4.52 M/UL (ref 4.21–5.77)
SODIUM SERPL-SCNC: 138 MMOL/L (ref 135–144)
WBC OTHER # BLD: 13.5 K/UL (ref 3.5–11.3)

## 2024-01-03 PROCEDURE — 82947 ASSAY GLUCOSE BLOOD QUANT: CPT

## 2024-01-03 PROCEDURE — 97116 GAIT TRAINING THERAPY: CPT

## 2024-01-03 PROCEDURE — 80048 BASIC METABOLIC PNL TOTAL CA: CPT

## 2024-01-03 PROCEDURE — 6360000002 HC RX W HCPCS: Performed by: STUDENT IN AN ORGANIZED HEALTH CARE EDUCATION/TRAINING PROGRAM

## 2024-01-03 PROCEDURE — 6370000000 HC RX 637 (ALT 250 FOR IP): Performed by: STUDENT IN AN ORGANIZED HEALTH CARE EDUCATION/TRAINING PROGRAM

## 2024-01-03 PROCEDURE — 97166 OT EVAL MOD COMPLEX 45 MIN: CPT

## 2024-01-03 PROCEDURE — 2580000003 HC RX 258: Performed by: STUDENT IN AN ORGANIZED HEALTH CARE EDUCATION/TRAINING PROGRAM

## 2024-01-03 PROCEDURE — 85025 COMPLETE CBC W/AUTO DIFF WBC: CPT

## 2024-01-03 PROCEDURE — 97535 SELF CARE MNGMENT TRAINING: CPT

## 2024-01-03 PROCEDURE — 97162 PT EVAL MOD COMPLEX 30 MIN: CPT

## 2024-01-03 PROCEDURE — 6370000000 HC RX 637 (ALT 250 FOR IP)

## 2024-01-03 PROCEDURE — 94640 AIRWAY INHALATION TREATMENT: CPT

## 2024-01-03 PROCEDURE — 6370000000 HC RX 637 (ALT 250 FOR IP): Performed by: INTERNAL MEDICINE

## 2024-01-03 PROCEDURE — 36415 COLL VENOUS BLD VENIPUNCTURE: CPT

## 2024-01-03 PROCEDURE — 99233 SBSQ HOSP IP/OBS HIGH 50: CPT | Performed by: INTERNAL MEDICINE

## 2024-01-03 PROCEDURE — 97530 THERAPEUTIC ACTIVITIES: CPT

## 2024-01-03 PROCEDURE — 99239 HOSP IP/OBS DSCHRG MGMT >30: CPT | Performed by: STUDENT IN AN ORGANIZED HEALTH CARE EDUCATION/TRAINING PROGRAM

## 2024-01-03 PROCEDURE — 2700000000 HC OXYGEN THERAPY PER DAY

## 2024-01-03 PROCEDURE — 94761 N-INVAS EAR/PLS OXIMETRY MLT: CPT

## 2024-01-03 RX ORDER — PREDNISONE 10 MG/1
TABLET ORAL
Qty: 30 TABLET | Refills: 0 | Status: SHIPPED | OUTPATIENT
Start: 2024-01-03

## 2024-01-03 RX ORDER — GUAIFENESIN 600 MG/1
600 TABLET, EXTENDED RELEASE ORAL 2 TIMES DAILY
Qty: 30 TABLET | Refills: 0 | Status: SHIPPED | OUTPATIENT
Start: 2024-01-03

## 2024-01-03 RX ORDER — FUROSEMIDE 20 MG/1
20 TABLET ORAL DAILY
Qty: 60 TABLET | Refills: 3 | Status: SHIPPED | OUTPATIENT
Start: 2024-01-04

## 2024-01-03 RX ORDER — LEVOFLOXACIN 500 MG/1
500 TABLET, FILM COATED ORAL DAILY
Qty: 2 TABLET | Refills: 0 | Status: SHIPPED | OUTPATIENT
Start: 2024-01-03 | End: 2024-01-05

## 2024-01-03 RX ORDER — FUROSEMIDE 20 MG/1
20 TABLET ORAL DAILY
Status: DISCONTINUED | OUTPATIENT
Start: 2024-01-04 | End: 2024-01-03 | Stop reason: HOSPADM

## 2024-01-03 RX ORDER — ALBUTEROL SULFATE 2.5 MG/3ML
2.5 SOLUTION RESPIRATORY (INHALATION) EVERY 6 HOURS PRN
Qty: 120 EACH | Refills: 3 | Status: SHIPPED | OUTPATIENT
Start: 2024-01-03

## 2024-01-03 RX ADMIN — ATORVASTATIN CALCIUM 40 MG: 40 TABLET, FILM COATED ORAL at 08:08

## 2024-01-03 RX ADMIN — TIOTROPIUM BROMIDE INHALATION SPRAY 2 PUFF: 3.12 SPRAY, METERED RESPIRATORY (INHALATION) at 08:11

## 2024-01-03 RX ADMIN — HEPARIN SODIUM 5000 UNITS: 5000 INJECTION INTRAVENOUS; SUBCUTANEOUS at 06:10

## 2024-01-03 RX ADMIN — LEVOFLOXACIN 500 MG: 500 TABLET, FILM COATED ORAL at 08:11

## 2024-01-03 RX ADMIN — METOPROLOL TARTRATE 25 MG: 25 TABLET, FILM COATED ORAL at 08:08

## 2024-01-03 RX ADMIN — IPRATROPIUM BROMIDE AND ALBUTEROL SULFATE 1 DOSE: 2.5; .5 SOLUTION RESPIRATORY (INHALATION) at 08:11

## 2024-01-03 RX ADMIN — IPRATROPIUM BROMIDE AND ALBUTEROL SULFATE 1 DOSE: 2.5; .5 SOLUTION RESPIRATORY (INHALATION) at 11:41

## 2024-01-03 RX ADMIN — BUDESONIDE AND FORMOTEROL FUMARATE DIHYDRATE 2 PUFF: 160; 4.5 AEROSOL RESPIRATORY (INHALATION) at 08:11

## 2024-01-03 RX ADMIN — FINASTERIDE 5 MG: 5 TABLET, FILM COATED ORAL at 08:09

## 2024-01-03 RX ADMIN — ASPIRIN 81 MG 81 MG: 81 TABLET ORAL at 08:08

## 2024-01-03 RX ADMIN — TAMSULOSIN HYDROCHLORIDE 0.4 MG: 0.4 CAPSULE ORAL at 08:08

## 2024-01-03 RX ADMIN — PREDNISONE 40 MG: 20 TABLET ORAL at 08:08

## 2024-01-03 RX ADMIN — CLOPIDOGREL BISULFATE 75 MG: 75 TABLET ORAL at 08:08

## 2024-01-03 RX ADMIN — SODIUM CHLORIDE, PRESERVATIVE FREE 10 ML: 5 INJECTION INTRAVENOUS at 08:10

## 2024-01-03 RX ADMIN — FUROSEMIDE 40 MG: 40 TABLET ORAL at 08:08

## 2024-01-03 RX ADMIN — GUAIFENESIN 600 MG: 600 TABLET, EXTENDED RELEASE ORAL at 08:08

## 2024-01-03 NOTE — PLAN OF CARE
Problem: Discharge Planning  Goal: Discharge to home or other facility with appropriate resources  1/3/2024 0821 by Isha Ramachandran RN  Outcome: Progressing  1/2/2024 2247 by Shivam Brush RN  Outcome: Progressing  1/2/2024 1858 by Dallas Torres RN  Outcome: Progressing  Flowsheets (Taken 1/2/2024 0800)  Discharge to home or other facility with appropriate resources:   Identify barriers to discharge with patient and caregiver   Arrange for needed discharge resources and transportation as appropriate   Identify discharge learning needs (meds, wound care, etc)     Problem: Pain  Goal: Verbalizes/displays adequate comfort level or baseline comfort level  1/3/2024 0821 by Isha Ramachandran RN  Outcome: Progressing  1/2/2024 2247 by Shivam Brush, RN  Outcome: Progressing  1/2/2024 1858 by Dallas Torres RN  Outcome: Progressing     Problem: Respiratory - Adult  Goal: Achieves optimal ventilation and oxygenation  1/3/2024 0821 by Isha Ramachandran RN  Outcome: Progressing  Flowsheets (Taken 1/3/2024 0818 by Marlin Case RCP)  Achieves optimal ventilation and oxygenation:   Assess for changes in mentation and behavior   Assess for changes in respiratory status   Position to facilitate oxygenation and minimize respiratory effort   Oxygen supplementation based on oxygen saturation or arterial blood gases   Initiate smoking cessation protocol as indicated   Encourage broncho-pulmonary hygiene including cough, deep breathe, incentive spirometry   Assess the need for suctioning and aspirate as needed   Assess and instruct to report shortness of breath or any respiratory difficulty   Respiratory therapy support as indicated  1/2/2024 2247 by Shivam Brush, RN  Outcome: Progressing  Flowsheets (Taken 1/2/2024 1945 by Sanjuana Jay, RCBETHANY)  Achieves optimal ventilation and oxygenation:   Assess for changes in respiratory status   Assess for changes in mentation and behavior   Position to facilitate

## 2024-01-03 NOTE — DISCHARGE INSTRUCTIONS
Take prednisone taper as advised  Continue breathing treatments  Follow up with pulmonary  Continue 2 lt oxygen at home  Change Lasix to 20 mg daily, monitor weight at home and if weight increases to more than 3 pounds in 1 day then take 40 mg of Lasix for 3 days.  Fluid restriction 1500ml

## 2024-01-03 NOTE — CARE COORDINATION
Met with patient to discuss transitional planning. He is returning home. He requires oxygen and would like to use his Medicare benefits. Freedom of choice provided. He denies other needs    1300 oxygen and nebulizer orders faxed to [facility]. Fahad notified. Portable oxygen tank delivered to patient's room. Education and phone number provided    Discharge Report    [facility]  Clinical Case Management Department  Written by: Court Tong RN    Patient Name: Marcus Dobbs  Attending Provider: No att. providers found  Admit Date: 2023  1:06 PM  MRN: [id number]  Account: [id number]                     : 1950  Discharge Date: 1/3/2024      Disposition: home    Court Tong RN    
housing: clinical status  Potential Assistance needed at discharge: (P) Home Care            Potential DME:    Patient expects to discharge to: (P) Apartment  Plan for transportation at discharge: (P) Family    Financial    Payor: [employer] / Plan: [employer] / Product Type: *No Product type* /     Does insurance require precert for SNF: Yes    Potential assistance Purchasing Medications:    Meds-to-Beds request: Yes      [facility] - [place], [place] - [address] -  [phone] - F [phone]  [address] [address]  Phone: [phone] Fax: [phone]      Notes:    Factors facilitating achievement of predicted outcomes: Family support, Cooperative, and Pleasant    Barriers to discharge: clinical status    Additional Case Management Notes:Plans to return home independently, will consider hc if needed has transportation    The Plan for Transition of Care is related to the following treatment goals of Acute on chronic heart failure, unspecified heart failure type (HCC) [I50.9]  Acute on chronic congestive heart failure, unspecified heart failure type (HCC) [I50.9]    IF APPLICABLE: The Patient and/or patient representative Marcus and his family were provided with a choice of provider and agrees with the discharge plan. Freedom of choice list with basic dialogue that supports the patient's individualized plan of care/goals and shares the quality data associated with the providers was provided to: (P) Patient   Patient Representative Name:       The Patient and/or Patient Representative Agree with the Discharge Plan? (P) Yes    Chey Kahn RN  Case Management Department  Ph:  Fax:

## 2024-01-03 NOTE — PLAN OF CARE
Problem: Discharge Planning  Goal: Discharge to home or other facility with appropriate resources  1/2/2024 2247 by Shivam Brush, RN  Outcome: Progressing     Problem: Pain  Goal: Verbalizes/displays adequate comfort level or baseline comfort level  1/2/2024 2247 by Shivam Brush, RN  Outcome: Progressing     Problem: Respiratory - Adult  Goal: Achieves optimal ventilation and oxygenation  1/2/2024 2247 by Shivam Brush, RN  Outcome: Progressing  Flowsheets (Taken 1/2/2024 1945 by Sanjuana Jay RCP)  Achieves optimal ventilation and oxygenation:   Assess for changes in respiratory status   Assess for changes in mentation and behavior   Position to facilitate oxygenation and minimize respiratory effort   Oxygen supplementation based on oxygen saturation or arterial blood gases   Initiate smoking cessation protocol as indicated   Encourage broncho-pulmonary hygiene including cough, deep breathe, incentive spirometry   Assess the need for suctioning and aspirate as needed   Assess and instruct to report shortness of breath or any respiratory difficulty   Respiratory therapy support as indicated     Problem: Safety - Adult  Goal: Free from fall injury  1/2/2024 2247 by Shivam Brush, RN  Outcome: Progressing

## 2024-01-03 NOTE — DISCHARGE SUMMARY
[facility]  Office: [phone]  Francisco Cadena DO, Jean Pierre Jerry DO, Konrad Cannon DO, Hunter Campuzano DO, Crispin Chow MD, Alexsandra Mckinley MD, Breann Herrera MD, Marilyn Jha MD,  Tim Fuentes MD, Destiney Matos MD, Ashlyn Kaiser MD,  Mehdi Latham DO, Digna Melo MD, Max Marie MD, Ariel Cadena DO, Ghada Hickey MD,  João Huerta DO, Hellen Reddy MD, Soha Moeller MD, Denae Carcamo MD, Lynette Emery MD,  Washington German MD, Pamela Dexter MD, Clinton Mariano MD, Zoey Severino MD, Smooth Kennedy MD, Dede Jacobson MD, Dave Moreira DO, Ulices Benites DO, Kristin Agudelo MD,  Anthony Samayoa MD, Shirley Waterhouse, CNP,  Ashley Holliday CNP, Gurwinder Vazquez, CNP,  Jesenia Saldana, BRIEN, Roselyn Zhu, CNP, Ashwini Kenney, CNP, Sara Chong CNP, Randi Irvin CNP, Nirmala Leigh, CNP, Suzanna Grant, PA-C, Alana Mandujano PA-C, Ashli Love, CNP, Tish Kaminski, CNS, Shirin Weiss, CNP, Flora Tesfaye CNP, Tracy Schwab, CNP         [facility]   IN-PATIENT SERVICE   [facility]    Discharge Summary     Patient ID: Marcus Dobbs  :  1950   MRN: [id number]     ACCOUNT:  [id number]   Patient's PCP: Jesenia Alexander MD  Admit Date: 2023   Discharge Date: 1/3/2024     Length of Stay: 7  Code Status:  Full Code  Admitting Physician: No admitting provider for patient encounter.  Discharge Physician: Digna Melo MD     Active Discharge Diagnoses:     Hospital Problem Lists:  Principal Problem:    Acute on chronic congestive heart failure (HCC)  Active Problems:    Community acquired pneumonia    ANY (acute kidney injury) (HCC)    Acute on chronic heart failure (HCC)    Acute respiratory failure with hypoxia (HCC)    Sinus tachycardia  Resolved Problems:    * No resolved hospital problems. *      Admission Condition:  poor     Discharged Condition: fair    Hospital Stay:     Hospital Course:  Marcus Dobbs is a 73 y.o. male

## 2024-01-03 NOTE — PROGRESS NOTES
01/02/24 1943   Care Plan - Respiratory Goals   Achieves optimal ventilation and oxygenation Assess for changes in respiratory status;Assess for changes in mentation and behavior;Position to facilitate oxygenation and minimize respiratory effort;Oxygen supplementation based on oxygen saturation or arterial blood gases;Initiate smoking cessation protocol as indicated;Encourage broncho-pulmonary hygiene including cough, deep breathe, incentive spirometry;Assess the need for suctioning and aspirate as needed;Assess and instruct to report shortness of breath or any respiratory difficulty;Respiratory therapy support as indicated       
   01/03/24 0818   Care Plan - Respiratory Goals   Achieves optimal ventilation and oxygenation Assess for changes in mentation and behavior;Assess for changes in respiratory status;Position to facilitate oxygenation and minimize respiratory effort;Oxygen supplementation based on oxygen saturation or arterial blood gases;Initiate smoking cessation protocol as indicated;Encourage broncho-pulmonary hygiene including cough, deep breathe, incentive spirometry;Assess the need for suctioning and aspirate as needed;Assess and instruct to report shortness of breath or any respiratory difficulty;Respiratory therapy support as indicated       
  [facility]  Progress Note                   Date:   12/30/2023  Patient name: Marcus Dobbs  Date of admission:  12/27/2023  1:06 PM  MRN:   [id number]  YOB: 1950  PCP: Jesenia Alexander MD    Reason for Admission: Acute on chronic heart failure, unspecified heart failure type (HCC) [I50.9]  Acute on chronic congestive heart failure, unspecified heart failure type (HCC) [I50.9]    Subjective:       Clinical Changes /Abnormalities: Patient seen and examined in room. Denies CP. No acute CV events overnight. Labs, vitals, and tele reviewed. On HFNC - up to commode. Reports [facility]>       Review of Systems    Medications:   Scheduled Meds:   aspirin  81 mg Oral Daily    atorvastatin  40 mg Oral Daily    clopidogrel  75 mg Oral Daily    finasteride  5 mg Oral Daily    tamsulosin  0.4 mg Oral BID    heparin (porcine)  5,000 Units SubCUTAneous 3 times per day    vancomycin  1,250 mg IntraVENous Q24H    vancomycin (VANCOCIN) intermittent dosing (placeholder)   Other RX Placeholder    cefepime  2,000 mg IntraVENous Q12H    predniSONE  40 mg Oral Daily    ipratropium 0.5 mg-albuterol 2.5 mg  1 Dose Inhalation 4x Daily RT    guaiFENesin  600 mg Oral BID    tiotropium  2 puff Inhalation Daily RT    budesonide-formoterol  2 puff Inhalation BID RT    sodium chloride flush  5-40 mL IntraVENous 2 times per day    insulin lispro  0-8 Units SubCUTAneous TID WC    insulin lispro  0-4 Units SubCUTAneous Nightly    furosemide  40 mg IntraVENous Daily    sodium chloride flush  5-40 mL IntraVENous 2 times per day    metoprolol tartrate  25 mg Oral BID     Continuous Infusions:   sodium chloride      dextrose      sodium chloride Stopped (12/27/23 1800)     CBC:   Recent Labs     12/27/23  1328 12/28/23  0700   WBC 16.1* 15.2*   HGB 15.1 13.6    166     BMP:    Recent Labs     12/27/23  1322 12/27/23  1328 12/27/23  1757 12/28/23  0700   NA  --  136  --  132*   K  --  3.9  --  3.8   CL  --  95*  --  
  [facility]  Progress Note                   Date:   12/31/2023  Patient name: Marcus Dobbs  Date of admission:  12/27/2023  1:06 PM  MRN:   [id number]  YOB: 1950  PCP: Jesenia Alexander MD    Reason for Admission: Acute on chronic heart failure, unspecified heart failure type (HCC) [I50.9]  Acute on chronic congestive heart failure, unspecified heart failure type (HCC) [I50.9]    Subjective:       Clinical Changes /Abnormalities: Patient seen and examined in room. Denies CP. No acute CV events overnight. Labs, vitals, and tele reviewed. On HFNC - up to commode. Reports [facility]. No acute distress.       Review of Systems    Medications:   Scheduled Meds:   aspirin  81 mg Oral Daily    atorvastatin  40 mg Oral Daily    clopidogrel  75 mg Oral Daily    finasteride  5 mg Oral Daily    tamsulosin  0.4 mg Oral BID    heparin (porcine)  5,000 Units SubCUTAneous 3 times per day    vancomycin  1,250 mg IntraVENous Q24H    vancomycin (VANCOCIN) intermittent dosing (placeholder)   Other RX Placeholder    cefepime  2,000 mg IntraVENous Q12H    predniSONE  40 mg Oral Daily    ipratropium 0.5 mg-albuterol 2.5 mg  1 Dose Inhalation 4x Daily RT    guaiFENesin  600 mg Oral BID    tiotropium  2 puff Inhalation Daily RT    budesonide-formoterol  2 puff Inhalation BID RT    sodium chloride flush  5-40 mL IntraVENous 2 times per day    insulin lispro  0-8 Units SubCUTAneous TID WC    insulin lispro  0-4 Units SubCUTAneous Nightly    furosemide  40 mg IntraVENous Daily    sodium chloride flush  5-40 mL IntraVENous 2 times per day    metoprolol tartrate  25 mg Oral BID     Continuous Infusions:   sodium chloride      dextrose      sodium chloride 100 mL/hr at 12/30/23 1613     CBC:   Recent Labs     12/30/23  0929   WBC 12.1*   HGB 14.3          BMP:    Recent Labs     12/30/23  0929   *   K 3.9   CL 94*   CO2 21   BUN 34*   CREATININE 1.0   GLUCOSE 135*       Hepatic:  No results for 
CLINICAL PHARMACY NOTE: MEDS TO BEDS    Total # of Prescriptions Filled: 5   The following medications were delivered to the patient:  Metoprolol tartrate 25mg  Furosemide 20mg  Levofloxacin 500mg  Prednisone 10mg  Mucus relief 600mg    Additional Documentation: delivered to patient in room 2004 1/3 at 2pm. Co-pay $7.72 clover.  
Congestive Heart Failure Education note:      Discussed 2000mg/day sodium restricted diet; patient verbalized understanding.    Moderate daily exercise encouraged as tolerated. Discussed rest breaks as needed; patient verbalized understanding.    Patient instructed to weigh self at the same time of each day, using same clothes and same scale; reinforced teaching to monitor for 3-5 lb weight increase over 1-2 days notify physician if charge noted.  Patient verbalized understanding.    Patient instructed to limit fluid intake to 2 liters per day.  Patient verbalized understanding.    Signs and symptoms of CHF discussed with patient, such as feeling more tired than normal, feeling short of breath, coughing that increases when you lie down, sudden weight gain, swelling of your feet, legs or belly.  Patient verbalized understanding to notify physician office if these symptoms occur.    Compliance with plan of care and further disease process causes discussed with patient, patient encouraged to keep all follow up appointments.  Patient verbalized understanding.   
Home Oxygen Evaluation    Home Oxygen Evaluation completed.    Patient is on 2 liters per minute via nasal cannula.  Resting SpO2 = 90%  Resting SpO2 on room air = 86%      Nocturnal Oximetry with patient on room air is recommended is SpO2 is between 89% and 95% (requires additional order).    DRU MORALEZ RCP  8:15 AM  
[facility]   Pharmacy Pharmacokinetic Monitoring Service - Vancomycin     Marcus Dobbs is a 73 y.o. male starting on vancomycin therapy for CAP. Pharmacy consulted by Gianfranco Matos  for monitoring and adjustment.    Target Concentration: Goal AUC/ZAHRAA 400-600 mg*hr/L    Additional Antimicrobials: azithromycin, ceftriaxone    Pertinent Laboratory Values:   Wt Readings from Last 1 Encounters:   12/27/23 86.2 kg (190 lb)     Temp Readings from Last 1 Encounters:   12/29/23 98.1 °F (36.7 °C) (Oral)     Estimated Creatinine Clearance: 49 mL/min (A) (based on SCr of 1.5 mg/dL (H)).  Recent Labs     12/27/23  1328 12/28/23  0700   CREATININE 1.6* 1.5*   BUN 32* 32*   WBC 16.1* 15.2*     Procalcitonin: 13.31    MRSA Nasal Swab: was ordered by provider, awaiting results.    Plan:  Dosing recommendations based on Bayesian software  Start vancomycin 1250 mg q24h  Anticipated AUC of 459 and trough concentration of 12.4 at steady state  Renal labs as indicated   Vancomycin concentration ordered for TBD @ TBD   Pharmacy will continue to monitor patient and adjust therapy as indicated    Thank you for the consult,  Renard Irwin RPH  12/29/2023 3:53 PM   
PULMONARY & CRITICAL CARE MEDICINE CONSULT NOTE     Patient:  Marcus Dobbs  MRN: [id number]  Admit date: 12/27/2023  Primary Care Physician: Jesenia Alexander MD  Consulting Physician: Digna Melo MD  CODE Status: Full Code  LOS: 5     SUBJECTIVE     CHIEF COMPLAINT/REASON FOR CONSULT: Acute hypoxic respiratory failure 2/2 community-acquired pneumonia in a patient with bronchiectasis.    HISTORY OF PRESENT ILLNESS:  The patient is a 73 y.o. male with PMH significant of COPD, bronchiectasis, CAD s/p CABG x 3, HFrEF (EF 45%), GERD, presented with complaint of shortness of breath, progressively worsening for last 2 to 3 days prior to presentation.  He was admitted for the management of acute on chronic heart failure and community-acquired pneumonia.  Since admission he required BiPAP and was eventually transition to high flow nasal cannula.   Chest x-ray showed left greater than right scattered confluent airspace opacities suspicious for pneumonia.  COVID and flu tests were negative.  Cardiology consulted for concern of new onset A-fib with RVR.  Cardiology recommended to obtain echocardiogram and to continue IV diuresis.  EKG showed sinus tachycardia with frequent PACs.  No A-fib.  Pulmonology consulted for further evaluation and management.  He is well-known to our office.  Has history of COPD, bronchiectasis.  He also had empyema in the past which was treated with decortication.   He denies any sick contact.  He was having fever, chills, cough, bringing up or range green sputum.  He was also wheezing on presentation.  Had swelling of lower extremities.  Also reported orthopnea and PND episodes.  No hemoptysis.    1/1/2024   Subjective    Weaning high flow   On 6 l nc       PAST MEDICAL HISTORY:    No past medical history on file.  PAST SURGICAL HISTORY:    No past surgical history on file.  FAMILY HISTORY:   No family history on file.  SOCIAL HISTORY:   TOBACCO:   has no history on file for tobacco use.  ETOH:  
Patient was evaluated today for the diagnosis of COPD.  I entered a DME order for home oxygen at 2 lpm because the diagnosis and testing require the patient to have supplemental oxygen.  Condition will improve or be benefited by oxygen use.  The patient is  able to perform good mobility in a home setting and therefore does require the use of a portable oxygen system.  The need for this equipment was discussed with the patient and he understands and is in agreement.       Marcus Dobbs was evaluated today and a DME order was entered for a nebulizer compressor in order to administer Albuterol due to the diagnosis of copd.  The need for this equipment and treatment was discussed with the patient and he understands and is in agreement.     
Physical Therapy  Facility/Department: [facility]- [facility]  Physical Therapy Initial Assessment    Name: Marcus Dobbs  : 1950  MRN: [id number]  Date of Service: 1/3/2024    No chief complaint on file.   Brief history per EMR:      73-year-old with a medical history of COPD, bronchiectasis, coronary artery disease with CABG, systolic heart failure with EF 45%, GERD presents to the hospital with shortness of breath, patient was noted to be in heart failure and there was also concern for community-acquired pneumonia.  Patient was hypoxic and required BiPAP and was then transitioned to high flow oxygen.  COVID was negative.  Patient came back positive for rhinovirus.  Cardiology was consulted for new onset A-fib, which was ruled out.    Discharge Recommendations:  Patient would benefit from continued therapy after discharge (for general strengthening , pt denies need)   PT Equipment Recommendations  Equipment Needed: No      Patient Diagnosis(es): The primary encounter diagnosis was Acute on chronic congestive heart failure, unspecified heart failure type (HCC). A diagnosis of Chronic obstructive pulmonary disease, unspecified COPD type (HCC) was also pertinent to this visit.  Past Medical History:  has no past medical history on file.  Past Surgical History:  has no past surgical history on file.    Assessment   Body Structures, Functions, Activity Limitations Requiring Skilled Therapeutic Intervention: Decreased endurance  Assessment: Pt presents with general deconditioning and decline in endurance. Pt demonstrates safety and independence with mobilty within room , ambulate 150 ft without AD SBA, sit <> stand SBA and negotiate steps CGA with O2 in place. Pt report baseline level of mobiltiy with no DME needs and good family support. Pt provided verbal , written and practical instruction in bilateal LE seated and standing extercises to improve general strength and activity tolerance with focus on pacing, 
[facility]  Office: [phone]  Francisco Cadena DO, Jean Pierre Jerry DO, Konrad Cannon DO, Hunter Campuzano DO, Crispin Chow MD, Alexsandra Mckinley MD, Breann Herrera MD, Marilyn Jha MD,  Tim Fuentes MD, Destiney Matos MD, Ashlyn Kaiser MD,  Mehdi Latham DO, Digna Melo MD, Max Marie MD, Ariel Cadena DO, Ghada Hickey MD,  João Huerta DO, Hellen Reddy MD, Soha Moeller MD, Denae Carcamo MD, Lynette Emery MD,  Washington German MD, Pamela Dexter MD, Clinton Mariano MD, Zoey Severino MD, Smooth Kennedy MD, Ddee Jacobson MD, Dave Moreira DO, Ulices Benites DO, rKistin Agudelo MD,  Anthony Samayoa MD, Shirley Waterhouse, CNP,  Ashley Holliday CNP, Gurwinder Vazquez, CNP,  Jesenia Saldana, BRIEN, Roselyn Zhu, CNP, Ashwini Kenney, CNP, Sara Chong CNP, Randi Irvin CNP, Nirmala Leigh, CNP, Suzanna Grant, PA-C, Alana Mandujano PA-C, Ashli Love, CNP, Tish Kaminski, CNS, Shirin Weiss, CNP, Flora Tesfaye CNP, Tracy Schwab, CNP         [facility]   IN-PATIENT SERVICE   [facility]    Progress Note    12/31/2023    8:13 AM    Name:   Marcus Dobbs  MRN:     [id number]     Acct:      [id number]   Room:   2004/2004-01  IP Day:  4  Admit Date:  12/27/2023  1:06 PM    PCP:   Jesenia Alexander MD  Code Status:  Full Code    Subjective:     C/C: SOB  Interval History Status: improved.     Patient seen and examined at bedside this morning. Continues on HFNC. States he feels his breathing has improved. Denies any CP, fever or chills. Continues on IV abx     Brief History:     Marcus Vaca is a 73 y.o. Male with PMHx of MV CAD with CABG in 2021, NSTEMI, HFrEF, HTN, GERD, who presented with worsening SOB 2-3 days prior to admission. Patient is admitted to the hospital for the management of Acute on chronic heart failure, unspecified heart failure type (HCC), Afib with RVR and possible CAP.  Patient was initially placed on BiPAP and transitioned to 
[facility]  Office: [phone]  Francisco Cadena DO, Jean Pierre Jerry DO, Konrad Cannon DO, Hunter Campuzano DO, Crispin Chow MD, Alexsandra Mckinley MD, Breann Herrera MD, Marilyn Jha MD,  Tim Fuentes MD, Destiney Matos MD, Ashlyn Kaiser MD,  Mehdi Latham DO, Digna Melo MD, Max Marie MD, Ariel Cadena DO, Ghada Hickey MD,  João Huerta DO, Hellen Reddy MD, Soha Moeller MD, Denae Carcamo MD, Lynette Emery MD,  Washington German MD, Pamela Dexter MD, Clinton Mariano MD, Zoey Severino MD, Smooth Kennedy MD, Dede Jacobson MD, Dave Moreira DO, Ulices Benites DO, Kristin Agudelo MD,  Anthony Samayoa MD, Shirley Waterhouse, CNP,  Ashley Holliday CNP, Gurwinder Vazquez, CNP,  Jesenia Saldana, BRIEN, Roselyn Zhu, CNP, Ashwini Kenney CNP, Sara Chong CNP, Randi Irvin CNP, Nirmala Leigh, CNP, Suzanna Grant, PA-C, Alana Mandujano PA-C, Ashli Love, CNP, Tish Kaminski, CNS, Shirin Weiss, CNP, Flora Tesfaye CNP, Tracy Schwab, CNP         [facility]   IN-PATIENT SERVICE   [facility]    Progress Note    12/30/2023    8:42 AM    Name:   Marcus Dobbs  MRN:     [id number]     Acct:      [id number]   Room:   2004/2004-01  IP Day:  3  Admit Date:  12/27/2023  1:06 PM    PCP:   Jesenia Alexander MD  Code Status:  Full Code    Subjective:     C/C: SOB  Interval History Status: improved.     Patient seen and examined at bedside this morning. Had a rough night. Patient feels like he was breaking out in sweat. No recorded fever overnight. Patient continues to have SOB. Denies any CP, nausea or vomiting. Is tolerating breakfast this morning     Brief History:     Marcus Vaca is a 73 y.o. Male with PMHx of MV CAD with CABG in 2021, NSTEMI, HFrEF, HTN, GERD, who presented with worsening SOB 2-3 days prior to admission. Patient is admitted to the hospital for the management of Acute on chronic heart failure, unspecified heart failure type (HCC), Afib 
[facility]  Office: [phone]  Francisco Cadena DO, Jean Pierre Jerry DO, Konrad Cannon DO, Hunter Campuzano DO, Crispin Chow MD, Alexsandra Mckinley MD, Breann Herrera MD, Marilyn Jha MD,  Tim Fuentes MD, Destiney Matos MD, Ashlyn Kaiser MD,  Mehdi Latham DO, Digna Melo MD, Max Marie MD, Ariel Cadena DO, Ghada Hickey MD,  João Huerta DO, Hellen Reddy MD, Soha Moeller MD, Denae Carcamo MD, Lynette Emery MD,  Washington German MD, Pamela Dexter MD, Clinton Mariano MD, Zoey Severino MD, Smooth eKnnedy MD, Dede Jacobson MD, Dave Moreira DO, Ulices Benites DO, Kristin Agudelo MD,  Anthony Samayoa MD, Shirley Waterhouse, CNP,  Ashley Holliday CNP, Gurwinder Vazquez, CNP,  Jesenia Saldana, BRIEN, Roselyn Zhu, CNP, Ashwini Kenney, CNP, Sara Chong CNP, Randi Irvin CNP, Nirmala Leigh, CNP, Suzanna Grant, PA-C, Aalna Mandujano, PA-C, Ashli Love, CNP, Tish Kaminski, CNS, Shirin Weiss, CNP, Flora Tesfaye, CNP, Tracy Schwab, CNP         [facility]   IN-PATIENT SERVICE   [facility]    Progress Note    1/2/2024    8:14 AM    Name:   Marcus Dobbs  MRN:     [id number]     Acct:      [id number]   Room:   2004/2004-01  IP Day:  6  Admit Date:  12/27/2023  1:06 PM    PCP:   Jesenia Alexander MD  Code Status:  Full Code    Subjective:     C/C:sob  Interval History Status: improved.     Patient is slowly improving  No chest pain  Continues to have improvement in sob  Patient lives alone and will work with therapy today  Patient weaned from high flow nasal cannula oxygen to regular nasal cannula  Tmax 98.6  Blood pressure stable  H&H stable  Hemoglobin 12.6  White count 12.4.  Rhinovirus positive.    Brief History:     73-year-old with a medical history of COPD, bronchiectasis, coronary artery disease with CABG, systolic heart failure with EF 45%, GERD presents to the hospital with shortness of breath, patient was noted to be in heart failure and 
(HCC), Afib with RVR and possible CAP.  Patient was initially placed on BiPAP and transitioned to HFNC. CXR was ordered and showed Left greater than right scattered confluent airspace opacities suspicious for pneumonia. On his BMP, and was elevated at 1.6, patient's baseline from 5/1/2022 was 1.05.  proBNP was elevated at 3,679, troponin was 23, WBC was elevated at 16.1. Covid and Flu were both negative. Cardiology was consulted for his new onset Afib.    Review of Systems:     Constitutional:  negative for chills, fevers, sweats  Respiratory:  positive for cough, dyspnea on exertion, improving shortness of breath, no wheezing  Cardiovascular:  negative for chest pain, chest pressure/discomfort, lower extremity edema, palpitations  Gastrointestinal:  negative for abdominal pain, constipation, diarrhea, nausea, vomiting  Neurological:  negative for dizziness, headache    Medications:     Allergies:  Not on File    Current Meds:   Scheduled Meds:    sodium chloride flush  5-40 mL IntraVENous 2 times per day    insulin lispro  0-8 Units SubCUTAneous TID WC    insulin lispro  0-4 Units SubCUTAneous Nightly    furosemide  40 mg IntraVENous Daily    cefTRIAXone (ROCEPHIN) IV  1,000 mg IntraVENous Q24H    sodium chloride flush  5-40 mL IntraVENous 2 times per day    metoprolol tartrate  25 mg Oral BID    apixaban  5 mg Oral BID    azithromycin  250 mg IntraVENous Q24H     Continuous Infusions:    sodium chloride      dextrose      sodium chloride 100 mL (12/27/23 1314)     PRN Meds: nitroGLYCERIN, sodium chloride flush, sodium chloride, potassium chloride **OR** potassium alternative oral replacement **OR** potassium chloride, magnesium sulfate, ondansetron **OR** ondansetron, polyethylene glycol, acetaminophen **OR** acetaminophen, glucose, dextrose bolus **OR** dextrose bolus, glucagon (rDNA), dextrose, sodium chloride flush, sodium chloride, metoprolol    Data:     Past Medical History:   has no past medical history on 
initially placed on BiPAP and transitioned to HFNC. CXR was ordered and showed Left greater than right scattered confluent airspace opacities suspicious for pneumonia. On his BMP, and was elevated at 1.6, patient's baseline from 5/1/2022 was 1.05.  proBNP was elevated at 3,679, troponin was 23, WBC was elevated at 16.1. Covid and Flu were both negative. Cardiology was consulted for his new onset Afib.    Review of Systems:     Constitutional:  negative for chills, fevers, sweats  Respiratory:  positive for cough, dyspnea on exertion, improving shortness of breath, no wheezing  Cardiovascular:  negative for chest pain, chest pressure/discomfort, lower extremity edema, palpitations  Gastrointestinal:  negative for abdominal pain, constipation, diarrhea, nausea, vomiting  Neurological:  negative for dizziness, headache    Medications:     Allergies:  Not on File    Current Meds:   Scheduled Meds:    guaiFENesin  600 mg Oral BID    tiotropium  2 puff Inhalation Daily RT    budesonide-formoterol  2 puff Inhalation BID RT    sodium chloride flush  5-40 mL IntraVENous 2 times per day    insulin lispro  0-8 Units SubCUTAneous TID WC    insulin lispro  0-4 Units SubCUTAneous Nightly    furosemide  40 mg IntraVENous Daily    cefTRIAXone (ROCEPHIN) IV  1,000 mg IntraVENous Q24H    sodium chloride flush  5-40 mL IntraVENous 2 times per day    metoprolol tartrate  25 mg Oral BID    apixaban  5 mg Oral BID    azithromycin  250 mg IntraVENous Q24H     Continuous Infusions:    sodium chloride      dextrose      sodium chloride Stopped (12/27/23 1800)     PRN Meds: albuterol sulfate HFA, nitroGLYCERIN, sodium chloride flush, sodium chloride, potassium chloride **OR** potassium alternative oral replacement **OR** potassium chloride, magnesium sulfate, ondansetron **OR** ondansetron, polyethylene glycol, acetaminophen **OR** acetaminophen, glucose, dextrose bolus **OR** dextrose bolus, glucagon (rDNA), dextrose, sodium chloride flush, 
input(s): \"POCPO2\" in the last 72 hours.   FiO2 : 45 % (found @ 45%)     VITAL SIGNS:   LAST:  /62   Pulse 71   Temp 98.7 °F (37.1 °C) (Axillary)   Resp 21   Ht 1.829 m (6')   Wt 86.2 kg (190 lb)   SpO2 92%   BMI 25.77 kg/m²   8-24 HR RANGE:  TEMP Temp  Av °F (36.7 °C)  Min: 97.5 °F (36.4 °C)  Max: 98.7 °F (37.1 °C)   BP Systolic (24hrs), Av , Min:118 , Max:126      Diastolic (24hrs), Av, Min:62, Max:63     PULSE Pulse  Av.6  Min: 58  Max: 82   RR Resp  Av  Min: 17  Max: 21   O2 SAT SpO2  Av.3 %  Min: 92 %  Max: 95 %   OXYGEN DELIVERY O2 Flow Rate (L/min)  Av L/min  Min: 6 L/min  Max: 6 L/min        SYSTEMIC EXAMINATION:   Physical Exam -  Constitutional:  Alert, cooperative and no distress.  Mental Status:  Oriented to person, place and time and normal affect.  Lungs:  Bilateral air entry present, has bibasilar crackles without any rhonchi. Normal effort.  Heart:  Regular rate and rhythm, no murmur.  Abdomen:  Soft, nontender, nondistended, normal bowel sounds.  Extremities:  No edema, redness, tenderness in the calves.  Skin:  Warm, dry, no gross lesions or rashes.      DATA REVIEW     Medications: Current Inpatient  Scheduled Meds:   furosemide  40 mg Oral Daily    predniSONE  40 mg Oral Daily    Followed by    [START ON 2024] predniSONE  30 mg Oral Daily    Followed by    [START ON 2024] predniSONE  20 mg Oral Daily    Followed by    [START ON 2024] predniSONE  10 mg Oral Daily    aspirin  81 mg Oral Daily    atorvastatin  40 mg Oral Daily    clopidogrel  75 mg Oral Daily    finasteride  5 mg Oral Daily    tamsulosin  0.4 mg Oral BID    heparin (porcine)  5,000 Units SubCUTAneous 3 times per day    cefepime  2,000 mg IntraVENous Q12H    ipratropium 0.5 mg-albuterol 2.5 mg  1 Dose Inhalation 4x Daily RT    guaiFENesin  600 mg Oral BID    tiotropium  2 puff Inhalation Daily RT    budesonide-formoterol  2 puff Inhalation BID RT    sodium chloride flush  
nitroGLYCERIN, sodium chloride flush, sodium chloride, potassium chloride **OR** potassium alternative oral replacement **OR** potassium chloride, magnesium sulfate, ondansetron **OR** ondansetron, polyethylene glycol, acetaminophen **OR** acetaminophen, glucose, dextrose bolus **OR** dextrose bolus, glucagon (rDNA), dextrose, sodium chloride flush, sodium chloride, metoprolol    Data:     Past Medical History:   has no past medical history on file.    Social History:        Family History: No family history on file.    Vitals:  BP (!) 114/58   Pulse 74   Temp 98.3 °F (36.8 °C) (Oral)   Resp 16   Ht 1.829 m (6')   Wt 86.2 kg (190 lb)   SpO2 92%   BMI 25.77 kg/m²   Temp (24hrs), Av.2 °F (36.8 °C), Min:97.9 °F (36.6 °C), Max:98.6 °F (37 °C)    Recent Labs     24  1128 24  1651 24  0718 24  1109   POCGLU 89 127* 93 90         I/O (24Hr):    Intake/Output Summary (Last 24 hours) at 1/3/2024 1145  Last data filed at 1/3/2024 0500  Gross per 24 hour   Intake 280 ml   Output 1850 ml   Net -1570 ml         Labs:  Hematology:  Recent Labs     24  0455 24  0557 24  0654   WBC 12.1* 12.4* 13.5*   RBC 4.18* 4.20* 4.52   HGB 12.3* 12.6* 13.4   HCT 38.4* 40.9 42.5   MCV 91.9 97.4 94.0   MCH 29.4 30.0 29.6   MCHC 32.0 30.8 31.5   RDW 13.2 13.3 13.3    199 251   MPV 9.6 9.4 9.6       Chemistry:  Recent Labs     24  0455 24  0557 24  0654    140 138   K 3.5* 4.2 3.9    107 103   CO2 24 22 24   GLUCOSE 113* 81 94   BUN 26* 23 22   CREATININE 0.9 0.8 0.9   MG 2.3  --   --    ANIONGAP 9 11 11   LABGLOM >60 >60 >60   CALCIUM 8.2* 8.5* 8.7       Recent Labs     24  1950 24  0727 24  1128 24  1651 24  0718 24  1109   POCGLU 154* 75 89 127* 93 90       ABG:No results found for: \"POCPH\", \"PHART\", \"PH\", \"POCPCO2\", \"DEP7SOG\", \"PCO2\", \"POCPO2\", \"PO2ART\", \"PO2\", \"POCHCO3\", \"PSD3LDV\", \"HCO3\", \"NBEA\", \"PBEA\", \"BEART\", 
42.03  ADL Inpatient CMS 0-100% Score: 38.32  ADL Inpatient CMS G-Code Modifier : CJ           Goals  Short Term Goals  Time Frame for Short Term Goals: pt will, by discharge  Short Term Goal 1: complete LB ADLs and toileting tasks with supervision and set up  Short Term Goal 2: complete UB ADLs with mod I  Short Term Goal 3: dem understanding and ind initiate use of 2-3 energy conservation tech  Short Term Goal 4: dem ~6 minutes dynamic standing tolerance with supervision in order to complete functional tasks  Short Term Goal 5: increase activity tolerance to 25+ minutes in order to participate in daily tasks  Short Term Goal 6: dem supervision during functional transfers/functional mobility with LRAD, as needed       Therapy Time   Individual Concurrent Group Co-treatment   Time In 0905         Time Out 0930         Minutes 25         Timed Code Treatment Minutes: 23 Minutes       DESTINY Engel/NILSA     
IntraVENous 2 times per day    insulin lispro  0-8 Units SubCUTAneous TID WC    insulin lispro  0-4 Units SubCUTAneous Nightly    sodium chloride flush  5-40 mL IntraVENous 2 times per day    metoprolol tartrate  25 mg Oral BID     Continuous Infusions:   sodium chloride      dextrose      sodium chloride 100 mL/hr at 12/30/23 1613       INPUT/OUTPUT:  In: 520 [P.O.:520]  Out: 3050 [Urine:3050]  Date 01/03/24 0000 - 01/03/24 2359   Shift 5372-5273 3375-7192 6499-2421 24 Hour Total   INTAKE   Shift Total(mL/kg)       OUTPUT   Urine(mL/kg/hr) 800   800   Shift Total(mL/kg) 800(9.3)   800(9.3)   Weight (kg) 86.2 86.2 86.2 86.2          LABS:  ABGs:   No results for input(s): \"POCPH\", \"POCPCO2\", \"POCPO2\", \"POCHCO3\", \"RPCH7TOJ\" in the last 72 hours.  CBC:   Recent Labs     12/31/23  0936 01/01/24  0455 01/02/24  0557   WBC 14.2* 12.1* 12.4*   HGB 13.8 12.3* 12.6*   HCT 43.8 38.4* 40.9   MCV 94.2 91.9 97.4    199 199   LYMPHOPCT 16* 16* 24   RBC 4.65 4.18* 4.20*   MCH 29.7 29.4 30.0   MCHC 31.5 32.0 30.8   RDW 13.1 13.2 13.3       CRP:   No results for input(s): \"CRP\" in the last 72 hours.  LDH:   No results for input(s): \"LDH\" in the last 72 hours.  BMP:   Recent Labs     12/31/23  0936 01/01/24  0455 01/02/24  0557    137 140   K 3.2* 3.5* 4.2    104 107   CO2 24 24 22   BUN 29* 26* 23   CREATININE 1.0 0.9 0.8   GLUCOSE 108* 113* 81   MG 2.4 2.3  --        Liver Function Test:   No results for input(s): \"PROT\", \"LABALBU\", \"ALT\", \"AST\", \"GGT\", \"ALKPHOS\", \"BILITOT\" in the last 72 hours.    Coagulation Profile:   No results for input(s): \"INR\", \"PROTIME\", \"APTT\" in the last 72 hours.    D-Dimer:  No results for input(s): \"DDIMER\" in the last 72 hours.  Lactic Acid:  No results for input(s): \"LACTA\" in the last 72 hours.  Cardiac Enzymes:  No results for input(s): \"CKTOTAL\", \"CKMB\", \"CKMBINDEX\", \"TROPONINI\" in the last 72 hours.    Invalid input(s): \"TROPONIN\", \"HSTROP\"  BNP/ProBNP:   No results for 
IntraVENous Daily    sodium chloride flush  5-40 mL IntraVENous 2 times per day    metoprolol tartrate  25 mg Oral BID     Continuous Infusions:   sodium chloride      dextrose      sodium chloride Stopped (12/27/23 1800)       INPUT/OUTPUT:  In: -   Out: 2300 [Urine:2300]  Date 12/30/23 0000 - 12/30/23 2359   Shift 8915-2641 5252-7990 7527-1733 24 Hour Total   INTAKE   Shift Total(mL/kg)       OUTPUT   Urine(mL/kg/hr) 800(1.2) 700  1500   Shift Total(mL/kg) 800(9.3) 700(8.1)  1500(17.4)   Weight (kg) 86.2 86.2 86.2 86.2          LABS:  ABGs:   No results for input(s): \"POCPH\", \"POCPCO2\", \"POCPO2\", \"POCHCO3\", \"UHHO3DDE\" in the last 72 hours.  CBC:   Recent Labs     12/27/23  1328 12/28/23  0700 12/30/23  0929   WBC 16.1* 15.2* 12.1*   HGB 15.1 13.6 14.3   HCT 46.8 40.9 44.4   MCV 90.9 88.7 92.1    166 186   LYMPHOPCT 4* 6* 10*   RBC 5.15 4.61 4.82   MCH 29.3 29.5 29.7   MCHC 32.3 33.3 32.2   RDW 12.9 13.2 13.0       CRP:   No results for input(s): \"CRP\" in the last 72 hours.  LDH:   No results for input(s): \"LDH\" in the last 72 hours.  BMP:   Recent Labs     12/27/23  1322 12/27/23  1328 12/27/23  1757 12/28/23  0700 12/30/23  0929   NA  --  136  --  132* 130*   K  --  3.9  --  3.8 3.9   CL  --  95*  --  95* 94*   CO2  --  23 --  23 21   BUN  --  32*  --  32* 34*   CREATININE 1.4* 1.6*  --  1.5* 1.0   GLUCOSE  --  118* 96 99 135*       Liver Function Test:   Recent Labs     12/27/23  1328   PROT 6.8   LABALBU 3.2*   ALT 17   AST 21   ALKPHOS 119   BILITOT 1.3*       Coagulation Profile:   Recent Labs     12/28/23  0700   INR 1.4   PROTIME 17.0*       D-Dimer:  No results for input(s): \"DDIMER\" in the last 72 hours.  Lactic Acid:  No results for input(s): \"LACTA\" in the last 72 hours.  Cardiac Enzymes:  No results for input(s): \"CKTOTAL\", \"CKMB\", \"CKMBINDEX\", \"TROPONINI\" in the last 72 hours.    Invalid input(s): \"TROPONIN\", \"HSTROP\"  BNP/ProBNP:   Recent Labs     12/27/23  1328   PROBNP 3,679* 
\"SCFS7GGI\", \"H4XCRUQU\", \"O2SAT\", \"FIO2\"  Lab Results   Component Value Date/Time    SPECIAL L AC 10ML 12/27/2023 02:40 PM    SPECIAL R AC 10ML 12/27/2023 02:40 PM     Lab Results   Component Value Date/Time    CULTURE NORMAL RESPIRATORY BINDU HEAVY GROWTH 12/29/2023 03:45 PM       Radiology:  XR CHEST (SINGLE VIEW FRONTAL)    Result Date: 12/28/2023  1. Persistent patchy bilateral airspace opacities suspicious for pneumonia which a progressed in the left upper lobe and improved at the left lung base and right perihilar region. 2. New small left pleural effusion.     XR CHEST PORTABLE    Result Date: 12/27/2023  Left greater than right scattered confluent airspace opacities suspicious for pneumonia.  Radiographic follow-up is recommended.       Physical Examination:        General appearance:  alert, cooperative and no distress  Mental Status:  oriented to person, place and time and normal affect  Lungs:  Bilateral equal air entry, diminished breath sounds bilaterally, some rhonchi. On HFNC. Tachypic   Heart:  tachycardic, no murmur/rubs or gallops  Abdomen:  soft, nontender, nondistended, normal bowel sounds, no masses, hepatomegaly, splenomegaly  Extremities:  no edema, redness, tenderness in the calves  Skin:  no gross lesions, rashes, induration    Assessment:        Hospital Problems             Last Modified POA    * (Principal) Acute on chronic congestive heart failure (HCC) 12/29/2023 Yes    Community acquired pneumonia 12/29/2023 Yes    ANY (acute kidney injury) ([facility]) 12/29/2023 Yes    Acute on chronic heart failure (HCC) 12/29/2023 Yes   Plan:        Acute on chronic respiratory failure 2/2 HFrEF decompensation VS. CAP  CXR shows airspace opacities suspicious for pneumonia, repeat CXR yesterday was stable   Pulmonology following, recs appreciated   Continue Vanc and Cefepime  CBC daily   Patient is on HFNC, will continue for now, wean as tolerated  Continue on Prednisone 40 mg PO for total of 5 days (until 
    Microbiology:  Urine Culture:  No components found for: \"CURINE\"  Blood Culture:  No components found for: \"CBLOOD\", \"CFUNGUSBL\"  Sputum Culture:  No components found for: \"CSPUTUM\"  Recent Labs     12/29/23  1545 12/29/23  1851 12/29/23 1852   SPECDESC .EXPECTORATED SPUTUM   < > .NASAL SWAB   CULTURE NORMAL RESPIRATORY BINDU HEAVY GROWTH  --   --     < > = values in this interval not displayed.       Recent Labs     12/29/23  1545 12/29/23  1851 12/29/23 1852   SPECDESC .EXPECTORATED SPUTUM .NASOPHARYNGEAL SWAB .NASAL SWAB   CULTURE NORMAL RESPIRATORY BINDU HEAVY GROWTH  --   --             Radiology Reports:  XR CHEST (SINGLE VIEW FRONTAL)   Final Result   Stable chest.         XR CHEST (SINGLE VIEW FRONTAL)   Final Result   1. Persistent patchy bilateral airspace opacities suspicious for pneumonia   which a progressed in the left upper lobe and improved at the left lung base   and right perihilar region.   2. New small left pleural effusion.         XR CHEST PORTABLE   Final Result   Left greater than right scattered confluent airspace opacities suspicious for   pneumonia.  Radiographic follow-up is recommended.              Echocardiogram:   No results found for this or any previous visit.       ASSESSMENT AND PLAN     Assessment:    // Acute hypoxic respiratory failure  //Community-acquired pneumonia left upper lobe - new from ct chest 9/22/23 ( merged chart ) . And bronchiectasis increases risk for pseudomonas and staph aureus   // Chronic obstructive pulmonary disease  // Bronchiectasis  // History of empyema s/p decortication  // S/P CABG x 3   //Rhinovirus viral infection      Plan:  Continue cefepime and vancomycin  bronchodilator   Continue prednisone for 5 days and may need to based on exam  -Continue high flow to maintain saturation more than 90%.  Wean down as tolerated.      Electronically signed by AYO BOOTH MD on 12/31/2023 at 4:55 PM       Please note that this chart was generated using

## 2024-01-04 ENCOUNTER — TELEPHONE (OUTPATIENT)
Dept: PULMONOLOGY | Age: 74
End: 2024-01-04

## 2024-01-04 NOTE — TELEPHONE ENCOUNTER
Patient called and asked when he could start driving and what the restriction would be, pt was discharged from [facility] 01/03/2024, please advise and call pt at [phone], thank you

## 2024-03-26 ENCOUNTER — OFFICE VISIT (OUTPATIENT)
Dept: PULMONOLOGY | Age: 74
End: 2024-03-26
Payer: MEDICARE

## 2024-03-26 VITALS — WEIGHT: 192 LBS | HEIGHT: 72 IN | BODY MASS INDEX: 26.01 KG/M2 | HEART RATE: 77 BPM | OXYGEN SATURATION: 91 %

## 2024-03-26 DIAGNOSIS — J86.9 EMPYEMA OF PLEURA (HCC): ICD-10-CM

## 2024-03-26 DIAGNOSIS — Z95.1 S/P CABG X 3: ICD-10-CM

## 2024-03-26 DIAGNOSIS — I10 ESSENTIAL HYPERTENSION: ICD-10-CM

## 2024-03-26 DIAGNOSIS — I50.22 CHRONIC SYSTOLIC CONGESTIVE HEART FAILURE (HCC): ICD-10-CM

## 2024-03-26 DIAGNOSIS — J47.9 BRONCHIECTASIS WITHOUT COMPLICATION (HCC): ICD-10-CM

## 2024-03-26 DIAGNOSIS — E27.8 ADRENAL NODULE (HCC): ICD-10-CM

## 2024-03-26 DIAGNOSIS — J18.9 PNEUMONIA OF LEFT UPPER LOBE DUE TO INFECTIOUS ORGANISM: Primary | ICD-10-CM

## 2024-03-26 DIAGNOSIS — J44.9 CHRONIC OBSTRUCTIVE PULMONARY DISEASE, UNSPECIFIED COPD TYPE (HCC): ICD-10-CM

## 2024-03-26 PROCEDURE — G8484 FLU IMMUNIZE NO ADMIN: HCPCS | Performed by: INTERNAL MEDICINE

## 2024-03-26 PROCEDURE — 1123F ACP DISCUSS/DSCN MKR DOCD: CPT | Performed by: INTERNAL MEDICINE

## 2024-03-26 PROCEDURE — 3017F COLORECTAL CA SCREEN DOC REV: CPT | Performed by: INTERNAL MEDICINE

## 2024-03-26 PROCEDURE — 3023F SPIROM DOC REV: CPT | Performed by: INTERNAL MEDICINE

## 2024-03-26 PROCEDURE — 1036F TOBACCO NON-USER: CPT | Performed by: INTERNAL MEDICINE

## 2024-03-26 PROCEDURE — 99214 OFFICE O/P EST MOD 30 MIN: CPT | Performed by: INTERNAL MEDICINE

## 2024-03-26 PROCEDURE — G8427 DOCREV CUR MEDS BY ELIG CLIN: HCPCS | Performed by: INTERNAL MEDICINE

## 2024-03-26 PROCEDURE — G8417 CALC BMI ABV UP PARAM F/U: HCPCS | Performed by: INTERNAL MEDICINE

## 2024-03-26 NOTE — PROGRESS NOTES
yet  Ordered chest x-ray also  Patient does not need a CT scan of the chest to screen for lung cancer anymore as he quit smoking in 2007  Refills were provided -none  Patient was recommended to have prednisone and an antibiotic available for use during an exacerbation  Educated and clarified the medication use.  Discussed use, benefit, and side effects of prescribed medications.  Barriers to medication compliance addressed.   Marcus Dobbs received counseling on the following healthy behaviors: nutrition, exercise and medication adherence  Recommend flu vaccination in the fall annually.  Recommendations given regarding pneumococcal vaccinations.  Patient had the COVID-19 2023 booster  Patient is uptodate with vaccinations from pulmonary perspective.  Maintain an active lifestyle.  continue smoking cessation.  Patient was educated on how to use the respiratory medications.  All the questions that the patient has had were answered to   her satisfaction.  Home O2 evaluation was done.  Supplemental oxygen was not indicated, except with exertion if needed  After reviewing the patient's smoking history and his age patient does not meet the criteria for lung cancer screening as the patient quit smoking in the year 2007.  We'll see the patient back in 12 months or earlier if needed, based on the findings on the chest x-ray and MRI of the liver and adrenal gland  Patient will call us if he is sick, so he can be seen sooner.  Thank you for having us involved in the care of your patient. Please call us if you have any questions or concerns.          Nicolás Vega MD, MD             3/26/2024, 3:17 Patti

## 2024-04-02 ENCOUNTER — TELEPHONE (OUTPATIENT)
Dept: PULMONOLOGY | Age: 74
End: 2024-04-02

## 2024-04-02 NOTE — TELEPHONE ENCOUNTER
Patient called, states the MRI of his abdomen was cancelled because the radiologist Dr Leonardo couldn't clear him. It appears that patient may have an epicardial lead still intact in his body from previous heart surgery done by Dr Sebastian.     It cannot be ruled out, he cannot be cleared for the MRI.   Patient was unaware he had any metal in his body.     What would you like to do?

## 2024-04-03 NOTE — TELEPHONE ENCOUNTER
Patient called on this again and seems a bit concerned due to him stating that he is unaware of anything being put in him but 3 stents with his heart surgery.  Can you please review and let me know what you would like to do.

## 2024-04-04 NOTE — TELEPHONE ENCOUNTER
Spoke with patient and let him know that I will get message to Dr Vega on Monday and patient stated that he will get Xray done either today or tomorrow

## 2024-04-05 ENCOUNTER — HOSPITAL ENCOUNTER (OUTPATIENT)
Facility: CLINIC | Age: 74
End: 2024-04-05
Payer: MEDICARE

## 2024-04-05 ENCOUNTER — HOSPITAL ENCOUNTER (OUTPATIENT)
Dept: GENERAL RADIOLOGY | Facility: CLINIC | Age: 74
End: 2024-04-05
Payer: MEDICARE

## 2024-04-05 DIAGNOSIS — J18.9 PNEUMONIA OF LEFT UPPER LOBE DUE TO INFECTIOUS ORGANISM: ICD-10-CM

## 2024-04-05 PROCEDURE — 71046 X-RAY EXAM CHEST 2 VIEWS: CPT

## 2024-06-02 SDOH — HEALTH STABILITY: PHYSICAL HEALTH: ON AVERAGE, HOW MANY DAYS PER WEEK DO YOU ENGAGE IN MODERATE TO STRENUOUS EXERCISE (LIKE A BRISK WALK)?: 3 DAYS

## 2024-06-03 ENCOUNTER — OFFICE VISIT (OUTPATIENT)
Dept: FAMILY MEDICINE CLINIC | Age: 74
End: 2024-06-03
Payer: MEDICARE

## 2024-06-03 VITALS
WEIGHT: 190.6 LBS | HEART RATE: 63 BPM | SYSTOLIC BLOOD PRESSURE: 112 MMHG | DIASTOLIC BLOOD PRESSURE: 62 MMHG | BODY MASS INDEX: 25.85 KG/M2 | OXYGEN SATURATION: 95 % | TEMPERATURE: 98.2 F

## 2024-06-03 DIAGNOSIS — K21.9 GASTROESOPHAGEAL REFLUX DISEASE WITHOUT ESOPHAGITIS: ICD-10-CM

## 2024-06-03 DIAGNOSIS — I50.22 CHRONIC SYSTOLIC CONGESTIVE HEART FAILURE (HCC): ICD-10-CM

## 2024-06-03 DIAGNOSIS — Z86.718 HISTORY OF DVT OF LOWER EXTREMITY: ICD-10-CM

## 2024-06-03 DIAGNOSIS — J43.9 PULMONARY EMPHYSEMA, UNSPECIFIED EMPHYSEMA TYPE (HCC): ICD-10-CM

## 2024-06-03 DIAGNOSIS — Z95.1 S/P CABG X 3: Chronic | ICD-10-CM

## 2024-06-03 DIAGNOSIS — I25.10 CORONARY ARTERY DISEASE INVOLVING NATIVE CORONARY ARTERY OF NATIVE HEART WITHOUT ANGINA PECTORIS: ICD-10-CM

## 2024-06-03 DIAGNOSIS — E78.5 HYPERLIPIDEMIA, UNSPECIFIED HYPERLIPIDEMIA TYPE: ICD-10-CM

## 2024-06-03 DIAGNOSIS — I10 ESSENTIAL HYPERTENSION, BENIGN: Primary | ICD-10-CM

## 2024-06-03 PROBLEM — J18.9 COMMUNITY ACQUIRED PNEUMONIA: Status: RESOLVED | Noted: 2023-12-29 | Resolved: 2024-06-03

## 2024-06-03 PROBLEM — R33.9 URINARY RETENTION: Status: RESOLVED | Noted: 2019-08-17 | Resolved: 2024-06-03

## 2024-06-03 PROBLEM — E87.1 HYPONATREMIA: Status: RESOLVED | Noted: 2022-04-28 | Resolved: 2024-06-03

## 2024-06-03 PROBLEM — R00.0 SINUS TACHYCARDIA: Status: RESOLVED | Noted: 2024-01-02 | Resolved: 2024-06-03

## 2024-06-03 PROBLEM — J96.11 CHRONIC RESPIRATORY FAILURE WITH HYPOXIA (HCC): Status: ACTIVE | Noted: 2024-01-02

## 2024-06-03 PROBLEM — I50.9 ACUTE ON CHRONIC HEART FAILURE (HCC): Status: RESOLVED | Noted: 2023-12-29 | Resolved: 2024-06-03

## 2024-06-03 PROBLEM — J18.9 PNEUMONIA: Status: RESOLVED | Noted: 2022-04-27 | Resolved: 2024-06-03

## 2024-06-03 PROBLEM — J18.9 PNEUMONIA DUE TO INFECTIOUS ORGANISM: Status: RESOLVED | Noted: 2019-08-17 | Resolved: 2024-06-03

## 2024-06-03 PROBLEM — I50.9 ACUTE ON CHRONIC CONGESTIVE HEART FAILURE (HCC): Status: RESOLVED | Noted: 2023-12-27 | Resolved: 2024-06-03

## 2024-06-03 PROBLEM — N17.9 AKI (ACUTE KIDNEY INJURY) (HCC): Status: RESOLVED | Noted: 2019-08-17 | Resolved: 2024-06-03
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
## 2024-06-03 PROBLEM — N17.9 AKI (ACUTE KIDNEY INJURY) (HCC): Status: RESOLVED | Noted: 2023-12-29 | Resolved: 2024-06-03

## 2024-06-03 PROBLEM — I25.2 HISTORY OF NON-ST ELEVATION MYOCARDIAL INFARCTION (NSTEMI): Status: ACTIVE | Noted: 2021-12-23

## 2024-06-03 PROBLEM — S20.219A CONTUSION OF CHEST: Status: RESOLVED | Noted: 2023-09-22 | Resolved: 2024-06-03

## 2024-06-03 PROCEDURE — 99203 OFFICE O/P NEW LOW 30 MIN: CPT | Performed by: INTERNAL MEDICINE

## 2024-06-03 PROCEDURE — 1123F ACP DISCUSS/DSCN MKR DOCD: CPT | Performed by: INTERNAL MEDICINE

## 2024-06-03 PROCEDURE — 3017F COLORECTAL CA SCREEN DOC REV: CPT | Performed by: INTERNAL MEDICINE

## 2024-06-03 PROCEDURE — G8427 DOCREV CUR MEDS BY ELIG CLIN: HCPCS | Performed by: INTERNAL MEDICINE

## 2024-06-03 PROCEDURE — 3078F DIAST BP <80 MM HG: CPT | Performed by: INTERNAL MEDICINE

## 2024-06-03 PROCEDURE — G8417 CALC BMI ABV UP PARAM F/U: HCPCS | Performed by: INTERNAL MEDICINE

## 2024-06-03 PROCEDURE — 3074F SYST BP LT 130 MM HG: CPT | Performed by: INTERNAL MEDICINE

## 2024-06-03 PROCEDURE — 3023F SPIROM DOC REV: CPT | Performed by: INTERNAL MEDICINE

## 2024-06-03 PROCEDURE — 1036F TOBACCO NON-USER: CPT | Performed by: INTERNAL MEDICINE

## 2024-06-03 SDOH — ECONOMIC STABILITY: HOUSING INSECURITY
IN THE LAST 12 MONTHS, WAS THERE A TIME WHEN YOU DID NOT HAVE A STEADY PLACE TO SLEEP OR SLEPT IN A SHELTER (INCLUDING NOW)?: NO

## 2024-06-03 SDOH — ECONOMIC STABILITY: FOOD INSECURITY: WITHIN THE PAST 12 MONTHS, THE FOOD YOU BOUGHT JUST DIDN'T LAST AND YOU DIDN'T HAVE MONEY TO GET MORE.: NEVER TRUE

## 2024-06-03 SDOH — ECONOMIC STABILITY: INCOME INSECURITY: HOW HARD IS IT FOR YOU TO PAY FOR THE VERY BASICS LIKE FOOD, HOUSING, MEDICAL CARE, AND HEATING?: NOT HARD AT ALL

## 2024-06-03 SDOH — ECONOMIC STABILITY: FOOD INSECURITY: WITHIN THE PAST 12 MONTHS, YOU WORRIED THAT YOUR FOOD WOULD RUN OUT BEFORE YOU GOT MONEY TO BUY MORE.: NEVER TRUE

## 2024-06-03 ASSESSMENT — PATIENT HEALTH QUESTIONNAIRE - PHQ9
1. LITTLE INTEREST OR PLEASURE IN DOING THINGS: NOT AT ALL
SUM OF ALL RESPONSES TO PHQ QUESTIONS 1-9: 0
2. FEELING DOWN, DEPRESSED OR HOPELESS: NOT AT ALL
SUM OF ALL RESPONSES TO PHQ9 QUESTIONS 1 & 2: 0
SUM OF ALL RESPONSES TO PHQ QUESTIONS 1-9: 0

## 2024-06-03 ASSESSMENT — ENCOUNTER SYMPTOMS
CHEST TIGHTNESS: 0
ABDOMINAL PAIN: 0
NAUSEA: 0
DIARRHEA: 0
VOMITING: 0
CONSTIPATION: 0
CHOKING: 0
ANAL BLEEDING: 0
BLOOD IN STOOL: 0
COUGH: 0
WHEEZING: 0
SHORTNESS OF BREATH: 0

## 2024-06-03 ASSESSMENT — VISUAL ACUITY: OU: 1

## 2024-06-03 NOTE — PROGRESS NOTES
ill-appearing.   Eyes:      General: Lids are normal. Vision grossly intact.   Cardiovascular:      Rate and Rhythm: Normal rate and regular rhythm.      Heart sounds: Normal heart sounds, S1 normal and S2 normal. No murmur heard.     No friction rub. No gallop.   Pulmonary:      Effort: Pulmonary effort is normal. No respiratory distress.      Breath sounds: Normal breath sounds. No wheezing.   Abdominal:      General: Bowel sounds are normal.      Palpations: Abdomen is soft. There is no mass.      Tenderness: There is no abdominal tenderness. There is no guarding.   Musculoskeletal:         General: Normal range of motion.   Skin:     General: Skin is warm and dry.      Capillary Refill: Capillary refill takes less than 2 seconds.   Neurological:      General: No focal deficit present.      Mental Status: He is alert and oriented to person, place, and time.           Data Review       Health Maintenance Due   Topic Date Due    Hepatitis C screen  Never done    Colorectal Cancer Screen  Never done    Lipids  04/05/2023    Annual Wellness Visit (Medicare)  Never done           Patient given educational materials- see patient instructions.  Discussed use, benefit, and side effects of prescribedmedications.  All patient questions answered.  Pt voiced understanding. Reviewedhealth maintenance.  Instructed to continue current medications, diet and exercise.Patient agreed with treatment plan. Follow up as directed.     Electronically signedby ADRIÁN CORRALES MD on 6/3/2024

## 2024-10-30 SDOH — HEALTH STABILITY: PHYSICAL HEALTH: ON AVERAGE, HOW MANY MINUTES DO YOU ENGAGE IN EXERCISE AT THIS LEVEL?: 20 MIN

## 2024-10-30 SDOH — HEALTH STABILITY: PHYSICAL HEALTH: ON AVERAGE, HOW MANY DAYS PER WEEK DO YOU ENGAGE IN MODERATE TO STRENUOUS EXERCISE (LIKE A BRISK WALK)?: 5 DAYS

## 2024-10-30 ASSESSMENT — LIFESTYLE VARIABLES
HOW OFTEN DO YOU HAVE A DRINK CONTAINING ALCOHOL: 1
HOW OFTEN DO YOU HAVE SIX OR MORE DRINKS ON ONE OCCASION: 1
HOW MANY STANDARD DRINKS CONTAINING ALCOHOL DO YOU HAVE ON A TYPICAL DAY: PATIENT DOES NOT DRINK
HOW OFTEN DO YOU HAVE A DRINK CONTAINING ALCOHOL: NEVER
HOW MANY STANDARD DRINKS CONTAINING ALCOHOL DO YOU HAVE ON A TYPICAL DAY: 0

## 2024-10-30 ASSESSMENT — PATIENT HEALTH QUESTIONNAIRE - PHQ9
SUM OF ALL RESPONSES TO PHQ9 QUESTIONS 1 & 2: 0
SUM OF ALL RESPONSES TO PHQ QUESTIONS 1-9: 0
SUM OF ALL RESPONSES TO PHQ QUESTIONS 1-9: 0
2. FEELING DOWN, DEPRESSED OR HOPELESS: NOT AT ALL
SUM OF ALL RESPONSES TO PHQ QUESTIONS 1-9: 0
1. LITTLE INTEREST OR PLEASURE IN DOING THINGS: NOT AT ALL
SUM OF ALL RESPONSES TO PHQ QUESTIONS 1-9: 0

## 2024-10-30 NOTE — PROGRESS NOTES
Medicare Annual Wellness Visit    Marcus Dobbs is here for Medicare AWV    Assessment & Plan   Colon cancer screening  Encounter for screening for cardiovascular disorders  -     Lipid Panel; Future  Need for hepatitis C screening test  -     Hepatitis C Antibody; Future    Colon Cancer screening: scheduled for November 8th  Hepatitis C screening: will order     Recommendations for Preventive Services Due: see orders and patient instructions/AVS.  Recommended screening schedule for the next 5-10 years is provided to the patient in written form: see Patient Instructions/AVS.     No follow-ups on file.     Subjective   PMH: CAD s/p cabg follows up with cardiology and smoknig history   Surgical History:  Medications: wixela sub symbicort 1 puff in am and 1 puff pm   Social: 46 yrs of smoking and quit 18 yrs ago 2packs a day  lives with, job, smoking, recoviring alcohol October 1 1989,  no other drug, lives in apartment able 10, 2 daughters one lives in [place] and another daughter [facility] , sister and brother. Sister is the one he calls       Diet: lonny sandwiches, meatfloaf eats a lot of processed food  Exercise: has a watch keeps track of steps, likes to golf     Cancer screening:  Colonoscopy: scheduled for  November 8th   Hep C: needs, will order today   Average age for prostate is 51 yrs old      Patient's complete Health Risk Assessment and screening values have been reviewed and are found in Flowsheets. The following problems were reviewed today and where indicated follow up appointments were made and/or referrals ordered.    Positive Risk Factor Screenings with Interventions:       Cognitive:      Words recalled: 2 Words Recalled     Total Score Interpretation: Abnormal Mini-Cog  Interventions:  Was able to recall 2 words, and was able to draw a clock with correct number recall, was able to recall current events and history, will reassess in one year                Dentist Screen:  Have you seen the

## 2024-10-31 ENCOUNTER — OFFICE VISIT (OUTPATIENT)
Dept: FAMILY MEDICINE CLINIC | Age: 74
End: 2024-10-31

## 2024-10-31 ENCOUNTER — HOSPITAL ENCOUNTER (OUTPATIENT)
Age: 74
Setting detail: SPECIMEN
Discharge: HOME | End: 2024-10-31

## 2024-10-31 VITALS
OXYGEN SATURATION: 95 % | WEIGHT: 197 LBS | DIASTOLIC BLOOD PRESSURE: 68 MMHG | HEIGHT: 71 IN | HEART RATE: 85 BPM | SYSTOLIC BLOOD PRESSURE: 116 MMHG | BODY MASS INDEX: 27.58 KG/M2

## 2024-10-31 DIAGNOSIS — Z13.6 ENCOUNTER FOR SCREENING FOR CARDIOVASCULAR DISORDERS: ICD-10-CM

## 2024-10-31 DIAGNOSIS — Z11.59 NEED FOR HEPATITIS C SCREENING TEST: ICD-10-CM

## 2024-10-31 DIAGNOSIS — Z12.11 COLON CANCER SCREENING: Primary | ICD-10-CM

## 2024-10-31 LAB
CHOLEST SERPL-MCNC: 132 MG/DL (ref 0–199)
CHOLESTEROL/HDL RATIO: 3
HCV AB SERPL QL IA: NONREACTIVE
HDLC SERPL-MCNC: 44 MG/DL
LDLC SERPL CALC-MCNC: 69 MG/DL (ref 0–100)
TRIGL SERPL-MCNC: 95 MG/DL
VLDLC SERPL CALC-MCNC: 19 MG/DL (ref 1–30)

## 2024-12-03 ENCOUNTER — OFFICE VISIT (OUTPATIENT)
Dept: FAMILY MEDICINE CLINIC | Age: 74
End: 2024-12-03
Payer: MEDICARE

## 2024-12-03 VITALS
DIASTOLIC BLOOD PRESSURE: 60 MMHG | WEIGHT: 199.6 LBS | HEART RATE: 75 BPM | SYSTOLIC BLOOD PRESSURE: 112 MMHG | BODY MASS INDEX: 27.84 KG/M2 | TEMPERATURE: 97.4 F | OXYGEN SATURATION: 95 %

## 2024-12-03 DIAGNOSIS — J43.9 PULMONARY EMPHYSEMA, UNSPECIFIED EMPHYSEMA TYPE (HCC): ICD-10-CM

## 2024-12-03 DIAGNOSIS — I25.10 CORONARY ARTERY DISEASE INVOLVING NATIVE CORONARY ARTERY OF NATIVE HEART WITHOUT ANGINA PECTORIS: Primary | ICD-10-CM

## 2024-12-03 DIAGNOSIS — I50.22 CHRONIC SYSTOLIC CONGESTIVE HEART FAILURE (HCC): ICD-10-CM

## 2024-12-03 DIAGNOSIS — I25.2 HISTORY OF NON-ST ELEVATION MYOCARDIAL INFARCTION (NSTEMI): ICD-10-CM

## 2024-12-03 DIAGNOSIS — I10 ESSENTIAL HYPERTENSION, BENIGN: ICD-10-CM

## 2024-12-03 DIAGNOSIS — E78.5 HYPERLIPIDEMIA, UNSPECIFIED HYPERLIPIDEMIA TYPE: ICD-10-CM

## 2024-12-03 DIAGNOSIS — M75.51 ACUTE BURSITIS OF RIGHT SHOULDER: ICD-10-CM

## 2024-12-03 PROCEDURE — G8427 DOCREV CUR MEDS BY ELIG CLIN: HCPCS | Performed by: INTERNAL MEDICINE

## 2024-12-03 PROCEDURE — 1123F ACP DISCUSS/DSCN MKR DOCD: CPT | Performed by: INTERNAL MEDICINE

## 2024-12-03 PROCEDURE — 1036F TOBACCO NON-USER: CPT | Performed by: INTERNAL MEDICINE

## 2024-12-03 PROCEDURE — 99214 OFFICE O/P EST MOD 30 MIN: CPT | Performed by: INTERNAL MEDICINE

## 2024-12-03 PROCEDURE — G8484 FLU IMMUNIZE NO ADMIN: HCPCS | Performed by: INTERNAL MEDICINE

## 2024-12-03 PROCEDURE — G8417 CALC BMI ABV UP PARAM F/U: HCPCS | Performed by: INTERNAL MEDICINE

## 2024-12-03 PROCEDURE — 3017F COLORECTAL CA SCREEN DOC REV: CPT | Performed by: INTERNAL MEDICINE

## 2024-12-03 PROCEDURE — 3074F SYST BP LT 130 MM HG: CPT | Performed by: INTERNAL MEDICINE

## 2024-12-03 PROCEDURE — 3023F SPIROM DOC REV: CPT | Performed by: INTERNAL MEDICINE

## 2024-12-03 PROCEDURE — 3078F DIAST BP <80 MM HG: CPT | Performed by: INTERNAL MEDICINE

## 2024-12-03 PROCEDURE — 1159F MED LIST DOCD IN RCRD: CPT | Performed by: INTERNAL MEDICINE

## 2024-12-03 RX ORDER — METHYLPREDNISOLONE 4 MG/1
TABLET ORAL
Qty: 1 KIT | Refills: 0 | Status: SHIPPED | OUTPATIENT
Start: 2024-12-03

## 2024-12-03 NOTE — PATIENT INSTRUCTIONS
Patient Education        Shoulder Bursitis: Exercises  Introduction  Here are some examples of exercises for you to try. The exercises may be suggested for a condition or for rehabilitation. Start each exercise slowly. Ease off the exercises if you start to have pain.  You will be told when to start these exercises and which ones will work best for you.  How to do the exercises  Shoulder stretch (posterior)    Relax your shoulders. Hold the elbow of your affected arm with your other hand.  Use your hand to pull your affected arm gently up and across your body. You will feel a gentle stretch across the back of your affected shoulder.  Hold for at least 15 to 30 seconds, then slowly lower your arm.  Repeat 2 to 4 times.  If you can, repeat these steps for your other shoulder.  Up-the-back shoulder stretch    Your doctor or physical therapist may advise you to wait to do this stretch until you have regained most of your range of motion and strength.  Stand or sit up straight. If you're standing, keep your feet about hip-width apart.  Hold any of these stretches for at least 15 to 30 seconds.  Repeat 2 to 4 times.  If you can, repeat these steps for your other shoulder.  Light stretch: Put the hand of your affected arm in your back pocket (palm outward). Let it rest there to stretch your shoulder.  Moderate stretch: With your other hand, hold your affected arm (palm outward) behind your back by the wrist. Pull your arm up gently to stretch your shoulder.  Advanced stretch: Put a towel over your other shoulder. Put the hand of your affected arm behind your back. Now hold the back end of the towel. With the other hand, hold the front end of the towel in front of your body. Pull gently on the front end of the towel. This will bring your hand farther up your back to stretch your shoulder.  Shoulder extensor stretch (supported)    Standing about an arm's length away, grasp onto a solid surface. You could use a countertop, a

## 2024-12-03 NOTE — PROGRESS NOTES
dry.      Capillary Refill: Capillary refill takes less than 2 seconds.   Neurological:      General: No focal deficit present.      Mental Status: He is alert and oriented to person, place, and time.           Data Review     [facility]   [facility]   Health Maintenance Due   Topic Date Due    Colorectal Cancer Screen  Never done           Patient given educational materials- see patient instructions.  Discussed use, benefit, and side effects of prescribedmedications.  All patient questions answered.  Pt voiced understanding. Reviewedhealth maintenance.  Instructed to continue current medications, diet and exercise.Patient agreed with treatment plan. Follow up as directed.     Electronically signedby ADRIÁN CORRALES MD on 12/3/2024

## 2025-03-11 ENCOUNTER — OFFICE VISIT (OUTPATIENT)
Dept: PULMONOLOGY | Age: 75
End: 2025-03-11
Payer: MEDICARE

## 2025-03-11 VITALS
OXYGEN SATURATION: 94 % | RESPIRATION RATE: 18 BRPM | SYSTOLIC BLOOD PRESSURE: 98 MMHG | HEART RATE: 79 BPM | HEIGHT: 72 IN | DIASTOLIC BLOOD PRESSURE: 65 MMHG | WEIGHT: 198 LBS | BODY MASS INDEX: 26.82 KG/M2

## 2025-03-11 DIAGNOSIS — J47.9 BRONCHIECTASIS WITHOUT COMPLICATION (HCC): ICD-10-CM

## 2025-03-11 DIAGNOSIS — J86.9 EMPYEMA OF PLEURA (HCC): ICD-10-CM

## 2025-03-11 DIAGNOSIS — E27.9 ADRENAL NODULE: ICD-10-CM

## 2025-03-11 DIAGNOSIS — J44.9 CHRONIC OBSTRUCTIVE PULMONARY DISEASE, UNSPECIFIED COPD TYPE (HCC): Primary | ICD-10-CM

## 2025-03-11 DIAGNOSIS — I50.22 CHRONIC SYSTOLIC CONGESTIVE HEART FAILURE (HCC): ICD-10-CM

## 2025-03-11 DIAGNOSIS — J18.9 PNEUMONIA OF LEFT UPPER LOBE DUE TO INFECTIOUS ORGANISM: ICD-10-CM

## 2025-03-11 DIAGNOSIS — Z95.1 S/P CABG X 3: ICD-10-CM

## 2025-03-11 DIAGNOSIS — I10 ESSENTIAL HYPERTENSION: ICD-10-CM

## 2025-03-11 PROCEDURE — 1159F MED LIST DOCD IN RCRD: CPT | Performed by: INTERNAL MEDICINE

## 2025-03-11 PROCEDURE — 99213 OFFICE O/P EST LOW 20 MIN: CPT | Performed by: INTERNAL MEDICINE

## 2025-03-11 PROCEDURE — G8427 DOCREV CUR MEDS BY ELIG CLIN: HCPCS | Performed by: INTERNAL MEDICINE

## 2025-03-11 PROCEDURE — 3074F SYST BP LT 130 MM HG: CPT | Performed by: INTERNAL MEDICINE

## 2025-03-11 PROCEDURE — 1036F TOBACCO NON-USER: CPT | Performed by: INTERNAL MEDICINE

## 2025-03-11 PROCEDURE — 3023F SPIROM DOC REV: CPT | Performed by: INTERNAL MEDICINE

## 2025-03-11 PROCEDURE — 3078F DIAST BP <80 MM HG: CPT | Performed by: INTERNAL MEDICINE

## 2025-03-11 PROCEDURE — 3017F COLORECTAL CA SCREEN DOC REV: CPT | Performed by: INTERNAL MEDICINE

## 2025-03-11 PROCEDURE — 1123F ACP DISCUSS/DSCN MKR DOCD: CPT | Performed by: INTERNAL MEDICINE

## 2025-03-11 PROCEDURE — G8417 CALC BMI ABV UP PARAM F/U: HCPCS | Performed by: INTERNAL MEDICINE

## 2025-03-11 NOTE — PROGRESS NOTES
PULMONARY OP  PROGRESS NOTE      Patient:  Marcus Dobbs  YOB: 1950    MRN: [id number]     Acct:        Pt seen and Chart reviewed.  Marcus Dobbs is here in followup for   1. Chronic obstructive pulmonary disease, unspecified COPD type (HCC)    2. Bronchiectasis without complication (HCC)    3. Chronic systolic congestive heart failure (HCC)    4. Essential hypertension    5. S/P CABG x 3    6. Adrenal nodule    7. Pneumonia of left upper lobe due to infectious organism    8. Empyema of pleura (HCC)          History of Present Illness  The patient is a 74-year-old male who presents for follow-up of COPD, bronchiectasis, chronic systolic congestive heart failure, essential hypertension, CABG x3, adrenal nodule, pneumonia of the left upper lobe, and empyema of the pleura.    He reports no recent hospitalizations or emergency room visits due to dyspnea or wheezing. He has not experienced any exacerbations of his asthma or COPD. He does not require any medication refills at this time. He was previously prescribed home oxygen therapy but has not utilized it for the past 4 months. He is currently with [facility] and wishes to discontinue the oxygen therapy due to cost concerns. He plans to retain his nebulizer, which he has been using for over 13 months. His current medication regimen includes an albuterol inhaler, Advair, and Spiriva.    He continues to produce a significant amount of white or clear sputum, predominantly in the morning hours. He reports no orthopnea and typically sleeps in a supine position with the aid of two pillows. His condition has remained stable. He also reports no peripheral edema.    MEDICATIONS  albuterol inhaler, Advair, Spiriva    IMMUNIZATIONS  He is up to date on all his vaccinations.         Review of Systems -   Constitutional ROS: negative  ENT ROS: negative  Allergy and Immunology ROS: negative  Respiratory ROS: negative  Cardiovascular ROS: negative  Gastrointestinal

## 2025-03-31 ENCOUNTER — OFFICE VISIT (OUTPATIENT)
Dept: FAMILY MEDICINE CLINIC | Age: 75
End: 2025-03-31
Payer: MEDICARE

## 2025-03-31 VITALS
OXYGEN SATURATION: 89 % | BODY MASS INDEX: 27.11 KG/M2 | SYSTOLIC BLOOD PRESSURE: 100 MMHG | TEMPERATURE: 97.6 F | HEART RATE: 65 BPM | WEIGHT: 199.9 LBS | DIASTOLIC BLOOD PRESSURE: 60 MMHG

## 2025-03-31 DIAGNOSIS — R68.89 FLU-LIKE SYMPTOMS: ICD-10-CM

## 2025-03-31 DIAGNOSIS — J10.1 INFLUENZA A: Primary | ICD-10-CM

## 2025-03-31 LAB
INFLUENZA A ANTIGEN, POC: POSITIVE
INFLUENZA B ANTIGEN, POC: NEGATIVE
LOT EXPIRE DATE: ABNORMAL
LOT KIT NUMBER: ABNORMAL
SARS-COV-2 RNA, POC: NEGATIVE
VALID INTERNAL CONTROL: PRESENT
VENDOR AND KIT NAME POC: ABNORMAL

## 2025-03-31 PROCEDURE — 99214 OFFICE O/P EST MOD 30 MIN: CPT | Performed by: INTERNAL MEDICINE

## 2025-03-31 PROCEDURE — 3017F COLORECTAL CA SCREEN DOC REV: CPT | Performed by: INTERNAL MEDICINE

## 2025-03-31 PROCEDURE — 3074F SYST BP LT 130 MM HG: CPT | Performed by: INTERNAL MEDICINE

## 2025-03-31 PROCEDURE — 87428 SARSCOV & INF VIR A&B AG IA: CPT | Performed by: INTERNAL MEDICINE

## 2025-03-31 PROCEDURE — 1036F TOBACCO NON-USER: CPT | Performed by: INTERNAL MEDICINE

## 2025-03-31 PROCEDURE — 1123F ACP DISCUSS/DSCN MKR DOCD: CPT | Performed by: INTERNAL MEDICINE

## 2025-03-31 PROCEDURE — 3078F DIAST BP <80 MM HG: CPT | Performed by: INTERNAL MEDICINE

## 2025-03-31 PROCEDURE — G8417 CALC BMI ABV UP PARAM F/U: HCPCS | Performed by: INTERNAL MEDICINE

## 2025-03-31 PROCEDURE — 1159F MED LIST DOCD IN RCRD: CPT | Performed by: INTERNAL MEDICINE

## 2025-03-31 PROCEDURE — G8427 DOCREV CUR MEDS BY ELIG CLIN: HCPCS | Performed by: INTERNAL MEDICINE

## 2025-03-31 RX ORDER — METHYLPREDNISOLONE 4 MG/1
TABLET ORAL
Qty: 1 KIT | Refills: 0 | Status: SHIPPED | OUTPATIENT
Start: 2025-03-31

## 2025-03-31 RX ORDER — OSELTAMIVIR PHOSPHATE 75 MG/1
75 CAPSULE ORAL 2 TIMES DAILY
Qty: 10 CAPSULE | Refills: 0 | Status: SHIPPED | OUTPATIENT
Start: 2025-03-31 | End: 2025-04-05

## 2025-03-31 SDOH — ECONOMIC STABILITY: FOOD INSECURITY: WITHIN THE PAST 12 MONTHS, THE FOOD YOU BOUGHT JUST DIDN'T LAST AND YOU DIDN'T HAVE MONEY TO GET MORE.: NEVER TRUE

## 2025-03-31 SDOH — ECONOMIC STABILITY: FOOD INSECURITY: WITHIN THE PAST 12 MONTHS, YOU WORRIED THAT YOUR FOOD WOULD RUN OUT BEFORE YOU GOT MONEY TO BUY MORE.: NEVER TRUE

## 2025-03-31 ASSESSMENT — PATIENT HEALTH QUESTIONNAIRE - PHQ9
2. FEELING DOWN, DEPRESSED OR HOPELESS: NOT AT ALL
SUM OF ALL RESPONSES TO PHQ QUESTIONS 1-9: 0
SUM OF ALL RESPONSES TO PHQ QUESTIONS 1-9: 0
1. LITTLE INTEREST OR PLEASURE IN DOING THINGS: NOT AT ALL
SUM OF ALL RESPONSES TO PHQ QUESTIONS 1-9: 0
SUM OF ALL RESPONSES TO PHQ QUESTIONS 1-9: 0

## 2025-03-31 ASSESSMENT — ENCOUNTER SYMPTOMS
CONSTIPATION: 0
SINUS PRESSURE: 1
WHEEZING: 0
FLU SYMPTOMS: 1
SHORTNESS OF BREATH: 1
CHEST TIGHTNESS: 0
VISUAL CHANGE: 0
STRIDOR: 0
BLOOD IN STOOL: 0
VOMITING: 0
CHANGE IN BOWEL HABIT: 0
DIARRHEA: 1
SWOLLEN GLANDS: 0
NAUSEA: 0
ANAL BLEEDING: 0
ABDOMINAL PAIN: 0
SORE THROAT: 0
CHOKING: 0
COUGH: 1

## 2025-03-31 NOTE — PROGRESS NOTES
Site: Right Upper Arm, Patient Position: Sitting, BP Cuff Size: Medium Adult)   Pulse 65   Temp 97.6 °F (36.4 °C)   Wt 90.7 kg (199 lb 14.4 oz)   SpO2 (!) 89%   BMI 27.11 kg/m²   Physical Exam  Vitals and nursing note reviewed.   Constitutional:       Appearance: He is well-developed.   HENT:      Right Ear: Hearing and external ear normal.      Left Ear: Hearing and external ear normal.      Nose: Mucosal edema, congestion and rhinorrhea present.      Mouth/Throat:      Mouth: Mucous membranes are moist.      Pharynx: Uvula midline. No oropharyngeal exudate or posterior oropharyngeal erythema.   Cardiovascular:      Rate and Rhythm: Normal rate and regular rhythm.      Heart sounds: Normal heart sounds. No murmur heard.     No friction rub. No gallop.   Pulmonary:      Effort: Pulmonary effort is normal. No respiratory distress.      Breath sounds: No wheezing.   Abdominal:      General: Bowel sounds are normal.      Palpations: Abdomen is soft. There is no mass.      Tenderness: There is no abdominal tenderness. There is no guarding.   Musculoskeletal:         General: Normal range of motion.   Lymphadenopathy:      Cervical: Cervical adenopathy present.      Right cervical: Superficial cervical adenopathy and posterior cervical adenopathy present.      Left cervical: Superficial cervical adenopathy and posterior cervical adenopathy present.   Neurological:      Mental Status: He is alert.           Data Review       Health Maintenance Due   Topic Date Due    Colorectal Cancer Screen  Never done           Patient given educational materials- see patient instructions.  Discussed use, benefit, and side effects of prescribedmedications.  All patient questions answered.  Pt voiced understanding. Reviewedhealth maintenance.  Instructed to continue current medications, diet and exercise.Patient agreed with treatment plan. Follow up as directed.     Electronically signedby ADRIÁN CORRALES MD on 3/31/2025

## 2025-04-03 ENCOUNTER — TELEPHONE (OUTPATIENT)
Dept: FAMILY MEDICINE CLINIC | Age: 75
End: 2025-04-03

## 2025-04-03 NOTE — TELEPHONE ENCOUNTER
Patient contacted office with concern of breathing issues and diarrhea.     Patient stated he has oxygen at home and put it on, which brought his oxygen to 88 on 2L. Patient stated he has breathing issues already and follows with Dr. Vega. Advised patient that he contact their office to see if they want him to do any breathing treatments, increase his O2, and/or go to ER. Patient agreeable.      Spoke to SARTHAK Gutierrez- stated tamiflu can cause diarrhea. Advised patient of this. He will continue medication as long as he can handle the diarrhea

## 2025-04-03 NOTE — TELEPHONE ENCOUNTER
I called and spoke with patient to see how he is doing and patient stated that he is feeling a lot better his oxygen went up to 98 but patient did remove oxygen and stated it had been off for an hour or so. While I was speaking to him he checked his O2 again and it was at 84.  I asked patient to please put his oxygen back on and to keep his stats very close to 90 at lowest.  Patient verbalized understanding.

## 2025-04-03 NOTE — TELEPHONE ENCOUNTER
Reached out to pulm due to not seeing that patient contacted them. She will route message to provider

## 2025-04-14 ENCOUNTER — TELEPHONE (OUTPATIENT)
Dept: FAMILY MEDICINE CLINIC | Age: 75
End: 2025-04-14

## 2025-04-14 RX ORDER — PREDNISONE 10 MG/1
TABLET ORAL
Qty: 30 TABLET | Refills: 0 | Status: SHIPPED | OUTPATIENT
Start: 2025-04-14

## 2025-04-14 RX ORDER — PREDNISONE 10 MG/1
TABLET ORAL
Qty: 30 TABLET | Refills: 0 | Status: SHIPPED | OUTPATIENT
Start: 2025-04-14 | End: 2025-04-14

## 2025-04-14 NOTE — TELEPHONE ENCOUNTER
Patient contacted office in regards to still having some congestion and mucus after finishing medications from positive Flu A on 3/31/25.    Patient stated he is doing 5-6 breathing treatments a day and it is helping break things up. He stated the mucus is clear in color. He stated he just can't \"kick it\". He is asking if he can get another steroid or something to help      Stevehallier on [facility]

## 2025-04-14 NOTE — TELEPHONE ENCOUNTER
Prednisone taper called in.  It is not uncommon for people to take 4 to 6 weeks to recover from influenza-I am not sure he needs antibiotics.  Additional steroid taper called.  Was initially called into mail order pharmacy in error, new Rx sent to [facility].

## 2025-06-18 ENCOUNTER — HOSPITAL ENCOUNTER (OUTPATIENT)
Age: 75
Setting detail: SPECIMEN
Discharge: HOME | End: 2025-06-18

## 2025-06-18 ENCOUNTER — OFFICE VISIT (OUTPATIENT)
Dept: FAMILY MEDICINE CLINIC | Age: 75
End: 2025-06-18
Payer: MEDICARE

## 2025-06-18 VITALS
BODY MASS INDEX: 24.87 KG/M2 | DIASTOLIC BLOOD PRESSURE: 58 MMHG | HEART RATE: 83 BPM | WEIGHT: 183.4 LBS | OXYGEN SATURATION: 98 % | SYSTOLIC BLOOD PRESSURE: 102 MMHG

## 2025-06-18 DIAGNOSIS — Z13.0 SCREENING, ANEMIA, DEFICIENCY, IRON: ICD-10-CM

## 2025-06-18 DIAGNOSIS — E78.5 HYPERLIPIDEMIA, UNSPECIFIED HYPERLIPIDEMIA TYPE: ICD-10-CM

## 2025-06-18 DIAGNOSIS — J96.11 CHRONIC RESPIRATORY FAILURE WITH HYPOXIA (HCC): ICD-10-CM

## 2025-06-18 DIAGNOSIS — I50.22 CHRONIC SYSTOLIC CONGESTIVE HEART FAILURE (HCC): ICD-10-CM

## 2025-06-18 DIAGNOSIS — I10 ESSENTIAL HYPERTENSION, BENIGN: ICD-10-CM

## 2025-06-18 DIAGNOSIS — Z13.29 SCREENING FOR THYROID DISORDER: ICD-10-CM

## 2025-06-18 DIAGNOSIS — Z95.1 S/P CABG X 3: Chronic | ICD-10-CM

## 2025-06-18 DIAGNOSIS — R29.898 LEFT ARM WEAKNESS: Primary | ICD-10-CM

## 2025-06-18 DIAGNOSIS — J43.9 PULMONARY EMPHYSEMA, UNSPECIFIED EMPHYSEMA TYPE (HCC): ICD-10-CM

## 2025-06-18 DIAGNOSIS — I25.10 CORONARY ARTERY DISEASE INVOLVING NATIVE CORONARY ARTERY OF NATIVE HEART WITHOUT ANGINA PECTORIS: ICD-10-CM

## 2025-06-18 DIAGNOSIS — I25.2 HISTORY OF NON-ST ELEVATION MYOCARDIAL INFARCTION (NSTEMI): ICD-10-CM

## 2025-06-18 LAB
ALBUMIN SERPL-MCNC: 4.2 G/DL (ref 3.5–5.2)
ALBUMIN/GLOB SERPL: 1.5 {RATIO} (ref 1–2.5)
ALP SERPL-CCNC: 183 U/L (ref 40–129)
ALT SERPL-CCNC: 9 U/L (ref 10–50)
ANION GAP SERPL CALCULATED.3IONS-SCNC: 11 MMOL/L (ref 9–16)
AST SERPL-CCNC: 17 U/L (ref 10–50)
BASOPHILS # BLD: 0.04 K/UL (ref 0–0.2)
BASOPHILS NFR BLD: 0 % (ref 0–2)
BILIRUB SERPL-MCNC: 1 MG/DL (ref 0–1.2)
BUN SERPL-MCNC: 20 MG/DL (ref 8–23)
CALCIUM SERPL-MCNC: 9.7 MG/DL (ref 8.6–10.4)
CHLORIDE SERPL-SCNC: 99 MMOL/L (ref 98–107)
CO2 SERPL-SCNC: 27 MMOL/L (ref 20–31)
CREAT SERPL-MCNC: 1.5 MG/DL (ref 0.7–1.2)
EOSINOPHIL # BLD: 0.23 K/UL (ref 0–0.44)
EOSINOPHILS RELATIVE PERCENT: 2 % (ref 1–4)
ERYTHROCYTE [DISTWIDTH] IN BLOOD BY AUTOMATED COUNT: 13.1 % (ref 11.8–14.4)
GFR, ESTIMATED: 49 ML/MIN/1.73M2
GLUCOSE SERPL-MCNC: 91 MG/DL (ref 74–99)
HCT VFR BLD AUTO: 44.3 % (ref 40.7–50.3)
HGB BLD-MCNC: 14.1 G/DL (ref 13–17)
IMM GRANULOCYTES # BLD AUTO: 0.04 K/UL (ref 0–0.3)
IMM GRANULOCYTES NFR BLD: 0 %
LYMPHOCYTES NFR BLD: 2.05 K/UL (ref 1.1–3.7)
LYMPHOCYTES RELATIVE PERCENT: 20 % (ref 24–43)
MCH RBC QN AUTO: 28.8 PG (ref 25.2–33.5)
MCHC RBC AUTO-ENTMCNC: 31.8 G/DL (ref 28.4–34.8)
MCV RBC AUTO: 90.6 FL (ref 82.6–102.9)
MONOCYTES NFR BLD: 0.8 K/UL (ref 0.1–1.2)
MONOCYTES NFR BLD: 8 % (ref 3–12)
NEUTROPHILS NFR BLD: 70 % (ref 36–65)
NEUTS SEG NFR BLD: 6.97 K/UL (ref 1.5–8.1)
NRBC BLD-RTO: 0 PER 100 WBC
PLATELET # BLD AUTO: 193 K/UL (ref 138–453)
PMV BLD AUTO: 10.2 FL (ref 8.1–13.5)
POTASSIUM SERPL-SCNC: 5.2 MMOL/L (ref 3.7–5.3)
PROT SERPL-MCNC: 7 G/DL (ref 6.6–8.7)
RBC # BLD AUTO: 4.89 M/UL (ref 4.21–5.77)
SODIUM SERPL-SCNC: 137 MMOL/L (ref 136–145)
T4 FREE SERPL-MCNC: 1.1 NG/DL (ref 0.9–1.7)
TSH SERPL DL<=0.05 MIU/L-ACNC: 4.7 UIU/ML (ref 0.27–4.2)
WBC OTHER # BLD: 10.1 K/UL (ref 3.5–11.3)

## 2025-06-18 PROCEDURE — 3074F SYST BP LT 130 MM HG: CPT | Performed by: INTERNAL MEDICINE

## 2025-06-18 PROCEDURE — 3023F SPIROM DOC REV: CPT | Performed by: INTERNAL MEDICINE

## 2025-06-18 PROCEDURE — 3078F DIAST BP <80 MM HG: CPT | Performed by: INTERNAL MEDICINE

## 2025-06-18 PROCEDURE — 99214 OFFICE O/P EST MOD 30 MIN: CPT | Performed by: INTERNAL MEDICINE

## 2025-06-18 PROCEDURE — 3017F COLORECTAL CA SCREEN DOC REV: CPT | Performed by: INTERNAL MEDICINE

## 2025-06-18 PROCEDURE — 1123F ACP DISCUSS/DSCN MKR DOCD: CPT | Performed by: INTERNAL MEDICINE

## 2025-06-18 PROCEDURE — G8427 DOCREV CUR MEDS BY ELIG CLIN: HCPCS | Performed by: INTERNAL MEDICINE

## 2025-06-18 PROCEDURE — 1036F TOBACCO NON-USER: CPT | Performed by: INTERNAL MEDICINE

## 2025-06-18 PROCEDURE — G8420 CALC BMI NORM PARAMETERS: HCPCS | Performed by: INTERNAL MEDICINE

## 2025-06-18 NOTE — PROGRESS NOTES
[facility]  [facility]  [address] [place] [address]  Dept: [phone]  Dept Fax: [phone]      Marcus oDbbs is a 74 y.o. male who presents today for hismedical conditions/complaints as noted below.  Marcus Dobbs is c/o of 6 Month Follow-Up, Coronary Artery Disease (F/u), Hyperlipidemia (F/u), and Other (Pt thinks he may have had a mini stroke, last Thursday night he went to put his oxygen on to sleep at night, [facility], was woken up abut 2 in the morning, was gagging, took oxygen off, LT arm and hand he had loss of feeling, no chest pain, breathing was heavy LT hand still feeling numb, has a hard time using hand, tried to golf last night could not  the golf club, has hard time getting dentures out, states he stats to feel really crappy, takes a nap and feel better )        Assessment/Plan:     1. Left arm weakness  -     CTA HEAD NECK W CONTRAST; Future  2. S/P CABG x 3  3. Essential hypertension, benign  -     Comprehensive Metabolic Panel; Future  4. Hyperlipidemia, unspecified hyperlipidemia type  -     Comprehensive Metabolic Panel; Future  5. History of non-ST elevation myocardial infarction (NSTEMI)  6. Chronic systolic congestive heart failure (HCC)  7. Coronary artery disease involving native coronary artery of native heart without angina pectoris  8. Chronic respiratory failure with hypoxia (HCC)  9. Pulmonary emphysema, unspecified emphysema type (HCC)  10. Screening, anemia, deficiency, iron  -     CBC with Auto Differential; Future  11. Screening for thyroid disorder  -     TSH reflex to FT4; Future          No follow-ups on file.      HPI     He was sleeping with oxygen on 6/12/25 - woke up gagging, thinks he was getting too much flow, went to roll over and found his entire left arm was numb and he couldn't support himself. Normally left arm is numb forearm down. Since then he has noticed left hand weakness - unable to

## 2025-06-20 ENCOUNTER — HOSPITAL ENCOUNTER (OUTPATIENT)
Age: 75
Discharge: HOME | End: 2025-06-22
Payer: MEDICARE

## 2025-06-20 DIAGNOSIS — R29.898 LEFT ARM WEAKNESS: ICD-10-CM

## 2025-06-20 PROCEDURE — 70496 CT ANGIOGRAPHY HEAD: CPT

## 2025-06-20 PROCEDURE — 6360000004 HC RX CONTRAST MEDICATION: Performed by: INTERNAL MEDICINE

## 2025-06-20 RX ORDER — IOPAMIDOL 755 MG/ML
75 INJECTION, SOLUTION INTRAVASCULAR
Status: COMPLETED | OUTPATIENT
Start: 2025-06-20 | End: 2025-06-20

## 2025-06-20 RX ADMIN — IOPAMIDOL 75 ML: 755 INJECTION, SOLUTION INTRAVENOUS at 10:07

## 2025-06-23 ENCOUNTER — APPOINTMENT (OUTPATIENT)
Dept: CT IMAGING | Age: 75
DRG: 070 | End: 2025-06-23
Payer: OTHER GOVERNMENT

## 2025-06-23 ENCOUNTER — HOSPITAL ENCOUNTER (INPATIENT)
Age: 75
LOS: 2 days | Discharge: HOME HEALTH | DRG: 070 | End: 2025-06-25
Attending: EMERGENCY MEDICINE | Admitting: PSYCHIATRY & NEUROLOGY
Payer: OTHER GOVERNMENT

## 2025-06-23 DIAGNOSIS — R53.1 LEFT-SIDED WEAKNESS: ICD-10-CM

## 2025-06-23 DIAGNOSIS — I63.9 CEREBROVASCULAR ACCIDENT (CVA), UNSPECIFIED MECHANISM (HCC): Primary | ICD-10-CM

## 2025-06-23 PROBLEM — G93.89 BRAIN MASS: Status: ACTIVE | Noted: 2025-06-23

## 2025-06-23 LAB
ANION GAP SERPL CALCULATED.3IONS-SCNC: 14 MMOL/L (ref 9–16)
BASOPHILS # BLD: 0.04 K/UL (ref 0–0.2)
BASOPHILS NFR BLD: 0 % (ref 0–2)
BUN BLD-MCNC: 19 MG/DL (ref 8–26)
BUN SERPL-MCNC: 19 MG/DL (ref 8–23)
CA-I BLD-SCNC: 1.23 MMOL/L (ref 1.15–1.33)
CALCIUM SERPL-MCNC: 9.2 MG/DL (ref 8.6–10.4)
CHLORIDE BLD-SCNC: 104 MMOL/L (ref 98–107)
CHLORIDE SERPL-SCNC: 101 MMOL/L (ref 98–107)
CK SERPL-CCNC: 30 U/L (ref 39–308)
CO2 BLD CALC-SCNC: 28 MMOL/L (ref 22–30)
CO2 SERPL-SCNC: 23 MMOL/L (ref 20–31)
CREAT SERPL-MCNC: 1.2 MG/DL (ref 0.7–1.2)
EGFR, POC: 71 ML/MIN/1.73M2
EOSINOPHIL # BLD: 0.25 K/UL (ref 0–0.44)
EOSINOPHILS RELATIVE PERCENT: 3 % (ref 1–4)
ERYTHROCYTE [DISTWIDTH] IN BLOOD BY AUTOMATED COUNT: 12.9 % (ref 11.8–14.4)
GFR, ESTIMATED: 64 ML/MIN/1.73M2
GLUCOSE BLD-MCNC: 92 MG/DL (ref 74–100)
GLUCOSE SERPL-MCNC: 89 MG/DL (ref 74–99)
HCO3 VENOUS: 29.2 MMOL/L (ref 22–29)
HCT VFR BLD AUTO: 43.2 % (ref 40.7–50.3)
HCT VFR BLD AUTO: 45 % (ref 41–53)
HGB BLD-MCNC: 13.8 G/DL (ref 13–17)
IMM GRANULOCYTES # BLD AUTO: 0.04 K/UL (ref 0–0.3)
IMM GRANULOCYTES NFR BLD: 0 %
INR PPP: 1
LYMPHOCYTES NFR BLD: 1.88 K/UL (ref 1.1–3.7)
LYMPHOCYTES RELATIVE PERCENT: 20 % (ref 24–43)
MCH RBC QN AUTO: 28.8 PG (ref 25.2–33.5)
MCHC RBC AUTO-ENTMCNC: 31.9 G/DL (ref 28.4–34.8)
MCV RBC AUTO: 90 FL (ref 82.6–102.9)
MONOCYTES NFR BLD: 0.66 K/UL (ref 0.1–1.2)
MONOCYTES NFR BLD: 7 % (ref 3–12)
MYOGLOBIN SERPL-MCNC: 47 NG/ML (ref 28–72)
NEUTROPHILS NFR BLD: 70 % (ref 36–65)
NEUTS SEG NFR BLD: 6.78 K/UL (ref 1.5–8.1)
NRBC BLD-RTO: 0 PER 100 WBC
O2 SAT, VEN: 76.7 % (ref 60–85)
PARTIAL THROMBOPLASTIN TIME: 26.6 SEC (ref 23–36.5)
PCO2 VENOUS: 43.3 MM HG (ref 41–51)
PH VENOUS: 7.44 (ref 7.32–7.43)
PLATELET # BLD AUTO: 189 K/UL (ref 138–453)
PMV BLD AUTO: 9.7 FL (ref 8.1–13.5)
PO2 VENOUS: 40.3 MM HG (ref 30–50)
POC ANION GAP: 9 MMOL/L (ref 7–16)
POC CREATININE: 1.1 MG/DL (ref 0.51–1.19)
POC HEMOGLOBIN (CALC): 15.3 G/DL (ref 13.5–17.5)
POC LACTIC ACID: 0.8 MMOL/L (ref 0.56–1.39)
POSITIVE BASE EXCESS, VEN: 4.3 MMOL/L (ref 0–3)
POTASSIUM BLD-SCNC: 4 MMOL/L (ref 3.5–4.5)
POTASSIUM SERPL-SCNC: 4.3 MMOL/L (ref 3.7–5.3)
PROTHROMBIN TIME: 13.5 SEC (ref 11.7–14.9)
RBC # BLD AUTO: 4.8 M/UL (ref 4.21–5.77)
SODIUM BLD-SCNC: 140 MMOL/L (ref 138–146)
SODIUM SERPL-SCNC: 138 MMOL/L (ref 136–145)
TROPONIN I SERPL HS-MCNC: 12 NG/L (ref 0–22)
WBC OTHER # BLD: 9.7 K/UL (ref 3.5–11.3)

## 2025-06-23 PROCEDURE — 83605 ASSAY OF LACTIC ACID: CPT

## 2025-06-23 PROCEDURE — 82550 ASSAY OF CK (CPK): CPT

## 2025-06-23 PROCEDURE — 6370000000 HC RX 637 (ALT 250 FOR IP)

## 2025-06-23 PROCEDURE — 6360000002 HC RX W HCPCS

## 2025-06-23 PROCEDURE — 84484 ASSAY OF TROPONIN QUANT: CPT

## 2025-06-23 PROCEDURE — 82553 CREATINE MB FRACTION: CPT

## 2025-06-23 PROCEDURE — 80051 ELECTROLYTE PANEL: CPT

## 2025-06-23 PROCEDURE — 85730 THROMBOPLASTIN TIME PARTIAL: CPT

## 2025-06-23 PROCEDURE — 85610 PROTHROMBIN TIME: CPT

## 2025-06-23 PROCEDURE — 6370000000 HC RX 637 (ALT 250 FOR IP): Performed by: STUDENT IN AN ORGANIZED HEALTH CARE EDUCATION/TRAINING PROGRAM

## 2025-06-23 PROCEDURE — 71260 CT THORAX DX C+: CPT

## 2025-06-23 PROCEDURE — 82565 ASSAY OF CREATININE: CPT

## 2025-06-23 PROCEDURE — 85014 HEMATOCRIT: CPT

## 2025-06-23 PROCEDURE — 82947 ASSAY GLUCOSE BLOOD QUANT: CPT

## 2025-06-23 PROCEDURE — 6360000002 HC RX W HCPCS: Performed by: STUDENT IN AN ORGANIZED HEALTH CARE EDUCATION/TRAINING PROGRAM

## 2025-06-23 PROCEDURE — 84520 ASSAY OF UREA NITROGEN: CPT

## 2025-06-23 PROCEDURE — 2500000003 HC RX 250 WO HCPCS

## 2025-06-23 PROCEDURE — 93005 ELECTROCARDIOGRAM TRACING: CPT

## 2025-06-23 PROCEDURE — 85025 COMPLETE CBC W/AUTO DIFF WBC: CPT

## 2025-06-23 PROCEDURE — 82803 BLOOD GASES ANY COMBINATION: CPT

## 2025-06-23 PROCEDURE — 6360000004 HC RX CONTRAST MEDICATION

## 2025-06-23 PROCEDURE — 70496 CT ANGIOGRAPHY HEAD: CPT

## 2025-06-23 PROCEDURE — 99285 EMERGENCY DEPT VISIT HI MDM: CPT

## 2025-06-23 PROCEDURE — 80048 BASIC METABOLIC PNL TOTAL CA: CPT

## 2025-06-23 PROCEDURE — 6360000004 HC RX CONTRAST MEDICATION: Performed by: NURSE PRACTITIONER

## 2025-06-23 PROCEDURE — 83874 ASSAY OF MYOGLOBIN: CPT

## 2025-06-23 PROCEDURE — 2060000000 HC ICU INTERMEDIATE R&B

## 2025-06-23 PROCEDURE — 82330 ASSAY OF CALCIUM: CPT

## 2025-06-23 PROCEDURE — 70450 CT HEAD/BRAIN W/O DYE: CPT

## 2025-06-23 RX ORDER — ATORVASTATIN CALCIUM 40 MG/1
40 TABLET, FILM COATED ORAL NIGHTLY
Status: DISCONTINUED | OUTPATIENT
Start: 2025-06-23 | End: 2025-06-25 | Stop reason: HOSPADM

## 2025-06-23 RX ORDER — LEVETIRACETAM 500 MG/1
500 TABLET ORAL 2 TIMES DAILY
Status: DISCONTINUED | OUTPATIENT
Start: 2025-06-23 | End: 2025-06-25 | Stop reason: HOSPADM

## 2025-06-23 RX ORDER — DEXAMETHASONE SODIUM PHOSPHATE 4 MG/ML
4 INJECTION, SOLUTION INTRA-ARTICULAR; INTRALESIONAL; INTRAMUSCULAR; INTRAVENOUS; SOFT TISSUE EVERY 6 HOURS
Status: DISCONTINUED | OUTPATIENT
Start: 2025-06-23 | End: 2025-06-25

## 2025-06-23 RX ORDER — ONDANSETRON 2 MG/ML
4 INJECTION INTRAMUSCULAR; INTRAVENOUS EVERY 6 HOURS PRN
Status: DISCONTINUED | OUTPATIENT
Start: 2025-06-23 | End: 2025-06-25 | Stop reason: HOSPADM

## 2025-06-23 RX ORDER — LORAZEPAM 2 MG/ML
4 INJECTION INTRAMUSCULAR EVERY 5 MIN PRN
Status: DISCONTINUED | OUTPATIENT
Start: 2025-06-23 | End: 2025-06-25 | Stop reason: HOSPADM

## 2025-06-23 RX ORDER — ENOXAPARIN SODIUM 100 MG/ML
40 INJECTION SUBCUTANEOUS DAILY
Status: DISCONTINUED | OUTPATIENT
Start: 2025-06-23 | End: 2025-06-25 | Stop reason: HOSPADM

## 2025-06-23 RX ORDER — IOPAMIDOL 755 MG/ML
75 INJECTION, SOLUTION INTRAVASCULAR
Status: COMPLETED | OUTPATIENT
Start: 2025-06-23 | End: 2025-06-23

## 2025-06-23 RX ORDER — SODIUM CHLORIDE 0.9 % (FLUSH) 0.9 %
5-40 SYRINGE (ML) INJECTION PRN
Status: DISCONTINUED | OUTPATIENT
Start: 2025-06-23 | End: 2025-06-25 | Stop reason: HOSPADM

## 2025-06-23 RX ORDER — MAGNESIUM SULFATE IN WATER 40 MG/ML
2000 INJECTION, SOLUTION INTRAVENOUS PRN
Status: DISCONTINUED | OUTPATIENT
Start: 2025-06-23 | End: 2025-06-25 | Stop reason: HOSPADM

## 2025-06-23 RX ORDER — SODIUM CHLORIDE 0.9 % (FLUSH) 0.9 %
5-40 SYRINGE (ML) INJECTION EVERY 12 HOURS SCHEDULED
Status: DISCONTINUED | OUTPATIENT
Start: 2025-06-23 | End: 2025-06-25 | Stop reason: HOSPADM

## 2025-06-23 RX ORDER — TAMSULOSIN HYDROCHLORIDE 0.4 MG/1
0.4 CAPSULE ORAL 2 TIMES DAILY
Status: DISCONTINUED | OUTPATIENT
Start: 2025-06-23 | End: 2025-06-25 | Stop reason: HOSPADM

## 2025-06-23 RX ORDER — ACETAMINOPHEN 650 MG/1
650 SUPPOSITORY RECTAL EVERY 6 HOURS PRN
Status: DISCONTINUED | OUTPATIENT
Start: 2025-06-23 | End: 2025-06-25 | Stop reason: HOSPADM

## 2025-06-23 RX ORDER — TAMSULOSIN HYDROCHLORIDE 0.4 MG/1
0.4 CAPSULE ORAL ONCE
Status: COMPLETED | OUTPATIENT
Start: 2025-06-23 | End: 2025-06-23

## 2025-06-23 RX ORDER — IOPAMIDOL 755 MG/ML
90 INJECTION, SOLUTION INTRAVASCULAR
Status: COMPLETED | OUTPATIENT
Start: 2025-06-23 | End: 2025-06-23

## 2025-06-23 RX ORDER — SODIUM CHLORIDE 9 MG/ML
INJECTION, SOLUTION INTRAVENOUS PRN
Status: DISCONTINUED | OUTPATIENT
Start: 2025-06-23 | End: 2025-06-25 | Stop reason: HOSPADM

## 2025-06-23 RX ORDER — ACETAMINOPHEN 325 MG/1
650 TABLET ORAL EVERY 6 HOURS PRN
Status: DISCONTINUED | OUTPATIENT
Start: 2025-06-23 | End: 2025-06-25 | Stop reason: HOSPADM

## 2025-06-23 RX ORDER — DEXAMETHASONE SODIUM PHOSPHATE 10 MG/ML
10 INJECTION, SOLUTION INTRAMUSCULAR; INTRAVENOUS ONCE
Status: COMPLETED | OUTPATIENT
Start: 2025-06-23 | End: 2025-06-23

## 2025-06-23 RX ORDER — ONDANSETRON 4 MG/1
4 TABLET, ORALLY DISINTEGRATING ORAL EVERY 8 HOURS PRN
Status: DISCONTINUED | OUTPATIENT
Start: 2025-06-23 | End: 2025-06-25 | Stop reason: HOSPADM

## 2025-06-23 RX ORDER — ASPIRIN 81 MG/1
81 TABLET ORAL DAILY
Status: DISCONTINUED | OUTPATIENT
Start: 2025-06-23 | End: 2025-06-25 | Stop reason: HOSPADM

## 2025-06-23 RX ORDER — POTASSIUM CHLORIDE 7.45 MG/ML
10 INJECTION INTRAVENOUS PRN
Status: DISCONTINUED | OUTPATIENT
Start: 2025-06-23 | End: 2025-06-25 | Stop reason: HOSPADM

## 2025-06-23 RX ORDER — POLYETHYLENE GLYCOL 3350 17 G/17G
17 POWDER, FOR SOLUTION ORAL DAILY PRN
Status: DISCONTINUED | OUTPATIENT
Start: 2025-06-23 | End: 2025-06-25 | Stop reason: HOSPADM

## 2025-06-23 RX ORDER — POTASSIUM CHLORIDE 1500 MG/1
40 TABLET, EXTENDED RELEASE ORAL PRN
Status: DISCONTINUED | OUTPATIENT
Start: 2025-06-23 | End: 2025-06-25 | Stop reason: HOSPADM

## 2025-06-23 RX ADMIN — LEVETIRACETAM 500 MG: 500 TABLET, FILM COATED ORAL at 21:48

## 2025-06-23 RX ADMIN — ASPIRIN 81 MG: 81 TABLET, COATED ORAL at 15:10

## 2025-06-23 RX ADMIN — DEXAMETHASONE SODIUM PHOSPHATE 10 MG: 10 INJECTION, SOLUTION INTRAMUSCULAR; INTRAVENOUS at 12:31

## 2025-06-23 RX ADMIN — ENOXAPARIN SODIUM 40 MG: 100 INJECTION SUBCUTANEOUS at 15:11

## 2025-06-23 RX ADMIN — ACETAMINOPHEN 650 MG: 325 TABLET ORAL at 15:10

## 2025-06-23 RX ADMIN — IOPAMIDOL 75 ML: 755 INJECTION, SOLUTION INTRAVENOUS at 14:44

## 2025-06-23 RX ADMIN — TAMSULOSIN HYDROCHLORIDE 0.4 MG: 0.4 CAPSULE ORAL at 10:59

## 2025-06-23 RX ADMIN — SODIUM CHLORIDE, PRESERVATIVE FREE 10 ML: 5 INJECTION INTRAVENOUS at 21:49

## 2025-06-23 RX ADMIN — ATORVASTATIN CALCIUM 40 MG: 40 TABLET, FILM COATED ORAL at 21:48

## 2025-06-23 RX ADMIN — TAMSULOSIN HYDROCHLORIDE 0.4 MG: 0.4 CAPSULE ORAL at 22:50

## 2025-06-23 RX ADMIN — TAMSULOSIN HYDROCHLORIDE 0.4 MG: 0.4 CAPSULE ORAL at 22:51

## 2025-06-23 RX ADMIN — DEXAMETHASONE SODIUM PHOSPHATE 4 MG: 4 INJECTION, SOLUTION INTRAMUSCULAR; INTRAVENOUS at 21:48

## 2025-06-23 RX ADMIN — IOPAMIDOL 90 ML: 755 INJECTION, SOLUTION INTRAVENOUS at 08:15

## 2025-06-23 RX ADMIN — LEVETIRACETAM 500 MG: 500 TABLET, FILM COATED ORAL at 12:33

## 2025-06-23 ASSESSMENT — ENCOUNTER SYMPTOMS
SHORTNESS OF BREATH: 0
NAUSEA: 0
DIARRHEA: 0
CHEST TIGHTNESS: 0
BACK PAIN: 0
SORE THROAT: 0
COUGH: 0
COLOR CHANGE: 0
ABDOMINAL PAIN: 0
ABDOMINAL DISTENTION: 0

## 2025-06-23 NOTE — H&P
[facility] Neurology   IN-PATIENT SERVICE   [facility]    HISTORY AND PHYSICAL EXAMINATION            Date:   6/23/2025  Patient name:  Marcus Dobbs  Date of admission:  6/23/2025  7:45 AM  MRN:   [id number]  Account:  [id number]  YOB: 1950  PCP:    Alondra Ching MD  Room:   22/22  Code Status:    Prior    Chief Complaint:     Chief Complaint   Patient presents with   • Extremity Weakness     Left side        History Obtained From:     patient    History of Present Illness:     75-year-old male with history of CAD status post CABG x 3 on aspirin Plavix compliant, hyperlipidemia, sleep apnea, hypertension, non-STEMI, emphysema experience left-sided weakness and numbness 6/12/2025.  He thought he had slept on his arm funny so he did not go to the hospital.  However he did go to his PCP where she obtained a CT head and a CTA but it has not been read yet.  Today he presents to the hospital after his daughter spoke to him on the phone she thought he was kind of confused and slurring his words more than usual.  However it did show that patient had a right-sided frontal mass with vasogenic edema less likely a stroke.  I was told that he has a pacemaker incompatible MRI leads however I will try to do the MRI checklist and wait.  CTA did show severe stenosis and near occlusion just beyond the origin of left subclavian artery and slow flow to the proximal left vert but no significant stenosis or occlusion intracranially..  Neurosurgery will examine the patient and likely start him on Decadron and the patient will likely be admitted for workup of the tumor with MRI brain with and without contrast and then defer further planning for neurosurgery       LKW: 6/12/2025  NIHSS: 4  Modified [place] Scale: 2  SBP: 110  Glucose: 92  CT head without contrast: Right-sided hypodensity with a hyperdense focus likely mass  TNK: Not given, outside of window  Endovascular: Not consulted-but they did

## 2025-06-23 NOTE — ED NOTES
Pt to ED via EMS due to complaints of left arm numbness for 1 week, as well as new left sided weakness when he woke up this morning. He went to bed at 11pm and felt fine. Pt states he was recently seen and had a CT done for a TIA and that he has had this left arm numbness since. Pt states he has a hx of COPD, and open heart surgery. Pt is currently on stretcher with call light.

## 2025-06-23 NOTE — ED PROVIDER NOTES
[facility] EMERGENCY DEPARTMENT  Emergency Department Encounter  Emergency Medicine Resident     Pt Name:Marcus Dobbs  MRN: [id number]  Birthdate 1950  Date of evaluation: 6/23/25  PCP:  Alondra Ching MD  Note Started: 11:15 AM EDT      CHIEF COMPLAINT       Chief Complaint   Patient presents with    Extremity Weakness     Left side        HISTORY OF PRESENT ILLNESS  (Location/Symptom, Timing/Onset, Context/Setting, Quality, Duration, Modifying Factors, Severity.)      Marcus Dobbs is a 74 y.o. male who presents with left-sided weakness since 6/12.  Patient states upon awakening this morning he feels as if his left-sided weakness has worsened and he feels as if he is having difficulty with his speech.  He was seen previously by his primary care provider for this and had imaging of the head ordered, results are not yet available.    PAST MEDICAL / SURGICAL / SOCIAL / FAMILY HISTORY      has a past medical history of Acute upper respiratory infections of unspecified site, CAD (coronary artery disease), COPD (chronic obstructive pulmonary disease) (HCC), Empyema without mention of fistula, Esophageal reflux, Essential hypertension, benign, History of blood transfusion, Hx of blood clots, Localized osteoarthrosis not specified whether primary or secondary, unspecified site, Other and unspecified hyperlipidemia, Surgical or other procedure not carried out because of patient's decision, and Unspecified pleural effusion.       has a past surgical history that includes Lung decortication (Right, 2006); Coronary artery bypass graft (N/A, 12/27/2021); and Coronary artery bypass graft (12/27/2021).      Social History     Socioeconomic History    Marital status:      Spouse name: Not on file    Number of children: Not on file    Years of education: Not on file    Highest education level: Not on file   Occupational History    Not on file   Tobacco Use    Smoking status: Former     Current

## 2025-06-23 NOTE — ED NOTES
ED to inpatient nurses report      Chief Complaint:  Chief Complaint   Patient presents with    Extremity Weakness     Left side      Present to ED from: home    MOA:     LOC: alert and orientated to name, place, date  Mobility: Independent  Oxygen Baseline: 95%    Current needs required: room air    Pending ED orders: none   Present condition: stable     Why did the patient come to the ED? Left sided weakness  What is the plan? Nuerology  Any procedures or intervention occur? Ct, Labs   Any safety concerns?? No     Mental Status:       Psych Assessment:      Vital signs   Vitals:    06/23/25 0757   BP: 117/61   Pulse: 68   Resp: 16   Temp: 98 °F (36.7 °C)   TempSrc: Oral   SpO2: 95%        Vitals:  Patient Vitals for the past 24 hrs:   BP Temp Temp src Pulse Resp SpO2   06/23/25 0757 117/61 98 °F (36.7 °C) Oral 68 16 95 %      Visit Vitals  /61   Pulse 68   Temp 98 °F (36.7 °C) (Oral)   Resp 16   SpO2 95%        LDAs:   Peripheral IV 06/23/25 Proximal;Right Forearm (Active)       Ambulatory Status:  Presents to emergency department  because of falls (Syncope, seizure, or loss of consciousness): No, Age > 70: Yes, Altered Mental Status, Intoxication with alcohol or substance confusion (Disorientation, impaired judgment, poor safety awaremess, or inability to follow instructions): No, Impaired Mobility: Ambulates or transfers with assistive devices or assistance; Unable to ambulate or transer.: No, Nursing Judgement: No    Diagnosis:  DISPOSITION Decision To Admit 06/23/2025 09:01:35 AM   Final diagnoses:   Cerebrovascular accident (CVA), unspecified mechanism (HCC)   Left-sided weakness        Consults:  IP CONSULT TO STROKE TEAM     Treatment Team:   Treatment Team:   Ariel De La Fuente MD Gosch, Brandon M, MD Bates, Victoria, RN    Treatment:  ED Course as of 06/23/25 0906   Mon Jun 23, 2025   0859 CT HEAD WO CONTRAST  IMPRESSION:  Right frontal lobe mass with adjacent vasogenic edema.  MRI of the brain

## 2025-06-23 NOTE — ED PROVIDER NOTES
[facility] EMERGENCY DEPARTMENT     Emergency Department     Faculty Attestation    I performed a history and physical examination of the patient and discussed management with the resident. I reviewed the resident’s note and agree with the documented findings and plan of care. Any areas of disagreement are noted on the chart. I was personally present for the key portions of any procedures. I have documented in the chart those procedures where I was not present during the key portions. I have reviewed the emergency nurses triage note. I agree with the chief complaint, past medical history, past surgical history, allergies, medications, social and family history as documented unless otherwise noted below. For Physician Assistant/ Nurse Practitioner cases/documentation I have personally evaluated this patient and have completed at least one if not all key elements of the E/M (history, physical exam, and MDM). Additional findings are as noted.    Note Started: 7:57 AM EDT    Patient here with concerns for stroke.  Arrives by EMS provides independent history.  Patient has had left arm numbness coordination issues for over a week.  Chart review does show did see PCP as an outpatient had a CTA done last Friday.  However chart review shows images not read yet.  On exam awake alert and oriented appropriate x 4.  No facial asymmetry no slurring.  However does have left upper and left lower weakness arm more than leg compared with the right with decreased subjective sensation.  Strong pulses throughout.  Will call stroke consult repeat imaging as no read is done probable admit    EKG interpretation: Sinus rhythm 72.  Wide QRS at 146 with a right bundle branch block..  No acute ST or T changes given block.  Overall similar morphology to prior on 12/27/2023      Critical Care     CRITICAL CARE: There was a high probability of clinically significant/life threatening deterioration in this patient's condition which

## 2025-06-24 ENCOUNTER — TELEPHONE (OUTPATIENT)
Age: 75
End: 2025-06-24

## 2025-06-24 PROBLEM — I63.9 CEREBROVASCULAR ACCIDENT (CVA) (HCC): Status: ACTIVE | Noted: 2025-06-24

## 2025-06-24 PROBLEM — R53.1 LEFT-SIDED WEAKNESS: Status: ACTIVE | Noted: 2025-06-24

## 2025-06-24 PROBLEM — R91.1 NODULE OF UPPER LOBE OF RIGHT LUNG: Status: ACTIVE | Noted: 2025-06-24

## 2025-06-24 PROBLEM — K76.9 LIVER LESION: Status: ACTIVE | Noted: 2025-06-24

## 2025-06-24 LAB
EKG ATRIAL RATE: 72 BPM
EKG P AXIS: 43 DEGREES
EKG P-R INTERVAL: 200 MS
EKG Q-T INTERVAL: 430 MS
EKG QRS DURATION: 146 MS
EKG QTC CALCULATION (BAZETT): 470 MS
EKG R AXIS: -25 DEGREES
EKG T AXIS: 34 DEGREES
EKG VENTRICULAR RATE: 72 BPM
GLUCOSE BLD-MCNC: 139 MG/DL (ref 75–110)
GLUCOSE BLD-MCNC: 165 MG/DL (ref 75–110)

## 2025-06-24 PROCEDURE — 97535 SELF CARE MNGMENT TRAINING: CPT

## 2025-06-24 PROCEDURE — 6370000000 HC RX 637 (ALT 250 FOR IP): Performed by: INTERNAL MEDICINE

## 2025-06-24 PROCEDURE — 82947 ASSAY GLUCOSE BLOOD QUANT: CPT

## 2025-06-24 PROCEDURE — 93010 ELECTROCARDIOGRAM REPORT: CPT | Performed by: INTERNAL MEDICINE

## 2025-06-24 PROCEDURE — 2500000003 HC RX 250 WO HCPCS

## 2025-06-24 PROCEDURE — 97166 OT EVAL MOD COMPLEX 45 MIN: CPT

## 2025-06-24 PROCEDURE — 2060000000 HC ICU INTERMEDIATE R&B

## 2025-06-24 PROCEDURE — 94640 AIRWAY INHALATION TREATMENT: CPT

## 2025-06-24 PROCEDURE — 6370000000 HC RX 637 (ALT 250 FOR IP)

## 2025-06-24 PROCEDURE — 97116 GAIT TRAINING THERAPY: CPT

## 2025-06-24 PROCEDURE — 6360000002 HC RX W HCPCS

## 2025-06-24 PROCEDURE — 97162 PT EVAL MOD COMPLEX 30 MIN: CPT

## 2025-06-24 RX ORDER — PANTOPRAZOLE SODIUM 40 MG/1
40 TABLET, DELAYED RELEASE ORAL
Status: DISCONTINUED | OUTPATIENT
Start: 2025-06-24 | End: 2025-06-25 | Stop reason: HOSPADM

## 2025-06-24 RX ORDER — ALBUTEROL SULFATE 90 UG/1
2 INHALANT RESPIRATORY (INHALATION) EVERY 6 HOURS PRN
Status: DISCONTINUED | OUTPATIENT
Start: 2025-06-24 | End: 2025-06-25 | Stop reason: HOSPADM

## 2025-06-24 RX ORDER — BUDESONIDE AND FORMOTEROL FUMARATE DIHYDRATE 80; 4.5 UG/1; UG/1
2 AEROSOL RESPIRATORY (INHALATION)
Status: DISCONTINUED | OUTPATIENT
Start: 2025-06-24 | End: 2025-06-25 | Stop reason: HOSPADM

## 2025-06-24 RX ADMIN — LEVETIRACETAM 500 MG: 500 TABLET, FILM COATED ORAL at 08:37

## 2025-06-24 RX ADMIN — DEXAMETHASONE SODIUM PHOSPHATE 4 MG: 4 INJECTION, SOLUTION INTRAMUSCULAR; INTRAVENOUS at 20:54

## 2025-06-24 RX ADMIN — ATORVASTATIN CALCIUM 40 MG: 40 TABLET, FILM COATED ORAL at 20:54

## 2025-06-24 RX ADMIN — LEVETIRACETAM 500 MG: 500 TABLET, FILM COATED ORAL at 20:53

## 2025-06-24 RX ADMIN — BUDESONIDE AND FORMOTEROL FUMARATE DIHYDRATE 2 PUFF: 80; 4.5 AEROSOL RESPIRATORY (INHALATION) at 20:10

## 2025-06-24 RX ADMIN — ENOXAPARIN SODIUM 40 MG: 100 INJECTION SUBCUTANEOUS at 08:36

## 2025-06-24 RX ADMIN — DEXAMETHASONE SODIUM PHOSPHATE 4 MG: 4 INJECTION, SOLUTION INTRAMUSCULAR; INTRAVENOUS at 04:19

## 2025-06-24 RX ADMIN — DEXAMETHASONE SODIUM PHOSPHATE 4 MG: 4 INJECTION, SOLUTION INTRAMUSCULAR; INTRAVENOUS at 08:37

## 2025-06-24 RX ADMIN — SODIUM CHLORIDE, PRESERVATIVE FREE 10 ML: 5 INJECTION INTRAVENOUS at 08:37

## 2025-06-24 RX ADMIN — DEXAMETHASONE SODIUM PHOSPHATE 4 MG: 4 INJECTION, SOLUTION INTRAMUSCULAR; INTRAVENOUS at 15:12

## 2025-06-24 RX ADMIN — TAMSULOSIN HYDROCHLORIDE 0.4 MG: 0.4 CAPSULE ORAL at 20:53

## 2025-06-24 RX ADMIN — SODIUM CHLORIDE, PRESERVATIVE FREE 10 ML: 5 INJECTION INTRAVENOUS at 20:54

## 2025-06-24 RX ADMIN — POLYETHYLENE GLYCOL 3350 17 G: 17 POWDER, FOR SOLUTION ORAL at 12:00

## 2025-06-24 RX ADMIN — PANTOPRAZOLE SODIUM 40 MG: 40 TABLET, DELAYED RELEASE ORAL at 15:12

## 2025-06-24 RX ADMIN — TAMSULOSIN HYDROCHLORIDE 0.4 MG: 0.4 CAPSULE ORAL at 08:37

## 2025-06-24 ASSESSMENT — ENCOUNTER SYMPTOMS
VOMITING: 0
CONSTIPATION: 0
COUGH: 0
NAUSEA: 0
ABDOMINAL PAIN: 0
BLOOD IN STOOL: 0
DIARRHEA: 0
ABDOMINAL DISTENTION: 0
WHEEZING: 0
CHEST TIGHTNESS: 0
STRIDOR: 0
SHORTNESS OF BREATH: 0

## 2025-06-24 NOTE — PLAN OF CARE
Problem: Chronic Conditions and Co-morbidities  Goal: Patient's chronic conditions and co-morbidity symptoms are monitored and maintained or improved  6/24/2025 1619 by Alex Bustos RN  Outcome: Completed  Flowsheets (Taken 6/24/2025 0800)  Care Plan - Patient's Chronic Conditions and Co-Morbidity Symptoms are Monitored and Maintained or Improved: Monitor and assess patient's chronic conditions and comorbid symptoms for stability, deterioration, or improvement  6/24/2025 0612 by Kristan Calvin RN  Outcome: Progressing     Problem: Discharge Planning  Goal: Discharge to home or other facility with appropriate resources  6/24/2025 1619 by Alex Bustos RN  Outcome: Completed  Flowsheets (Taken 6/24/2025 0800)  Discharge to home or other facility with appropriate resources: Identify barriers to discharge with patient and caregiver  6/24/2025 0612 by Kristan Calvin RN  Outcome: Progressing     Problem: Seizure Precautions  Goal: Remains free of injury related to seizures activity  6/24/2025 1619 by Alex Bustos RN  Outcome: Completed  6/24/2025 0612 by Kristan Calvin RN  Outcome: Progressing     Problem: ABCDS Injury Assessment  Goal: Absence of physical injury  6/24/2025 1619 by Alex Bustos RN  Outcome: Completed  Flowsheets (Taken 6/24/2025 0800)  Absence of Physical Injury: Implement safety measures based on patient assessment  6/24/2025 0612 by Kristan Calvin RN  Outcome: Progressing     Problem: Safety - Adult  Goal: Free from fall injury  6/24/2025 1619 by Alex Bustos RN  Outcome: Completed  Flowsheets (Taken 6/24/2025 0800)  Free From Fall Injury: Instruct family/caregiver on patient safety  6/24/2025 0612 by Kristan Calvin RN  Outcome: Progressing

## 2025-06-24 NOTE — TELEPHONE ENCOUNTER
Name: Marcus Dobbs  : 1950  MRN: [id number]    Oncology Navigation- Initial Note:  (Unknown)  Intake-  Contact Type: Telephone    Continuum of Care: Diagnosis/Active Treatment    Smoking hx: Former Smoker Pk yrs 138    Notes:   Navigator received referral and reviewing chart. Tissue for confirmation needed. Neuro and pulmonary to see pt.   Electronically signed by Joanna Anthony RN on 2025 at 12:53 PM

## 2025-06-24 NOTE — PLAN OF CARE
Problem: Chronic Conditions and Co-morbidities  Goal: Patient's chronic conditions and co-morbidity symptoms are monitored and maintained or improved  Outcome: Progressing     Problem: Discharge Planning  Goal: Discharge to home or other facility with appropriate resources  Outcome: Progressing     Problem: Seizure Precautions  Goal: Remains free of injury related to seizures activity  Outcome: Progressing     Problem: ABCDS Injury Assessment  Goal: Absence of physical injury  Outcome: Progressing     Problem: Safety - Adult  Goal: Free from fall injury  Outcome: Progressing

## 2025-06-24 NOTE — CONSULTS
Patient - Marcus Dobbs   MRN -  [id number]   [id number]   - 1950        Date of evaluation -  2025    REASON FOR THE CONSULTATION:  Lung nodule   Brain mass   HISTORY OF PRESENT ILLNESS:    Marcus Dobbs is a 74 y.o. year old male here for evaluation of left-sided weakness and numbness which started 2025.  He went to his PCP and CT of the brain was done.  He came to the hospital because he was confused and slurring his words the CT scan showed right-sided frontal mass with vasogenic edema.  He had CT of the chest which showed right upper lobe lung nodule, emphysema and left upper lobe scarring.  He does complain of dyspepsia  Does not have purulent sputum or hemoptysis or weight loss  Follows with Dr. Vega  He is on Wixela, Spiriva and albuterol              Immunization   Immunization History   Administered Date(s) Administered    COVID-19, MODERNA BLUE border, Primary or Immunocompromised, (age 12y+), IM, 100 mcg/0.5mL 2021, 2021, 2021, 2022    COVID-19, PFIZER, , (age 12y+), IM, 30mcg/0.3mL 10/19/2023, 10/09/2024    Influenza Virus Vaccine 10/01/2014, 2019    Influenza, FLUAD, (age 65 y+), IM, Quadv, 0.5mL 2021, 2022    Influenza, FLUZONE High Dose (age 65 y+), IM, Quadv, 0.7mL 10/19/2023    Influenza, FLUZONE High Dose, (age 65 y+), IM, Trivalent PF, 0.5mL 10/07/2016, 10/09/2024    Pneumococcal, PCV-13, PREVNAR 13, (age 6w+), IM, 0.5mL 10/07/2016    Pneumococcal, PPSV23, PNEUMOVAX 23, (age 2y+), SC/IM, 0.5mL 2018    RSV, ABRYSVO, (Pregnant or age 60y+), PF, IM, 0.5mL 2023    TDaP, ADACEL (age 10y-64y), BOOSTRIX (age 10y+), IM, 0.5mL 2018    Zoster Recombinant (Shingrix) 2021, 2022        Pneumococcal Vaccine     [] Up to date    [] Indicated   [] Refused  [] Contraindicated       Influenza Vaccine   [] Up to date    [] Indicated   [] Refused  [] Contraindicated            Pulmonary Rehab 
    Today's Date: 6/24/2025  Patient Name: Marcus Dobbs  Date of admission: 6/23/2025  7:45 AM  Patient's age: 74 y.o., 1950  Admission Dx: Left-sided weakness [R53.1]  Brain mass [G93.89]  Cerebrovascular accident (CVA), unspecified mechanism (HCC) [I63.9]    Reason for Consult: RUL mass, Brain mass suspected mets  Requesting Physician: Hebert Min DO    Chief Complaint: Left extremity weakness    History Obtained From:  patient, family member - daughters    History of Present Illness:      The patient is a 74 y.o. male who is admitted to the hospital for management of brain mass.    He has a past medical history of CAD status post CABG x 3 (on aspirin and Plavix), hyperlipidemia, COPD, BRENNEN, hypertension, NSTEMI, adrenal nodule, prior EtOH abuse.    He presented to the ED with slurred speech and confusion.  He noticed weakness in his left arm last week and followed up with his PCP who ordered a CT a head and neck that has not yet been read.      -CT head without contrast showed a 1.5 x 1.4 cm primarily hyperdense right frontal lobe mass with adjacent vasogenic edema  - CT chest abdomen and pelvis shows a 2.5 cm right upper lobe nodule as well as a 1.9 cm right hepatic lesion, most likely a hemangioma    He has no past medical/family history of cancers.  He does endorse a significant smoking history, quit 18 years ago    Past Medical History:   has a past medical history of Acute upper respiratory infections of unspecified site, CAD (coronary artery disease), COPD (chronic obstructive pulmonary disease) (HCC), Empyema without mention of fistula, Esophageal reflux, Essential hypertension, benign, History of blood transfusion, Hx of blood clots, Localized osteoarthrosis not specified whether primary or secondary, unspecified site, Other and unspecified hyperlipidemia, Surgical or other procedure not carried out because of patient's decision, and Unspecified pleural effusion.    Past Surgical History:   has 
  Stroke Neurology Consult Note  Stroke Alert @ 8:13am  Arrival at bedside @ 8:15am    Reason for Consult:  ED Stroke Consult  Requesting Physician:  ED team   Endovascular Neurosurgeon:   Stroke Team: Bereket Patel MD  History Obtained From:  patient, family member - daughter and sister  Chief Complaint:  Acute neurological deficits   Allergies:  Patient has no known allergies.    History of Presenting Illness     75-year-old male with history of CAD status post CABG x 3 on aspirin Plavix compliant, hyperlipidemia, sleep apnea, hypertension, non-STEMI, emphysema experience left-sided weakness and numbness 6/12/2025.  He thought he had slept on his arm funny so he did not go to the hospital.  However he did go to his PCP where she obtained a CT head and a CTA but it has not been read yet.  Today he presents to the hospital after his daughter spoke to him on the phone she thought he was kind of confused and slurring his words more than usual.  However it did show that patient had a right-sided frontal mass with vasogenic edema less likely a stroke.  I was told that he has a pacemaker incompatible MRI leads however I will try to do the MRI checklist and wait.  CTA did show severe stenosis and near occlusion just beyond the origin of left subclavian artery and slow flow to the proximal left vert but no significant stenosis or occlusion intracranially..  Neurosurgery will examine the patient and likely start him on Decadron and the patient will likely be admitted for workup of the tumor with MRI brain with and without contrast and then defer further planning for neurosurgery      LKW: 6/12/2025  NIHSS: 4  Modified Isabel Scale: 2  SBP: 110  Glucose: 92  CT head without contrast: Right-sided hypodensity with a hyperdense focus likely mass  TNK: Not given, outside of window  Endovascular: Not consulted-but they did comment on the subclavian stenosis mentioning there is nothing to do from there and    Review of 
and 4-/5 proximally. LLE is 4-/5    Sensory:    Touch:    Right Upper Extremity:  normal  Left Upper Extremity:  abnormal - Decreased  Right Lower Extremity:  normal  Left Lower Extremity:  abnormal - Decreased    Coordination/Dysmetria:  Heel to Shin:  Right:  normal  Left:  normal  Finger to Nose:   Right:  normal  Left:  normal   Dysdiadochokinesia:  N/A    Gait:  Deferred   SKIN:  no rash      LABS AND IMAGING:     Labs:  CBC with Differential:    Lab Results   Component Value Date/Time    WBC 9.7 06/23/2025 08:03 AM    RBC 4.80 06/23/2025 08:03 AM    HGB 13.8 06/23/2025 08:03 AM    HCT 43.2 06/23/2025 08:03 AM     06/23/2025 08:03 AM    MCV 90.0 06/23/2025 08:03 AM    MCH 28.8 06/23/2025 08:03 AM    MCHC 31.9 06/23/2025 08:03 AM    RDW 12.9 06/23/2025 08:03 AM    LYMPHOPCT 20 06/23/2025 08:03 AM    MONOPCT 7 06/23/2025 08:03 AM    EOSPCT 3 06/23/2025 08:03 AM    BASOPCT 0 06/23/2025 08:03 AM    MONOSABS 0.66 06/23/2025 08:03 AM    LYMPHSABS 1.88 06/23/2025 08:03 AM    EOSABS 0.25 06/23/2025 08:03 AM    BASOSABS 0.04 06/23/2025 08:03 AM    DIFFTYPE NOT REPORTED 12/27/2021 04:46 AM     BMP:    Lab Results   Component Value Date/Time     06/23/2025 08:03 AM    K 4.3 06/23/2025 08:03 AM     06/23/2025 08:03 AM    CO2 23 06/23/2025 08:03 AM    BUN 19 06/23/2025 08:03 AM    CREATININE 1.2 06/23/2025 08:03 AM    CREATININE 1.1 06/23/2025 07:54 AM    CALCIUM 9.2 06/23/2025 08:03 AM    GFRAA >60 05/01/2022 07:31 AM    LABGLOM 64 06/23/2025 08:03 AM    LABGLOM >60 01/03/2024 06:54 AM    GLUCOSE 89 06/23/2025 08:03 AM       Radiology Review:    CT HEAD WO CONTRAST   Final Result   Right frontal lobe mass with adjacent vasogenic edema.  MRI of the brain with   without contrast is recommended for further characterization.      Findings were discussed with Bereket Patel at 8:42 am on 6/23/2025.         CTA HEAD NECK W CONTRAST   Final Result   Severe stenosis and near occlusion just beyond the origin of

## 2025-06-25 VITALS
RESPIRATION RATE: 20 BRPM | HEIGHT: 71 IN | DIASTOLIC BLOOD PRESSURE: 36 MMHG | OXYGEN SATURATION: 100 % | BODY MASS INDEX: 25.86 KG/M2 | HEART RATE: 78 BPM | TEMPERATURE: 97.9 F | WEIGHT: 184.7 LBS | SYSTOLIC BLOOD PRESSURE: 130 MMHG

## 2025-06-25 LAB
GLUCOSE BLD-MCNC: 113 MG/DL (ref 75–110)
GLUCOSE BLD-MCNC: 120 MG/DL (ref 75–110)

## 2025-06-25 PROCEDURE — 6370000000 HC RX 637 (ALT 250 FOR IP): Performed by: INTERNAL MEDICINE

## 2025-06-25 PROCEDURE — 6360000002 HC RX W HCPCS

## 2025-06-25 PROCEDURE — 94640 AIRWAY INHALATION TREATMENT: CPT

## 2025-06-25 PROCEDURE — 94761 N-INVAS EAR/PLS OXIMETRY MLT: CPT

## 2025-06-25 PROCEDURE — 2500000003 HC RX 250 WO HCPCS

## 2025-06-25 PROCEDURE — 6370000000 HC RX 637 (ALT 250 FOR IP)

## 2025-06-25 PROCEDURE — 82947 ASSAY GLUCOSE BLOOD QUANT: CPT

## 2025-06-25 RX ORDER — DEXAMETHASONE 4 MG/1
4 TABLET ORAL EVERY 8 HOURS SCHEDULED
Status: DISCONTINUED | OUTPATIENT
Start: 2025-06-29 | End: 2025-06-25 | Stop reason: HOSPADM

## 2025-06-25 RX ORDER — DEXAMETHASONE 2 MG/1
TABLET ORAL
Qty: 102 TABLET | Refills: 0 | Status: SHIPPED | OUTPATIENT
Start: 2025-06-25 | End: 2025-07-22

## 2025-06-25 RX ORDER — DEXAMETHASONE 4 MG/1
4 TABLET ORAL EVERY 6 HOURS SCHEDULED
Status: DISCONTINUED | OUTPATIENT
Start: 2025-06-25 | End: 2025-06-25 | Stop reason: HOSPADM

## 2025-06-25 RX ORDER — DEXAMETHASONE 4 MG/1
2 TABLET ORAL EVERY 12 HOURS SCHEDULED
Status: DISCONTINUED | OUTPATIENT
Start: 2025-07-07 | End: 2025-06-25 | Stop reason: HOSPADM

## 2025-06-25 RX ORDER — DEXAMETHASONE 4 MG/1
4 TABLET ORAL EVERY 12 HOURS SCHEDULED
Status: DISCONTINUED | OUTPATIENT
Start: 2025-07-03 | End: 2025-06-25 | Stop reason: HOSPADM

## 2025-06-25 RX ADMIN — BUDESONIDE AND FORMOTEROL FUMARATE DIHYDRATE 2 PUFF: 80; 4.5 AEROSOL RESPIRATORY (INHALATION) at 08:57

## 2025-06-25 RX ADMIN — POLYETHYLENE GLYCOL 3350 17 G: 17 POWDER, FOR SOLUTION ORAL at 08:44

## 2025-06-25 RX ADMIN — ENOXAPARIN SODIUM 40 MG: 100 INJECTION SUBCUTANEOUS at 08:41

## 2025-06-25 RX ADMIN — DEXAMETHASONE SODIUM PHOSPHATE 4 MG: 4 INJECTION, SOLUTION INTRAMUSCULAR; INTRAVENOUS at 05:30

## 2025-06-25 RX ADMIN — PANTOPRAZOLE SODIUM 40 MG: 40 TABLET, DELAYED RELEASE ORAL at 05:30

## 2025-06-25 RX ADMIN — TIOTROPIUM BROMIDE INHALATION SPRAY 5 MCG: 3.12 SPRAY, METERED RESPIRATORY (INHALATION) at 08:57

## 2025-06-25 RX ADMIN — TAMSULOSIN HYDROCHLORIDE 0.4 MG: 0.4 CAPSULE ORAL at 08:42

## 2025-06-25 RX ADMIN — LEVETIRACETAM 500 MG: 500 TABLET, FILM COATED ORAL at 08:41

## 2025-06-25 RX ADMIN — DEXAMETHASONE SODIUM PHOSPHATE 4 MG: 4 INJECTION, SOLUTION INTRAMUSCULAR; INTRAVENOUS at 11:26

## 2025-06-25 RX ADMIN — SODIUM CHLORIDE, PRESERVATIVE FREE 10 ML: 5 INJECTION INTRAVENOUS at 08:42

## 2025-06-25 ASSESSMENT — ENCOUNTER SYMPTOMS
BLOOD IN STOOL: 0
WHEEZING: 0
COUGH: 0
SHORTNESS OF BREATH: 0
VOMITING: 0
ABDOMINAL PAIN: 0
NAUSEA: 0
DIARRHEA: 0
CONSTIPATION: 0
ABDOMINAL DISTENTION: 0
CHEST TIGHTNESS: 0
STRIDOR: 0

## 2025-06-25 NOTE — PLAN OF CARE
Notified by neurology that patient is going to discharge with outpatient biopsy  Decadron ordered at 4mg q6h to wean over two weeks to 2mg BID, and then patient will stay on that  Will follow up with neurosurgery once biopsy completed  Will place discharge information in chart    Electronically signed by CHRISTOPH Presley CNP on 6/25/2025 at 1:51 PM

## 2025-06-25 NOTE — DISCHARGE INSTRUCTIONS
Follow up in outpatient Neurology clinic with Dr. Bassett in 2 months regarding seizure medications and tremor.    Follow up with your primary care provider, Alondra Montiel, within 2 weeks.    Follow up with Dr. Gross from Neurosurgery in 1 month.     Steroid taper: Dexamethasone 2 mg tablets-take 2 tablets by mouth every 6 hours for 4 days.  Then, 2 tablets every 8 hours for 4 days.  Then, 2 tablets every 12 hours for 4 days.  Then, 1 tablet twice daily until follow-up visit    Follow up with Dr. Corea's group for outpatient oncology.    Take all your medication as prescribed. If your prescription has refills, obtain the medication refills from the pharmacy before you run out. Seek medical attention if you run out of a medication you need and do not have any refills.     Reasons to call your doctor or go to the ER: worsening weakness, numbness, severe headaches, or any other concerning symptoms.

## 2025-06-25 NOTE — CARE COORDINATION
Case Management Assessment  Initial Evaluation    Date/Time of Evaluation: 6/25/2025 8:47 AM  Assessment Completed by: Carlos Umana    If patient is discharged prior to next notation, then this note serves as note for discharge by case management.    Patient Name: Marcus Dobbs                   YOB: 1950  Diagnosis: Left-sided weakness [R53.1]  Brain mass [G93.89]  Cerebrovascular accident (CVA), unspecified mechanism (HCC) [I63.9]                   Date / Time: 6/23/2025  7:45 AM    Patient Admission Status: Inpatient   Readmission Risk (Low < 19, Mod (19-27), High > 27): Readmission Risk Score: 13.4    Current PCP: Alondra Ching MD  PCP verified by CM? (P) Yes    Chart Reviewed: Yes      History Provided by: (P) Patient  Patient Orientation: (P) Alert and Oriented    Patient Cognition: (P) Alert    Hospitalization in the last 30 days (Readmission):  No    If yes, Readmission Assessment in  Navigator will be completed.    Advance Directives:      Code Status: Full Code   Patient's Primary Decision Maker is: (P) Legal Next of Kin      Discharge Planning:    Patient lives with: (P) Alone Type of Home: (P) House  Primary Care Giver: (P) Self  Patient Support Systems include: (P) Family Members   Current Financial resources: (P) Medicaid, [place]  Current community resources: (P) None  Current services prior to admission: (P) Oxygen Therapy, Other (Comment) (nebulizer)            Current DME:              Type of Home Care services:  (P) None    ADLS  Prior functional level: (P) Independent in ADLs/IADLs  Current functional level: (P) Independent in ADLs/IADLs    PT AM-PAC: 18 /24  OT AM-PAC: 19 /24    Family can provide assistance at DC: (P) Yes  Would you like Case Management to discuss the discharge plan with any other family members/significant others, and if so, who? (P) Yes  Plans to Return to Present Housing: (P) Yes  Other Identified Issues/Barriers to RETURNING to current

## 2025-06-25 NOTE — PLAN OF CARE
Problem: Respiratory - Adult  Goal: Achieves optimal ventilation and oxygenation  6/25/2025 1209 by Nelson Carrasco RN  Outcome: Progressing     Problem: Nutrition Deficit:  Goal: Optimize nutritional status  6/25/2025 1209 by Nelson Carrasco RN  Outcome: Progressing  6/25/2025 1109 by Ariel Aguilar RD  Outcome: Progressing  Flowsheets (Taken 6/25/2025 1109)  Nutrient intake appropriate for improving, restoring, or maintaining nutritional needs: Recommend appropriate diets, oral nutritional supplements, and vitamin/mineral supplements

## 2025-06-25 NOTE — PLAN OF CARE
Problem: Respiratory - Adult  Goal: Achieves optimal ventilation and oxygenation  Outcome: Progressing     Problem: Nutrition Deficit:  Goal: Optimize nutritional status  6/25/2025 1209 by Nelson Carrasco RN  Outcome: Progressing  6/25/2025 1109 by Ariel Aguilar RD  Outcome: Progressing  Flowsheets (Taken 6/25/2025 1109)  Nutrient intake appropriate for improving, restoring, or maintaining nutritional needs: Recommend appropriate diets, oral nutritional supplements, and vitamin/mineral supplements

## 2025-06-25 NOTE — PROCEDURES
EEG REPORT       Patient: Marcus Dobbs Age: 74 y.o.  MRN: [id number]      Referring Provider: No ref. provider found    History: This routine 30 minute scalp EEG was recorded with video- monitoring for a 74 y.o.. male  who presented with encephalopathy. This EEG was performed to evaluate for focal and epileptiform abnormalities.     Marcus Dobbs   Current Facility-Administered Medications   Medication Dose Route Frequency Provider Last Rate Last Admin    dexAMETHasone (DECADRON) tablet 4 mg  4 mg Oral 4 times per day Tamara Edmonds APRN - CNP        Followed by    [START ON 6/29/2025] dexAMETHasone (DECADRON) tablet 4 mg  4 mg Oral 3 times per day Tamara Edmonds APRN - CNP        Followed by    [START ON 7/3/2025] dexAMETHasone (DECADRON) tablet 4 mg  4 mg Oral 2 times per day Tamara Edmonds APRN - CNP        Followed by    [START ON 7/7/2025] dexAMETHasone (DECADRON) tablet 2 mg  2 mg Oral 2 times per day Tamara Edmonds APRN - CNP        pantoprazole (PROTONIX) tablet 40 mg  40 mg Oral QAM AC Eladio Moeller MD   40 mg at 06/25/25 0530    tiotropium (SPIRIVA RESPIMAT) 2.5 MCG/ACT inhaler 5 mcg  2 puff Inhalation Daily RT Eladio Moeller MD   5 mcg at 06/25/25 0857    budesonide-formoterol (SYMBICORT) 80-4.5 MCG/ACT inhaler 2 puff  2 puff Inhalation BID RT Eladio Moeller MD   2 puff at 06/25/25 0857    albuterol sulfate HFA (PROVENTIL;VENTOLIN;PROAIR) 108 (90 Base) MCG/ACT inhaler 2 puff  2 puff Inhalation Q6H PRN Eladio Moeller MD        levETIRAcetam (KEPPRA) tablet 500 mg  500 mg Oral BID Padma Garcia MD   500 mg at 06/25/25 0841    [Held by provider] aspirin EC tablet 81 mg  81 mg Oral Daily Padma Garcia MD   81 mg at 06/23/25 1510    atorvastatin (LIPITOR) tablet 40 mg  40 mg Oral Nightly KasPadma Bajwa MD   40 mg at 06/24/25 2054    sodium chloride flush 0.9 % injection 5-40 mL  5-40 mL IntraVENous 2 times per day Padma Garcia MD   10 mL

## 2025-06-25 NOTE — DISCHARGE INSTR - COC
Continuity of Care Form    Patient Name: Marcus Dobbs   :  1950  MRN:  [id number]    Admit date:  2025  Discharge date:  25    Code Status Order: Full Code   Advance Directives:     Admitting Physician:  Hebert Min DO  PCP: Alondra Ching MD    Discharging Nurse: Kelsea WONG RN  Discharging Hospital Unit/Room#: 0139/0139-01  Discharging Unit Phone Number: [phone]    Emergency Contact:   Extended Emergency Contact Information  Primary Emergency Contact: Mariely Salomon           [facility]  Home Phone: [phone]  Mobile Phone: [phone]  Relation: Child  Secondary Emergency Contact: Mary Moon \"hippa\"  Address: [address] [place] [address]  Home Phone: [phone]  Relation: Child    Past Surgical History:  Past Surgical History:   Procedure Laterality Date    CORONARY ARTERY BYPASS GRAFT N/A 2021    CORONARY ARTERY BYPASS X3; MINH PER ANESTHESIA performed by Gurwinder Sebastian MD at [facility]    CORONARY ARTERY BYPASS GRAFT  2021    No MRI per Dr. Dorado - Retained Epicardial Lead present on Chest XR imaging - [facility]    LUNG DECORTICATION Right     MRSA at that time.       Immunization History:   Immunization History   Administered Date(s) Administered    COVID-19, MODERNA BLUE border, Primary or Immunocompromised, (age 12y+), IM, 100 mcg/0.5mL 2021, 2021, 2021, 2022    COVID-19, PFIZER, , (age 12y+), IM, 30mcg/0.3mL 10/19/2023, 10/09/2024    Influenza Virus Vaccine 10/01/2014, 2019    Influenza, FLUAD, (age 65 y+), IM, Quadv, 0.5mL 2021, 2022    Influenza, FLUZONE High Dose (age 65 y+), IM, Quadv, 0.7mL 10/19/2023    Influenza, FLUZONE High Dose, (age 65 y+), IM, Trivalent PF, 0.5mL 10/07/2016, 10/09/2024    Pneumococcal, PCV-13, PREVNAR 13, (age 6w+), IM, 0.5mL 10/07/2016    Pneumococcal, PPSV23, PNEUMOVAX 23, (age 2y+), SC/IM, 0.5mL 2018

## 2025-06-25 NOTE — DISCHARGE SUMMARY
[facility]     Department of Neurology    INPATIENT DISCHARGE SUMMARY        Patient Identification:  Marcus Dobbs is a 74 y.o. male.  :  1950  MRN: [id number]     Acct: [id number]   Admit Date:  2025  Discharge date: 2025    Attending Provider: Hebert Min DO                                     Admission Diagnoses:   Left-sided weakness [R53.1]  Brain mass [G93.89]  Cerebrovascular accident (CVA), unspecified mechanism (HCC) [I63.9]    Discharge Diagnoses:   Principal Problem:    Brain mass  Active Problems:    Nodule of upper lobe of right lung    Cerebrovascular accident (CVA) (HCC)    Left-sided weakness    Liver lesion  Resolved Problems:    * No resolved hospital problems. *       Consults:   Neurosurgery, pulmonology, oncology, and neurology    Brief Inpatient course:    Marcus Dobbs is a 74 y.o. male with a past medical history of CAD s/p CABG x 3 on aspirin and Plavix, hyperlipidemia, sleep apnea, hypertension, and emphysema who presented with worsening left-sided weakness and numbness.  Patient was also noted by his daughter to be slurring his words and was more confused than usual.  CT head revealed a right frontal mass with surrounding vasogenic edema.  CTA showed no intracranial abnormalities.  Patient unable to get MRI brain due to retained epicardial leads from CABG.  Neurosurgery was consulted who started the patient on Decadron 4 mg IV every 6 hours.  CT chest abdomen pelvis was obtained due to concern for underlying malignancy.  Right upper lobe lung mass measuring 2.5 cm was found and thought to likely be a primary lung malignancy.  Oncology was consulted who recommended biopsy of the lung mass.  Pulmonology consulted for lung biopsy.  Mass felt to be better accessed via IR guided biopsy.  Due to patient being on DAPT, will need to hold antiplatelet medications for 7 days prior to IR guided biopsy.  Patient was then discharged home in stable

## 2025-06-26 ENCOUNTER — TELEPHONE (OUTPATIENT)
Age: 75
End: 2025-06-26

## 2025-06-26 ENCOUNTER — TELEPHONE (OUTPATIENT)
Dept: PULMONOLOGY | Age: 75
End: 2025-06-26

## 2025-06-26 NOTE — TELEPHONE ENCOUNTER
Name: Marcus Dobbs  : 1950  MRN: [id number]    Oncology Navigation- Initial Note:    Intake-  Contact Type: Telephone    Continuum of Care: Diagnosis/Active Treatment    Smoking hx:     Notes: Navigator spoke with pt. Introducing self and contact number given to pt. Daughter Dior usually handles appts. [facility] however hoping to have everything approved w/out delay. Upcoming Bx Monday. Writer encouraging pt. To have daughter reach out to writer.     Electronically signed by Joanna Anthony RN on 2025 at 2:55 PM

## 2025-06-26 NOTE — TELEPHONE ENCOUNTER
----- Message from Mili DURHAM sent at 6/26/2025  7:38 AM EDT -----  Blanca, please see message from Dr Ambrosoi and call to schedule as requested. Thank you.  ----- Message -----  From: Morales Ambrosio MD  Sent: 6/25/2025   6:00 PM EDT  To: px Respiratory Spec Clinical Staff    This is Dr. Vega patient who was admitted to hospital seen by Dr. Moeller he had requested IR guided CT-guided biopsy.  Patient need to follow-up with Dr. Vega in 2 to 3 weeks

## 2025-06-27 ENCOUNTER — TELEPHONE (OUTPATIENT)
Dept: FAMILY MEDICINE CLINIC | Age: 75
End: 2025-06-27

## 2025-06-27 ENCOUNTER — TELEPHONE (OUTPATIENT)
Dept: PULMONOLOGY | Age: 75
End: 2025-06-27

## 2025-06-27 ENCOUNTER — TELEPHONE (OUTPATIENT)
Age: 75
End: 2025-06-27

## 2025-06-27 DIAGNOSIS — J96.11 CHRONIC RESPIRATORY FAILURE WITH HYPOXIA (HCC): ICD-10-CM

## 2025-06-27 DIAGNOSIS — G93.89 BRAIN MASS: ICD-10-CM

## 2025-06-27 DIAGNOSIS — R91.1 NODULE OF UPPER LOBE OF RIGHT LUNG: Primary | ICD-10-CM

## 2025-06-27 DIAGNOSIS — R53.1 LEFT-SIDED WEAKNESS: ICD-10-CM

## 2025-06-27 NOTE — TELEPHONE ENCOUNTER
Name: Marcus Dobbs  : 1950  MRN: [id number]    Oncology Navigation Follow-Up Note    Contact Type:  Telephone    Notes: navigator spoke with daughter Dior and had not heard back from pulmonary. Writer sending PS message to Dr. Corea reporting pts. Coughing/ choking when attempting to swallow food or liquids.   4:12 received order from Dr. Corea for swallow study. Writer notifying daughter Dior and scheduling number given to her.       Electronically signed by Joanna Anthony RN on 2025 at 3:49 PM

## 2025-06-27 NOTE — TELEPHONE ENCOUNTER
Care Transitions Initial Follow Up Call    Outreach made within 2 business days of discharge: Yes    Patient: Marcus Dobbs Patient : 1950   MRN: [id number]  Reason for Admission: Brain mass  Discharge Date: 25       Spoke with: Gaurav (daughter)    Discharge department/facility: [facility] Interactive Patient Contact:  Was patient able to fill all prescriptions: Yes  Was patient instructed to bring all medications to the follow-up visit: Yes  Is patient taking all medications as directed in the discharge summary? Yes  Does patient understand their discharge instructions: Yes  Does patient have questions or concerns that need addressed prior to 7-14 day follow up office visit: no    Additional needs identified to be addressed with provider  Pt's daughter Mariely expressed that Marcus has been experiencing some swallowing issues - she has a call into Pulmonology to get their recommendations. Pt is scheduled for a lung biopsy on Monday as well.              Scheduled appointment with PCP within 7-14 days    Follow Up  Future Appointments   Date Time Provider Department Center   2025 10:00 AM [facility] CT ROOM 2 [facility] CT SCAN [facility] Radiolog   2025 11:15 AM Myra Lynn APRN - [facility] DEP   7/15/2025  1:15 PM Nicolás Vega MD RESP [facility]   2025  9:00 AM Alondra Ching MD [facility] DEP   3/10/2026  1:15 PM Nicolás Vega MD RESP [facility]       Donell Bran MA

## 2025-06-27 NOTE — TELEPHONE ENCOUNTER
Name: Marcus Dobbs  : 1950  MRN: [id number]    Oncology Navigation Follow-Up Note    Contact Type:  Telephone    Notes: Daughter Dior returning call per writers request. Introducing self and once tissue obtained will schedule MO F/U. [facility] aware [facility] and preliminary testing needed.   Dior adding she called pulmonary this morning due to pts. Ongoing difficulty swallowing, choking with all oral intake. Pt. Waiting for return call from pulmonary.      Electronically signed by Joanna Anthony RN on 2025 at 8:38 AM

## 2025-06-27 NOTE — TELEPHONE ENCOUNTER
Patients daughter called and stated that ever since her dad has been out of the hospital that he has had an issue with swallowing and some SOB and she is trying to figure out some way to get him relief.  I did ask her if the patient sees an ENT and she stated no patient does not and really wanted me to run it by Dr Vega to see what his thoughts were due to the patients lung cancer.  I did explain to her that he is out of the office right now but another physician is covering.  I told her I can present the issue but she may need to reach out to PCP for more answers.  Please advise

## 2025-06-28 NOTE — PROGRESS NOTES
[facility] Neurology   IN-PATIENT SERVICE  General Neurology Progress Note   [facility]                Date:   6/24/2025  Patient name:  Marcus Dobbs  Date of admission:  6/23/2025  7:45 AM  MRN:   [id number]  Account:  [id number]  YOB: 1950  PCP:    Alondra Ching MD  Room:   [address] [place] [address]  Code Status:    Full Code  Chief Complaint:   Chief Complaint   Patient presents with    Extremity Weakness     Left side        Interval history      The patient was seen and examined at bedside this morning.     Labs, chart, and VS reviewed. No acute events overnight.  Strength slightly improved compared to prior documented exams.    Brief History     Marcus Dobbs is a 75-year-old male with history of CAD status post CABG x 3 on aspirin Plavix compliant, hyperlipidemia, sleep apnea, hypertension, non-STEMI, emphysema experience left-sided weakness and numbness 6/12/2025. He thought he had slept on his arm funny so he did not go to the hospital. However he did go to his PCP where she obtained a CT head and a CTA but it has not been read yet. Today he presents to the hospital after his daughter spoke to him on the phone she thought he was kind of confused and slurring his words more than usual. However it did show that patient had a right-sided frontal mass with vasogenic edema less likely a stroke. I was told that he has a pacemaker incompatible MRI leads however I will try to do the MRI checklist and wait. CTA did show severe stenosis and near occlusion just beyond the origin of left subclavian artery and slow flow to the proximal left vert but no significant stenosis or occlusion intracranially.. Neurosurgery will examine the patient and likely start him on Decadron and the patient will likely be admitted for workup of the tumor with MRI brain with and without contrast and then defer further planning for neurosurgery .      Review of Systems   Review of Systems 
    Pulmonary progress note.      Patient - Marcus Dobbs   MRN -  [id number]   [id number]   - 1950        Date of evaluation -  2025    REASON FOR THE INITIAL CONSULTATION:  Lung nodule   Brain mass   HISTORY OF PRESENT ILLNESS:    Marcus Dobbs is a 74 y.o. year old male here for evaluation of left-sided weakness and numbness which started 2025.  He went to his PCP and CT of the brain was done.  He came to the hospital because he was confused and slurring his words the CT scan showed right-sided frontal mass with vasogenic edema.  He had CT of the chest which showed right upper lobe lung nodule, emphysema and left upper lobe scarring.  He does complain of dyspepsia  Does not have purulent sputum or hemoptysis or weight loss  Follows with Dr. Vega  He is on Wixela, Spiriva and albuterol      Interval history and evaluation: 25     According patient he does not complain of any change.  He does not have much cough or shortness of breath except chronic finding.  He is using Spiriva and Symbicort while he is here does not require additional albuterol.  No coughing or choking but sometimes feeling of dysphagia  Patient was seen by Dr. Moeller yesterday and recommended to have IR guided biopsy of the lung nodule which patient and the family is aware and there was already a request for IR        REVIEW OF SYSTEMS:  CONSTITUTIONAL:  negative for  fevers, chills, sweats, fatigue, malaise, anorexia and weight loss  EYES:  negative for  double vision, blurred vision, dry eyes, eye discharge and redness  HEENT:  negative for  hearing loss, tinnitus, ear drainage, earaches and nasal congestion  RESPIRATORY:  See hpi  CARDIOVASCULAR:  negative for  chest pain, palpitations, orthopnea, PND  GASTROINTESTINAL:  negative for nausea, vomiting, change in bowel habits, diarrhea, constipation, abdominal pain, pruritus, abdominal mass and abdominal distention  GENITOURINARY:  negative for 
  Physician Progress Note      PATIENT:               ALEXANDR HUMPHREYS  [facility] #:                  [id number]  :                       1950  ADMIT DATE:       2025 7:45 AM  DISCH DATE:        2025 4:06 PM  RESPONDING  PROVIDER #:        ZACK WEEMS          QUERY TEXT:    Noted documentation of CVA in the discharge summary on  and right sided   frontal mass with vasogenic edema less likely a stroke in neurology progress   note on .  Based on your medical judgment, please clarify the following:    The clinical indicators include:   CTA head-IMPRESSION:  Severe stenosis and near occlusion just beyond the origin of the left   subclavian artery resulting in slow flow in the proximal left vertebral   artery. No significant stenosis or evidence for large vessel occlusion   intracranially.   neuro-CTA did show that patient had right sided frontal mass with   vasogenic edema less likely a stroke.  Pacemaker makes MRI incompatible d/t   leads.  CTA did show severe stenosis near occlusion of the left subclavian   artery but no stenosis or occlusion intracranially.  Likely has metastatic   mass.   neurosurgery-patient's left arm and hand are feeling better after   steroids coordination is better.  Plans for biopsy.  No plans for surgery   immediately further recommendations after biopsy.  Right lung mass right   frontal mass with surrounding edema.  Concerning for metastatic cancer   discharge summary admitting diagnosis brain mass, CVA unspecified   mechanism..  Due to the confusion a routine EEG was obtained which was normal   Keppra was discontinued prior to discharge  Options provided:  -- After study CVA diagnosis ruled out  -- After study CVA diagnosis confirmed  -- Other - I will add my own diagnosis  -- Disagree - Not applicable / Not valid  -- Disagree - Clinically unable to determine / Unknown  -- Refer to Clinical Documentation Reviewer    PROVIDER RESPONSE TEXT:    Provider is 
Comprehensive Nutrition Assessment    Type and Reason for Visit:  Initial, Positive nutrition screen    Nutrition Recommendations/Plan:   Regular diet.   High calorie / High protein ONS TID.   Monitor po intake, ONS intake, weight, labs.     Malnutrition Assessment:  Malnutrition Status:  At risk for malnutrition (06/25/25 1106)    Context:  Chronic Illness       Nutrition Assessment:    Patient is seen for positive nutrition screen. Patient is a 74 y.o. male admitted for brain mass. PMHx: cancer, CABG, CHF, CAD, HTN, HLP and BRENNEN. Regular diet order in place. Per chart review, appears patient has lost ~15 x 3 months (-7.5% x 3 months). During visit, patient out of room for procedure. Discussed nutrition POC with patients daughters. Discussed adding high protein / high calore ONS TID and importance of adequate kcal/protein intake.    Nutrition Related Findings:    Meds/labs reviewed. Wound Type: None       Current Nutrition Intake & Therapies:    Average Meal Intake: %  Average Supplements Intake: Unable to assess  ADULT DIET; Regular    Anthropometric Measures:  Height: 180.3 cm (5' 10.98\")  Ideal Body Weight (IBW): 172 lbs (78 kg)    Current Body Weight: 83.8 kg (184 lb 11.9 oz), 107.4 % IBW. Weight Source: Not specified  Current BMI (kg/m2): 25.8  Weight Adjustment For: No Adjustment    Estimated Daily Nutrient Needs:  Energy Requirements Based On: Kcal/kg  Weight Used for Energy Requirements: Current  Energy (kcal/day): 1650-2100kcals/day  Weight Used for Protein Requirements: Ideal  Protein (g/day): 100-120g protein/day  Method Used for Fluid Requirements: 1 ml/kcal  Fluid (ml/day): or per medical team    Nutrition Diagnosis:   Increased nutrient needs related to other (Medical diagnosis) as evidenced by weight loss (cancer)    Nutrition Interventions:   Food and/or Nutrient Delivery: Continue Current Diet, Start Oral Nutrition Supplement  Nutrition Education/Counseling: Survival skills/brief education 
EEG completed in lab   
Neurosurgery JOSE LUIS/Resident    Daily Progress Note   CC:  Chief Complaint   Patient presents with    Extremity Weakness     Left side      6/24/2025  7:15 AM    Chart reviewed.  No acute events overnight.  No new complaints. Reports some improvement in his left arm strength and sensation, family reports that they feel his speech has improved     Vitals:    06/23/25 2018 06/23/25 2215 06/24/25 0000 06/24/25 0400   BP: (!) 105/52 119/61 (!) 112/53 (!) 109/47   Pulse: 90 75 73 71   Resp: 22 17 17 15   Temp: 97.2 °F (36.2 °C)  97.8 °F (36.6 °C) (!) 96.6 °F (35.9 °C)   TempSrc: Oral  Temporal Axillary   SpO2: 97% 97% 94% 94%   Weight:       Height:           PE:   AOx3   PERRL, EOMI  Cranial Nerves:    II: Visual acuity:  normal  III: Pupils:  equal, round, reactive to light  III,IV,VI: Extra Ocular Movements:intact  V: Facial sensation:  intact  VII: Facial strength: intact  VIII: Hearing:  intact  IX: Palate:  intact  XI: Shoulder shrug: intact  XII: Tongue movement: intact      Motor   L deltoid 5/5; R deltoid 5/5  L biceps 5/5; R biceps 5/5  L triceps 5/5; R triceps 5/5  Weaker hand grasp on left side compared to right  Weaker intrinsics on left compared to right     L iliopsoas 5/5 , R iliopsoas 5/5  L quadriceps 5/5; R quadriceps 5/5  L Dorsiflexion 5/5; R dorsiflexion 5/5  L Plantarflexion 5/5; R plantarflexion 5/5  L EHL 5/5; R EHL 5/5      Sensation: sight abnormal to left side improved from yesterday     Lab Results   Component Value Date    WBC 9.7 06/23/2025    HGB 13.8 06/23/2025    HCT 43.2 06/23/2025     06/23/2025    CHOL 132 10/31/2024    TRIG 95 10/31/2024    HDL 44 10/31/2024    ALT 9 (L) 06/18/2025    AST 17 06/18/2025     06/23/2025    K 4.3 06/23/2025     06/23/2025    CREATININE 1.2 06/23/2025    BUN 19 06/23/2025    CO2 23 06/23/2025    TSH 4.70 (H) 06/18/2025    PSA 0.85 10/24/2013    INR 1.0 06/23/2025    LABA1C 5.2 09/22/2023       Radiology   CT CHEST ABDOMEN PELVIS W CONTRAST 
Occupational Therapy  Occupational Therapy Initial Evaluation  Facility/Department: [address] STE[address]OWN   Patient Name: Marcus Dobbs        MRN: [id number]    : 1950    Date of Service: 2025    Chief Complaint   Patient presents with    Extremity Weakness     Left side      Past Medical History:  has a past medical history of Acute upper respiratory infections of unspecified site, CAD (coronary artery disease), COPD (chronic obstructive pulmonary disease) (HCC), Empyema without mention of fistula, Esophageal reflux, Essential hypertension, benign, History of blood transfusion, Hx of blood clots, Localized osteoarthrosis not specified whether primary or secondary, unspecified site, Other and unspecified hyperlipidemia, Surgical or other procedure not carried out because of patient's decision, and Unspecified pleural effusion.  Past Surgical History:  has a past surgical history that includes Lung decortication (Right, ); Coronary artery bypass graft (N/A, 2021); and Coronary artery bypass graft (2021).    Discharge Recommendations  Discharge Recommendations: Patient would benefit from continued therapy after discharge  OT Equipment Recommendations  Equipment Needed: No    Assessment  Performance deficits / Impairments: Decreased functional mobility ;Decreased balance;Decreased ADL status;Decreased ROM;Decreased endurance;Decreased high-level IADLs;Decreased strength;Decreased fine motor control;Decreased coordination  Assessment: Pt agreed to occupational therapy evaluation with education provided on purpose and role of occupational therapy services in the acute care setting. OT facilitated assessing functional mobility and ADL performance to pt's tolerance this visit. Pt exhibited requiring SBA for bed mobility, SBA for functional sit<>stand transfers and CGA for functional mobility. Transfers/mobility completed utilizing no device. Greatest limitations exhibited during these activities 
[facility]  PROGRESS NOTE    Shift date: 6/24/2025  Shift day: Tuesday   Shift # 1    Room # 0139/0139-01   Name: Marcus Dobbs                Jain: non-Druze   Place of Episcopalian:     Referral: Nurse    Admit Date & Time: 6/23/2025  7:45 AM    Assessment:  Marcus Dobbs is a 74 y.o. male in the hospital . Upon entering the room writer observes patient awake  in hospital bed, pt appeared coping and calm, Pt 's shared that one of his Pastors visited him yesterday and that another  is visiting today. Present in the room was pt's youngest daughter Dior.       Intervention:  Writer introduced self and title as  Writer offered space for pt and family  to express feelings, needs, and concerns and provided a ministry presence.     Outcome:  Patient was peaceful and engaged in conversation and expressed gratitude for the visit from spiritual care.    Plan:  Chaplains will remain available to offer spiritual and emotional support as needed.      Electronically signed by Chaplain RAMIRO, on 6/24/2025 at 8:37 AM.  [facility]  [facility]  [phone]     
Physical Therapy  Facility/Department: [address] STEPDOWN   Physical Therapy Initial Evaluation    Patient Name: Marcus Dobbs        MRN: [id number]    : 1950    Date of Service: 2025    Chief Complaint   Patient presents with    Extremity Weakness     Left side      Past Medical History:  has a past medical history of Acute upper respiratory infections of unspecified site, CAD (coronary artery disease), COPD (chronic obstructive pulmonary disease) (HCC), Empyema without mention of fistula, Esophageal reflux, Essential hypertension, benign, History of blood transfusion, Hx of blood clots, Localized osteoarthrosis not specified whether primary or secondary, unspecified site, Other and unspecified hyperlipidemia, Surgical or other procedure not carried out because of patient's decision, and Unspecified pleural effusion.  Past Surgical History:  has a past surgical history that includes Lung decortication (Right, ); Coronary artery bypass graft (N/A, 2021); and Coronary artery bypass graft (2021).    Discharge Recommendations  Discharge Recommendations: Patient would benefit from continued therapy after discharge  PT Equipment Recommendations  Equipment Needed: No    Assessment  Body Structures, Functions, Activity Limitations Requiring Skilled Therapeutic Intervention: Decreased functional mobility , Decreased ROM, Decreased strength, Decreased safe awareness, Decreased endurance, Decreased sensation, Decreased balance  Assessment: Pt presents with decreased strength, ROM, and balance secondary to brain mass. Pt has L sided weakness and lives independently. Pt currently requires SBA for bed mobility, SBA for transfers, and CGA for gait 200' with no AD. During gait pt has few instances of LOB but is able to self correct. Pt is currently safe to return to prior living arrangements with assist PRN. Pt would benefit from continued acute care PT as well as PT after discharge to address 
Spiritual Health History and Assessment/Progress Note  [facility]    (P) Spiritual/Emotional Needs,  ,  ,      Name: Marcus Dobbs MRN: [id number]    Age: 74 y.o.     Sex: male   Language: English   Cheondoism: Non-Congregation   Brain mass     Date: 6/23/2025            Total Time Calculated: (P) 15 min              Spiritual Assessment began in [facility]        Referral/Consult From: (P) Nurse   Encounter Overview/Reason: (P) Spiritual/Emotional Needs  Service Provided For: (P) Patient and family together    Pt in bed and writer met dtr (came in from out of town) outside room. Dtr has received hard news before but this is not easier. Pt trying to stay \"positive\" and dtr got questions answered. Pt told writer 2 pastors have already visited, so writer offered further  services as needed.    Priya, Belief, Meaning:   Patient is connected with a priya tradition or spiritual practice  Family/Friends Other: unknown      Importance and Influence:  Patient unable to assess at this time  Family/Friends Other: unknown    Community:  Patient feels well-supported. Support system includes: Children and Priya Community  Family/Friends feel well-supported. Support system includes: Extended family    Assessment and Plan of Care:     Patient Interventions include: Facilitated expression of thoughts and feelings and Affirmed coping skills/support systems  Family/Friends Interventions include: Facilitated expression of thoughts and feelings and Affirmed coping skills/support systems    Patient Plan of Care: Spiritual Care available upon further referral  Family/Friends Plan of Care: Spiritual Care available upon further referral    Electronically signed by Torito Rodríguez,  Intern on 6/23/2025 at 10:41 PM    
Writer went over AVS with the pt and the pt's family. All verbalize understanding on DC instructions, medications and follow up appts. PIV removed. Personal belongings packed up by the pt. Pt taken down via WC.   
Facility-Administered Medications   Medication Dose Route Frequency Provider Last Rate Last Admin    pantoprazole (PROTONIX) tablet 40 mg  40 mg Oral QAM AC Eladio Moeller MD   40 mg at 06/25/25 0530    tiotropium (SPIRIVA RESPIMAT) 2.5 MCG/ACT inhaler 5 mcg  2 puff Inhalation Daily RT Eladio Moeller MD   5 mcg at 06/25/25 0857    budesonide-formoterol (SYMBICORT) 80-4.5 MCG/ACT inhaler 2 puff  2 puff Inhalation BID RT Eladio Moeller MD   2 puff at 06/25/25 0857    albuterol sulfate HFA (PROVENTIL;VENTOLIN;PROAIR) 108 (90 Base) MCG/ACT inhaler 2 puff  2 puff Inhalation Q6H PRN Eladio Moeller MD        dexAMETHasone (DECADRON) injection 4 mg  4 mg IntraVENous Q6H Padma Garcia MD   4 mg at 06/25/25 0530    levETIRAcetam (KEPPRA) tablet 500 mg  500 mg Oral BID Padma Garcia MD   500 mg at 06/25/25 0841    [Held by provider] aspirin EC tablet 81 mg  81 mg Oral Daily Padma Garcia MD   81 mg at 06/23/25 1510    atorvastatin (LIPITOR) tablet 40 mg  40 mg Oral Nightly aPdma Garcia MD   40 mg at 06/24/25 2054    sodium chloride flush 0.9 % injection 5-40 mL  5-40 mL IntraVENous 2 times per day Padma Garcia MD   10 mL at 06/25/25 0842    sodium chloride flush 0.9 % injection 5-40 mL  5-40 mL IntraVENous PRN Padma Garcia MD        0.9 % sodium chloride infusion   IntraVENous PRN Padma Garcia MD        potassium chloride (KLOR-CON M) extended release tablet 40 mEq  40 mEq Oral PRN Padma Garcia MD        Or    potassium bicarb-citric acid (EFFER-K) effervescent tablet 40 mEq  40 mEq Oral PRN Padma Garcia MD        Or    potassium chloride 10 mEq/100 mL IVPB (Peripheral Line)  10 mEq IntraVENous PRN Padma Garcia MD        magnesium sulfate 2000 mg in 50 mL IVPB premix  2,000 mg IntraVENous PRN Padma Garcia MD        LORazepam (ATIVAN) injection 4 mg  4 mg 
disease/noncalcified plaques. Celiac axis: Unremarkable. SMA: Mild stenosis at the proximal SMA due to calcified and noncalcified atherosclerotic plaques. Right renal artery: Unremarkable. Left Renal artery: Unremarkable. Right iliofemoral artery: Unremarkable. Left a iliofemoral artery: Unremarkable. Chest: Lower neck: Unremarkable. Heart: Mild cardiomegaly with biventricular enlargement. Coronary artery: Postsurgical change of coronary artery bypass with severe native 3 vessel coronary artery calcifications. Pulmonary artery: Unremarkable. Pericardium and mediastinal soft tissue: Unremarkable. Lymph nodes: Unremarkable. Esophagus: Unremarkable. Airways and lungs: A spiculated nodule within the posterior right upper lobe measuring 2.5 x 2.2 cm, suspicious for a primary lung malignancy/adenocarcinoma, new from prior exam.  Scarring within the left upper lobe and bilateral lower lobes.  Moderate centrilobular emphysema. Pleura: Unremarkable. Abdomen/Pelvis: Liver: A peripheral enhancing lesion within the hepatic segment 6 measuring 1.9 x 1.6 cm with delayed centripetal enhancement, most likely a hemangioma. Gallbladder and biliary system: Unremarkable. Spleen: Unremarkable. Pancreas: Unremarkable. Adrenal glands: Unremarkable. : Simple lower pole left renal cortical cysts.  Mild thickening of the urinary bladder with trabeculated appearance, most likely related to chronic outlet obstruction.  Unremarkable reproductive organs. GI/Bowel: Small bowel is unremarkable. The stomach is unremarkable.Large bowel is unremarkable. Unremarkable appendix. Small fat containing bilateral inguinal and umbilical hernia. Mesentery and retroperitoneum: Unremarkable. Lymph nodes: Unremarkable. Ascites: None. Bones/Soft Tissues: No aggressive bone lesions. No acute fracture or malalignment. Moderate degenerative changes at the lower cervical spine. Mild multilevel degenerative changes at the thoracic and lumbar spine. Median sternotomy

## 2025-06-30 ENCOUNTER — HOSPITAL ENCOUNTER (OUTPATIENT)
Dept: CT IMAGING | Age: 75
Discharge: HOME | End: 2025-07-02
Payer: MEDICARE

## 2025-06-30 ENCOUNTER — HOSPITAL ENCOUNTER (OUTPATIENT)
Dept: GENERAL RADIOLOGY | Age: 75
Discharge: HOME | End: 2025-07-02
Payer: MEDICARE

## 2025-06-30 VITALS
SYSTOLIC BLOOD PRESSURE: 123 MMHG | RESPIRATION RATE: 16 BRPM | HEIGHT: 71 IN | BODY MASS INDEX: 25.76 KG/M2 | DIASTOLIC BLOOD PRESSURE: 62 MMHG | TEMPERATURE: 97.2 F | HEART RATE: 60 BPM | OXYGEN SATURATION: 98 % | WEIGHT: 184 LBS

## 2025-06-30 DIAGNOSIS — R91.1 LUNG NODULE: ICD-10-CM

## 2025-06-30 LAB
INR PPP: 0.9
PARTIAL THROMBOPLASTIN TIME: 24.8 SEC (ref 23–36.5)
PLATELET # BLD AUTO: 194 K/UL (ref 138–453)
PROTHROMBIN TIME: 12.9 SEC (ref 11.7–14.9)

## 2025-06-30 PROCEDURE — 7100000040 HC SPAR PHASE II RECOVERY - FIRST 15 MIN: Performed by: RADIOLOGY

## 2025-06-30 PROCEDURE — 88305 TISSUE EXAM BY PATHOLOGIST: CPT

## 2025-06-30 PROCEDURE — 85049 AUTOMATED PLATELET COUNT: CPT

## 2025-06-30 PROCEDURE — 88342 IMHCHEM/IMCYTCHM 1ST ANTB: CPT

## 2025-06-30 PROCEDURE — 71045 X-RAY EXAM CHEST 1 VIEW: CPT

## 2025-06-30 PROCEDURE — 7100000041 HC SPAR PHASE II RECOVERY - ADDTL 15 MIN: Performed by: RADIOLOGY

## 2025-06-30 PROCEDURE — 36415 COLL VENOUS BLD VENIPUNCTURE: CPT

## 2025-06-30 PROCEDURE — 2709999900 CT NEEDLE BIOPSY LUNG PERCUTANEOUS W IMAGING GUIDANCE

## 2025-06-30 PROCEDURE — 88333 PATH CONSLTJ SURG CYTO XM 1: CPT

## 2025-06-30 PROCEDURE — 85610 PROTHROMBIN TIME: CPT

## 2025-06-30 PROCEDURE — 85730 THROMBOPLASTIN TIME PARTIAL: CPT

## 2025-06-30 PROCEDURE — 88360 TUMOR IMMUNOHISTOCHEM/MANUAL: CPT

## 2025-06-30 PROCEDURE — 88341 IMHCHEM/IMCYTCHM EA ADD ANTB: CPT

## 2025-06-30 PROCEDURE — 88334 PATH CONSLTJ SURG CYTO XM EA: CPT

## 2025-06-30 RX ORDER — SODIUM CHLORIDE 9 MG/ML
INJECTION, SOLUTION INTRAVENOUS CONTINUOUS
Status: DISCONTINUED | OUTPATIENT
Start: 2025-06-30 | End: 2025-07-03 | Stop reason: HOSPADM

## 2025-06-30 ASSESSMENT — PAIN - FUNCTIONAL ASSESSMENT
PAIN_FUNCTIONAL_ASSESSMENT: 0-10
PAIN_FUNCTIONAL_ASSESSMENT: 0-10
PAIN_FUNCTIONAL_ASSESSMENT: NONE - DENIES PAIN
PAIN_FUNCTIONAL_ASSESSMENT: 0-10

## 2025-06-30 NOTE — OR NURSING
Access removed by Dr Christy. Band aid applied. STAT CXR verbal order by Dr Christy, Radiology dept called, spoke to Rayo.   Spoke to pt - she states she discussed changing OCPs at her appt on 8/29, but she states she is fine continuing on the Isibloom/Apri she was taking before. Script sent to pharmacy.

## 2025-06-30 NOTE — BRIEF OP NOTE
Brief Postoperative Note    Marcus Dobbs  YOB: 1950  [id number]    Pre-operative Diagnosis: Lung nodule      Post-operative Diagnosis: Same    Procedure: Lung bx    Medication Given: none    Anesthesia: 1% Lidocaine     Surgeons/Assistants:  MD Jaelyn and Nas Ramirez PA-C    Estimated Blood Loss: Minimal    Complications: none    Technically successful bx of right lung nodule.  4 core samples were obtained and given to the onsite pathologist that confirmed tissue sampling.     Electronically signed by LISSET Ervin on 6/30/2025 at 11:15 AM

## 2025-06-30 NOTE — DISCHARGE INSTR - OTHER ORDERS
Today you had a  CT NEEDLE BIOPSY LUNG PERCUTANEOUS WITH IMAGING GUIDANCE in Interventional Radiology with Dr Christy

## 2025-06-30 NOTE — DISCHARGE INSTRUCTIONS
Percutaneous Lung Biopsy: What to Expect at Home  Your Recovery     A needle biopsy of the lung is a procedure to take a sample (biopsy) of lung tissue. The doctor puts a long needle through your chest wall to get the sample. Another doctor will look at the lung tissue with a microscope to check for infection, cancer, or other lung problems.  You may be sore where the doctor made the cut (incision) in your skin and put in the biopsy needle. You may feel some pain in your lung when you take a deep breath. These symptoms usually get better in a few days. If you cough up mucus, there may be streaks of blood in the mucus for the first week after the procedure.  You may need to take it easy at home for a day or two after the procedure. For 1 week, try to avoid heavy lifting and strenuous activities. These activities could cause bleeding from the biopsy site.  It can take several days to get the results of the biopsy. The doctor or nurse will discuss the results with you.  This care sheet gives you a general idea about how long it will take for you to recover. But each person recovers at a different pace. Follow the steps below to feel better as quickly as possible.  How can you care for yourself at home?  Activity    Rest when you feel tired. Getting enough sleep will help you recover.     Try to walk each day. Start by walking a little more than you did the day before. Bit by bit, increase the amount you walk. Walking boosts blood flow and helps prevent pneumonia and constipation.     Avoid strenuous activities, such as bicycle riding, jogging, weight lifting, or aerobic exercise, for 1 week or until your doctor says it is okay.     For 1 week, avoid lifting anything that would make you strain. This may include a child, heavy grocery bags and milk containers, a heavy briefcase or backpack, cat litter or dog food bags, or a vacuum .     Ask your doctor when you can drive again.     You may need to take 1 or 2

## 2025-06-30 NOTE — H&P
History and Physical    Pt Name: Marcus Dobbs  MRN: [id number]  YOB: 1950  Date of evaluation: 6/30/2025  Primary Care Physician: Alondra Ching MD    SUBJECTIVE:   History of Chief Complaint:    Marcus Dobbs is a 74 y.o. male who is scheduled today for lung biopsy. Patient states he was admitted for left sided weakness and was found to have a stroke earlier this month. On imaging studies, patient was also diagnosed with a right upper lobe pulmonary nodule, brain mass and liver lesion. Patient states he has a chronic, productive cough with brown/white sputum production. He states he also has chronic shortness of breath. Patient reports he has also lost about 25 lbs, unintentionally. Patient has a history of COPD, emphysema and uses supplemental oxygen, 2 LPM NC as needed. He states he is a former smoker, quit 17 years ago. He had a right lung decortication in 2006.  Allergies  has no known allergies.  Medications  Prior to Admission medications    Medication Sig Start Date End Date Taking? Authorizing Provider   dexAMETHasone (DECADRON) 2 MG tablet Take 2 tablets by mouth every 6 hours for 4 days, THEN 2 tablets every 8 hours for 4 days, THEN 2 tablets every 12 hours for 4 days, THEN 1 tablet every 12 hours for 15 days. 6/25/25 7/22/25 Yes Tamara Edmonds APRN - CNP   albuterol (PROVENTIL) (2.5 MG/3ML) 0.083% nebulizer solution Take 3 mLs by nebulization every 6 hours as needed for Wheezing 1/3/24  Yes Nicolás Vega MD   metoprolol tartrate (LOPRESSOR) 25 MG tablet Take 1 tablet by mouth 2 times daily 1/3/24  Yes Digna Melo MD   furosemide (LASIX) 20 MG tablet Take 1 tablet by mouth daily 1/4/24  Yes Digna Meol MD   finasteride (PROSCAR) 5 MG tablet Take 1 tablet by mouth daily   Yes Flash Alex MD   tamsulosin (FLOMAX) 0.4 MG capsule Take 1 capsule by mouth 2 times daily   Yes Flash Alex MD   pantoprazole (PROTONIX) 40 MG tablet Take 1 tablet by mouth daily

## 2025-06-30 NOTE — OR NURSING
Pathology staff arrives in CT dept, core specimens obtained by JOSÉ MIGUEL Pace handed specimens to pathology in room.

## 2025-07-02 ENCOUNTER — HOSPITAL ENCOUNTER (OUTPATIENT)
Dept: GENERAL RADIOLOGY | Age: 75
Discharge: HOME | End: 2025-07-04
Attending: INTERNAL MEDICINE
Payer: MEDICARE

## 2025-07-02 ENCOUNTER — OFFICE VISIT (OUTPATIENT)
Dept: FAMILY MEDICINE CLINIC | Age: 75
End: 2025-07-02

## 2025-07-02 VITALS
DIASTOLIC BLOOD PRESSURE: 70 MMHG | BODY MASS INDEX: 25.83 KG/M2 | HEART RATE: 59 BPM | HEIGHT: 71 IN | WEIGHT: 184.5 LBS | SYSTOLIC BLOOD PRESSURE: 118 MMHG | RESPIRATION RATE: 18 BRPM | OXYGEN SATURATION: 97 %

## 2025-07-02 DIAGNOSIS — G93.89 BRAIN MASS: ICD-10-CM

## 2025-07-02 DIAGNOSIS — J96.11 CHRONIC RESPIRATORY FAILURE WITH HYPOXIA (HCC): ICD-10-CM

## 2025-07-02 DIAGNOSIS — R53.1 LEFT-SIDED WEAKNESS: ICD-10-CM

## 2025-07-02 DIAGNOSIS — R29.898 LEFT ARM WEAKNESS: ICD-10-CM

## 2025-07-02 DIAGNOSIS — R91.1 NODULE OF UPPER LOBE OF RIGHT LUNG: ICD-10-CM

## 2025-07-02 DIAGNOSIS — R91.1 LUNG NODULE: ICD-10-CM

## 2025-07-02 DIAGNOSIS — Z09 HOSPITAL DISCHARGE FOLLOW-UP: Primary | ICD-10-CM

## 2025-07-02 DIAGNOSIS — R13.10 DYSPHAGIA, UNSPECIFIED TYPE: ICD-10-CM

## 2025-07-02 DIAGNOSIS — R94.6 ABNORMAL THYROID FUNCTION TEST: ICD-10-CM

## 2025-07-02 PROCEDURE — 92611 MOTION FLUOROSCOPY/SWALLOW: CPT

## 2025-07-02 PROCEDURE — 74230 X-RAY XM SWLNG FUNCJ C+: CPT

## 2025-07-02 NOTE — PROGRESS NOTES
Post-Discharge Transitional Care  Follow Up      Marcus Dobbs   YOB: 1950    Date of Office Visit:  7/2/2025  Date of Hospital Admission: 6/23/25  Date of Hospital Discharge: 6/25/25  Risk of hospital readmission (high >=14%. Medium >=10%) :Readmission Risk Score: 13.5      Care management risk score Rising risk (score 2-5) and Complex Care (Scores >=6): No Risk Score On File     Non face to face  following discharge, date last encounter closed (first attempt may have been earlier): 06/27/2025    Call initiated 2 business days of discharge: Yes    ASSESSMENT/PLAN:   Hospital discharge follow-up  -     DC DISCHARGE MEDS RECONCILED W/ CURRENT OUTPATIENT MED LIST  Abnormal thyroid function test  -     TSH reflex to FT4; Future  Left arm weakness  Dysphagia, unspecified type  Lung nodule  Brain mass    - Complete ordered swallow study today as scheduled  - f/u with oncology 07/09/2025  - f/u with pulmonology 07/15/2025 pending pathology of lung biopsy results  - Needs to schedule f/u with neurosurgery, neurology - specialist information provided in AVS and copy provided to vaishnavi Frank     Medical Decision Making: high complexity  Return if symptoms worsen or fail to improve, for As scheduled with PCP 09/18/2025.           Subjective:   HPI:  Follow up of Hospital problems/diagnosis(es):       Presents with vaishnavi frank (who lives in [place]) for hospital follow up  Has daughter that lives 2.5 hours away, sister that lives 5 mins away  Has HC coming out to the house, PT signed off     Admitted to [facility] 06/23/2025-06/25/2025    \"Marcus Dobbs is a 74 y.o. male with a past medical history of CAD s/p CABG x 3 on aspirin and Plavix, hyperlipidemia, sleep apnea, hypertension, and emphysema who presented with worsening left-sided weakness and numbness.  Patient was also noted by his daughter to be slurring his words and was more confused than usual.  CT head revealed a right frontal mass

## 2025-07-02 NOTE — PROCEDURES
INSTRUMENTAL SWALLOW REPORT  MODIFIED BARIUM SWALLOW    NAME: Marcus Dobbs   : 1950  MRN: [id number]       Date of Eval: 2025     Ordering Physician: Dr. Corea  Radiologist: Dr. Dorado     Referring Diagnosis(es):      Past Medical History:  has a past medical history of Acute upper respiratory infections of unspecified site, Brain mass, CAD (coronary artery disease), CHF (congestive heart failure) (HCC), Colon polyps, COPD (chronic obstructive pulmonary disease) (HCC), CVA (cerebral vascular accident) (HCC), Emphysema lung (HCC), Empyema without mention of fistula, Esophageal reflux, Essential hypertension, benign, Hearing loss, History of blood transfusion, Hx of blood clots, Liver lesion, Localized osteoarthrosis not specified whether primary or secondary, unspecified site, Other and unspecified hyperlipidemia, Requires supplemental oxygen, Right upper lobe pulmonary nodule, Surgical or other procedure not carried out because of patient's decision, and Unspecified pleural effusion.  Past Surgical History:  has a past surgical history that includes Lung decortication (Right, ); Coronary artery bypass graft (N/A, 2021); Coronary artery bypass graft (2021); Colonoscopy; Tonsillectomy and adenoidectomy; other surgical history (2025); and CT NEEDLE BIOPSY LUNG PERCUTANEOUS (2025).    Type of Study: Initial MBS    Patient Complaints/Reason for Referral:  Marcus Dobbs was referred for a MBS to assess the efficiency of his/her swallow function, assess for aspiration, and to make recommendations regarding safe dietary consistencies, effective compensatory strategies, and safe eating environment.    Behavior/Cognition/Vision/Hearing:  Behavior/Cognition: Alert;Cooperative  Vision: Impaired (wears glasses)  Hearing: Within functional limits    Impressions:  Spoke with pt. And daughter.  They report occasional coughing when drinking, difficulty with taking pills.  Pt.

## 2025-07-02 NOTE — PATIENT INSTRUCTIONS
Neuro Surgery -   Rayo Gross MD  [address]  [address]  [place] [id number] [phone]    Neurology? - follow up in 1 month   August Bassett MD  [address] [address] [address]  [facility] [address] [phone]

## 2025-07-03 LAB — SURGICAL PATHOLOGY REPORT: NORMAL

## 2025-07-09 ENCOUNTER — TELEPHONE (OUTPATIENT)
Age: 75
End: 2025-07-09

## 2025-07-09 ENCOUNTER — OFFICE VISIT (OUTPATIENT)
Age: 75
End: 2025-07-09
Payer: MEDICARE

## 2025-07-09 VITALS
OXYGEN SATURATION: 98 % | WEIGHT: 181.1 LBS | BODY MASS INDEX: 25.27 KG/M2 | DIASTOLIC BLOOD PRESSURE: 72 MMHG | TEMPERATURE: 96.4 F | SYSTOLIC BLOOD PRESSURE: 135 MMHG | RESPIRATION RATE: 14 BRPM | HEART RATE: 63 BPM

## 2025-07-09 DIAGNOSIS — R63.4 WEIGHT LOSS: ICD-10-CM

## 2025-07-09 DIAGNOSIS — C7A.8 NEUROENDOCRINE MALIGNANCY (HCC): ICD-10-CM

## 2025-07-09 DIAGNOSIS — C34.90 MALIGNANT NEOPLASM OF LUNG, UNSPECIFIED LATERALITY, UNSPECIFIED PART OF LUNG (HCC): ICD-10-CM

## 2025-07-09 DIAGNOSIS — G93.89 BRAIN MASS: Primary | ICD-10-CM

## 2025-07-09 PROCEDURE — 99211 OFF/OP EST MAY X REQ PHY/QHP: CPT | Performed by: INTERNAL MEDICINE

## 2025-07-09 PROCEDURE — 99214 OFFICE O/P EST MOD 30 MIN: CPT | Performed by: INTERNAL MEDICINE

## 2025-07-09 NOTE — PROGRESS NOTES
[facility](low burden of the disease)  Dotatate PET/CT  Based on the final differentiation will consider send back to surgeon or radiation oncology  We discussed the final plan after pathology back  Weight loss> likely related to malignancy> dietitian evaluation  RTC in 2 weeks      I spent a total of 50 minutes on the date of the service which included preparing to see the patient, face-to-face patient care, completing clinical documentation, obtaining and/or reviewing separately obtained history, performing a medically appropriate examination, counseling and educating the patient/family/caregiver, ordering medications, tests, or procedures, communicating with other HCPs (not separately reported), independently interpreting results (not separately reported), communicating results to the patient/family/caregiver and care coordination (not separately reported).                                   Ankit Corea MD                          [facility]                            This note is created with the assistance of a speech recognition program.  While intending to generate a document that actually reflects the content of the visit, the document can still have some errors including those of syntax and sound a like substitutions which may escape proof reading.  It such instances, actual meaning can be extrapolated by contextual diversion.

## 2025-07-09 NOTE — TELEPHONE ENCOUNTER
Instructions   from Dr. Ankit Corea MD    Labs today--lab closed pt will have drawn 07/10/25    Refer to radiation--07/14/25    Pet scan--07/17/25    Rtc in 2 weeks-07/23/25    Nurse navigator-- advised will call,. Card given to pt.     Labs/AVS provided to pt  and family; expressed an understanding of instructions.

## 2025-07-10 ENCOUNTER — HOSPITAL ENCOUNTER (OUTPATIENT)
Age: 75
Setting detail: SPECIMEN
Discharge: HOME | End: 2025-07-10

## 2025-07-10 DIAGNOSIS — R94.6 ABNORMAL THYROID FUNCTION TEST: ICD-10-CM

## 2025-07-10 DIAGNOSIS — G93.89 BRAIN MASS: ICD-10-CM

## 2025-07-10 LAB
T4 FREE SERPL-MCNC: 1.1 NG/DL (ref 0.92–1.68)
TSH SERPL DL<=0.05 MIU/L-ACNC: 4.91 UIU/ML (ref 0.27–4.2)

## 2025-07-11 ENCOUNTER — TELEPHONE (OUTPATIENT)
Age: 75
End: 2025-07-11

## 2025-07-11 NOTE — TELEPHONE ENCOUNTER
Name: Marcus Dobbs  : 1950  MRN: [id number]    Oncology Navigation Follow-Up Note    Contact Type:  Telephone    Notes: Navigator spoke with daughter Pilar and answering any questions post MO Visit.   Medical POA-Jesenia-sister  Legal POA-Pilar daughter  Brother apolinar will be assisting with pts. Care, Pilar is back in [place] and will be returning to [place].   Giuliana Quezada would like to be on call(speaker) when pt. Is consulting with RO.        Electronically signed by Joanna Anthony RN on 2025 at 10:25 AM

## 2025-07-12 LAB — CHROMOGRANIN A: 510 NG/ML (ref 0–187)

## 2025-07-14 ENCOUNTER — HOSPITAL ENCOUNTER (OUTPATIENT)
Dept: RADIATION ONCOLOGY | Age: 75
Discharge: HOME | End: 2025-07-14
Payer: MEDICARE

## 2025-07-14 ENCOUNTER — TELEPHONE (OUTPATIENT)
Age: 75
End: 2025-07-14

## 2025-07-14 VITALS
RESPIRATION RATE: 14 BRPM | HEART RATE: 61 BPM | BODY MASS INDEX: 25.41 KG/M2 | OXYGEN SATURATION: 95 % | WEIGHT: 182.1 LBS | TEMPERATURE: 97.5 F | DIASTOLIC BLOOD PRESSURE: 69 MMHG | SYSTOLIC BLOOD PRESSURE: 122 MMHG

## 2025-07-14 PROCEDURE — 99213 OFFICE O/P EST LOW 20 MIN: CPT | Performed by: RADIOLOGY

## 2025-07-14 NOTE — PROGRESS NOTES
Referring Physician: Dr. Corea      Vitals:    25 1349   BP: 122/69   Pulse: 61   Resp: 14   Temp: 97.5 °F (36.4 °C)   SpO2: 95%    :     Pain Assessment: None - Denies Pain          Wt Readings from Last 1 Encounters:   25 82.6 kg (182 lb 1.6 oz)        Body mass index is 25.41 kg/m².              Smoking Status:    [] Smoker - PPD:   [] Nonsmoker - Quit Date:               [] Never a smoker      No chief complaint on file.       Cancer Staging   No matching staging information was found for the patient.      Prior Radiation Therapy? No   If yes, site treated:   Facility:                             Date:    Concurrent Chemo/radiation? No   If yes, start date:    Prior Hormonal Therapy or Contraceptive Medications?  No   If yes,  Medication:                                Duration:    Head and Neck Cancer? No     Pacemaker/Defibrillator/ICD:  No    Do you have any musculoskeletal diseases, Lupus or arthritic conditions?  No   If yes, are you currently taking Methotrexate?  []  Yes  []  No           BREAST/GYN  Patient only:     LMP:    Age at first Menses:    :    Para:            PROSTATE patient only :    Past or Current Form of Hormone Therapy (such as ADT or Testosterone)  []  Yes  []  No            Current Outpatient Medications   Medication Sig Dispense Refill    dexAMETHasone (DECADRON) 2 MG tablet Take 2 tablets by mouth every 6 hours for 4 days, THEN 2 tablets every 8 hours for 4 days, THEN 2 tablets every 12 hours for 4 days, THEN 1 tablet every 12 hours for 15 days. 102 tablet 0    albuterol (PROVENTIL) (2.5 MG/3ML) 0.083% nebulizer solution Take 3 mLs by nebulization every 6 hours as needed for Wheezing 120 each 3    metoprolol tartrate (LOPRESSOR) 25 MG tablet Take 1 tablet by mouth 2 times daily 60 tablet 3    furosemide (LASIX) 20 MG tablet Take 1 tablet by mouth daily 60 tablet 3    finasteride (PROSCAR) 5 MG tablet Take 1 tablet by mouth daily      tamsulosin (FLOMAX) 0.4 MG

## 2025-07-14 NOTE — TELEPHONE ENCOUNTER
Name: Marcus Dobbs  : 1950  MRN: [id number]    Oncology Navigation Follow-Up Note    Contact Type:  Telephone    Notes: Navigator spoke with CTS and writer requesting information regarding leads left in chest from 21 surgery. Office unable to locate in notes-incomplete note.  Writer then faxing request for surgical report from medical records to be faxed to MO and RO.      Electronically signed by Joanna Anthony RN on 2025 at 3:17 PM

## 2025-07-15 ENCOUNTER — TELEPHONE (OUTPATIENT)
Age: 75
End: 2025-07-15

## 2025-07-15 ENCOUNTER — OFFICE VISIT (OUTPATIENT)
Dept: PULMONOLOGY | Age: 75
End: 2025-07-15
Payer: MEDICARE

## 2025-07-15 VITALS
HEIGHT: 70 IN | BODY MASS INDEX: 25.91 KG/M2 | DIASTOLIC BLOOD PRESSURE: 58 MMHG | HEART RATE: 71 BPM | WEIGHT: 181 LBS | OXYGEN SATURATION: 93 % | SYSTOLIC BLOOD PRESSURE: 104 MMHG

## 2025-07-15 DIAGNOSIS — I10 ESSENTIAL HYPERTENSION: ICD-10-CM

## 2025-07-15 DIAGNOSIS — J47.9 BRONCHIECTASIS WITHOUT COMPLICATION (HCC): ICD-10-CM

## 2025-07-15 DIAGNOSIS — R91.1 NODULE OF UPPER LOBE OF RIGHT LUNG: ICD-10-CM

## 2025-07-15 DIAGNOSIS — J86.9 EMPYEMA OF PLEURA (HCC): ICD-10-CM

## 2025-07-15 DIAGNOSIS — E27.9 ADRENAL NODULE: ICD-10-CM

## 2025-07-15 DIAGNOSIS — J44.9 CHRONIC OBSTRUCTIVE PULMONARY DISEASE, UNSPECIFIED COPD TYPE (HCC): Primary | ICD-10-CM

## 2025-07-15 DIAGNOSIS — Z95.1 S/P CABG X 3: ICD-10-CM

## 2025-07-15 DIAGNOSIS — C7A.8 NEUROENDOCRINE MALIGNANCY (HCC): ICD-10-CM

## 2025-07-15 DIAGNOSIS — I50.22 CHRONIC SYSTOLIC CONGESTIVE HEART FAILURE (HCC): ICD-10-CM

## 2025-07-15 PROCEDURE — 3023F SPIROM DOC REV: CPT | Performed by: INTERNAL MEDICINE

## 2025-07-15 PROCEDURE — 1123F ACP DISCUSS/DSCN MKR DOCD: CPT | Performed by: INTERNAL MEDICINE

## 2025-07-15 PROCEDURE — 3017F COLORECTAL CA SCREEN DOC REV: CPT | Performed by: INTERNAL MEDICINE

## 2025-07-15 PROCEDURE — 99214 OFFICE O/P EST MOD 30 MIN: CPT | Performed by: INTERNAL MEDICINE

## 2025-07-15 PROCEDURE — 1036F TOBACCO NON-USER: CPT | Performed by: INTERNAL MEDICINE

## 2025-07-15 PROCEDURE — 3074F SYST BP LT 130 MM HG: CPT | Performed by: INTERNAL MEDICINE

## 2025-07-15 PROCEDURE — 3078F DIAST BP <80 MM HG: CPT | Performed by: INTERNAL MEDICINE

## 2025-07-15 PROCEDURE — G8427 DOCREV CUR MEDS BY ELIG CLIN: HCPCS | Performed by: INTERNAL MEDICINE

## 2025-07-15 PROCEDURE — G8417 CALC BMI ABV UP PARAM F/U: HCPCS | Performed by: INTERNAL MEDICINE

## 2025-07-15 PROCEDURE — 1111F DSCHRG MED/CURRENT MED MERGE: CPT | Performed by: INTERNAL MEDICINE

## 2025-07-15 RX ORDER — AZITHROMYCIN 250 MG/1
TABLET, FILM COATED ORAL
Qty: 1 PACKET | Refills: 0 | Status: SHIPPED | OUTPATIENT
Start: 2025-07-15

## 2025-07-15 NOTE — TELEPHONE ENCOUNTER
Name: Marcus Dobbs  : 1950  MRN: [id number]    Oncology Navigation Follow-Up Note    Contact Type:  Telephone    Notes: Navigator spoke again with CTS and will approach Dr. Sebastian when he's back in office next week regarding leads in pts. Chest. Medical records has no additional information.       Electronically signed by Joanna Anthony RN on 7/15/2025 at 2:29 PM

## 2025-07-15 NOTE — PROGRESS NOTES
PULMONARY OP  PROGRESS NOTE      Patient:  Marcus Dobbs  YOB: 1950    MRN: [id number]     Acct:        Pt seen and Chart reviewed.  Marcus Dobbs is here in followup for   1. Chronic obstructive pulmonary disease, unspecified COPD type (HCC)    2. Bronchiectasis without complication (HCC)    3. Chronic systolic congestive heart failure (HCC)    4. Essential hypertension    5. S/P CABG x 3    6. Adrenal nodule    7. Empyema of pleura (HCC)    8. Nodule of upper lobe of right lung    9. Neuroendocrine malignancy (HCC)          History of Present Illness  The patient is a 74-year-old male who presents for follow-up of COPD, bronchiectasis, nodule of the upper lobe of the right lung, neuroendocrine malignancy, chronic systolic CHF, essential hypertension, status post CABG x3, adrenal nodule, and empyema of the pleura history. He is accompanied by his daughter via phone.    He was unable to undergo an MRI due to the presence of wires in his body. A biopsy was performed, and it was determined that he has stage IV lung cancer with metastasis to the brain. The speed of the cancer's progression is currently unknown. He is scheduled for radiation therapy to the brain and will have a mask fitted on 07/28/2025. [facility] and has to go to [facility] on Monday. He believes this might be for a second opinion or related to payment issues, as Medicare does not cover everything. [facility] is his secondary insurance, and they work with outside groups. He is going to see what they say and ensure everything is coordinated. He is supposed to hear something definite on 07/23/2025, as all tests should be back by then. He has a 4:00 PM appointment to decide on the course of action. Surgery has been ruled out altogether because it would not be beneficial due to the involvement of the brain. They want to make sure he is getting a PET scan done on Thursday afternoon. He is currently

## 2025-07-17 ENCOUNTER — HOSPITAL ENCOUNTER (OUTPATIENT)
Dept: NUCLEAR MEDICINE | Age: 75
Discharge: HOME | End: 2025-07-19
Attending: INTERNAL MEDICINE
Payer: MEDICARE

## 2025-07-17 ENCOUNTER — TELEPHONE (OUTPATIENT)
Age: 75
End: 2025-07-17

## 2025-07-17 DIAGNOSIS — C7A.8 NEUROENDOCRINE MALIGNANCY (HCC): ICD-10-CM

## 2025-07-17 PROCEDURE — 78815 PET IMAGE W/CT SKULL-THIGH: CPT

## 2025-07-17 PROCEDURE — 3430000000 HC RX DIAGNOSTIC RADIOPHARMACEUTICAL: Performed by: INTERNAL MEDICINE

## 2025-07-17 PROCEDURE — A9592 HC RX DIAGNOSTIC RADIOPHARMACEUTICAL: HCPCS | Performed by: INTERNAL MEDICINE

## 2025-07-17 PROCEDURE — 2500000003 HC RX 250 WO HCPCS: Performed by: INTERNAL MEDICINE

## 2025-07-17 RX ORDER — SODIUM CHLORIDE 0.9 % (FLUSH) 0.9 %
10 SYRINGE (ML) INJECTION
Status: COMPLETED | OUTPATIENT
Start: 2025-07-17 | End: 2025-07-17

## 2025-07-17 RX ADMIN — SODIUM CHLORIDE, PRESERVATIVE FREE 10 ML: 5 INJECTION INTRAVENOUS at 14:48

## 2025-07-17 RX ADMIN — COPPER CU 64 DOTATATE 4.27 MILLICURIE: 1 INJECTION, SOLUTION INTRAVENOUS at 14:36

## 2025-07-17 NOTE — TELEPHONE ENCOUNTER
Name: Marcus Dobbs  : 1950  MRN: [id number]    Oncology Navigation Follow-Up Note    Contact Type:  Telephone    Notes: Navigator spoke with MRI department SVs and if pt. Has abandon leads per Dr. Dorado cannot perform MRI regardless if make or model of leads can be identified.       Electronically signed by Joanna Anthony RN on 2025 at 9:23 AM

## 2025-07-17 NOTE — CONSULTS
[facility]            Radiation Oncology          [address] [place] [address]        O: [phone]        F: [phone]       [employer]             2025    Patient: Marcus Dobbs   YOB: 1950       Dear Dr Corea:    Thank you for referring Marcus Dobbs to me for evaluation. Below are the relevant portions of my assessment and plan of care. If you have questions, please do not hesitate to call me. I look forward to following this patient along with you.     Sincerely,    Electronically signed by Cee Savage MD on 2025 at 10:55 AM    CC: Patient Care Team:  Alondra Cihng MD as PCP - General (Internal Medicine)  Alondra Ching MD as PCP - Empaneled Provider  Jesenia Alexander MD Katragadda, Srinivas, MD as Consulting Physician (Pulmonary Disease)  Aditi Bowen, RN as Registered Nurse  Santa Mckinney, RN as Registered Nurse  Suzanne Powers, RN as Registered Nurse  Manasa Cristobal, RN as Registered Nurse  Blanche Anna, RN as Registered Nurse  Santa Mckinney, RN as Registered Nurse  Joanna Anthony, RN as Nurse Navigator (Oncology)  ------------------------------------------------------------------------------------------------------------------------------------------------------------------------------------------      RADIATION ONCOLOGY NEW PATIENT VISIT:    Date of Service: 2025    Location:  [facility] Radiation Oncology,   [address] [place] [id number] [phone]     Patient ID:   Marcus Dobbs  : 1950   MRN: [id number]    CHIEF COMPLAINT: \"Brain tumor\"    DIAGNOSIS:  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 male with 138-pack-year history of smoking who presented to the hospital with slurred

## 2025-07-23 ENCOUNTER — OFFICE VISIT (OUTPATIENT)
Age: 75
End: 2025-07-23
Payer: MEDICARE

## 2025-07-23 ENCOUNTER — TELEPHONE (OUTPATIENT)
Age: 75
End: 2025-07-23

## 2025-07-23 VITALS
HEART RATE: 76 BPM | OXYGEN SATURATION: 96 % | WEIGHT: 180.8 LBS | DIASTOLIC BLOOD PRESSURE: 71 MMHG | SYSTOLIC BLOOD PRESSURE: 122 MMHG | BODY MASS INDEX: 25.94 KG/M2 | TEMPERATURE: 98 F

## 2025-07-23 DIAGNOSIS — G93.89 BRAIN MASS: ICD-10-CM

## 2025-07-23 DIAGNOSIS — C7A.8 NEUROENDOCRINE MALIGNANCY (HCC): ICD-10-CM

## 2025-07-23 DIAGNOSIS — C34.90 MALIGNANT NEOPLASM OF LUNG, UNSPECIFIED LATERALITY, UNSPECIFIED PART OF LUNG (HCC): Primary | ICD-10-CM

## 2025-07-23 PROCEDURE — 3078F DIAST BP <80 MM HG: CPT | Performed by: INTERNAL MEDICINE

## 2025-07-23 PROCEDURE — 3074F SYST BP LT 130 MM HG: CPT | Performed by: INTERNAL MEDICINE

## 2025-07-23 PROCEDURE — 1111F DSCHRG MED/CURRENT MED MERGE: CPT | Performed by: INTERNAL MEDICINE

## 2025-07-23 PROCEDURE — G8427 DOCREV CUR MEDS BY ELIG CLIN: HCPCS | Performed by: INTERNAL MEDICINE

## 2025-07-23 PROCEDURE — 1125F AMNT PAIN NOTED PAIN PRSNT: CPT | Performed by: INTERNAL MEDICINE

## 2025-07-23 PROCEDURE — 99214 OFFICE O/P EST MOD 30 MIN: CPT | Performed by: INTERNAL MEDICINE

## 2025-07-23 PROCEDURE — 3017F COLORECTAL CA SCREEN DOC REV: CPT | Performed by: INTERNAL MEDICINE

## 2025-07-23 PROCEDURE — G8417 CALC BMI ABV UP PARAM F/U: HCPCS | Performed by: INTERNAL MEDICINE

## 2025-07-23 PROCEDURE — 1123F ACP DISCUSS/DSCN MKR DOCD: CPT | Performed by: INTERNAL MEDICINE

## 2025-07-23 PROCEDURE — 1036F TOBACCO NON-USER: CPT | Performed by: INTERNAL MEDICINE

## 2025-07-23 PROCEDURE — 99215 OFFICE O/P EST HI 40 MIN: CPT | Performed by: INTERNAL MEDICINE

## 2025-07-23 PROCEDURE — 1159F MED LIST DOCD IN RCRD: CPT | Performed by: INTERNAL MEDICINE

## 2025-07-23 RX ORDER — DEXAMETHASONE 1 MG
1 TABLET ORAL 2 TIMES DAILY WITH MEALS
COMMUNITY
End: 2025-07-28

## 2025-07-23 NOTE — PROGRESS NOTES
Patient ID: Marcus Dobbs, 1950, [id number], 74 y.o.  Referred by :  No ref. provider found   Reason for consultation: Right frontal lobe mass/right upper lobe lung mass compatible with malignant neoplasm with neuroendocrine features      HISTORY OF PRESENT ILLNESS:    Oncologic History:    Marcus Dobbs is a very pleasant 74 y.o. male.  who was admitted to the hospital for management of brain mass on 6/25/2025   He has a past medical history of CAD status post CABG x 3 (on aspirin and Plavix), hyperlipidemia, COPD, BRENNEN, hypertension, NSTEMI, adrenal nodule, prior EtOH abuse.   He presented to the ED with slurred speech and confusion.  He noticed weakness in his left arm last week and followed up with his PCP who ordered a CT a head and neck that has not yet been read.       -CT head without contrast showed a 1.5 x 1.4 cm primarily hyperdense right frontal lobe mass with adjacent vasogenic edema  - CT chest abdomen and pelvis shows a 2.5 cm right upper lobe nodule as well as a 1.9 cm right hepatic lesion, most likely a hemangioma  -Patient unable to get the brain MRI     He has no past medical/family history of cancers.  He does endorse a significant smoking history, quit 18 years ago    June 30, 2025  Right upper lobe lung biopsy  MALIGNANT NEOPLASM WITH NEUROENDOCRINE FEATURES.  However not able to specify more details and was sent [facility]  Neoplastic cells are positive for CD56 and pankeratin, and negative   for CK7, TTF, P63, SYNAPTOPHYSIN, CD3, CD45, CD34 and PAX5. Ki 67 is   high; 70%.       History of Present Illness  The patient presents for stage IV lung cancer.    He has been diagnosed with stage IV lung cancer, which has metastasized to his brain. The cancer is described as aggressive, with one lesion in the lung and one in the brain. He is currently under the care of Dr. Savage for radiation treatment of the brain lesion. A PET scan did not show any cancer elsewhere in the

## 2025-07-23 NOTE — TELEPHONE ENCOUNTER
Instructions   from Dr. Ankit Corea MD    [facility]  [facility]  Rtc in 2 weeks        Lab is currently closed and out of [facility] kits. Kits should be arriving soon. I will call patient to update on status of kits.   Referral sent to Dr. Olvera's office, I called but office is closed. Patient's daughter states she will call tomorrow.   RV 8/6/25 at 9:30 am

## 2025-07-24 ENCOUNTER — TELEPHONE (OUTPATIENT)
Age: 75
End: 2025-07-24

## 2025-07-24 DIAGNOSIS — C34.90 MALIGNANT NEOPLASM OF LUNG, UNSPECIFIED LATERALITY, UNSPECIFIED PART OF LUNG (HCC): Primary | ICD-10-CM

## 2025-07-24 LAB — TEMPUS PORTAL: NORMAL

## 2025-07-24 NOTE — TELEPHONE ENCOUNTER
[facility]- MEDICAL NUTRITION THERAPY     Visit Type: Initial  Reason for Assessment: MD Consult-d/t weight loss    NUTRITION RECOMMENDATIONS / MONITORING / EVALUATION  Continue regular diet as tolerated.   Continue use of oral nutrition supplements in addition to meals to help with weight maintenance.   Will monitor PO tolerance, adequacy of intake, and weight after start of treatment, but encouraged patient to call with any questions/concerns in the meantime.     Subjective/Current Data:  Marcus Dobbs is a 74 y.o. male with Right frontal lobe mass/right upper lobe lung mass compatible with malignant neoplasm with neuroendocrine features.  Referral received from Dr Corea r/t history of unintentional weight loss. Chart reviewed and called patient. Pt reports he used to weigh 210-215 lb, but now around 180 lb. Pt reports he believes he eats well and denies any recent appetite changes. Typically eats 2 large meals/day and snacks in between. Recently started drinking 1-2 Premier Protein shakes/day. Pt reports his weight has now stabilized around 180 lbs. Pt states h/o occasional swallowing difficulty. Recently had a swallow study done and passed for regular consistency with thin liquids. Pt was provided with safe swallowing strategies which have been helpful. Pt denies any trouble swallowing at present.     Objective Data:  Patient Active Problem List    Diagnosis Date Noted    Hx of Legionella Pneumonia 04/28/2022    Chronic systolic congestive heart failure (HCC) 04/28/2022    CAD (coronary artery disease) 04/28/2022    Neuroendocrine malignancy (HCC) 07/09/2025    Nodule of upper lobe of right lung 06/24/2025    Cerebrovascular accident (CVA) (HCC) 06/24/2025    Left-sided weakness 06/24/2025    Liver lesion 06/24/2025    Brain mass 06/23/2025    Pulmonary emphysema (HCC) 06/03/2024    Chronic respiratory failure with hypoxia (HCC) 01/02/2024    S/P CABG x 3 12/27/2021    History of non-ST elevation

## 2025-07-25 ENCOUNTER — HOSPITAL ENCOUNTER (OUTPATIENT)
Age: 75
Discharge: HOME | End: 2025-07-25
Payer: MEDICARE

## 2025-07-25 DIAGNOSIS — C34.90 MALIGNANT NEOPLASM OF LUNG, UNSPECIFIED LATERALITY, UNSPECIFIED PART OF LUNG (HCC): ICD-10-CM

## 2025-07-25 PROCEDURE — 36415 COLL VENOUS BLD VENIPUNCTURE: CPT

## 2025-07-28 ENCOUNTER — HOSPITAL ENCOUNTER (OUTPATIENT)
Dept: RADIATION ONCOLOGY | Age: 75
Discharge: HOME | End: 2025-07-28
Payer: MEDICARE

## 2025-07-28 PROCEDURE — 77334 RADIATION TREATMENT AID(S): CPT | Performed by: RADIOLOGY

## 2025-07-28 RX ORDER — DEXAMETHASONE 2 MG/1
2 TABLET ORAL 2 TIMES DAILY WITH MEALS
Qty: 60 TABLET | Refills: 1 | Status: SHIPPED | OUTPATIENT
Start: 2025-07-28

## 2025-07-28 NOTE — DISCHARGE INSTRUCTIONS
What to expect next!   Simulation Scan      Prior to your radiation you will need a simulation CT scan. This scan is where they will precisely identify the area on your body where you will receive radiation. Positioning is extremely important, and your body will be positioned carefully. You will be in the same position during every treatment, and you will need to remain still during the treatments. Your doctor may order a mold or a mask (for head and neck treatment only) to help reproduce your treatment set up. You will also receive tattoos during this appointment. These tattoos are very important. The therapists use these tattoos to line your body up on the treatment table. Information from the CT scan is used to precisely locate the treatment fields and create a \"map\" for the physician to design the treatment. The CT scanner is specially designed to work with the other equipment in the department and is not a replacement for other diagnostic scans you may have received.   After simulation, details from the procedure are forwarded to medical radiation dosimetrists and medical physicists. These professionals perform highly technical calculations that will be used to set up the treatment machine (linear accelerator). The dosimetrist and physicist work closely with your radiation oncologist to develop the treatment plan, a process that typically takes 7-10 business days. Once the planning is completed, a therapist will call you with a start date. During that call your appointment time will also be determined.                                    Head and Neck Mask      Body Mold                     Radiation Tattoo            Daily Treatments       You will be placed on the treatment table in the same position as you were when you had your simulation scan. Once in place, a set of X-ray films will be taken to ensure that the treatment will be delivered the same way as it was simulated. Once the films and positioning are

## 2025-07-28 NOTE — PROGRESS NOTES
Radiation Treatment Site/Plan/Fractions:5 every other day treatments to brain.      Concurrent Chemotherapy/Immunotherapy:no      Cardiac Device:no      Transportation Concerns:no      Nursing Referrals and Reasons:none      Contrast Given:no          Miscellaneous Information:Patient arrives ambulatory with steady gait.  He is accompanied by his brother.  Patient's daughter lives in [place] and is an active support person for patient.  Radiation therapy plan of care, treatment process and site specific side effects reviewed with patient and provided to him.  Questions answered to his satisfaction and he voices understanding of the information. He was escorted to simulation room.     Patient reports ongoing headaches and some increase in \"forgetfulness\"  Patient currently taking Decadron 2mg BID.  Dr. Savage notified and refilled patient prescription and patient notified to continue at current dose.  He voices understanding.

## 2025-07-29 ENCOUNTER — TELEPHONE (OUTPATIENT)
Age: 75
End: 2025-07-29

## 2025-07-29 ENCOUNTER — TELEPHONE (OUTPATIENT)
Dept: CARDIOTHORACIC SURGERY | Age: 75
End: 2025-07-29

## 2025-07-29 DIAGNOSIS — C34.90 MALIGNANT NEOPLASM OF LUNG, UNSPECIFIED LATERALITY, UNSPECIFIED PART OF LUNG (HCC): Primary | ICD-10-CM

## 2025-07-29 DIAGNOSIS — G93.89 BRAIN MASS: ICD-10-CM

## 2025-07-29 DIAGNOSIS — C34.11 MALIGNANT NEOPLASM OF UPPER LOBE, RIGHT BRONCHUS OR LUNG (HCC): ICD-10-CM

## 2025-07-29 NOTE — TELEPHONE ENCOUNTER
Called [facility] in regards to this patient due to him having [facility] to get a Auth, they stated that a service request form was never sent in for the patient to be seen in our office, the writer explained that it will be filled out and sent back with Dr. Corea's note for the referral, at this time his note is not complete.    The writer also contacted Dr. Corea's office to request that the note be completed so that the service request can be sent it he will not be in the office until tomorrow.    The [facility] stated that due to the steps required that it is very unlikely that the request will be completed before Thursday, at this time the patient was rescheduled to 8/7 to get the Auth, but they did state that they have 28 days to complete it. Once Note has been finished Service request form will be sent.    Fax: [phone]  Meredith in charge of auth at [facility]: [phone]

## 2025-07-29 NOTE — TELEPHONE ENCOUNTER
Name: Marcus Dobbs  : 1950  MRN: [id number]    Oncology Navigation Follow-Up Note    Contact Type:  Telephone    Notes: Writer covering for Joanna, pt's oncology nurse navigator.  Pt's daughter, Dior, called in stating Dr. Sotomayor  consult rescheduled to  r/t await [facility] approval.  Daughter inquired if need  Dr. Corea f/u rescheduled.  Per appt notes noted obtain [facility] results.  Per [facility] portal await specimen.  Dr. Corea updated.  Dr. Corea requested PET/CT scan completed r/t [facility] pathology results showing sarcomatoid carcinoma.  Dr. Corea requested PET/CT scan scheduled prior to  f/u if possible.  Dr. Corea requested pathology specimen sent back to [facility] asap to expedite [facility] testing.  Spoke w/Jacquelyn, [facility] , updated on pt & requested PET/CT scan asap.  Spoke w/Mili [facility] pathology, updated on pt & requested specimen retrieved & sent to [facility] asap.  Jacquelyn alerted writer PET/CT scan scheduled  arrive @ 1400.  Dior updated on conversation w/Dr. Corea & updated on  PET/CT scan appt details including pre imaging instructions.  Dior verbalized understanding & denied questions/concerns.       Electronically signed by Blanca Garnett RN on 2025 at 11:48 AM

## 2025-07-30 ENCOUNTER — HOSPITAL ENCOUNTER (OUTPATIENT)
Dept: NUCLEAR MEDICINE | Age: 75
Discharge: HOME | End: 2025-08-01
Attending: INTERNAL MEDICINE
Payer: MEDICARE

## 2025-07-30 ENCOUNTER — TELEPHONE (OUTPATIENT)
Age: 75
End: 2025-07-30

## 2025-07-30 ENCOUNTER — HOSPITAL ENCOUNTER (OUTPATIENT)
Dept: RADIATION ONCOLOGY | Age: 75
Discharge: HOME | End: 2025-07-30
Payer: MEDICARE

## 2025-07-30 DIAGNOSIS — G93.89 BRAIN MASS: ICD-10-CM

## 2025-07-30 DIAGNOSIS — C34.90 MALIGNANT NEOPLASM OF LUNG, UNSPECIFIED LATERALITY, UNSPECIFIED PART OF LUNG (HCC): ICD-10-CM

## 2025-07-30 DIAGNOSIS — C34.11 MALIGNANT NEOPLASM OF UPPER LOBE, RIGHT BRONCHUS OR LUNG (HCC): ICD-10-CM

## 2025-07-30 LAB — GLUCOSE BLD-MCNC: 119 MG/DL (ref 75–110)

## 2025-07-30 PROCEDURE — 77338 DESIGN MLC DEVICE FOR IMRT: CPT | Performed by: RADIOLOGY

## 2025-07-30 PROCEDURE — 82947 ASSAY GLUCOSE BLOOD QUANT: CPT

## 2025-07-30 PROCEDURE — 2500000003 HC RX 250 WO HCPCS: Performed by: INTERNAL MEDICINE

## 2025-07-30 PROCEDURE — 3430000000 HC RX DIAGNOSTIC RADIOPHARMACEUTICAL: Performed by: INTERNAL MEDICINE

## 2025-07-30 PROCEDURE — A9609 HC RX DIAGNOSTIC RADIOPHARMACEUTICAL: HCPCS | Performed by: INTERNAL MEDICINE

## 2025-07-30 PROCEDURE — 77300 RADIATION THERAPY DOSE PLAN: CPT | Performed by: RADIOLOGY

## 2025-07-30 PROCEDURE — 78815 PET IMAGE W/CT SKULL-THIGH: CPT

## 2025-07-30 PROCEDURE — 77301 RADIOTHERAPY DOSE PLAN IMRT: CPT | Performed by: RADIOLOGY

## 2025-07-30 RX ORDER — SODIUM CHLORIDE 0.9 % (FLUSH) 0.9 %
10 SYRINGE (ML) INJECTION PRN
Status: DISCONTINUED | OUTPATIENT
Start: 2025-07-30 | End: 2025-08-02 | Stop reason: HOSPADM

## 2025-07-30 RX ORDER — FLUDEOXYGLUCOSE F 18 200 MCI/ML
10 INJECTION, SOLUTION INTRAVENOUS
Status: COMPLETED | OUTPATIENT
Start: 2025-07-30 | End: 2025-07-30

## 2025-07-30 RX ADMIN — SODIUM CHLORIDE, PRESERVATIVE FREE 10 ML: 5 INJECTION INTRAVENOUS at 14:28

## 2025-07-30 RX ADMIN — FLUDEOXYGLUCOSE F 18 10.78 MILLICURIE: 200 INJECTION, SOLUTION INTRAVENOUS at 14:28

## 2025-07-30 NOTE — TELEPHONE ENCOUNTER
Name: Marcus Dobbs  : 1950  MRN: [id number]    Oncology Navigation Follow-Up Note    Contact Type:  Telephone    Notes: Navigator received call from Xenia in [facility] office and needing amendment to progress note prior to sending to [facility] for auth. Writer left  for Dr. Corea. Writer also sending PS message to Dr. Corea requesting he change his note ASAP.       Electronically signed by Joanna Anthony RN on 2025 at 10:42 AM

## 2025-07-31 ENCOUNTER — TELEPHONE (OUTPATIENT)
Age: 75
End: 2025-07-31

## 2025-08-01 NOTE — TELEPHONE ENCOUNTER
Called and spoke with [facility] care due to Meredith being out of the office they requested that I refax the RFS (Request for Services) papers to them again but provided the general number this time, it was refaxed but as urgent this time

## 2025-08-04 LAB
PD-L1 BY 22C3 TISS IMSTN DOC: NORMAL
TEMPUS PD-L1 (22C3) COMBINED POSITIVE SCORE: 52
TEMPUS PD-L1 (22C3) TUMOR PROPORTION SCORE: 50 %
TEMPUS PORTAL: NORMAL
TEMPUS THERAPY1: NORMAL
TEMPUS THERAPY2: NORMAL
TEMPUS THERAPYCOUNT: 2

## 2025-08-05 ENCOUNTER — HOSPITAL ENCOUNTER (OUTPATIENT)
Dept: RADIATION ONCOLOGY | Age: 75
Discharge: HOME | End: 2025-08-05
Payer: MEDICARE

## 2025-08-05 PROCEDURE — 77373 STRTCTC BDY RAD THER TX DLVR: CPT | Performed by: RADIOLOGY

## 2025-08-06 ENCOUNTER — APPOINTMENT (OUTPATIENT)
Dept: RADIATION ONCOLOGY | Age: 75
End: 2025-08-06
Payer: MEDICARE

## 2025-08-06 ENCOUNTER — TELEPHONE (OUTPATIENT)
Dept: CARDIOTHORACIC SURGERY | Age: 75
End: 2025-08-06

## 2025-08-06 ENCOUNTER — OFFICE VISIT (OUTPATIENT)
Age: 75
End: 2025-08-06
Payer: MEDICARE

## 2025-08-06 ENCOUNTER — TELEPHONE (OUTPATIENT)
Age: 75
End: 2025-08-06

## 2025-08-06 VITALS
SYSTOLIC BLOOD PRESSURE: 119 MMHG | TEMPERATURE: 98.2 F | DIASTOLIC BLOOD PRESSURE: 68 MMHG | HEART RATE: 73 BPM | OXYGEN SATURATION: 97 % | HEIGHT: 70 IN | BODY MASS INDEX: 26.13 KG/M2 | WEIGHT: 182.5 LBS

## 2025-08-06 DIAGNOSIS — C79.51 METASTASIS TO BONE (HCC): ICD-10-CM

## 2025-08-06 DIAGNOSIS — C34.11 MALIGNANT NEOPLASM OF UPPER LOBE OF RIGHT LUNG (HCC): Primary | ICD-10-CM

## 2025-08-06 DIAGNOSIS — C79.31 METASTASIS TO BRAIN (HCC): ICD-10-CM

## 2025-08-06 PROCEDURE — 3078F DIAST BP <80 MM HG: CPT | Performed by: INTERNAL MEDICINE

## 2025-08-06 PROCEDURE — G8427 DOCREV CUR MEDS BY ELIG CLIN: HCPCS | Performed by: INTERNAL MEDICINE

## 2025-08-06 PROCEDURE — 3074F SYST BP LT 130 MM HG: CPT | Performed by: INTERNAL MEDICINE

## 2025-08-06 PROCEDURE — G8417 CALC BMI ABV UP PARAM F/U: HCPCS | Performed by: INTERNAL MEDICINE

## 2025-08-06 PROCEDURE — 99215 OFFICE O/P EST HI 40 MIN: CPT | Performed by: INTERNAL MEDICINE

## 2025-08-06 PROCEDURE — 1123F ACP DISCUSS/DSCN MKR DOCD: CPT | Performed by: INTERNAL MEDICINE

## 2025-08-06 PROCEDURE — 1159F MED LIST DOCD IN RCRD: CPT | Performed by: INTERNAL MEDICINE

## 2025-08-06 PROCEDURE — 1036F TOBACCO NON-USER: CPT | Performed by: INTERNAL MEDICINE

## 2025-08-06 PROCEDURE — 77336 RADIATION PHYSICS CONSULT: CPT | Performed by: RADIOLOGY

## 2025-08-06 PROCEDURE — 3017F COLORECTAL CA SCREEN DOC REV: CPT | Performed by: INTERNAL MEDICINE

## 2025-08-06 PROCEDURE — 99214 OFFICE O/P EST MOD 30 MIN: CPT | Performed by: INTERNAL MEDICINE

## 2025-08-06 RX ORDER — SODIUM CHLORIDE 9 MG/ML
INJECTION, SOLUTION INTRAVENOUS PRN
OUTPATIENT
Start: 2025-08-13

## 2025-08-06 RX ORDER — SODIUM CHLORIDE 9 MG/ML
5-250 INJECTION, SOLUTION INTRAVENOUS PRN
OUTPATIENT
Start: 2025-08-13

## 2025-08-06 RX ORDER — HEPARIN SODIUM (PORCINE) LOCK FLUSH IV SOLN 100 UNIT/ML 100 UNIT/ML
500 SOLUTION INTRAVENOUS PRN
OUTPATIENT
Start: 2025-08-13

## 2025-08-06 RX ORDER — ONDANSETRON 2 MG/ML
8 INJECTION INTRAMUSCULAR; INTRAVENOUS
OUTPATIENT
Start: 2025-08-13

## 2025-08-06 RX ORDER — DIPHENHYDRAMINE HYDROCHLORIDE 50 MG/ML
50 INJECTION, SOLUTION INTRAMUSCULAR; INTRAVENOUS
OUTPATIENT
Start: 2025-08-13

## 2025-08-06 RX ORDER — FAMOTIDINE 10 MG/ML
20 INJECTION, SOLUTION INTRAVENOUS
OUTPATIENT
Start: 2025-08-13

## 2025-08-06 RX ORDER — ACETAMINOPHEN 325 MG/1
650 TABLET ORAL
OUTPATIENT
Start: 2025-08-13

## 2025-08-06 RX ORDER — EPINEPHRINE 1 MG/ML
0.3 INJECTION, SOLUTION, CONCENTRATE INTRAVENOUS PRN
OUTPATIENT
Start: 2025-08-13

## 2025-08-06 RX ORDER — ALBUTEROL SULFATE 90 UG/1
4 INHALANT RESPIRATORY (INHALATION) PRN
OUTPATIENT
Start: 2025-08-13

## 2025-08-06 RX ORDER — MEPERIDINE HYDROCHLORIDE 50 MG/ML
12.5 INJECTION INTRAMUSCULAR; INTRAVENOUS; SUBCUTANEOUS PRN
OUTPATIENT
Start: 2025-08-13

## 2025-08-06 RX ORDER — SODIUM CHLORIDE 0.9 % (FLUSH) 0.9 %
5-40 SYRINGE (ML) INJECTION PRN
OUTPATIENT
Start: 2025-08-13

## 2025-08-06 RX ORDER — HYDROCORTISONE SODIUM SUCCINATE 100 MG/2ML
100 INJECTION INTRAMUSCULAR; INTRAVENOUS
OUTPATIENT
Start: 2025-08-13

## 2025-08-07 ENCOUNTER — HOSPITAL ENCOUNTER (OUTPATIENT)
Dept: RADIATION ONCOLOGY | Age: 75
Discharge: HOME | End: 2025-08-07
Payer: MEDICARE

## 2025-08-07 PROCEDURE — 77373 STRTCTC BDY RAD THER TX DLVR: CPT | Performed by: RADIOLOGY

## 2025-08-08 ENCOUNTER — TELEPHONE (OUTPATIENT)
Dept: RADIATION ONCOLOGY | Age: 75
End: 2025-08-08

## 2025-08-08 ENCOUNTER — APPOINTMENT (OUTPATIENT)
Dept: RADIATION ONCOLOGY | Age: 75
End: 2025-08-08
Payer: MEDICARE

## 2025-08-11 ENCOUNTER — HOSPITAL ENCOUNTER (OUTPATIENT)
Age: 75
Discharge: HOME | End: 2025-08-11
Payer: MEDICARE

## 2025-08-11 ENCOUNTER — HOSPITAL ENCOUNTER (OUTPATIENT)
Dept: RADIATION ONCOLOGY | Age: 75
Discharge: HOME | End: 2025-08-11
Payer: MEDICARE

## 2025-08-11 ENCOUNTER — APPOINTMENT (OUTPATIENT)
Dept: RADIATION ONCOLOGY | Age: 75
End: 2025-08-11
Payer: MEDICARE

## 2025-08-11 VITALS
BODY MASS INDEX: 25.78 KG/M2 | OXYGEN SATURATION: 97 % | RESPIRATION RATE: 16 BRPM | DIASTOLIC BLOOD PRESSURE: 71 MMHG | SYSTOLIC BLOOD PRESSURE: 130 MMHG | WEIGHT: 179.7 LBS | TEMPERATURE: 97.6 F | HEART RATE: 69 BPM

## 2025-08-11 DIAGNOSIS — G93.89 BRAIN MASS: ICD-10-CM

## 2025-08-11 DIAGNOSIS — C34.11 MALIGNANT NEOPLASM OF UPPER LOBE OF RIGHT LUNG (HCC): ICD-10-CM

## 2025-08-11 LAB
ALBUMIN SERPL-MCNC: 4.1 G/DL (ref 3.5–5.2)
ALBUMIN/GLOB SERPL: 1.6 {RATIO} (ref 1–2.5)
ALP SERPL-CCNC: 128 U/L (ref 40–129)
ALT SERPL-CCNC: 20 U/L (ref 10–50)
ANION GAP SERPL CALCULATED.3IONS-SCNC: 13 MMOL/L (ref 9–16)
AST SERPL-CCNC: 13 U/L (ref 10–50)
BASOPHILS # BLD: 0.1 K/UL (ref 0–0.2)
BASOPHILS NFR BLD: 0 % (ref 0–2)
BILIRUB SERPL-MCNC: 0.5 MG/DL (ref 0–1.2)
BUN SERPL-MCNC: 37 MG/DL (ref 8–23)
CALCIUM SERPL-MCNC: 9.5 MG/DL (ref 8.6–10.4)
CHLORIDE SERPL-SCNC: 98 MMOL/L (ref 98–107)
CO2 SERPL-SCNC: 27 MMOL/L (ref 20–31)
CORTIS SERPL-MCNC: 1.8 UG/DL (ref 2.5–19.5)
CORTISOL COLLECTION INFO: ABNORMAL
CREAT SERPL-MCNC: 1.3 MG/DL (ref 0.7–1.2)
EOSINOPHIL # BLD: 0 K/UL (ref 0–0.4)
EOSINOPHILS RELATIVE PERCENT: 0 % (ref 1–4)
ERYTHROCYTE [DISTWIDTH] IN BLOOD BY AUTOMATED COUNT: 16.2 % (ref 12.5–15.4)
GFR, ESTIMATED: 58 ML/MIN/1.73M2
GLUCOSE SERPL-MCNC: 100 MG/DL (ref 74–99)
HBV CORE AB SER QL: NONREACTIVE
HBV SURFACE AB SERPL IA-ACNC: <3.5 MIU/ML
HBV SURFACE AG SERPL QL IA: NONREACTIVE
HCT VFR BLD AUTO: 46.4 % (ref 41–53)
HGB BLD-MCNC: 15.3 G/DL (ref 13.5–17.5)
LYMPHOCYTES NFR BLD: 0.6 K/UL (ref 1–4.8)
LYMPHOCYTES RELATIVE PERCENT: 4 % (ref 24–44)
MCH RBC QN AUTO: 29.6 PG (ref 26–34)
MCHC RBC AUTO-ENTMCNC: 32.9 G/DL (ref 31–37)
MCV RBC AUTO: 90.1 FL (ref 80–100)
MONOCYTES NFR BLD: 0.8 K/UL (ref 0.1–1.2)
MONOCYTES NFR BLD: 5 % (ref 2–11)
NEUTROPHILS NFR BLD: 91 % (ref 36–66)
NEUTS SEG NFR BLD: 13.3 K/UL (ref 1.8–7.7)
PLATELET # BLD AUTO: 207 K/UL (ref 140–450)
PMV BLD AUTO: 7.1 FL (ref 6–12)
POTASSIUM SERPL-SCNC: 5 MMOL/L (ref 3.7–5.3)
PROT SERPL-MCNC: 6.7 G/DL (ref 6.6–8.7)
RBC # BLD AUTO: 5.15 M/UL (ref 4.5–5.9)
SODIUM SERPL-SCNC: 138 MMOL/L (ref 136–145)
TSH SERPL DL<=0.05 MIU/L-ACNC: 1.24 UIU/ML (ref 0.27–4.2)
WBC OTHER # BLD: 14.7 K/UL (ref 3.5–11)

## 2025-08-11 PROCEDURE — 86316 IMMUNOASSAY TUMOR OTHER: CPT

## 2025-08-11 PROCEDURE — 87340 HEPATITIS B SURFACE AG IA: CPT

## 2025-08-11 PROCEDURE — 36415 COLL VENOUS BLD VENIPUNCTURE: CPT

## 2025-08-11 PROCEDURE — 80053 COMPREHEN METABOLIC PANEL: CPT

## 2025-08-11 PROCEDURE — 77373 STRTCTC BDY RAD THER TX DLVR: CPT | Performed by: RADIOLOGY

## 2025-08-11 PROCEDURE — 86317 IMMUNOASSAY INFECTIOUS AGENT: CPT

## 2025-08-11 PROCEDURE — 82533 TOTAL CORTISOL: CPT

## 2025-08-11 PROCEDURE — 86704 HEP B CORE ANTIBODY TOTAL: CPT

## 2025-08-11 PROCEDURE — 84443 ASSAY THYROID STIM HORMONE: CPT

## 2025-08-11 PROCEDURE — 85025 COMPLETE CBC W/AUTO DIFF WBC: CPT

## 2025-08-11 ASSESSMENT — PAIN DESCRIPTION - LOCATION: LOCATION: HEAD

## 2025-08-11 ASSESSMENT — PAIN SCALES - GENERAL: PAINLEVEL_OUTOF10: 2

## 2025-08-12 LAB
DNA RANGE(S) EXAMINED NAR: NORMAL
GENE DIS ANL INTERP-IMP: POSITIVE
GENE DIS ASSESSED: NORMAL
GENE MUT TESTED BLD/T: 0.5 M/MB
HLA-A HIGH RES: NORMAL
HLA-A UNRESLVD ALLELES HIGH RES: YES
HLA-B HIGH RES: NORMAL
HLA-B UNRESLVD ALLELES HIGH RES: YES
HLA-C HIGH RES: NORMAL
HLA-C UNRESLVD ALLELES HIGH RES: YES
MSI CA SPEC-IMP: NORMAL
PD-L1 BY 22C3 TISS IMSTN DOC: NORMAL
REASON FOR STUDY: NORMAL
TEMPUS GERMLINE NOTE: NORMAL
TEMPUS HLA-A SAMPLE TYPE: NORMAL
TEMPUS HLA-B SAMPLE TYPE: NORMAL
TEMPUS HLA-C SAMPLE TYPE: NORMAL
TEMPUS PD-L1 (22C3) COMBINED POSITIVE SCORE: 52
TEMPUS PD-L1 (22C3) TUMOR PROPORTION SCORE: 50 %
TEMPUS PERTINENTNEGATIVES: NORMAL
TEMPUS PORTAL: NORMAL
TEMPUS THERAPY1: NORMAL
TEMPUS THERAPY2: NORMAL
TEMPUS THERAPYCOUNT: 2

## 2025-08-13 ENCOUNTER — HOSPITAL ENCOUNTER (OUTPATIENT)
Dept: RADIATION ONCOLOGY | Age: 75
Discharge: HOME | End: 2025-08-13
Payer: MEDICARE

## 2025-08-13 DIAGNOSIS — C79.31 METASTASIS TO BRAIN (HCC): ICD-10-CM

## 2025-08-13 DIAGNOSIS — C79.51 METASTASIS TO BONE (HCC): Primary | ICD-10-CM

## 2025-08-13 DIAGNOSIS — C34.11 MALIGNANT NEOPLASM OF UPPER LOBE OF RIGHT LUNG (HCC): ICD-10-CM

## 2025-08-13 LAB — CHROMOGRANIN A: 362 NG/ML (ref 0–187)

## 2025-08-13 PROCEDURE — 77373 STRTCTC BDY RAD THER TX DLVR: CPT | Performed by: RADIOLOGY

## 2025-08-15 ENCOUNTER — HOSPITAL ENCOUNTER (OUTPATIENT)
Dept: RADIATION ONCOLOGY | Age: 75
Discharge: HOME | End: 2025-08-15
Payer: MEDICARE

## 2025-08-15 PROCEDURE — 77373 STRTCTC BDY RAD THER TX DLVR: CPT | Performed by: RADIOLOGY

## 2025-08-15 RX ORDER — DEXAMETHASONE 2 MG/1
2 TABLET ORAL DAILY
Qty: 60 TABLET | Refills: 0 | Status: SHIPPED | OUTPATIENT
Start: 2025-08-15

## 2025-08-21 ENCOUNTER — OFFICE VISIT (OUTPATIENT)
Age: 75
End: 2025-08-21
Payer: MEDICARE

## 2025-08-21 VITALS
SYSTOLIC BLOOD PRESSURE: 122 MMHG | WEIGHT: 179 LBS | DIASTOLIC BLOOD PRESSURE: 68 MMHG | HEIGHT: 70 IN | BODY MASS INDEX: 25.62 KG/M2 | HEART RATE: 82 BPM

## 2025-08-21 DIAGNOSIS — C34.11 MALIGNANT NEOPLASM OF UPPER LOBE OF RIGHT LUNG (HCC): Primary | ICD-10-CM

## 2025-08-21 DIAGNOSIS — C79.31 METASTASIS TO BRAIN (HCC): ICD-10-CM

## 2025-08-21 DIAGNOSIS — C79.51 METASTASIS TO BONE (HCC): ICD-10-CM

## 2025-08-21 PROCEDURE — 3078F DIAST BP <80 MM HG: CPT | Performed by: NEUROLOGICAL SURGERY

## 2025-08-21 PROCEDURE — 1036F TOBACCO NON-USER: CPT | Performed by: NEUROLOGICAL SURGERY

## 2025-08-21 PROCEDURE — 3017F COLORECTAL CA SCREEN DOC REV: CPT | Performed by: NEUROLOGICAL SURGERY

## 2025-08-21 PROCEDURE — G8417 CALC BMI ABV UP PARAM F/U: HCPCS | Performed by: NEUROLOGICAL SURGERY

## 2025-08-21 PROCEDURE — 99214 OFFICE O/P EST MOD 30 MIN: CPT | Performed by: NEUROLOGICAL SURGERY

## 2025-08-21 PROCEDURE — 3074F SYST BP LT 130 MM HG: CPT | Performed by: NEUROLOGICAL SURGERY

## 2025-08-21 PROCEDURE — G8427 DOCREV CUR MEDS BY ELIG CLIN: HCPCS | Performed by: NEUROLOGICAL SURGERY

## 2025-08-21 PROCEDURE — 1159F MED LIST DOCD IN RCRD: CPT | Performed by: NEUROLOGICAL SURGERY

## 2025-08-21 PROCEDURE — 1123F ACP DISCUSS/DSCN MKR DOCD: CPT | Performed by: NEUROLOGICAL SURGERY

## 2025-08-22 LAB
DNA RANGE(S) EXAMINED NAR: NORMAL
GENE DIS ANL INTERP-IMP: POSITIVE
GENE DIS ASSESSED: NORMAL
GENE MUT TESTED BLD/T: 0.5 M/MB
HLA-A HIGH RES: NORMAL
HLA-A UNRESLVD ALLELES HIGH RES: YES
HLA-B HIGH RES: NORMAL
HLA-B UNRESLVD ALLELES HIGH RES: YES
HLA-C HIGH RES: NORMAL
HLA-C UNRESLVD ALLELES HIGH RES: YES
MSI CA SPEC-IMP: NORMAL
PD-L1 BY 22C3 TISS IMSTN DOC: NORMAL
REASON FOR STUDY: NORMAL
TEMPUS GERMLINE NOTE: NORMAL
TEMPUS HLA-A SAMPLE TYPE: NORMAL
TEMPUS HLA-B SAMPLE TYPE: NORMAL
TEMPUS HLA-C SAMPLE TYPE: NORMAL
TEMPUS IHC/FISH RESULT2: NEGATIVE
TEMPUS IHC/FISH SCORE: 0
TEMPUS PD-L1 (22C3) COMBINED POSITIVE SCORE: 52
TEMPUS PD-L1 (22C3) TUMOR PROPORTION SCORE: 50 %
TEMPUS PERTINENTNEGATIVES: NORMAL
TEMPUS PORTAL: NORMAL
TEMPUS THERAPY1: NORMAL
TEMPUS THERAPY2: NORMAL
TEMPUS THERAPYCOUNT: 2
TEMPUS XR RESULT 1: NORMAL

## 2025-08-25 ENCOUNTER — TELEPHONE (OUTPATIENT)
Age: 75
End: 2025-08-25

## 2025-08-25 ENCOUNTER — OFFICE VISIT (OUTPATIENT)
Age: 75
End: 2025-08-25
Payer: MEDICARE

## 2025-08-25 DIAGNOSIS — C34.11 MALIGNANT NEOPLASM OF UPPER LOBE OF RIGHT LUNG (HCC): Primary | ICD-10-CM

## 2025-08-25 DIAGNOSIS — R68.89 OTHER GENERAL SYMPTOMS AND SIGNS: ICD-10-CM

## 2025-08-25 PROCEDURE — G8417 CALC BMI ABV UP PARAM F/U: HCPCS | Performed by: INTERNAL MEDICINE

## 2025-08-25 PROCEDURE — 99213 OFFICE O/P EST LOW 20 MIN: CPT | Performed by: INTERNAL MEDICINE

## 2025-08-25 PROCEDURE — G8428 CUR MEDS NOT DOCUMENT: HCPCS | Performed by: INTERNAL MEDICINE

## 2025-08-25 PROCEDURE — 1123F ACP DISCUSS/DSCN MKR DOCD: CPT | Performed by: INTERNAL MEDICINE

## 2025-08-25 PROCEDURE — 1036F TOBACCO NON-USER: CPT | Performed by: INTERNAL MEDICINE

## 2025-08-25 PROCEDURE — 99212 OFFICE O/P EST SF 10 MIN: CPT | Performed by: INTERNAL MEDICINE

## 2025-08-25 PROCEDURE — 3017F COLORECTAL CA SCREEN DOC REV: CPT | Performed by: INTERNAL MEDICINE

## 2025-08-26 ENCOUNTER — HOSPITAL ENCOUNTER (OUTPATIENT)
Age: 75
Setting detail: SPECIMEN
Discharge: HOME | End: 2025-08-26

## 2025-08-26 DIAGNOSIS — C34.11 MALIGNANT NEOPLASM OF UPPER LOBE OF RIGHT LUNG (HCC): ICD-10-CM

## 2025-08-26 DIAGNOSIS — R68.89 OTHER GENERAL SYMPTOMS AND SIGNS: ICD-10-CM

## 2025-08-26 LAB
ALBUMIN SERPL-MCNC: 3.7 G/DL (ref 3.5–5.2)
ALBUMIN/GLOB SERPL: 1.8 {RATIO} (ref 1–2.5)
ALP SERPL-CCNC: 113 U/L (ref 40–129)
ALT SERPL-CCNC: 28 U/L (ref 10–50)
AMYLASE SERPL-CCNC: 90 U/L (ref 28–100)
ANION GAP SERPL CALCULATED.3IONS-SCNC: 10 MMOL/L (ref 9–16)
AST SERPL-CCNC: 16 U/L (ref 10–50)
BASOPHILS # BLD: 0.04 K/UL (ref 0–0.2)
BASOPHILS NFR BLD: 0 % (ref 0–2)
BILIRUB SERPL-MCNC: 0.6 MG/DL (ref 0–1.2)
BUN SERPL-MCNC: 24 MG/DL (ref 8–23)
CALCIUM SERPL-MCNC: 9.1 MG/DL (ref 8.6–10.4)
CHLORIDE SERPL-SCNC: 100 MMOL/L (ref 98–107)
CO2 SERPL-SCNC: 29 MMOL/L (ref 20–31)
CORTIS SERPL-MCNC: 1.9 UG/DL (ref 2.5–19.5)
CORTISOL COLLECTION INFO: 810
CREAT SERPL-MCNC: 1 MG/DL (ref 0.7–1.2)
EOSINOPHIL # BLD: 0.04 K/UL (ref 0–0.44)
EOSINOPHILS RELATIVE PERCENT: 0 % (ref 1–4)
ERYTHROCYTE [DISTWIDTH] IN BLOOD BY AUTOMATED COUNT: 16.3 % (ref 11.8–14.4)
GFR, ESTIMATED: 79 ML/MIN/1.73M2
GLUCOSE SERPL-MCNC: 71 MG/DL (ref 74–99)
HCT VFR BLD AUTO: 43.8 % (ref 40.7–50.3)
HGB BLD-MCNC: 13.7 G/DL (ref 13–17)
IMM GRANULOCYTES # BLD AUTO: 0.26 K/UL (ref 0–0.3)
IMM GRANULOCYTES NFR BLD: 2 %
LIPASE SERPL-CCNC: 33 U/L (ref 13–60)
LYMPHOCYTES NFR BLD: 1.71 K/UL (ref 1.1–3.7)
LYMPHOCYTES RELATIVE PERCENT: 16 % (ref 24–43)
MCH RBC QN AUTO: 29.6 PG (ref 25.2–33.5)
MCHC RBC AUTO-ENTMCNC: 31.3 G/DL (ref 28.4–34.8)
MCV RBC AUTO: 94.6 FL (ref 82.6–102.9)
MONOCYTES NFR BLD: 0.73 K/UL (ref 0.1–1.2)
MONOCYTES NFR BLD: 7 % (ref 3–12)
NEUTROPHILS NFR BLD: 75 % (ref 36–65)
NEUTS SEG NFR BLD: 8.26 K/UL (ref 1.5–8.1)
NRBC BLD-RTO: 0 PER 100 WBC
PLATELET # BLD AUTO: 128 K/UL (ref 138–453)
PMV BLD AUTO: 9.3 FL (ref 8.1–13.5)
POTASSIUM SERPL-SCNC: 4.2 MMOL/L (ref 3.7–5.3)
PROT SERPL-MCNC: 5.8 G/DL (ref 6.6–8.7)
RBC # BLD AUTO: 4.63 M/UL (ref 4.21–5.77)
RBC # BLD: ABNORMAL 10*6/UL
SODIUM SERPL-SCNC: 139 MMOL/L (ref 136–145)
TSH SERPL DL<=0.05 MIU/L-ACNC: 3.06 UIU/ML (ref 0.27–4.2)
WBC OTHER # BLD: 11 K/UL (ref 3.5–11.3)

## 2025-08-27 ENCOUNTER — HOSPITAL ENCOUNTER (OUTPATIENT)
Dept: INFUSION THERAPY | Age: 75
Discharge: HOME | End: 2025-08-27
Payer: OTHER GOVERNMENT

## 2025-08-27 ENCOUNTER — CLINICAL DOCUMENTATION (OUTPATIENT)
Age: 75
End: 2025-08-27

## 2025-08-27 ENCOUNTER — OFFICE VISIT (OUTPATIENT)
Age: 75
End: 2025-08-27
Payer: MEDICARE

## 2025-08-27 ENCOUNTER — TELEPHONE (OUTPATIENT)
Age: 75
End: 2025-08-27

## 2025-08-27 VITALS
DIASTOLIC BLOOD PRESSURE: 61 MMHG | TEMPERATURE: 97.9 F | RESPIRATION RATE: 18 BRPM | WEIGHT: 187 LBS | BODY MASS INDEX: 26.83 KG/M2 | HEART RATE: 71 BPM | SYSTOLIC BLOOD PRESSURE: 112 MMHG

## 2025-08-27 DIAGNOSIS — C34.11 MALIGNANT NEOPLASM OF UPPER LOBE OF RIGHT LUNG (HCC): Primary | ICD-10-CM

## 2025-08-27 DIAGNOSIS — C79.31 METASTASIS TO BRAIN (HCC): ICD-10-CM

## 2025-08-27 DIAGNOSIS — C79.51 METASTASIS TO BONE (HCC): ICD-10-CM

## 2025-08-27 DIAGNOSIS — D69.6 ACQUIRED THROMBOCYTOPENIA: ICD-10-CM

## 2025-08-27 PROCEDURE — G8428 CUR MEDS NOT DOCUMENT: HCPCS | Performed by: INTERNAL MEDICINE

## 2025-08-27 PROCEDURE — 99214 OFFICE O/P EST MOD 30 MIN: CPT | Performed by: INTERNAL MEDICINE

## 2025-08-27 PROCEDURE — 2580000003 HC RX 258: Performed by: INTERNAL MEDICINE

## 2025-08-27 PROCEDURE — 96413 CHEMO IV INFUSION 1 HR: CPT

## 2025-08-27 PROCEDURE — G8417 CALC BMI ABV UP PARAM F/U: HCPCS | Performed by: INTERNAL MEDICINE

## 2025-08-27 PROCEDURE — 99215 OFFICE O/P EST HI 40 MIN: CPT | Performed by: INTERNAL MEDICINE

## 2025-08-27 PROCEDURE — 3078F DIAST BP <80 MM HG: CPT | Performed by: INTERNAL MEDICINE

## 2025-08-27 PROCEDURE — 1036F TOBACCO NON-USER: CPT | Performed by: INTERNAL MEDICINE

## 2025-08-27 PROCEDURE — 3074F SYST BP LT 130 MM HG: CPT | Performed by: INTERNAL MEDICINE

## 2025-08-27 PROCEDURE — 6360000002 HC RX W HCPCS: Performed by: INTERNAL MEDICINE

## 2025-08-27 PROCEDURE — 3017F COLORECTAL CA SCREEN DOC REV: CPT | Performed by: INTERNAL MEDICINE

## 2025-08-27 PROCEDURE — 1123F ACP DISCUSS/DSCN MKR DOCD: CPT | Performed by: INTERNAL MEDICINE

## 2025-08-27 RX ORDER — HYDROCORTISONE SODIUM SUCCINATE 100 MG/2ML
100 INJECTION INTRAMUSCULAR; INTRAVENOUS
OUTPATIENT
Start: 2025-09-17

## 2025-08-27 RX ORDER — SODIUM CHLORIDE 9 MG/ML
5-250 INJECTION, SOLUTION INTRAVENOUS PRN
OUTPATIENT
Start: 2025-09-17

## 2025-08-27 RX ORDER — FAMOTIDINE 10 MG/ML
20 INJECTION, SOLUTION INTRAVENOUS
OUTPATIENT
Start: 2025-09-17

## 2025-08-27 RX ORDER — EPINEPHRINE 1 MG/ML
0.3 INJECTION, SOLUTION, CONCENTRATE INTRAVENOUS PRN
OUTPATIENT
Start: 2025-09-17

## 2025-08-27 RX ORDER — SODIUM CHLORIDE 0.9 % (FLUSH) 0.9 %
5-40 SYRINGE (ML) INJECTION PRN
OUTPATIENT
Start: 2025-09-17

## 2025-08-27 RX ORDER — ALBUTEROL SULFATE 90 UG/1
4 INHALANT RESPIRATORY (INHALATION) PRN
OUTPATIENT
Start: 2025-09-17

## 2025-08-27 RX ORDER — HEPARIN SODIUM (PORCINE) LOCK FLUSH IV SOLN 100 UNIT/ML 100 UNIT/ML
500 SOLUTION INTRAVENOUS PRN
OUTPATIENT
Start: 2025-09-17

## 2025-08-27 RX ORDER — ACETAMINOPHEN 325 MG/1
650 TABLET ORAL
OUTPATIENT
Start: 2025-09-17

## 2025-08-27 RX ORDER — SODIUM CHLORIDE 9 MG/ML
INJECTION, SOLUTION INTRAVENOUS PRN
OUTPATIENT
Start: 2025-09-17

## 2025-08-27 RX ORDER — ONDANSETRON 2 MG/ML
8 INJECTION INTRAMUSCULAR; INTRAVENOUS
OUTPATIENT
Start: 2025-09-17

## 2025-08-27 RX ORDER — DIPHENHYDRAMINE HYDROCHLORIDE 50 MG/ML
50 INJECTION, SOLUTION INTRAMUSCULAR; INTRAVENOUS
OUTPATIENT
Start: 2025-09-17

## 2025-08-27 RX ORDER — ZOLEDRONIC ACID 0.04 MG/ML
4 INJECTION, SOLUTION INTRAVENOUS ONCE
OUTPATIENT
Start: 2025-09-17 | End: 2025-09-17

## 2025-08-27 RX ADMIN — SODIUM CHLORIDE 200 MG: 9 INJECTION, SOLUTION INTRAVENOUS at 14:16

## 2025-09-02 ENCOUNTER — HOSPITAL ENCOUNTER (INPATIENT)
Age: 75
LOS: 4 days | Discharge: HOME | End: 2025-09-06
Attending: EMERGENCY MEDICINE | Admitting: STUDENT IN AN ORGANIZED HEALTH CARE EDUCATION/TRAINING PROGRAM
Payer: OTHER GOVERNMENT

## 2025-09-02 ENCOUNTER — APPOINTMENT (OUTPATIENT)
Dept: CT IMAGING | Age: 75
End: 2025-09-02
Payer: OTHER GOVERNMENT

## 2025-09-02 DIAGNOSIS — G45.8 SUBCLAVIAN STEAL SYNDROME: ICD-10-CM

## 2025-09-02 DIAGNOSIS — C79.31 MALIGNANT NEOPLASM METASTATIC TO BRAIN (HCC): Primary | ICD-10-CM

## 2025-09-02 DIAGNOSIS — R51.9 NONINTRACTABLE HEADACHE, UNSPECIFIED CHRONICITY PATTERN, UNSPECIFIED HEADACHE TYPE: ICD-10-CM

## 2025-09-02 LAB
ANION GAP SERPL CALCULATED.3IONS-SCNC: 13 MMOL/L (ref 9–16)
BASOPHILS # BLD: 0.03 K/UL (ref 0–0.2)
BASOPHILS NFR BLD: 0 % (ref 0–2)
BUN BLD-MCNC: 21 MG/DL (ref 8–26)
BUN SERPL-MCNC: 20 MG/DL (ref 8–23)
CA-I BLD-SCNC: 1.18 MMOL/L (ref 1.15–1.33)
CALCIUM SERPL-MCNC: 9.2 MG/DL (ref 8.6–10.4)
CHLORIDE BLD-SCNC: 99 MMOL/L (ref 98–107)
CHLORIDE SERPL-SCNC: 100 MMOL/L (ref 98–107)
CK SERPL-CCNC: 18 U/L (ref 39–308)
CO2 BLD CALC-SCNC: 33 MMOL/L (ref 22–30)
CO2 SERPL-SCNC: 27 MMOL/L (ref 20–31)
CREAT SERPL-MCNC: 1.1 MG/DL (ref 0.7–1.2)
EGFR, POC: 79 ML/MIN/1.73M2
EKG ATRIAL RATE: 111 BPM
EKG P AXIS: 38 DEGREES
EKG P-R INTERVAL: 148 MS
EKG Q-T INTERVAL: 354 MS
EKG QRS DURATION: 124 MS
EKG QTC CALCULATION (BAZETT): 481 MS
EKG R AXIS: -45 DEGREES
EKG T AXIS: 41 DEGREES
EKG VENTRICULAR RATE: 111 BPM
EOSINOPHIL # BLD: 0.06 K/UL (ref 0–0.44)
EOSINOPHILS RELATIVE PERCENT: 1 % (ref 1–4)
ERYTHROCYTE [DISTWIDTH] IN BLOOD BY AUTOMATED COUNT: 16.4 % (ref 11.8–14.4)
GFR, ESTIMATED: 71 ML/MIN/1.73M2
GLUCOSE BLD-MCNC: 95 MG/DL (ref 74–100)
GLUCOSE SERPL-MCNC: 91 MG/DL (ref 74–99)
HCO3 VENOUS: 34.5 MMOL/L (ref 22–29)
HCT VFR BLD AUTO: 42.2 % (ref 40.7–50.3)
HCT VFR BLD AUTO: 46 % (ref 41–53)
HGB BLD-MCNC: 13.5 G/DL (ref 13–17)
IMM GRANULOCYTES # BLD AUTO: 0.11 K/UL (ref 0–0.3)
IMM GRANULOCYTES NFR BLD: 1 %
INR PPP: 0.9
LYMPHOCYTES NFR BLD: 0.87 K/UL (ref 1.1–3.7)
LYMPHOCYTES RELATIVE PERCENT: 10 % (ref 24–43)
MCH RBC QN AUTO: 29.7 PG (ref 25.2–33.5)
MCHC RBC AUTO-ENTMCNC: 32 G/DL (ref 28.4–34.8)
MCV RBC AUTO: 93 FL (ref 82.6–102.9)
MONOCYTES NFR BLD: 0.46 K/UL (ref 0.1–1.2)
MONOCYTES NFR BLD: 6 % (ref 3–12)
MYOGLOBIN SERPL-MCNC: 40 NG/ML (ref 28–72)
NEUTROPHILS NFR BLD: 82 % (ref 36–65)
NEUTS SEG NFR BLD: 6.87 K/UL (ref 1.5–8.1)
NRBC BLD-RTO: 0 PER 100 WBC
O2 SAT, VEN: 13.9 % (ref 60–85)
PARTIAL THROMBOPLASTIN TIME: 32.2 SEC (ref 23–36.5)
PCO2 VENOUS: 51.6 MM HG (ref 41–51)
PH VENOUS: 7.43 (ref 7.32–7.43)
PLATELET # BLD AUTO: 107 K/UL (ref 138–453)
PMV BLD AUTO: 9.1 FL (ref 8.1–13.5)
PO2 VENOUS: 12.3 MM HG (ref 30–50)
POC ANION GAP: 13 MMOL/L (ref 7–16)
POC CREATININE: 1 MG/DL (ref 0.51–1.19)
POC HEMOGLOBIN (CALC): 15.5 G/DL (ref 13.5–17.5)
POC LACTIC ACID: 1 MMOL/L (ref 0.56–1.39)
POSITIVE BASE EXCESS, VEN: 8.3 MMOL/L (ref 0–3)
POTASSIUM BLD-SCNC: 4.2 MMOL/L (ref 3.5–4.5)
POTASSIUM SERPL-SCNC: 4.3 MMOL/L (ref 3.7–5.3)
PROTHROMBIN TIME: 12.9 SEC (ref 11.7–14.9)
RBC # BLD AUTO: 4.54 M/UL (ref 4.21–5.77)
RBC # BLD: ABNORMAL 10*6/UL
SODIUM BLD-SCNC: 144 MMOL/L (ref 138–146)
SODIUM SERPL-SCNC: 140 MMOL/L (ref 136–145)
TROPONIN I SERPL HS-MCNC: 29 NG/L (ref 0–22)
TROPONIN I SERPL HS-MCNC: 30 NG/L (ref 0–22)
WBC OTHER # BLD: 8.4 K/UL (ref 3.5–11.3)

## 2025-09-02 PROCEDURE — 83874 ASSAY OF MYOGLOBIN: CPT

## 2025-09-02 PROCEDURE — 84484 ASSAY OF TROPONIN QUANT: CPT

## 2025-09-02 PROCEDURE — 70498 CT ANGIOGRAPHY NECK: CPT

## 2025-09-02 PROCEDURE — 85014 HEMATOCRIT: CPT

## 2025-09-02 PROCEDURE — 6370000000 HC RX 637 (ALT 250 FOR IP)

## 2025-09-02 PROCEDURE — 71260 CT THORAX DX C+: CPT

## 2025-09-02 PROCEDURE — 70450 CT HEAD/BRAIN W/O DYE: CPT

## 2025-09-02 PROCEDURE — 6360000002 HC RX W HCPCS: Performed by: NURSE PRACTITIONER

## 2025-09-02 PROCEDURE — 82553 CREATINE MB FRACTION: CPT

## 2025-09-02 PROCEDURE — 2500000003 HC RX 250 WO HCPCS

## 2025-09-02 PROCEDURE — 6360000002 HC RX W HCPCS

## 2025-09-02 PROCEDURE — 2500000003 HC RX 250 WO HCPCS: Performed by: NURSE PRACTITIONER

## 2025-09-02 PROCEDURE — 80048 BASIC METABOLIC PNL TOTAL CA: CPT

## 2025-09-02 PROCEDURE — 96374 THER/PROPH/DIAG INJ IV PUSH: CPT

## 2025-09-02 PROCEDURE — 94761 N-INVAS EAR/PLS OXIMETRY MLT: CPT

## 2025-09-02 PROCEDURE — 82565 ASSAY OF CREATININE: CPT

## 2025-09-02 PROCEDURE — 84520 ASSAY OF UREA NITROGEN: CPT

## 2025-09-02 PROCEDURE — 96375 TX/PRO/DX INJ NEW DRUG ADDON: CPT

## 2025-09-02 PROCEDURE — 2060000000 HC ICU INTERMEDIATE R&B

## 2025-09-02 PROCEDURE — 85610 PROTHROMBIN TIME: CPT

## 2025-09-02 PROCEDURE — 6360000004 HC RX CONTRAST MEDICATION

## 2025-09-02 PROCEDURE — 83605 ASSAY OF LACTIC ACID: CPT

## 2025-09-02 PROCEDURE — 85025 COMPLETE CBC W/AUTO DIFF WBC: CPT

## 2025-09-02 PROCEDURE — 80051 ELECTROLYTE PANEL: CPT

## 2025-09-02 PROCEDURE — 2700000000 HC OXYGEN THERAPY PER DAY

## 2025-09-02 PROCEDURE — 85730 THROMBOPLASTIN TIME PARTIAL: CPT

## 2025-09-02 PROCEDURE — 93005 ELECTROCARDIOGRAM TRACING: CPT

## 2025-09-02 PROCEDURE — 99223 1ST HOSP IP/OBS HIGH 75: CPT | Performed by: INTERNAL MEDICINE

## 2025-09-02 PROCEDURE — 82330 ASSAY OF CALCIUM: CPT

## 2025-09-02 PROCEDURE — 82803 BLOOD GASES ANY COMBINATION: CPT

## 2025-09-02 PROCEDURE — APPNB45 APP NON BILLABLE 31-45 MINUTES: Performed by: NURSE PRACTITIONER

## 2025-09-02 PROCEDURE — 82550 ASSAY OF CK (CPK): CPT

## 2025-09-02 PROCEDURE — 82947 ASSAY GLUCOSE BLOOD QUANT: CPT

## 2025-09-02 PROCEDURE — 99285 EMERGENCY DEPT VISIT HI MDM: CPT

## 2025-09-02 RX ORDER — POTASSIUM CHLORIDE 1500 MG/1
40 TABLET, EXTENDED RELEASE ORAL PRN
Status: DISCONTINUED | OUTPATIENT
Start: 2025-09-02 | End: 2025-09-06 | Stop reason: HOSPADM

## 2025-09-02 RX ORDER — LEVOTHYROXINE SODIUM 50 UG/1
25 TABLET ORAL DAILY
Status: DISCONTINUED | OUTPATIENT
Start: 2025-09-02 | End: 2025-09-06 | Stop reason: HOSPADM

## 2025-09-02 RX ORDER — ACETAMINOPHEN 500 MG
1000 TABLET ORAL ONCE
Status: COMPLETED | OUTPATIENT
Start: 2025-09-02 | End: 2025-09-02

## 2025-09-02 RX ORDER — MAGNESIUM SULFATE IN WATER 40 MG/ML
2000 INJECTION, SOLUTION INTRAVENOUS PRN
Status: DISCONTINUED | OUTPATIENT
Start: 2025-09-02 | End: 2025-09-06 | Stop reason: HOSPADM

## 2025-09-02 RX ORDER — PROCHLORPERAZINE EDISYLATE 5 MG/ML
10 INJECTION INTRAMUSCULAR; INTRAVENOUS ONCE
Status: COMPLETED | OUTPATIENT
Start: 2025-09-02 | End: 2025-09-02

## 2025-09-02 RX ORDER — ONDANSETRON 4 MG/1
4 TABLET, ORALLY DISINTEGRATING ORAL EVERY 8 HOURS PRN
Status: DISCONTINUED | OUTPATIENT
Start: 2025-09-02 | End: 2025-09-06 | Stop reason: HOSPADM

## 2025-09-02 RX ORDER — FENTANYL CITRATE 50 UG/ML
50 INJECTION, SOLUTION INTRAMUSCULAR; INTRAVENOUS ONCE
Status: DISCONTINUED | OUTPATIENT
Start: 2025-09-02 | End: 2025-09-02

## 2025-09-02 RX ORDER — CLOPIDOGREL BISULFATE 75 MG/1
75 TABLET ORAL DAILY
Status: DISCONTINUED | OUTPATIENT
Start: 2025-09-02 | End: 2025-09-06 | Stop reason: HOSPADM

## 2025-09-02 RX ORDER — ALBUTEROL SULFATE 0.83 MG/ML
2.5 SOLUTION RESPIRATORY (INHALATION) EVERY 6 HOURS PRN
Status: DISCONTINUED | OUTPATIENT
Start: 2025-09-02 | End: 2025-09-06 | Stop reason: HOSPADM

## 2025-09-02 RX ORDER — ALBUTEROL SULFATE 90 UG/1
2 INHALANT RESPIRATORY (INHALATION) 4 TIMES DAILY PRN
Status: DISCONTINUED | OUTPATIENT
Start: 2025-09-02 | End: 2025-09-06 | Stop reason: HOSPADM

## 2025-09-02 RX ORDER — IOPAMIDOL 755 MG/ML
150 INJECTION, SOLUTION INTRAVASCULAR
Status: COMPLETED | OUTPATIENT
Start: 2025-09-02 | End: 2025-09-02

## 2025-09-02 RX ORDER — ATORVASTATIN CALCIUM 40 MG/1
40 TABLET, FILM COATED ORAL DAILY
Status: DISCONTINUED | OUTPATIENT
Start: 2025-09-02 | End: 2025-09-06 | Stop reason: HOSPADM

## 2025-09-02 RX ORDER — ASPIRIN 81 MG/1
81 TABLET ORAL DAILY
Status: DISCONTINUED | OUTPATIENT
Start: 2025-09-02 | End: 2025-09-06 | Stop reason: HOSPADM

## 2025-09-02 RX ORDER — SODIUM CHLORIDE 9 MG/ML
INJECTION, SOLUTION INTRAVENOUS PRN
Status: DISCONTINUED | OUTPATIENT
Start: 2025-09-02 | End: 2025-09-06 | Stop reason: HOSPADM

## 2025-09-02 RX ORDER — DEXAMETHASONE SODIUM PHOSPHATE 4 MG/ML
4 INJECTION, SOLUTION INTRA-ARTICULAR; INTRALESIONAL; INTRAMUSCULAR; INTRAVENOUS; SOFT TISSUE EVERY 6 HOURS
Status: DISCONTINUED | OUTPATIENT
Start: 2025-09-02 | End: 2025-09-06

## 2025-09-02 RX ORDER — ACETAMINOPHEN 325 MG/1
650 TABLET ORAL EVERY 6 HOURS PRN
Status: DISCONTINUED | OUTPATIENT
Start: 2025-09-02 | End: 2025-09-06 | Stop reason: HOSPADM

## 2025-09-02 RX ORDER — DIPHENHYDRAMINE HYDROCHLORIDE 50 MG/ML
25 INJECTION, SOLUTION INTRAMUSCULAR; INTRAVENOUS ONCE
Status: COMPLETED | OUTPATIENT
Start: 2025-09-02 | End: 2025-09-02

## 2025-09-02 RX ORDER — METOPROLOL TARTRATE 25 MG/1
25 TABLET, FILM COATED ORAL 2 TIMES DAILY
Status: DISCONTINUED | OUTPATIENT
Start: 2025-09-02 | End: 2025-09-06 | Stop reason: HOSPADM

## 2025-09-02 RX ORDER — ACETAMINOPHEN 650 MG/1
650 SUPPOSITORY RECTAL EVERY 6 HOURS PRN
Status: DISCONTINUED | OUTPATIENT
Start: 2025-09-02 | End: 2025-09-06 | Stop reason: HOSPADM

## 2025-09-02 RX ORDER — HEPARIN SODIUM 5000 [USP'U]/ML
5000 INJECTION, SOLUTION INTRAVENOUS; SUBCUTANEOUS EVERY 8 HOURS SCHEDULED
Status: DISCONTINUED | OUTPATIENT
Start: 2025-09-02 | End: 2025-09-06 | Stop reason: HOSPADM

## 2025-09-02 RX ORDER — SODIUM CHLORIDE 0.9 % (FLUSH) 0.9 %
5-40 SYRINGE (ML) INJECTION EVERY 12 HOURS SCHEDULED
Status: DISCONTINUED | OUTPATIENT
Start: 2025-09-02 | End: 2025-09-06 | Stop reason: HOSPADM

## 2025-09-02 RX ORDER — POTASSIUM CHLORIDE 7.45 MG/ML
10 INJECTION INTRAVENOUS PRN
Status: DISCONTINUED | OUTPATIENT
Start: 2025-09-02 | End: 2025-09-06 | Stop reason: HOSPADM

## 2025-09-02 RX ORDER — POLYETHYLENE GLYCOL 3350 17 G/17G
17 POWDER, FOR SOLUTION ORAL DAILY PRN
Status: DISCONTINUED | OUTPATIENT
Start: 2025-09-02 | End: 2025-09-06 | Stop reason: HOSPADM

## 2025-09-02 RX ORDER — ONDANSETRON 2 MG/ML
4 INJECTION INTRAMUSCULAR; INTRAVENOUS EVERY 6 HOURS PRN
Status: DISCONTINUED | OUTPATIENT
Start: 2025-09-02 | End: 2025-09-06 | Stop reason: HOSPADM

## 2025-09-02 RX ORDER — SODIUM CHLORIDE 0.9 % (FLUSH) 0.9 %
5-40 SYRINGE (ML) INJECTION PRN
Status: DISCONTINUED | OUTPATIENT
Start: 2025-09-02 | End: 2025-09-06 | Stop reason: HOSPADM

## 2025-09-02 RX ADMIN — IOPAMIDOL 150 ML: 755 INJECTION, SOLUTION INTRAVENOUS at 05:56

## 2025-09-02 RX ADMIN — PANTOPRAZOLE SODIUM 40 MG: 40 INJECTION, POWDER, FOR SOLUTION INTRAVENOUS at 11:16

## 2025-09-02 RX ADMIN — PROCHLORPERAZINE EDISYLATE 10 MG: 5 INJECTION INTRAMUSCULAR; INTRAVENOUS at 10:25

## 2025-09-02 RX ADMIN — METOPROLOL TARTRATE 25 MG: 25 TABLET, FILM COATED ORAL at 20:18

## 2025-09-02 RX ADMIN — DIPHENHYDRAMINE HYDROCHLORIDE 25 MG: 50 INJECTION INTRAMUSCULAR; INTRAVENOUS at 10:25

## 2025-09-02 RX ADMIN — SODIUM CHLORIDE, PRESERVATIVE FREE 10 ML: 5 INJECTION INTRAVENOUS at 20:18

## 2025-09-02 RX ADMIN — DEXAMETHASONE SODIUM PHOSPHATE 4 MG: 4 INJECTION INTRA-ARTICULAR; INTRALESIONAL; INTRAMUSCULAR; INTRAVENOUS; SOFT TISSUE at 09:12

## 2025-09-02 RX ADMIN — HEPARIN SODIUM 5000 UNITS: 5000 INJECTION, SOLUTION INTRAVENOUS; SUBCUTANEOUS at 16:24

## 2025-09-02 RX ADMIN — ACETAMINOPHEN 1000 MG: 500 TABLET ORAL at 06:36

## 2025-09-02 RX ADMIN — DEXAMETHASONE SODIUM PHOSPHATE 4 MG: 4 INJECTION INTRA-ARTICULAR; INTRALESIONAL; INTRAMUSCULAR; INTRAVENOUS; SOFT TISSUE at 16:24

## 2025-09-02 RX ADMIN — HEPARIN SODIUM 5000 UNITS: 5000 INJECTION, SOLUTION INTRAVENOUS; SUBCUTANEOUS at 21:39

## 2025-09-02 RX ADMIN — DEXAMETHASONE SODIUM PHOSPHATE 4 MG: 4 INJECTION INTRA-ARTICULAR; INTRALESIONAL; INTRAMUSCULAR; INTRAVENOUS; SOFT TISSUE at 20:18

## 2025-09-02 ASSESSMENT — ENCOUNTER SYMPTOMS
COLOR CHANGE: 0
ALLERGIC/IMMUNOLOGIC NEGATIVE: 1
ABDOMINAL PAIN: 0
GASTROINTESTINAL NEGATIVE: 1
ABDOMINAL DISTENTION: 0
DIARRHEA: 0
NAUSEA: 0
VOICE CHANGE: 1
BACK PAIN: 1
CHEST TIGHTNESS: 0
RESPIRATORY NEGATIVE: 1
VOMITING: 0
CONSTIPATION: 0
EYES NEGATIVE: 1
COUGH: 0
SHORTNESS OF BREATH: 1

## 2025-09-02 ASSESSMENT — PAIN DESCRIPTION - LOCATION: LOCATION: HEAD

## 2025-09-02 ASSESSMENT — PAIN SCALES - GENERAL
PAINLEVEL_OUTOF10: 0

## 2025-09-02 ASSESSMENT — LIFESTYLE VARIABLES
HOW OFTEN DO YOU HAVE A DRINK CONTAINING ALCOHOL: PATIENT DECLINED
HOW MANY STANDARD DRINKS CONTAINING ALCOHOL DO YOU HAVE ON A TYPICAL DAY: PATIENT DECLINED

## 2025-09-03 DIAGNOSIS — C34.11 MALIGNANT NEOPLASM OF UPPER LOBE OF RIGHT LUNG (HCC): Primary | ICD-10-CM

## 2025-09-03 DIAGNOSIS — C79.51 METASTASIS TO BONE (HCC): ICD-10-CM

## 2025-09-03 DIAGNOSIS — G93.89 BRAIN MASS: ICD-10-CM

## 2025-09-03 DIAGNOSIS — C34.90 MALIGNANT NEOPLASM OF LUNG, UNSPECIFIED LATERALITY, UNSPECIFIED PART OF LUNG (HCC): ICD-10-CM

## 2025-09-03 PROBLEM — G93.6 VASOGENIC BRAIN EDEMA (HCC): Status: ACTIVE | Noted: 2025-09-03

## 2025-09-03 PROBLEM — R51.9 NONINTRACTABLE HEADACHE: Status: ACTIVE | Noted: 2025-09-03

## 2025-09-03 LAB
ALBUMIN SERPL-MCNC: 3.3 G/DL (ref 3.5–5.2)
ALBUMIN/GLOB SERPL: 1.1 {RATIO} (ref 1–2.5)
ALP SERPL-CCNC: 107 U/L (ref 40–129)
ALT SERPL-CCNC: 22 U/L (ref 10–50)
ANION GAP SERPL CALCULATED.3IONS-SCNC: 12 MMOL/L (ref 9–16)
ANION GAP SERPL CALCULATED.3IONS-SCNC: 12 MMOL/L (ref 9–16)
AST SERPL-CCNC: 13 U/L (ref 10–50)
BASOPHILS # BLD: 0 K/UL (ref 0–0.2)
BASOPHILS # BLD: 0 K/UL (ref 0–0.2)
BASOPHILS NFR BLD: 0 % (ref 0–2)
BASOPHILS NFR BLD: 0 % (ref 0–2)
BILIRUB DIRECT SERPL-MCNC: 0.2 MG/DL (ref 0–0.2)
BILIRUB INDIRECT SERPL-MCNC: 0.4 MG/DL (ref 0–1)
BILIRUB SERPL-MCNC: 0.6 MG/DL (ref 0–1.2)
BUN SERPL-MCNC: 23 MG/DL (ref 8–23)
BUN SERPL-MCNC: 24 MG/DL (ref 8–23)
CALCIUM SERPL-MCNC: 8.8 MG/DL (ref 8.6–10.4)
CALCIUM SERPL-MCNC: 8.9 MG/DL (ref 8.6–10.4)
CHLORIDE SERPL-SCNC: 101 MMOL/L (ref 98–107)
CHLORIDE SERPL-SCNC: 101 MMOL/L (ref 98–107)
CO2 SERPL-SCNC: 22 MMOL/L (ref 20–31)
CO2 SERPL-SCNC: 23 MMOL/L (ref 20–31)
CREAT SERPL-MCNC: 1 MG/DL (ref 0.7–1.2)
CREAT SERPL-MCNC: 1.1 MG/DL (ref 0.7–1.2)
EOSINOPHIL # BLD: 0 K/UL (ref 0–0.44)
EOSINOPHIL # BLD: 0 K/UL (ref 0–0.44)
EOSINOPHILS RELATIVE PERCENT: 0 % (ref 1–4)
EOSINOPHILS RELATIVE PERCENT: 0 % (ref 1–4)
ERYTHROCYTE [DISTWIDTH] IN BLOOD BY AUTOMATED COUNT: 15.8 % (ref 11.8–14.4)
ERYTHROCYTE [DISTWIDTH] IN BLOOD BY AUTOMATED COUNT: 16 % (ref 11.8–14.4)
FERRITIN SERPL-MCNC: 683 NG/ML
FOLATE SERPL-MCNC: 10.6 NG/ML (ref 4.8–24.2)
GFR, ESTIMATED: 71 ML/MIN/1.73M2
GFR, ESTIMATED: 79 ML/MIN/1.73M2
GLOBULIN SER CALC-MCNC: 3 G/DL
GLUCOSE SERPL-MCNC: 133 MG/DL (ref 74–99)
GLUCOSE SERPL-MCNC: 147 MG/DL (ref 74–99)
HAPTOGLOB SERPL-MCNC: 328 MG/DL (ref 30–200)
HCT VFR BLD AUTO: 35.3 % (ref 40.7–50.3)
HCT VFR BLD AUTO: 36.3 % (ref 40.7–50.3)
HCT VFR BLD AUTO: 36.6 % (ref 40.7–50.3)
HGB BLD-MCNC: 11.8 G/DL (ref 13–17)
HGB BLD-MCNC: 11.9 G/DL (ref 13–17)
HGB BLD-MCNC: 12 G/DL (ref 13–17)
IMM GRANULOCYTES # BLD AUTO: 0.08 K/UL (ref 0–0.3)
IMM GRANULOCYTES # BLD AUTO: 0.09 K/UL (ref 0–0.3)
IMM GRANULOCYTES NFR BLD: 1 %
IMM GRANULOCYTES NFR BLD: 1 %
IMM RETICS NFR: 13.4 % (ref 2.7–18.3)
IRON SATN MFR SERPL: 38 % (ref 20–55)
IRON SERPL-MCNC: 66 UG/DL (ref 61–157)
LACTIC ACID, WHOLE BLOOD: 0.7 MMOL/L (ref 0.7–2.1)
LDH SERPL-CCNC: 225 U/L (ref 135–225)
LYMPHOCYTES NFR BLD: 0.46 K/UL (ref 1.1–3.7)
LYMPHOCYTES NFR BLD: 0.5 K/UL (ref 1.1–3.7)
LYMPHOCYTES RELATIVE PERCENT: 5 % (ref 24–43)
LYMPHOCYTES RELATIVE PERCENT: 6 % (ref 24–43)
MCH RBC QN AUTO: 29.8 PG (ref 25.2–33.5)
MCH RBC QN AUTO: 30 PG (ref 25.2–33.5)
MCHC RBC AUTO-ENTMCNC: 32.5 G/DL (ref 28.4–34.8)
MCHC RBC AUTO-ENTMCNC: 33.4 G/DL (ref 28.4–34.8)
MCV RBC AUTO: 89.8 FL (ref 82.6–102.9)
MCV RBC AUTO: 91.5 FL (ref 82.6–102.9)
MONOCYTES NFR BLD: 0.17 K/UL (ref 0.1–1.2)
MONOCYTES NFR BLD: 0.18 K/UL (ref 0.1–1.2)
MONOCYTES NFR BLD: 2 % (ref 3–12)
MONOCYTES NFR BLD: 2 % (ref 3–12)
MORPHOLOGY: ABNORMAL
NEUTROPHILS NFR BLD: 91 % (ref 36–65)
NEUTROPHILS NFR BLD: 92 % (ref 36–65)
NEUTS SEG NFR BLD: 7.65 K/UL (ref 1.5–8.1)
NEUTS SEG NFR BLD: 8.37 K/UL (ref 1.5–8.1)
NRBC BLD-RTO: 0 PER 100 WBC
NRBC BLD-RTO: 0 PER 100 WBC
PLATELET # BLD AUTO: 103 K/UL (ref 138–453)
PLATELET # BLD AUTO: 103 K/UL (ref 138–453)
PMV BLD AUTO: 9 FL (ref 8.1–13.5)
PMV BLD AUTO: 9.2 FL (ref 8.1–13.5)
POTASSIUM SERPL-SCNC: 4.1 MMOL/L (ref 3.7–5.3)
POTASSIUM SERPL-SCNC: 4.5 MMOL/L (ref 3.7–5.3)
PROT SERPL-MCNC: 6.3 G/DL (ref 6.6–8.7)
RBC # BLD AUTO: 3.93 M/UL (ref 4.21–5.77)
RBC # BLD AUTO: 4 M/UL (ref 4.21–5.77)
RETIC HEMOGLOBIN: 33.4 PG (ref 28.2–35.7)
RETICS # AUTO: 0.06 M/UL (ref 0.03–0.08)
RETICS/RBC NFR AUTO: 1.5 % (ref 0.5–1.9)
SODIUM SERPL-SCNC: 135 MMOL/L (ref 136–145)
SODIUM SERPL-SCNC: 136 MMOL/L (ref 136–145)
TIBC SERPL-MCNC: 174 UG/DL (ref 250–450)
UNSATURATED IRON BINDING CAPACITY: 108 UG/DL (ref 112–347)
VIT B12 SERPL-MCNC: 454 PG/ML (ref 232–1245)
WBC OTHER # BLD: 8.4 K/UL (ref 3.5–11.3)
WBC OTHER # BLD: 9.1 K/UL (ref 3.5–11.3)

## 2025-09-03 PROCEDURE — 85025 COMPLETE CBC W/AUTO DIFF WBC: CPT

## 2025-09-03 PROCEDURE — 80048 BASIC METABOLIC PNL TOTAL CA: CPT

## 2025-09-03 PROCEDURE — 85014 HEMATOCRIT: CPT

## 2025-09-03 PROCEDURE — 83550 IRON BINDING TEST: CPT

## 2025-09-03 PROCEDURE — 6360000002 HC RX W HCPCS: Performed by: NURSE PRACTITIONER

## 2025-09-03 PROCEDURE — 6370000000 HC RX 637 (ALT 250 FOR IP)

## 2025-09-03 PROCEDURE — 87040 BLOOD CULTURE FOR BACTERIA: CPT

## 2025-09-03 PROCEDURE — 6360000002 HC RX W HCPCS

## 2025-09-03 PROCEDURE — 83605 ASSAY OF LACTIC ACID: CPT

## 2025-09-03 PROCEDURE — 2700000000 HC OXYGEN THERAPY PER DAY

## 2025-09-03 PROCEDURE — 83540 ASSAY OF IRON: CPT

## 2025-09-03 PROCEDURE — 85045 AUTOMATED RETICULOCYTE COUNT: CPT

## 2025-09-03 PROCEDURE — 85018 HEMOGLOBIN: CPT

## 2025-09-03 PROCEDURE — 99232 SBSQ HOSP IP/OBS MODERATE 35: CPT | Performed by: INTERNAL MEDICINE

## 2025-09-03 PROCEDURE — 82746 ASSAY OF FOLIC ACID SERUM: CPT

## 2025-09-03 PROCEDURE — 83615 LACTATE (LD) (LDH) ENZYME: CPT

## 2025-09-03 PROCEDURE — 82607 VITAMIN B-12: CPT

## 2025-09-03 PROCEDURE — 83010 ASSAY OF HAPTOGLOBIN QUANT: CPT

## 2025-09-03 PROCEDURE — 80076 HEPATIC FUNCTION PANEL: CPT

## 2025-09-03 PROCEDURE — 36415 COLL VENOUS BLD VENIPUNCTURE: CPT

## 2025-09-03 PROCEDURE — 94761 N-INVAS EAR/PLS OXIMETRY MLT: CPT

## 2025-09-03 PROCEDURE — 2500000003 HC RX 250 WO HCPCS

## 2025-09-03 PROCEDURE — 2500000003 HC RX 250 WO HCPCS: Performed by: NURSE PRACTITIONER

## 2025-09-03 PROCEDURE — 2060000000 HC ICU INTERMEDIATE R&B

## 2025-09-03 PROCEDURE — 82728 ASSAY OF FERRITIN: CPT

## 2025-09-03 RX ORDER — TAMSULOSIN HYDROCHLORIDE 0.4 MG/1
0.4 CAPSULE ORAL 2 TIMES DAILY
Status: DISCONTINUED | OUTPATIENT
Start: 2025-09-03 | End: 2025-09-06 | Stop reason: HOSPADM

## 2025-09-03 RX ADMIN — SODIUM CHLORIDE, PRESERVATIVE FREE 10 ML: 5 INJECTION INTRAVENOUS at 20:26

## 2025-09-03 RX ADMIN — DEXAMETHASONE SODIUM PHOSPHATE 4 MG: 4 INJECTION INTRA-ARTICULAR; INTRALESIONAL; INTRAMUSCULAR; INTRAVENOUS; SOFT TISSUE at 20:24

## 2025-09-03 RX ADMIN — Medication 5 MG: at 22:42

## 2025-09-03 RX ADMIN — TAMSULOSIN HYDROCHLORIDE 0.4 MG: 0.4 CAPSULE ORAL at 09:18

## 2025-09-03 RX ADMIN — HEPARIN SODIUM 5000 UNITS: 5000 INJECTION, SOLUTION INTRAVENOUS; SUBCUTANEOUS at 06:12

## 2025-09-03 RX ADMIN — ACETAMINOPHEN 650 MG: 325 TABLET ORAL at 11:23

## 2025-09-03 RX ADMIN — ATORVASTATIN CALCIUM 40 MG: 40 TABLET, FILM COATED ORAL at 09:18

## 2025-09-03 RX ADMIN — HEPARIN SODIUM 5000 UNITS: 5000 INJECTION, SOLUTION INTRAVENOUS; SUBCUTANEOUS at 14:31

## 2025-09-03 RX ADMIN — METOPROLOL TARTRATE 25 MG: 25 TABLET, FILM COATED ORAL at 20:24

## 2025-09-03 RX ADMIN — HEPARIN SODIUM 5000 UNITS: 5000 INJECTION, SOLUTION INTRAVENOUS; SUBCUTANEOUS at 20:24

## 2025-09-03 RX ADMIN — DEXAMETHASONE SODIUM PHOSPHATE 4 MG: 4 INJECTION INTRA-ARTICULAR; INTRALESIONAL; INTRAMUSCULAR; INTRAVENOUS; SOFT TISSUE at 14:31

## 2025-09-03 RX ADMIN — LEVOTHYROXINE SODIUM 25 MCG: 0.05 TABLET ORAL at 06:10

## 2025-09-03 RX ADMIN — TAMSULOSIN HYDROCHLORIDE 0.4 MG: 0.4 CAPSULE ORAL at 20:24

## 2025-09-03 RX ADMIN — DEXAMETHASONE SODIUM PHOSPHATE 4 MG: 4 INJECTION INTRA-ARTICULAR; INTRALESIONAL; INTRAMUSCULAR; INTRAVENOUS; SOFT TISSUE at 02:40

## 2025-09-03 RX ADMIN — METOPROLOL TARTRATE 25 MG: 25 TABLET, FILM COATED ORAL at 09:18

## 2025-09-03 RX ADMIN — ACETAMINOPHEN 650 MG: 325 TABLET ORAL at 02:52

## 2025-09-03 RX ADMIN — DEXAMETHASONE SODIUM PHOSPHATE 4 MG: 4 INJECTION INTRA-ARTICULAR; INTRALESIONAL; INTRAMUSCULAR; INTRAVENOUS; SOFT TISSUE at 09:18

## 2025-09-03 RX ADMIN — ACETAMINOPHEN 650 MG: 325 TABLET ORAL at 20:24

## 2025-09-03 RX ADMIN — SODIUM CHLORIDE, PRESERVATIVE FREE 10 ML: 5 INJECTION INTRAVENOUS at 09:21

## 2025-09-03 RX ADMIN — PANTOPRAZOLE SODIUM 40 MG: 40 INJECTION, POWDER, FOR SOLUTION INTRAVENOUS at 09:18

## 2025-09-03 ASSESSMENT — PAIN - FUNCTIONAL ASSESSMENT
PAIN_FUNCTIONAL_ASSESSMENT: 0-10

## 2025-09-03 ASSESSMENT — PAIN SCALES - GENERAL
PAINLEVEL_OUTOF10: 1
PAINLEVEL_OUTOF10: 1
PAINLEVEL_OUTOF10: 3
PAINLEVEL_OUTOF10: 3
PAINLEVEL_OUTOF10: 2

## 2025-09-03 ASSESSMENT — PAIN DESCRIPTION - DESCRIPTORS: DESCRIPTORS: ACHING;DISCOMFORT

## 2025-09-03 ASSESSMENT — ENCOUNTER SYMPTOMS
BACK PAIN: 1
CONSTIPATION: 0
ABDOMINAL DISTENTION: 0
GASTROINTESTINAL NEGATIVE: 1
DIARRHEA: 0
SHORTNESS OF BREATH: 1
VOICE CHANGE: 1

## 2025-09-03 ASSESSMENT — PAIN DESCRIPTION - ORIENTATION: ORIENTATION: POSTERIOR

## 2025-09-03 ASSESSMENT — PAIN DESCRIPTION - LOCATION: LOCATION: HEAD

## 2025-09-04 LAB
ANION GAP SERPL CALCULATED.3IONS-SCNC: 12 MMOL/L (ref 9–16)
BASOPHILS # BLD: 0 K/UL (ref 0–0.2)
BASOPHILS NFR BLD: 0 % (ref 0–2)
BUN SERPL-MCNC: 26 MG/DL (ref 8–23)
CALCIUM SERPL-MCNC: 9.1 MG/DL (ref 8.6–10.4)
CHLORIDE SERPL-SCNC: 101 MMOL/L (ref 98–107)
CO2 SERPL-SCNC: 22 MMOL/L (ref 20–31)
CREAT SERPL-MCNC: 0.9 MG/DL (ref 0.7–1.2)
EOSINOPHIL # BLD: 0 K/UL (ref 0–0.4)
EOSINOPHILS RELATIVE PERCENT: 0 % (ref 1–4)
ERYTHROCYTE [DISTWIDTH] IN BLOOD BY AUTOMATED COUNT: 16.1 % (ref 11.8–14.4)
GFR, ESTIMATED: >90 ML/MIN/1.73M2
GLUCOSE SERPL-MCNC: 131 MG/DL (ref 74–99)
HCT VFR BLD AUTO: 34.2 % (ref 40.7–50.3)
HCT VFR BLD AUTO: 36.8 % (ref 40.7–50.3)
HGB BLD-MCNC: 11.5 G/DL (ref 13–17)
HGB BLD-MCNC: 12.1 G/DL (ref 13–17)
IMM GRANULOCYTES # BLD AUTO: 0 K/UL (ref 0–0.3)
IMM GRANULOCYTES NFR BLD: 0 %
LYMPHOCYTES NFR BLD: 0.34 K/UL (ref 1–4.8)
LYMPHOCYTES RELATIVE PERCENT: 3 % (ref 24–44)
MCH RBC QN AUTO: 30.2 PG (ref 25.2–33.5)
MCHC RBC AUTO-ENTMCNC: 32.9 G/DL (ref 28.4–34.8)
MCV RBC AUTO: 91.8 FL (ref 82.6–102.9)
MONOCYTES NFR BLD: 0.11 K/UL (ref 0.1–0.8)
MONOCYTES NFR BLD: 1 % (ref 1–7)
MORPHOLOGY: ABNORMAL
MORPHOLOGY: ABNORMAL
NEUTROPHILS NFR BLD: 96 % (ref 36–66)
NEUTS SEG NFR BLD: 10.85 K/UL (ref 1.8–7.7)
NRBC BLD-RTO: 0 PER 100 WBC
PLATELET # BLD AUTO: 116 K/UL (ref 138–453)
PMV BLD AUTO: 9.1 FL (ref 8.1–13.5)
POTASSIUM SERPL-SCNC: 5 MMOL/L (ref 3.7–5.3)
RBC # BLD AUTO: 4.01 M/UL (ref 4.21–5.77)
SODIUM SERPL-SCNC: 135 MMOL/L (ref 136–145)
WBC OTHER # BLD: 11.3 K/UL (ref 3.5–11.3)

## 2025-09-04 PROCEDURE — 6370000000 HC RX 637 (ALT 250 FOR IP)

## 2025-09-04 PROCEDURE — 6360000002 HC RX W HCPCS

## 2025-09-04 PROCEDURE — 6360000002 HC RX W HCPCS: Performed by: NURSE PRACTITIONER

## 2025-09-04 PROCEDURE — 2500000003 HC RX 250 WO HCPCS

## 2025-09-04 PROCEDURE — 85025 COMPLETE CBC W/AUTO DIFF WBC: CPT

## 2025-09-04 PROCEDURE — 36415 COLL VENOUS BLD VENIPUNCTURE: CPT

## 2025-09-04 PROCEDURE — 85018 HEMOGLOBIN: CPT

## 2025-09-04 PROCEDURE — 80048 BASIC METABOLIC PNL TOTAL CA: CPT

## 2025-09-04 PROCEDURE — 99232 SBSQ HOSP IP/OBS MODERATE 35: CPT | Performed by: INTERNAL MEDICINE

## 2025-09-04 PROCEDURE — 2060000000 HC ICU INTERMEDIATE R&B

## 2025-09-04 PROCEDURE — 85014 HEMATOCRIT: CPT

## 2025-09-04 RX ORDER — PANTOPRAZOLE SODIUM 40 MG/1
40 TABLET, DELAYED RELEASE ORAL
Status: DISCONTINUED | OUTPATIENT
Start: 2025-09-04 | End: 2025-09-04

## 2025-09-04 RX ADMIN — DEXAMETHASONE SODIUM PHOSPHATE 4 MG: 4 INJECTION INTRA-ARTICULAR; INTRALESIONAL; INTRAMUSCULAR; INTRAVENOUS; SOFT TISSUE at 14:36

## 2025-09-04 RX ADMIN — SODIUM CHLORIDE, PRESERVATIVE FREE 10 ML: 5 INJECTION INTRAVENOUS at 20:54

## 2025-09-04 RX ADMIN — METOPROLOL TARTRATE 25 MG: 25 TABLET, FILM COATED ORAL at 20:53

## 2025-09-04 RX ADMIN — METOPROLOL TARTRATE 25 MG: 25 TABLET, FILM COATED ORAL at 09:42

## 2025-09-04 RX ADMIN — TAMSULOSIN HYDROCHLORIDE 0.4 MG: 0.4 CAPSULE ORAL at 20:53

## 2025-09-04 RX ADMIN — HEPARIN SODIUM 5000 UNITS: 5000 INJECTION, SOLUTION INTRAVENOUS; SUBCUTANEOUS at 05:46

## 2025-09-04 RX ADMIN — PANTOPRAZOLE SODIUM 40 MG: 40 INJECTION, POWDER, FOR SOLUTION INTRAVENOUS at 09:41

## 2025-09-04 RX ADMIN — LEVOTHYROXINE SODIUM 25 MCG: 0.05 TABLET ORAL at 05:46

## 2025-09-04 RX ADMIN — DEXAMETHASONE SODIUM PHOSPHATE 4 MG: 4 INJECTION INTRA-ARTICULAR; INTRALESIONAL; INTRAMUSCULAR; INTRAVENOUS; SOFT TISSUE at 20:53

## 2025-09-04 RX ADMIN — TAMSULOSIN HYDROCHLORIDE 0.4 MG: 0.4 CAPSULE ORAL at 09:42

## 2025-09-04 RX ADMIN — HEPARIN SODIUM 5000 UNITS: 5000 INJECTION, SOLUTION INTRAVENOUS; SUBCUTANEOUS at 20:53

## 2025-09-04 RX ADMIN — ATORVASTATIN CALCIUM 40 MG: 40 TABLET, FILM COATED ORAL at 09:42

## 2025-09-04 RX ADMIN — SODIUM CHLORIDE, PRESERVATIVE FREE 10 ML: 5 INJECTION INTRAVENOUS at 09:56

## 2025-09-04 RX ADMIN — HEPARIN SODIUM 5000 UNITS: 5000 INJECTION, SOLUTION INTRAVENOUS; SUBCUTANEOUS at 14:36

## 2025-09-04 RX ADMIN — DEXAMETHASONE SODIUM PHOSPHATE 4 MG: 4 INJECTION INTRA-ARTICULAR; INTRALESIONAL; INTRAMUSCULAR; INTRAVENOUS; SOFT TISSUE at 02:08

## 2025-09-04 RX ADMIN — DEXAMETHASONE SODIUM PHOSPHATE 4 MG: 4 INJECTION INTRA-ARTICULAR; INTRALESIONAL; INTRAMUSCULAR; INTRAVENOUS; SOFT TISSUE at 09:41

## 2025-09-04 ASSESSMENT — PAIN SCALES - GENERAL
PAINLEVEL_OUTOF10: 0
PAINLEVEL_OUTOF10: 0

## 2025-09-05 LAB
ANION GAP SERPL CALCULATED.3IONS-SCNC: 8 MMOL/L (ref 9–16)
BASOPHILS # BLD: 0 K/UL (ref 0–0.2)
BASOPHILS NFR BLD: 0 % (ref 0–2)
BUN SERPL-MCNC: 30 MG/DL (ref 8–23)
CALCIUM SERPL-MCNC: 8.7 MG/DL (ref 8.6–10.4)
CHLORIDE SERPL-SCNC: 102 MMOL/L (ref 98–107)
CO2 SERPL-SCNC: 24 MMOL/L (ref 20–31)
CREAT SERPL-MCNC: 0.8 MG/DL (ref 0.7–1.2)
EOSINOPHIL # BLD: 0 K/UL (ref 0–0.4)
EOSINOPHILS RELATIVE PERCENT: 0 % (ref 1–4)
ERYTHROCYTE [DISTWIDTH] IN BLOOD BY AUTOMATED COUNT: 16.1 % (ref 11.8–14.4)
GFR, ESTIMATED: >90 ML/MIN/1.73M2
GLUCOSE SERPL-MCNC: 118 MG/DL (ref 74–99)
HCT VFR BLD AUTO: 35.2 % (ref 40.7–50.3)
HGB BLD-MCNC: 11.5 G/DL (ref 13–17)
IMM GRANULOCYTES # BLD AUTO: 0 K/UL (ref 0–0.3)
IMM GRANULOCYTES NFR BLD: 0 %
LYMPHOCYTES NFR BLD: 0.26 K/UL (ref 1–4.8)
LYMPHOCYTES RELATIVE PERCENT: 3 % (ref 24–44)
MCH RBC QN AUTO: 29.9 PG (ref 25.2–33.5)
MCHC RBC AUTO-ENTMCNC: 32.7 G/DL (ref 28.4–34.8)
MCV RBC AUTO: 91.4 FL (ref 82.6–102.9)
MONOCYTES NFR BLD: 0.26 K/UL (ref 0.1–0.8)
MONOCYTES NFR BLD: 3 % (ref 1–7)
MORPHOLOGY: ABNORMAL
NEUTROPHILS NFR BLD: 94 % (ref 36–66)
NEUTS SEG NFR BLD: 8.08 K/UL (ref 1.8–7.7)
NRBC BLD-RTO: 0 PER 100 WBC
PLATELET # BLD AUTO: 119 K/UL (ref 138–453)
PMV BLD AUTO: 9.2 FL (ref 8.1–13.5)
POTASSIUM SERPL-SCNC: 4.8 MMOL/L (ref 3.7–5.3)
RBC # BLD AUTO: 3.85 M/UL (ref 4.21–5.77)
SODIUM SERPL-SCNC: 134 MMOL/L (ref 136–145)
WBC OTHER # BLD: 8.6 K/UL (ref 3.5–11.3)

## 2025-09-05 PROCEDURE — 6370000000 HC RX 637 (ALT 250 FOR IP)

## 2025-09-05 PROCEDURE — 80048 BASIC METABOLIC PNL TOTAL CA: CPT

## 2025-09-05 PROCEDURE — 97530 THERAPEUTIC ACTIVITIES: CPT

## 2025-09-05 PROCEDURE — 97162 PT EVAL MOD COMPLEX 30 MIN: CPT

## 2025-09-05 PROCEDURE — 97166 OT EVAL MOD COMPLEX 45 MIN: CPT

## 2025-09-05 PROCEDURE — 2500000003 HC RX 250 WO HCPCS

## 2025-09-05 PROCEDURE — 2060000000 HC ICU INTERMEDIATE R&B

## 2025-09-05 PROCEDURE — 99232 SBSQ HOSP IP/OBS MODERATE 35: CPT | Performed by: INTERNAL MEDICINE

## 2025-09-05 PROCEDURE — 6360000002 HC RX W HCPCS: Performed by: NURSE PRACTITIONER

## 2025-09-05 PROCEDURE — 36415 COLL VENOUS BLD VENIPUNCTURE: CPT

## 2025-09-05 PROCEDURE — 97535 SELF CARE MNGMENT TRAINING: CPT

## 2025-09-05 PROCEDURE — 6360000002 HC RX W HCPCS

## 2025-09-05 PROCEDURE — 85025 COMPLETE CBC W/AUTO DIFF WBC: CPT

## 2025-09-05 RX ADMIN — METOPROLOL TARTRATE 25 MG: 25 TABLET, FILM COATED ORAL at 21:02

## 2025-09-05 RX ADMIN — HEPARIN SODIUM 5000 UNITS: 5000 INJECTION, SOLUTION INTRAVENOUS; SUBCUTANEOUS at 21:02

## 2025-09-05 RX ADMIN — DEXAMETHASONE SODIUM PHOSPHATE 4 MG: 4 INJECTION INTRA-ARTICULAR; INTRALESIONAL; INTRAMUSCULAR; INTRAVENOUS; SOFT TISSUE at 21:02

## 2025-09-05 RX ADMIN — METOPROLOL TARTRATE 25 MG: 25 TABLET, FILM COATED ORAL at 08:27

## 2025-09-05 RX ADMIN — HEPARIN SODIUM 5000 UNITS: 5000 INJECTION, SOLUTION INTRAVENOUS; SUBCUTANEOUS at 06:01

## 2025-09-05 RX ADMIN — HEPARIN SODIUM 5000 UNITS: 5000 INJECTION, SOLUTION INTRAVENOUS; SUBCUTANEOUS at 15:54

## 2025-09-05 RX ADMIN — Medication 5 MG: at 00:04

## 2025-09-05 RX ADMIN — DEXAMETHASONE SODIUM PHOSPHATE 4 MG: 4 INJECTION INTRA-ARTICULAR; INTRALESIONAL; INTRAMUSCULAR; INTRAVENOUS; SOFT TISSUE at 08:27

## 2025-09-05 RX ADMIN — TAMSULOSIN HYDROCHLORIDE 0.4 MG: 0.4 CAPSULE ORAL at 21:02

## 2025-09-05 RX ADMIN — PANTOPRAZOLE SODIUM 40 MG: 40 INJECTION, POWDER, FOR SOLUTION INTRAVENOUS at 08:27

## 2025-09-05 RX ADMIN — DEXAMETHASONE SODIUM PHOSPHATE 4 MG: 4 INJECTION INTRA-ARTICULAR; INTRALESIONAL; INTRAMUSCULAR; INTRAVENOUS; SOFT TISSUE at 03:01

## 2025-09-05 RX ADMIN — TAMSULOSIN HYDROCHLORIDE 0.4 MG: 0.4 CAPSULE ORAL at 08:27

## 2025-09-05 RX ADMIN — SODIUM CHLORIDE, PRESERVATIVE FREE 10 ML: 5 INJECTION INTRAVENOUS at 21:03

## 2025-09-05 RX ADMIN — LEVOTHYROXINE SODIUM 25 MCG: 0.05 TABLET ORAL at 06:00

## 2025-09-05 RX ADMIN — ATORVASTATIN CALCIUM 40 MG: 40 TABLET, FILM COATED ORAL at 08:27

## 2025-09-05 RX ADMIN — DEXAMETHASONE SODIUM PHOSPHATE 4 MG: 4 INJECTION INTRA-ARTICULAR; INTRALESIONAL; INTRAMUSCULAR; INTRAVENOUS; SOFT TISSUE at 15:54

## 2025-09-05 RX ADMIN — ACETAMINOPHEN 650 MG: 325 TABLET ORAL at 23:36

## 2025-09-05 ASSESSMENT — PAIN SCALES - GENERAL
PAINLEVEL_OUTOF10: 0
PAINLEVEL_OUTOF10: 3
PAINLEVEL_OUTOF10: 6
PAINLEVEL_OUTOF10: 0

## 2025-09-05 ASSESSMENT — PAIN DESCRIPTION - LOCATION: LOCATION: BACK

## 2025-09-05 ASSESSMENT — PAIN - FUNCTIONAL ASSESSMENT: PAIN_FUNCTIONAL_ASSESSMENT: 0-10

## 2025-09-06 VITALS
DIASTOLIC BLOOD PRESSURE: 56 MMHG | OXYGEN SATURATION: 97 % | SYSTOLIC BLOOD PRESSURE: 118 MMHG | BODY MASS INDEX: 27.92 KG/M2 | HEART RATE: 61 BPM | WEIGHT: 188.49 LBS | RESPIRATION RATE: 22 BRPM | TEMPERATURE: 97.3 F | HEIGHT: 69 IN

## 2025-09-06 LAB
ANION GAP SERPL CALCULATED.3IONS-SCNC: 10 MMOL/L (ref 9–16)
BASOPHILS # BLD: 0 K/UL (ref 0–0.2)
BASOPHILS NFR BLD: 0 % (ref 0–2)
BUN SERPL-MCNC: 29 MG/DL (ref 8–23)
CALCIUM SERPL-MCNC: 8.6 MG/DL (ref 8.6–10.4)
CHLORIDE SERPL-SCNC: 105 MMOL/L (ref 98–107)
CO2 SERPL-SCNC: 20 MMOL/L (ref 20–31)
CREAT SERPL-MCNC: 0.9 MG/DL (ref 0.7–1.2)
EOSINOPHIL # BLD: 0 K/UL (ref 0–0.44)
EOSINOPHILS RELATIVE PERCENT: 0 % (ref 1–4)
ERYTHROCYTE [DISTWIDTH] IN BLOOD BY AUTOMATED COUNT: 16 % (ref 11.8–14.4)
GFR, ESTIMATED: >90 ML/MIN/1.73M2
GLUCOSE SERPL-MCNC: 88 MG/DL (ref 74–99)
HCT VFR BLD AUTO: 37 % (ref 40.7–50.3)
HGB BLD-MCNC: 11.7 G/DL (ref 13–17)
IMM GRANULOCYTES # BLD AUTO: 0.27 K/UL (ref 0–0.3)
IMM GRANULOCYTES NFR BLD: 3 %
LYMPHOCYTES NFR BLD: 0.55 K/UL (ref 1.1–3.7)
LYMPHOCYTES RELATIVE PERCENT: 6 % (ref 24–43)
MCH RBC QN AUTO: 30.2 PG (ref 25.2–33.5)
MCHC RBC AUTO-ENTMCNC: 31.6 G/DL (ref 28.4–34.8)
MCV RBC AUTO: 95.6 FL (ref 82.6–102.9)
MONOCYTES NFR BLD: 0.46 K/UL (ref 0.1–1.2)
MONOCYTES NFR BLD: 5 % (ref 3–12)
MORPHOLOGY: ABNORMAL
MORPHOLOGY: ABNORMAL
NEUTROPHILS NFR BLD: 86 % (ref 36–65)
NEUTS SEG NFR BLD: 7.82 K/UL (ref 1.5–8.1)
NRBC BLD-RTO: 0 PER 100 WBC
PLATELET # BLD AUTO: 127 K/UL (ref 138–453)
PMV BLD AUTO: 9.7 FL (ref 8.1–13.5)
POTASSIUM SERPL-SCNC: 5.2 MMOL/L (ref 3.7–5.3)
RBC # BLD AUTO: 3.87 M/UL (ref 4.21–5.77)
SODIUM SERPL-SCNC: 135 MMOL/L (ref 136–145)
WBC OTHER # BLD: 9.1 K/UL (ref 3.5–11.3)

## 2025-09-06 PROCEDURE — 99232 SBSQ HOSP IP/OBS MODERATE 35: CPT | Performed by: INTERNAL MEDICINE

## 2025-09-06 PROCEDURE — 85025 COMPLETE CBC W/AUTO DIFF WBC: CPT

## 2025-09-06 PROCEDURE — 6370000000 HC RX 637 (ALT 250 FOR IP)

## 2025-09-06 PROCEDURE — 80048 BASIC METABOLIC PNL TOTAL CA: CPT

## 2025-09-06 PROCEDURE — 36415 COLL VENOUS BLD VENIPUNCTURE: CPT

## 2025-09-06 PROCEDURE — 6360000002 HC RX W HCPCS: Performed by: NURSE PRACTITIONER

## 2025-09-06 PROCEDURE — 2700000000 HC OXYGEN THERAPY PER DAY

## 2025-09-06 PROCEDURE — 2500000003 HC RX 250 WO HCPCS

## 2025-09-06 PROCEDURE — 6360000002 HC RX W HCPCS

## 2025-09-06 PROCEDURE — 94761 N-INVAS EAR/PLS OXIMETRY MLT: CPT

## 2025-09-06 RX ORDER — PANTOPRAZOLE SODIUM 40 MG/1
40 TABLET, DELAYED RELEASE ORAL
Status: DISCONTINUED | OUTPATIENT
Start: 2025-09-06 | End: 2025-09-06 | Stop reason: HOSPADM

## 2025-09-06 RX ADMIN — TAMSULOSIN HYDROCHLORIDE 0.4 MG: 0.4 CAPSULE ORAL at 08:37

## 2025-09-06 RX ADMIN — ATORVASTATIN CALCIUM 40 MG: 40 TABLET, FILM COATED ORAL at 08:37

## 2025-09-06 RX ADMIN — PANTOPRAZOLE SODIUM 40 MG: 40 TABLET, DELAYED RELEASE ORAL at 08:37

## 2025-09-06 RX ADMIN — METOPROLOL TARTRATE 25 MG: 25 TABLET, FILM COATED ORAL at 08:37

## 2025-09-06 RX ADMIN — HEPARIN SODIUM 5000 UNITS: 5000 INJECTION, SOLUTION INTRAVENOUS; SUBCUTANEOUS at 05:33

## 2025-09-06 RX ADMIN — LEVOTHYROXINE SODIUM 25 MCG: 0.05 TABLET ORAL at 05:33

## 2025-09-06 RX ADMIN — DEXAMETHASONE SODIUM PHOSPHATE 4 MG: 4 INJECTION INTRA-ARTICULAR; INTRALESIONAL; INTRAMUSCULAR; INTRAVENOUS; SOFT TISSUE at 05:33

## 2025-09-06 RX ADMIN — DEXAMETHASONE SODIUM PHOSPHATE 4 MG: 4 INJECTION INTRA-ARTICULAR; INTRALESIONAL; INTRAMUSCULAR; INTRAVENOUS; SOFT TISSUE at 08:37

## 2025-09-06 RX ADMIN — SODIUM CHLORIDE, PRESERVATIVE FREE 10 ML: 5 INJECTION INTRAVENOUS at 08:38

## 2025-09-06 ASSESSMENT — ENCOUNTER SYMPTOMS
WHEEZING: 0
BACK PAIN: 1
RESPIRATORY NEGATIVE: 1
VOMITING: 0
EYES NEGATIVE: 1
SHORTNESS OF BREATH: 0
ABDOMINAL PAIN: 0
GASTROINTESTINAL NEGATIVE: 1
DIARRHEA: 0
ABDOMINAL DISTENTION: 0
NAUSEA: 0
COUGH: 0
CONSTIPATION: 0

## 2025-09-06 ASSESSMENT — PAIN SCALES - GENERAL
PAINLEVEL_OUTOF10: 0
PAINLEVEL_OUTOF10: 0

## 2025-09-07 PROBLEM — Z01.818 PRE-OP EVALUATION: Status: ACTIVE | Noted: 2025-09-07

## 2025-09-07 LAB
MICROORGANISM SPEC CULT: NORMAL
MICROORGANISM SPEC CULT: NORMAL
SERVICE CMNT-IMP: NORMAL
SERVICE CMNT-IMP: NORMAL
SPECIMEN DESCRIPTION: NORMAL
SPECIMEN DESCRIPTION: NORMAL

## (undated) DEVICE — SUTURE PROL SZ 7-0 L24IN NONABSORBABLE BLU L8MM BV175-6 3/8 8735H

## (undated) DEVICE — APPLICATOR MEDICATED 26 CC SOLUTION HI LT ORNG CHLORAPREP

## (undated) DEVICE — GOWN,AURORA,NONREINFORCED,LARGE: Brand: MEDLINE

## (undated) DEVICE — INSUFFLATION TUBING SET WITH FILTER, FUNNEL CONNECTOR AND LUER LOCK: Brand: JOSNOE MEDICAL INC

## (undated) DEVICE — COLLECTOR SPEC RAYON LIQ STUART DBL FOR THRT VAG SKIN WND

## (undated) DEVICE — CONNECTOR TBNG WHT PLAS SUCT STR 5IN1 LTWT W/ M CONN

## (undated) DEVICE — GLOVE SURG SZ 7 CRM LTX FREE POLYISOPRENE POLYMER BEAD ANTI

## (undated) DEVICE — ADAPTER PERF L7.5IN INLET LEG 3IN Y TYP VENT CLR CODE CLMP

## (undated) DEVICE — TTL1LYR 16FR10ML 100%SIL TMPST TR: Brand: MEDLINE

## (undated) DEVICE — PROTECTOR ULN NRV PUR FOAM HK LOOP STRP ANATOMICALLY

## (undated) DEVICE — POSITIONER,HEAD,MULTIRING,36CS: Brand: MEDLINE

## (undated) DEVICE — SWAB CULT CLR BLU PLAS RAYON LIQ AMIES AERB ANAERB FRIC CAP

## (undated) DEVICE — PACK PROCEDURE SURG OPN HRT

## (undated) DEVICE — SUTURE VCRL + SZ 4-0 L18IN ABSRB UD L19MM PS-2 3/8 CIR PRIM VCP496H

## (undated) DEVICE — CANNULA PERF 20FR L3/8IN ELONG TAPR DIFFUSE TIP WIREWOUND

## (undated) DEVICE — SUTURE VCRL + SZ 3-0 L27IN ABSRB UD L26MM SH 1/2 CIR VCP416H

## (undated) DEVICE — BLADE SAW W6.35XL32MM STRNM CUT STRNOTMY

## (undated) DEVICE — GOWN,SIRUS,NONRNF,SETINSLV,XL,20/CS: Brand: MEDLINE

## (undated) DEVICE — COVER,LIGHT HANDLE,FLX,2/PK: Brand: MEDLINE INDUSTRIES, INC.

## (undated) DEVICE — SUTURE VCRL + SZ 2-0 L27IN ABSRB CLR CT-1 1/2 CIR TAPERCUT VCP259H

## (undated) DEVICE — PACK PROCEDURE SURG OPN HRT ADD ON

## (undated) DEVICE — BLADE ES L6IN ELASTOMERIC COAT EXT DURABLE BEND UPTO 90DEG

## (undated) DEVICE — TOWEL,OR,DSP,ST,BLUE,DLX,XR,4/PK,20PK/CS: Brand: MEDLINE

## (undated) DEVICE — MPS® DELIVERY SET W/ARREST AGENT AND ADDITIVE CASSETTES, HEAT EXCHANGER & 10 FT. DELIVERY TUBING: Brand: MPS

## (undated) DEVICE — Z INACTIVE OBSOLETE PER MEDTRONIC CUSTOM PK HY8C67R11 VAVD PK

## (undated) DEVICE — SPONGE LAP W18XL18IN WHT COT 4 PLY FLD STRUNG RADPQ DISP ST

## (undated) DEVICE — GLOVE SURG SZ 75 CRM LTX FREE POLYISOPRENE POLYMER BEAD ANTI

## (undated) DEVICE — BNDG,ELSTC,MATRIX,STRL,6"X5YD,LF,HOOK&LP: Brand: MEDLINE

## (undated) DEVICE — CATHETER,URETHRAL,REDRUBBER,STRL,18FR: Brand: MEDLINE

## (undated) DEVICE — LEAD PACE L475MM CHNL A OR V MYOCARDIAL STEROID ELUT SIL

## (undated) DEVICE — BLADE OPHTH D5MM 15DEG GRN W/ RND KNURLED HNDL MICRO-SHARP

## (undated) DEVICE — SUTURE PROL SZ 4-0 L36IN NONABSORBABLE BLU L26MM SH 1/2 CIR 8521H

## (undated) DEVICE — GLOVE SURG SZ 75 L12IN FNGR THK79MIL GRN LTX FREE

## (undated) DEVICE — 1/4 FORCE SURGICAL SPRING CLIP: Brand: STEALTH® SPRING CLIP

## (undated) DEVICE — Z DISCONTINUED NO SUB IDED DRAIN SURG 2 COLL PT TB FOR ATS BG OASIS

## (undated) DEVICE — WAX SURG 2.5GM HEMSTAT BNE BEESWAX PARAFFIN ISO PALMITATE

## (undated) DEVICE — CLIP LIG M BLU TI HRT SHP WIRE HORZ 180 PER BX

## (undated) DEVICE — SUTURE MCRYL SZ 4-0 L18IN ABSRB UD L19MM PS-2 3/8 CIR PRIM Y496G

## (undated) DEVICE — SS SUTURE, 3 PER SLEEVE: Brand: MYO/WIRE II

## (undated) DEVICE — AVID DUAL STAGE VENOUS DRAINAGE CANNULA: Brand: AVID DUAL STAGE VENOUS DRAINAGE CANNULA

## (undated) DEVICE — BNDG,ELSTC,MATRIX,STRL,4"X5YD,LF,HOOK&LP: Brand: MEDLINE

## (undated) DEVICE — APPLICATOR MEDICATED 10.5 CC SOLUTION HI LT ORNG CHLORAPREP

## (undated) DEVICE — KIT APPL 11:1 PROC W/ FIBRIJET MED CUP APPL TIP TY

## (undated) DEVICE — SUTURE PERMA-HAND SZ 0 L18IN NONABSORBABLE BLK CT-2 L26MM C027D

## (undated) DEVICE — GLOVE SURG SZ 65 L12IN FNGR THK79MIL GRN LTX FREE

## (undated) DEVICE — GLOVE SURG SZ 7.5 L11.73IN FNGR THK9.8MIL STRW LTX POLYMER

## (undated) DEVICE — TUBE CARDIAC SUCTION 6FR SOFT TIP 10FR

## (undated) DEVICE — CONNECTOR HAD 1/2X1/2IN EQL STR

## (undated) DEVICE — SUTURE VCRL + SZ 4-0 L27IN ABSRB UD L26MM SH 1/2 CIR VCP415H

## (undated) DEVICE — Device

## (undated) DEVICE — TUBING, SUCTION, 9/32" X 20', STRAIGHT: Brand: MEDLINE INDUSTRIES, INC.

## (undated) DEVICE — ADHESIVE SKIN CLOSURE TOP 36 CC HI VISC DERMBND MINI

## (undated) DEVICE — .: Brand: PERFECTCUT AORTOTOMY SYSTEM

## (undated) DEVICE — SUTURE PROL SZ 5-0 L30IN NONABSORBABLE BLU L13MM RB-2 1/2 8710H

## (undated) DEVICE — TIP APPL GEL PLT ENDO 5MMX32CM

## (undated) DEVICE — SUTURE PROL SZ 6-0 L18IN NONABSORBABLE BLU RB-2 L13MM 1/2 8714H

## (undated) DEVICE — CANNULA PERF L2IN BLNT TIP 2MM VES CLR RADPQ BODY FEM LUER

## (undated) DEVICE — PLATELET CONCENTRATION PACK PROC 14-20 ML SMARTPREP 2

## (undated) DEVICE — GLOVE SURG SZ 7 L12IN FNGR THK79MIL GRN LTX FREE

## (undated) DEVICE — RETRACTOR SURG INSRT SUT HLD OCTOBASE

## (undated) DEVICE — Device: Brand: VIRTUOSAPH PLUS WITH RADIAL INDICATION

## (undated) DEVICE — CANNULA PERFUSION 5.5IN 9FR AORTIC ROOT

## (undated) DEVICE — GLOVE SURG SZ 8 CRM LTX FREE POLYISOPRENE POLYMER BEAD ANTI

## (undated) DEVICE — SUTURE NONABSORBABLE MONOFILAMENT 4-0 RB-1 36 IN BLU PROLENE 8557H

## (undated) DEVICE — DRAPE SLUSH DISC W44XL66IN ST FOR RND BSIN HUSH SLUSH SYS

## (undated) DEVICE — CANNULA TIP SPRY MAGELLAN

## (undated) DEVICE — INTENDED FOR TISSUE SEPARATION, AND OTHER PROCEDURES THAT REQUIRE A SHARP SURGICAL BLADE TO PUNCTURE OR CUT.: Brand: BARD-PARKER ® CARBON RIB-BACK BLADES

## (undated) DEVICE — HEMOCONCENTERATOR PERF W TBNG AND ADPT SET 400 MEDIVATORS

## (undated) DEVICE — Z DISCONTINUED BY MEDLINE USE 2711682 TRAY SKIN PREP DRY W/ PREM GLV

## (undated) DEVICE — SUMP INTCARD W/ 1/4INCH CONN 20FRENCH 15INCH DLP

## (undated) DEVICE — SUTURE ETHIB EXCL BR GRN TAPR PT 2-0 30 X563H X563H

## (undated) DEVICE — GOWN,SIRUS,NONRNF,SETINSLV,2XL,18/CS: Brand: MEDLINE

## (undated) DEVICE — BLADE OPHTH ORNG GRINDLESS SMALLER ALTERNATIVE TO NO15 GEN

## (undated) DEVICE — SUTURE PROL SZ 7 0 L24IN NONABSORBABLE BLU BV175 6 L8MM 3 8 EP8735H

## (undated) DEVICE — GLOVE SURG SZ 6 CRM LTX FREE POLYISOPRENE POLYMER BEAD ANTI

## (undated) DEVICE — AGENT HEMSTAT W2XL4IN OXIDIZED REGENERATED CELOS ABSRB SFT

## (undated) DEVICE — DRAIN,WOUND,ROUND,24FR,5/16",FULL-FLUTED: Brand: MEDLINE

## (undated) DEVICE — SUTURE PDS II SZ 0 L27IN ABSRB VLT L36MM CT-1 1/2 CIR Z340H

## (undated) DEVICE — PLEDGET SURG W3.5XL7MM THK1.5MM WHT PTFE RECT FIRM TFE